# Patient Record
Sex: MALE | Race: WHITE | NOT HISPANIC OR LATINO | Employment: OTHER | URBAN - METROPOLITAN AREA
[De-identification: names, ages, dates, MRNs, and addresses within clinical notes are randomized per-mention and may not be internally consistent; named-entity substitution may affect disease eponyms.]

---

## 2019-08-19 ENCOUNTER — HOSPITAL ENCOUNTER (EMERGENCY)
Facility: HOSPITAL | Age: 59
Discharge: HOME | End: 2019-08-19
Attending: EMERGENCY MEDICINE
Payer: MEDICAID

## 2019-08-19 VITALS
BODY MASS INDEX: 25.87 KG/M2 | TEMPERATURE: 99 F | OXYGEN SATURATION: 98 % | WEIGHT: 191 LBS | RESPIRATION RATE: 18 BRPM | HEIGHT: 72 IN | SYSTOLIC BLOOD PRESSURE: 131 MMHG | DIASTOLIC BLOOD PRESSURE: 88 MMHG | HEART RATE: 56 BPM

## 2019-08-19 DIAGNOSIS — R19.7 DIARRHEA, UNSPECIFIED TYPE: Primary | ICD-10-CM

## 2019-08-19 LAB
ALBUMIN SERPL BCP-MCNC: 3.5 G/DL (ref 3.5–5.2)
ALBUMIN SERPL BCP-MCNC: 3.9 G/DL (ref 3.5–5.2)
ALP SERPL-CCNC: 52 U/L (ref 55–135)
ALP SERPL-CCNC: 58 U/L (ref 55–135)
ALT SERPL W/O P-5'-P-CCNC: 15 U/L (ref 10–44)
ALT SERPL W/O P-5'-P-CCNC: 16 U/L (ref 10–44)
ANION GAP SERPL CALC-SCNC: 5 MMOL/L (ref 8–16)
ANION GAP SERPL CALC-SCNC: 8 MMOL/L (ref 8–16)
AST SERPL-CCNC: 20 U/L (ref 10–40)
AST SERPL-CCNC: 22 U/L (ref 10–40)
BACTERIA #/AREA URNS HPF: NEGATIVE /HPF
BASOPHILS # BLD AUTO: 0.03 K/UL (ref 0–0.2)
BASOPHILS NFR BLD: 0.5 % (ref 0–1.9)
BILIRUB SERPL-MCNC: 1.2 MG/DL (ref 0.1–1)
BILIRUB SERPL-MCNC: 1.4 MG/DL (ref 0.1–1)
BILIRUB UR QL STRIP: NEGATIVE
BUN SERPL-MCNC: 11 MG/DL (ref 8–23)
BUN SERPL-MCNC: 11 MG/DL (ref 8–23)
CALCIUM SERPL-MCNC: 8.4 MG/DL (ref 8.7–10.5)
CALCIUM SERPL-MCNC: 9.1 MG/DL (ref 8.7–10.5)
CHLORIDE SERPL-SCNC: 103 MMOL/L (ref 95–110)
CHLORIDE SERPL-SCNC: 106 MMOL/L (ref 95–110)
CLARITY UR: CLEAR
CO2 SERPL-SCNC: 26 MMOL/L (ref 23–29)
CO2 SERPL-SCNC: 27 MMOL/L (ref 23–29)
COLOR UR: YELLOW
CREAT SERPL-MCNC: 1 MG/DL (ref 0.5–1.4)
CREAT SERPL-MCNC: 1 MG/DL (ref 0.5–1.4)
DIFFERENTIAL METHOD: ABNORMAL
EOSINOPHIL # BLD AUTO: 0.2 K/UL (ref 0–0.5)
EOSINOPHIL NFR BLD: 2.4 % (ref 0–8)
ERYTHROCYTE [DISTWIDTH] IN BLOOD BY AUTOMATED COUNT: 13.4 % (ref 11.5–14.5)
EST. GFR  (AFRICAN AMERICAN): >60 ML/MIN/1.73 M^2
EST. GFR  (AFRICAN AMERICAN): >60 ML/MIN/1.73 M^2
EST. GFR  (NON AFRICAN AMERICAN): >60 ML/MIN/1.73 M^2
EST. GFR  (NON AFRICAN AMERICAN): >60 ML/MIN/1.73 M^2
GLUCOSE SERPL-MCNC: 92 MG/DL (ref 70–110)
GLUCOSE SERPL-MCNC: 97 MG/DL (ref 70–110)
GLUCOSE UR QL STRIP: NEGATIVE
HCT VFR BLD AUTO: 46.3 % (ref 40–54)
HGB BLD-MCNC: 15.6 G/DL (ref 14–18)
HGB UR QL STRIP: ABNORMAL
HYALINE CASTS #/AREA URNS LPF: 4 /LPF
IMM GRANULOCYTES # BLD AUTO: 0.01 K/UL (ref 0–0.04)
IMM GRANULOCYTES NFR BLD AUTO: 0.2 % (ref 0–0.5)
KETONES UR QL STRIP: NEGATIVE
LEUKOCYTE ESTERASE UR QL STRIP: NEGATIVE
LIPASE SERPL-CCNC: 34 U/L (ref 4–60)
LYMPHOCYTES # BLD AUTO: 1.8 K/UL (ref 1–4.8)
LYMPHOCYTES NFR BLD: 28 % (ref 18–48)
MCH RBC QN AUTO: 31.8 PG (ref 27–31)
MCHC RBC AUTO-ENTMCNC: 33.7 G/DL (ref 32–36)
MCV RBC AUTO: 95 FL (ref 82–98)
MICROSCOPIC COMMENT: ABNORMAL
MONOCYTES # BLD AUTO: 0.8 K/UL (ref 0.3–1)
MONOCYTES NFR BLD: 12.5 % (ref 4–15)
NEUTROPHILS # BLD AUTO: 3.5 K/UL (ref 1.8–7.7)
NEUTROPHILS NFR BLD: 56.4 % (ref 38–73)
NITRITE UR QL STRIP: NEGATIVE
NRBC BLD-RTO: 0 /100 WBC
PH UR STRIP: 8 [PH] (ref 5–8)
PLATELET # BLD AUTO: 239 K/UL (ref 150–350)
PMV BLD AUTO: 9.3 FL (ref 9.2–12.9)
POTASSIUM SERPL-SCNC: 4.1 MMOL/L (ref 3.5–5.1)
POTASSIUM SERPL-SCNC: 4.2 MMOL/L (ref 3.5–5.1)
PROT SERPL-MCNC: 6.4 G/DL (ref 6–8.4)
PROT SERPL-MCNC: 7.3 G/DL (ref 6–8.4)
PROT UR QL STRIP: ABNORMAL
RBC # BLD AUTO: 4.9 M/UL (ref 4.6–6.2)
RBC #/AREA URNS HPF: 6 /HPF (ref 0–4)
SODIUM SERPL-SCNC: 137 MMOL/L (ref 136–145)
SODIUM SERPL-SCNC: 138 MMOL/L (ref 136–145)
SP GR UR STRIP: 1.02 (ref 1–1.03)
SQUAMOUS #/AREA URNS HPF: 1 /HPF
URN SPEC COLLECT METH UR: ABNORMAL
UROBILINOGEN UR STRIP-ACNC: NEGATIVE EU/DL
WBC # BLD AUTO: 6.25 K/UL (ref 3.9–12.7)
WBC #/AREA URNS HPF: 1 /HPF (ref 0–5)

## 2019-08-19 PROCEDURE — 85025 COMPLETE CBC W/AUTO DIFF WBC: CPT

## 2019-08-19 PROCEDURE — 80053 COMPREHEN METABOLIC PANEL: CPT

## 2019-08-19 PROCEDURE — 81001 URINALYSIS AUTO W/SCOPE: CPT

## 2019-08-19 PROCEDURE — 25000003 PHARM REV CODE 250: Performed by: EMERGENCY MEDICINE

## 2019-08-19 PROCEDURE — 36415 COLL VENOUS BLD VENIPUNCTURE: CPT

## 2019-08-19 PROCEDURE — 96374 THER/PROPH/DIAG INJ IV PUSH: CPT | Mod: 59

## 2019-08-19 PROCEDURE — 63600175 PHARM REV CODE 636 W HCPCS: Performed by: EMERGENCY MEDICINE

## 2019-08-19 PROCEDURE — 80053 COMPREHEN METABOLIC PANEL: CPT | Mod: 59

## 2019-08-19 PROCEDURE — 83690 ASSAY OF LIPASE: CPT

## 2019-08-19 PROCEDURE — 96361 HYDRATE IV INFUSION ADD-ON: CPT

## 2019-08-19 PROCEDURE — 99285 EMERGENCY DEPT VISIT HI MDM: CPT | Mod: 25

## 2019-08-19 PROCEDURE — 25500020 PHARM REV CODE 255: Performed by: EMERGENCY MEDICINE

## 2019-08-19 RX ORDER — LISINOPRIL AND HYDROCHLOROTHIAZIDE 12.5; 2 MG/1; MG/1
1 TABLET ORAL
Status: ON HOLD | COMMUNITY
End: 2022-01-04 | Stop reason: HOSPADM

## 2019-08-19 RX ORDER — ONDANSETRON 2 MG/ML
4 INJECTION INTRAMUSCULAR; INTRAVENOUS
Status: COMPLETED | OUTPATIENT
Start: 2019-08-19 | End: 2019-08-19

## 2019-08-19 RX ORDER — SIMVASTATIN 20 MG/1
20 TABLET, FILM COATED ORAL NIGHTLY
COMMUNITY
End: 2021-07-13

## 2019-08-19 RX ORDER — HYOSCYAMINE SULFATE 0.12 MG/1
0.12 TABLET SUBLINGUAL
Status: COMPLETED | OUTPATIENT
Start: 2019-08-19 | End: 2019-08-19

## 2019-08-19 RX ADMIN — IOHEXOL 100 ML: 350 INJECTION, SOLUTION INTRAVENOUS at 10:08

## 2019-08-19 RX ADMIN — HYOSCYAMINE SULFATE 0.12 MG: 0.12 TABLET ORAL at 09:08

## 2019-08-19 RX ADMIN — SODIUM CHLORIDE 1000 ML: 0.9 INJECTION, SOLUTION INTRAVENOUS at 09:08

## 2019-08-19 RX ADMIN — ONDANSETRON 4 MG: 2 INJECTION INTRAMUSCULAR; INTRAVENOUS at 09:08

## 2019-08-19 NOTE — DISCHARGE INSTRUCTIONS
Please follow-up with your primary care provider and your gastroenterologist for further evaluation and definitive care  Return if your condition becomes worse or for any concerns.

## 2019-08-19 NOTE — ED PROVIDER NOTES
Encounter Date: 8/19/2019       History 59-year-old male presents to the emergency department with complaint of lower abdominal pain and cramping associated with diarrhea patient states he had a as outpatient colonoscopy performed in December he states that his doctor told him he had something abnormal on his colonoscopy which was removed he states he has had intermittent loose stools since that time however reports for 1 week he has had persistent diarrhea patient denies associated fevers or weight loss.  Denies melanotic stools he states he did have some bright red blood in his stool last week for 1 day but attributed to possibly straining.     Chief Complaint   Patient presents with    Abdominal Pain     X 3 DAYS    Diarrhea     HPI  Review of patient's allergies indicates:  No Known Allergies  Past Medical History:   Diagnosis Date    Enlarged prostate     Hypertension     Kidney stone     Thyroid disease      Past Surgical History:   Procedure Laterality Date    THYROIDECTOMY, PARTIAL       No family history on file.  Social History     Tobacco Use    Smoking status: Not on file   Substance Use Topics    Alcohol use: Not on file    Drug use: Not on file     Review of Systems   Constitutional: Negative for fever and unexpected weight change.   HENT: Negative.    Respiratory: Negative.    Cardiovascular: Negative.    Gastrointestinal: Positive for abdominal pain, diarrhea and nausea.   Genitourinary: Negative.    Musculoskeletal: Negative.    Hematological: Negative.        Physical Exam     Initial Vitals [08/19/19 0851]   BP Pulse Resp Temp SpO2   (!) 142/97 61 16 97.9 °F (36.6 °C) 96 %      MAP       --         Physical Exam    Nursing note and vitals reviewed.  Constitutional: He appears well-developed and well-nourished.   HENT:   Head: Normocephalic.   Eyes: EOM are normal. Pupils are equal, round, and reactive to light.   Cardiovascular: Normal rate and regular rhythm.   Pulmonary/Chest: Effort  normal and breath sounds normal.   Abdominal: Soft. Normal appearance and bowel sounds are normal. He exhibits no distension, no ascites, no pulsatile midline mass and no mass. There is no hepatosplenomegaly. There is tenderness in the left lower quadrant. There is no rigidity, no rebound, no guarding and no tenderness at McBurney's point.   Neurological: He is alert and oriented to person, place, and time.   Skin: Skin is warm and dry.   Psychiatric: He has a normal mood and affect. His speech is normal and behavior is normal.         ED Course   Procedures  Labs Reviewed   CBC W/ AUTO DIFFERENTIAL   COMPREHENSIVE METABOLIC PANEL   LIPASE   URINALYSIS, REFLEX TO URINE CULTURE          Imaging Results    None          Medical Decision Making:   Initial Assessment:   Diarrhea  Differential Diagnosis:   Gastroenteritis, diverticulitis , appendicitis, colitis  Clinical Tests:   Lab Tests: Ordered and Reviewed  Radiological Study: Ordered and Reviewed  ED Management:  59-year-old male presents to the emergency room with a complaint of diarrhea that has been ongoing since December he states for the last few days he has experienced more loose stools than normal he states he has had 1 episode today.  Patient has had no associated vomiting or weight loss.  His labs are unremarkable CT imaging reveals no acute pathology including diverticulitis appendicitis patient does have incidental renal cyst noted. Patient was instructed to follow up with  who is his gastroenterologist for further evaluation and definitive care.  He was given detailed return precautions.  The patient has not had a bowel movement therefore no stool studies were able to be analyzed.                      Clinical Impression:       ICD-10-CM ICD-9-CM   1. Diarrhea, unspecified type R19.7 787.91                                Katy Rabago, [facility]  08/19/19 1208

## 2019-11-07 PROBLEM — N40.1 BPH WITH URINARY OBSTRUCTION: Status: ACTIVE | Noted: 2019-11-07

## 2019-11-07 PROBLEM — N13.8 BPH WITH URINARY OBSTRUCTION: Status: ACTIVE | Noted: 2019-11-07

## 2020-06-29 DIAGNOSIS — R26.81 UNSTEADY GAIT: ICD-10-CM

## 2020-06-29 DIAGNOSIS — G24.5 EYE TWITCH: ICD-10-CM

## 2020-06-29 DIAGNOSIS — R25.1 TREMOR OF RIGHT HAND: Primary | ICD-10-CM

## 2020-06-29 DIAGNOSIS — H02.402 PTOSIS OF LEFT EYELID: ICD-10-CM

## 2020-07-01 ENCOUNTER — HOSPITAL ENCOUNTER (OUTPATIENT)
Dept: RADIOLOGY | Facility: HOSPITAL | Age: 60
Discharge: HOME | End: 2020-07-01
Attending: CLINICAL NURSE SPECIALIST
Payer: MEDICAID

## 2020-07-01 DIAGNOSIS — R26.81 UNSTEADY GAIT: ICD-10-CM

## 2020-07-01 DIAGNOSIS — H02.402 PTOSIS OF LEFT EYELID: ICD-10-CM

## 2020-07-01 DIAGNOSIS — G24.5 EYE TWITCH: ICD-10-CM

## 2020-07-01 DIAGNOSIS — R25.1 TREMOR OF RIGHT HAND: ICD-10-CM

## 2020-07-01 PROCEDURE — 93880 EXTRACRANIAL BILAT STUDY: CPT | Mod: TC,PO

## 2020-07-01 PROCEDURE — 70551 MRI BRAIN STEM W/O DYE: CPT | Mod: TC,PO

## 2020-10-23 ENCOUNTER — OFFICE VISIT (OUTPATIENT)
Dept: URGENT CARE | Facility: CLINIC | Age: 60
End: 2020-10-23
Payer: MEDICAID

## 2020-10-23 VITALS
OXYGEN SATURATION: 97 % | WEIGHT: 192 LBS | BODY MASS INDEX: 25.33 KG/M2 | SYSTOLIC BLOOD PRESSURE: 147 MMHG | TEMPERATURE: 98 F | DIASTOLIC BLOOD PRESSURE: 97 MMHG | HEART RATE: 67 BPM | RESPIRATION RATE: 12 BRPM

## 2020-10-23 DIAGNOSIS — L08.9 INFECTED SEBACEOUS CYST: Primary | ICD-10-CM

## 2020-10-23 DIAGNOSIS — L72.3 INFECTED SEBACEOUS CYST: Primary | ICD-10-CM

## 2020-10-23 PROCEDURE — 99204 PR OFFICE/OUTPT VISIT, NEW, LEVL IV, 45-59 MIN: ICD-10-PCS | Mod: S$GLB,,, | Performed by: NURSE PRACTITIONER

## 2020-10-23 PROCEDURE — 99204 OFFICE O/P NEW MOD 45 MIN: CPT | Mod: S$GLB,,, | Performed by: NURSE PRACTITIONER

## 2020-10-23 RX ORDER — SULFAMETHOXAZOLE AND TRIMETHOPRIM 800; 160 MG/1; MG/1
1 TABLET ORAL 2 TIMES DAILY
Qty: 20 TABLET | Refills: 0 | Status: SHIPPED | OUTPATIENT
Start: 2020-10-23 | End: 2020-11-02

## 2020-10-23 NOTE — PATIENT INSTRUCTIONS
Epidermoid Cyst (Sebaceous Cyst), Infected (Antibiotic Treatment)  You have an epidermoid cyst. This is a small, painless lump under your skin. An epidermoid cyst (often called a sebaceous cyst, epidermal cyst, or epidermal inclusion cyst) is a term most often used for 2 similar types of cysts:  · Epidermoid cysts. These cysts form slowly under the skin. They can be found on most parts of the body. But they are most often found on areas with more hair such as the scalp, face, upper back, and genitals.  · Pilar cysts. These are similar to epidermoid cysts. But they start from a different part of the hair follicle. They are more likely to be on the scalp.  Here are some general facts about these cysts:  · A cyst is a sac filled with material that is often cheesy, fatty, oily, or stringy. The material inside can be thick. Or it can be a liquid.  · You can usually move the cyst slightly if you try.  · The cysts can be smaller than a pea or as large as a few inches.  · The cysts are usually not painful, unless they become inflamed or infected.  · The area around the cyst may smell bad. If the cyst breaks open, the material inside it often smells bad as well.  Your cyst became infected and your healthcare provider wanted to treat it with antibiotics. If the antibiotics dont clear up the infection, the cyst will need to be drained by making a small cut (incision). Local anesthesia will be used to numb the area.  Home care  · Resist the temptation to squeeze or pop the cyst, stick a needle in it, or cut it open. This often leads to a worsening infection and scarring.  · Take the antibiotic as directed until it is all used up.  · Soak the affected area in hot water or apply a hot pack (a thin, clean towel soaked in hot water) for 20 minutes at a time. Do this 3 to 4 times a day.  · Apply antibiotic cream or ointment 3 times a day.  · You may use over-the-counter pain medicine to control pain, unless another medicine was  given. If you have chronic liver or kidney disease or ever had a stomach ulcer or GI bleeding, talk with your healthcare provider before using these medicines.  Prevention  Once this infection has healed, reduce the risk of future infections by:  · Keeping the cyst area clean by bathing or showering daily  · Avoiding tight-fitting clothing in the cyst area  Follow-up care  Follow up with your healthcare provider, or as advised. If a gauze packing was put in your wound, it should be removed in a few days as advised by your healthcare provider. Check your wound every day for the signs listed below.  When to seek medical advice  Call your healthcare provider right away if any of these occur:  · Pus coming from the cyst  · Increasing redness around the wound  · Increasing local pain or swelling  · Fever of 100.4°F (38ºC) or higher, or as directed by your provider  Date Last Reviewed: 10/5/2016  © 9888-9363 [employer]. [address] [place] [address]. All rights reserved. This information is not intended as a substitute for professional medical care. Always follow your healthcare professional's instructions.        Epidermoid Cyst (Sebaceous Cyst), Infected (Antibiotic Treatment)  You have an epidermoid cyst. This is a small, painless lump under your skin. An epidermoid cyst (often called a sebaceous cyst, epidermal cyst, or epidermal inclusion cyst) is a term most often used for 2 similar types of cysts:  · Epidermoid cysts. These cysts form slowly under the skin. They can be found on most parts of the body. But they are most often found on areas with more hair such as the scalp, face, upper back, and genitals.  · Pilar cysts. These are similar to epidermoid cysts. But they start from a different part of the hair follicle. They are more likely to be on the scalp.  Here are some general facts about these cysts:  · A cyst is a sac filled with material that is often cheesy, fatty, oily, or stringy.  The material inside can be thick. Or it can be a liquid.  · You can usually move the cyst slightly if you try.  · The cysts can be smaller than a pea or as large as a few inches.  · The cysts are usually not painful, unless they become inflamed or infected.  · The area around the cyst may smell bad. If the cyst breaks open, the material inside it often smells bad as well.  Your cyst became infected and your healthcare provider wanted to treat it with antibiotics. If the antibiotics dont clear up the infection, the cyst will need to be drained by making a small cut (incision). Local anesthesia will be used to numb the area.  Home care  · Resist the temptation to squeeze or pop the cyst, stick a needle in it, or cut it open. This often leads to a worsening infection and scarring.  · Take the antibiotic as directed until it is all used up.  · Soak the affected area in hot water or apply a hot pack (a thin, clean towel soaked in hot water) for 20 minutes at a time. Do this 3 to 4 times a day.  · Apply antibiotic cream or ointment 3 times a day.  · You may use over-the-counter pain medicine to control pain, unless another medicine was given. If you have chronic liver or kidney disease or ever had a stomach ulcer or GI bleeding, talk with your healthcare provider before using these medicines.  Prevention  Once this infection has healed, reduce the risk of future infections by:  · Keeping the cyst area clean by bathing or showering daily  · Avoiding tight-fitting clothing in the cyst area  Follow-up care  Follow up with your healthcare provider, or as advised. If a gauze packing was put in your wound, it should be removed in a few days as advised by your healthcare provider. Check your wound every day for the signs listed below.  When to seek medical advice  Call your healthcare provider right away if any of these occur:  · Pus coming from the cyst  · Increasing redness around the wound  · Increasing local pain or  swelling  · Fever of 100.4°F (38ºC) or higher, or as directed by your provider  Date Last Reviewed: 10/5/2016  © 9015-5990 [employer]. [address] [place] [address], [place] [address]. All rights reserved. This information is not intended as a substitute for professional medical care. Always follow your healthcare professional's instructions.

## 2020-10-23 NOTE — PROGRESS NOTES
Subjective:       Patient ID: Helder Brand is a 60 y.o. male.    Vitals:  weight is 87.1 kg (192 lb). His temperature is 98.2 °F (36.8 °C). His blood pressure is 147/97 (abnormal) and his pulse is 67. His respiration is 12 and oxygen saturation is 97%.     Chief Complaint: Facial Swelling    CC:: swelling on the forehead and almost to the eye with pain and redness  Onset: 2 days   TX: none       Constitution: Negative for appetite change, chills and fever.   HENT: Negative for ear pain, congestion and sore throat.    Neck: Negative for painful lymph nodes.   Eyes: Negative for eye discharge and eye redness.   Respiratory: Negative for cough.    Gastrointestinal: Negative for vomiting and diarrhea.   Genitourinary: Negative for dysuria.   Musculoskeletal: Negative for muscle ache.   Skin: Positive for erythema and abscess. Negative for rash.   Neurological: Negative for headaches and seizures.   Hematologic/Lymphatic: Negative for swollen lymph nodes.       Objective:      Physical Exam   Constitutional: He is oriented to person, place, and time. He appears well-developed.   HENT:   Head: Normocephalic and atraumatic. Head is without abrasion, without contusion and without laceration.       Ears:   Right Ear: External ear normal.   Left Ear: External ear normal.   Nose: Nose normal.   Mouth/Throat: Oropharynx is clear and moist and mucous membranes are normal.   Eyes: Pupils are equal, round, and reactive to light. Conjunctivae, EOM and lids are normal.   Neck: Trachea normal, full passive range of motion without pain and phonation normal. Neck supple.   Cardiovascular: Normal rate, regular rhythm and normal heart sounds.   Pulmonary/Chest: Effort normal and breath sounds normal. No stridor. No respiratory distress.   Musculoskeletal: Normal range of motion.   Neurological: He is alert and oriented to person, place, and time.   Skin: Skin is warm, dry, intact and no rash. Capillary refill takes less than 2  seconds. Lesions:  erythemaabrasion, burn, bruising and ecchymosisPsychiatric: His speech is normal and behavior is normal. Judgment and thought content normal.   Nursing note and vitals reviewed.        Assessment:       1. Infected sebaceous cyst        Plan:         Infected sebaceous cyst    Other orders  -     sulfamethoxazole-trimethoprim 800-160mg (BACTRIM DS) 800-160 mg Tab; Take 1 tablet by mouth 2 (two) times daily. for 10 days  Dispense: 20 tablet; Refill: 0

## 2020-10-25 ENCOUNTER — HOSPITAL ENCOUNTER (EMERGENCY)
Facility: HOSPITAL | Age: 60
Discharge: HOME | End: 2020-10-25
Attending: EMERGENCY MEDICINE
Payer: MEDICAID

## 2020-10-25 VITALS
RESPIRATION RATE: 20 BRPM | HEIGHT: 72 IN | SYSTOLIC BLOOD PRESSURE: 120 MMHG | TEMPERATURE: 98 F | HEART RATE: 69 BPM | OXYGEN SATURATION: 95 % | BODY MASS INDEX: 26.01 KG/M2 | WEIGHT: 192 LBS | DIASTOLIC BLOOD PRESSURE: 87 MMHG

## 2020-10-25 DIAGNOSIS — L02.01 FACIAL ABSCESS: Primary | ICD-10-CM

## 2020-10-25 LAB
ALBUMIN SERPL BCP-MCNC: 4.2 G/DL (ref 3.5–5.2)
ALP SERPL-CCNC: 69 U/L (ref 55–135)
ALT SERPL W/O P-5'-P-CCNC: 17 U/L (ref 10–44)
ANION GAP SERPL CALC-SCNC: 6 MMOL/L (ref 8–16)
AST SERPL-CCNC: 21 U/L (ref 10–40)
BASOPHILS # BLD AUTO: 0.06 K/UL (ref 0–0.2)
BASOPHILS NFR BLD: 0.6 % (ref 0–1.9)
BILIRUB SERPL-MCNC: 1.2 MG/DL (ref 0.1–1)
BUN SERPL-MCNC: 14 MG/DL (ref 8–23)
CALCIUM SERPL-MCNC: 9 MG/DL (ref 8.7–10.5)
CHLORIDE SERPL-SCNC: 104 MMOL/L (ref 95–110)
CO2 SERPL-SCNC: 25 MMOL/L (ref 23–29)
CREAT SERPL-MCNC: 1.2 MG/DL (ref 0.5–1.4)
DIFFERENTIAL METHOD: ABNORMAL
EOSINOPHIL # BLD AUTO: 0.2 K/UL (ref 0–0.5)
EOSINOPHIL NFR BLD: 2.3 % (ref 0–8)
ERYTHROCYTE [DISTWIDTH] IN BLOOD BY AUTOMATED COUNT: 13.4 % (ref 11.5–14.5)
EST. GFR  (AFRICAN AMERICAN): >60 ML/MIN/1.73 M^2
EST. GFR  (NON AFRICAN AMERICAN): >60 ML/MIN/1.73 M^2
GLUCOSE SERPL-MCNC: 128 MG/DL (ref 70–110)
HCT VFR BLD AUTO: 50 % (ref 40–54)
HGB BLD-MCNC: 17.1 G/DL (ref 14–18)
IMM GRANULOCYTES # BLD AUTO: 0.02 K/UL (ref 0–0.04)
IMM GRANULOCYTES NFR BLD AUTO: 0.2 % (ref 0–0.5)
LYMPHOCYTES # BLD AUTO: 2.3 K/UL (ref 1–4.8)
LYMPHOCYTES NFR BLD: 24 % (ref 18–48)
MCH RBC QN AUTO: 31.5 PG (ref 27–31)
MCHC RBC AUTO-ENTMCNC: 34.2 G/DL (ref 32–36)
MCV RBC AUTO: 92 FL (ref 82–98)
MONOCYTES # BLD AUTO: 1.1 K/UL (ref 0.3–1)
MONOCYTES NFR BLD: 11.6 % (ref 4–15)
NEUTROPHILS # BLD AUTO: 5.8 K/UL (ref 1.8–7.7)
NEUTROPHILS NFR BLD: 61.3 % (ref 38–73)
NRBC BLD-RTO: 0 /100 WBC
PLATELET # BLD AUTO: 258 K/UL (ref 150–350)
PMV BLD AUTO: 9.5 FL (ref 9.2–12.9)
POTASSIUM SERPL-SCNC: 3.7 MMOL/L (ref 3.5–5.1)
PROT SERPL-MCNC: 8 G/DL (ref 6–8.4)
RBC # BLD AUTO: 5.43 M/UL (ref 4.6–6.2)
SODIUM SERPL-SCNC: 135 MMOL/L (ref 136–145)
WBC # BLD AUTO: 9.45 K/UL (ref 3.9–12.7)

## 2020-10-25 PROCEDURE — 10060 I&D ABSCESS SIMPLE/SINGLE: CPT

## 2020-10-25 PROCEDURE — 25000003 PHARM REV CODE 250: Performed by: EMERGENCY MEDICINE

## 2020-10-25 PROCEDURE — 96365 THER/PROPH/DIAG IV INF INIT: CPT

## 2020-10-25 PROCEDURE — 96366 THER/PROPH/DIAG IV INF ADDON: CPT | Mod: 59

## 2020-10-25 PROCEDURE — 87070 CULTURE OTHR SPECIMN AEROBIC: CPT

## 2020-10-25 PROCEDURE — 63600175 PHARM REV CODE 636 W HCPCS: Performed by: EMERGENCY MEDICINE

## 2020-10-25 PROCEDURE — 99284 EMERGENCY DEPT VISIT MOD MDM: CPT | Mod: 25

## 2020-10-25 PROCEDURE — 85025 COMPLETE CBC W/AUTO DIFF WBC: CPT

## 2020-10-25 PROCEDURE — 80053 COMPREHEN METABOLIC PANEL: CPT

## 2020-10-25 RX ORDER — HYDROCODONE BITARTRATE AND ACETAMINOPHEN 5; 325 MG/1; MG/1
1 TABLET ORAL EVERY 6 HOURS PRN
Qty: 12 TABLET | Refills: 0 | Status: SHIPPED | OUTPATIENT
Start: 2020-10-25 | End: 2021-07-13

## 2020-10-25 RX ORDER — MUPIROCIN 20 MG/G
OINTMENT TOPICAL 3 TIMES DAILY
Qty: 22 G | Refills: 0 | Status: SHIPPED | OUTPATIENT
Start: 2020-10-25 | End: 2021-07-13

## 2020-10-25 RX ORDER — LIDOCAINE HYDROCHLORIDE 10 MG/ML
10 INJECTION, SOLUTION EPIDURAL; INFILTRATION; INTRACAUDAL; PERINEURAL
Status: COMPLETED | OUTPATIENT
Start: 2020-10-25 | End: 2020-10-25

## 2020-10-25 RX ORDER — VANCOMYCIN HCL IN 5 % DEXTROSE 1G/250ML
1000 PLASTIC BAG, INJECTION (ML) INTRAVENOUS ONCE
Status: COMPLETED | OUTPATIENT
Start: 2020-10-25 | End: 2020-10-25

## 2020-10-25 RX ADMIN — VANCOMYCIN HYDROCHLORIDE 1000 MG: 1 INJECTION, POWDER, LYOPHILIZED, FOR SOLUTION INTRAVENOUS at 10:10

## 2020-10-25 RX ADMIN — VANCOMYCIN HYDROCHLORIDE 500 MG: 500 INJECTION, POWDER, LYOPHILIZED, FOR SOLUTION INTRAVENOUS at 08:10

## 2020-10-25 RX ADMIN — LIDOCAINE HYDROCHLORIDE 100 MG: 10 INJECTION, SOLUTION EPIDURAL; INFILTRATION; INTRACAUDAL; PERINEURAL at 08:10

## 2020-10-25 NOTE — DISCHARGE INSTRUCTIONS
Continue Bactrim.  Elevate head above heart.  Return immediately for any fever, worsening swelling, or any other problems.  Packing removal in 48 hours.

## 2020-10-25 NOTE — ED PROVIDER NOTES
Encounter Date: 10/25/2020       History     Chief Complaint   Patient presents with    Abscess     Seen by ricardo, on antibiotics; Not getting better and pain has gotten worse, Eyes started swelling 3 days ago     Patient reports approximately 45 day history of redness and swelling to left lower forehead.  Patient went to urgent care clinic.  He was paced on Bactrim.  Patient has been taking Bactrim for approximately 2 days with no improvement.  Symptoms seem to be worsening.  No fever or chills.  No shortness of breath.  No similar symptoms in the past.        Review of patient's allergies indicates:  No Known Allergies  Past Medical History:   Diagnosis Date    Enlarged prostate     Hypertension     Kidney stone     Thyroid disease     benign growth, partial thyroidectomy     Past Surgical History:   Procedure Laterality Date    PROSTATE SURGERY      THYROIDECTOMY, PARTIAL      urolift  2019    [facility]     Family History   Problem Relation Age of Onset    Hypertension Mother     COPD Mother     Aneurysm Father      Social History     Tobacco Use    Smoking status: Former Smoker     Quit date: 1980     Years since quittin.9    Smokeless tobacco: Never Used   Substance Use Topics    Alcohol use: Yes     Alcohol/week: 2.0 standard drinks     Types: 2 Cans of beer per week     Comment: weekend only    Drug use: Not on file     Review of Systems   Constitutional: Negative for chills and fever.   HENT: Negative for sore throat.         Approximately 2 x 2 cm area of erythema induration with central pustule just above left eyebrow.  No drainage.  There is some mild left periorbital edema with no erythema.  No painful ocular movements.   Eyes: Negative for photophobia and visual disturbance.   Respiratory: Negative for shortness of breath.    Cardiovascular: Negative for chest pain.   Gastrointestinal: Negative for abdominal pain and vomiting.   Genitourinary: Negative for dysuria.    Musculoskeletal: Negative for joint swelling.   Skin: Negative for rash.   Neurological: Negative for weakness and headaches.   Psychiatric/Behavioral: Negative for confusion.       Physical Exam     Initial Vitals [10/25/20 0748]   BP Pulse Resp Temp SpO2   (!) 140/72 89 14 98.4 °F (36.9 °C) 96 %      MAP       --         Physical Exam    Nursing note and vitals reviewed.  Constitutional: He is not diaphoretic. No distress.   HENT:   Head: Normocephalic and atraumatic.   Approximately 2 x 2 cm area of erythema induration with pustule just above medial left eyebrow.  Mild associated periorbital swelling.   Eyes: Conjunctivae are normal.   Neck: Normal range of motion.   Musculoskeletal: Normal range of motion.   Lymphadenopathy:     He has no cervical adenopathy.   Skin: No rash noted.   Psychiatric: He has a normal mood and affect.         ED Course   I & D - Incision and Drainage    Date/Time: 10/25/2020 9:32 AM  Location procedure was performed: [facility] EMERGENCY DEPARTMENT  Performed by: Nik Arevalo MD  Authorized by: Nik Arevalo MD   Type: abscess  Anesthesia: local infiltration    Anesthesia:  Local Anesthetic: lidocaine 1% without epinephrine  Scalpel size: 11  Incision type: single straight  Complexity: simple  Drainage: bloody and  purulent  Drainage amount: moderate  Wound treatment: incision  Packing material: 1/4 in iodoform gauze  Patient tolerance: Patient tolerated the procedure well with no immediate complications        Labs Reviewed   CBC W/ AUTO DIFFERENTIAL - Abnormal; Notable for the following components:       Result Value    Mean Corpuscular Hemoglobin 31.5 (*)     Mono # 1.1 (*)     All other components within normal limits   CULTURE, AEROBIC  (SPECIFY SOURCE)   COMPREHENSIVE METABOLIC PANEL          Imaging Results    None          Medical Decision Making:   History:   Old Medical Records: I decided to obtain old medical records.  Clinical Tests:   Lab Tests: Reviewed  ED  Management:  Patient presents with abscess/cellulitis of forehead.  Symptoms are worsening on Bactrim.  Discussed risk and benefits of incision and in drainage including scarring.  Patient states he knows it has to be open.  Status post I&D.  Will give 1 dose of parental antibiotics.  Continue Bactrim.                             Clinical Impression:     ICD-10-CM ICD-9-CM   1. Facial abscess  L02.01 682.0                                               Nik Arevalo MD  10/25/20 0935       Nik Arevalo MD  11/07/20 [id number]

## 2020-10-25 NOTE — ED TRIAGE NOTES
"Present to the ER with c/o " this cyst on my eyebrow is killing me" reports cyst to face x 4 days, reports going to urgent care 2 days ago and placed on bactrim, rates pain 6/10, denies fever/chills   "

## 2020-10-30 LAB — BACTERIA SPEC AEROBE CULT: NO GROWTH

## 2021-02-23 ENCOUNTER — LAB VISIT (OUTPATIENT)
Dept: URGENT CARE | Facility: CLINIC | Age: 61
End: 2021-02-23
Payer: MEDICAID

## 2021-02-23 DIAGNOSIS — Z20.822 ENCOUNTER FOR LABORATORY TESTING FOR COVID-19 VIRUS: ICD-10-CM

## 2021-02-23 PROCEDURE — U0003 INFECTIOUS AGENT DETECTION BY NUCLEIC ACID (DNA OR RNA); SEVERE ACUTE RESPIRATORY SYNDROME CORONAVIRUS 2 (SARS-COV-2) (CORONAVIRUS DISEASE [COVID-19]), AMPLIFIED PROBE TECHNIQUE, MAKING USE OF HIGH THROUGHPUT TECHNOLOGIES AS DESCRIBED BY CMS-2020-01-R: HCPCS

## 2021-02-23 PROCEDURE — U0005 INFEC AGEN DETEC AMPLI PROBE: HCPCS

## 2021-02-24 LAB — SARS-COV-2 RNA RESP QL NAA+PROBE: NOT DETECTED

## 2021-03-09 ENCOUNTER — HOSPITAL ENCOUNTER (EMERGENCY)
Facility: HOSPITAL | Age: 61
Discharge: HOME | End: 2021-03-09
Attending: EMERGENCY MEDICINE
Payer: MEDICAID

## 2021-03-09 VITALS
RESPIRATION RATE: 18 BRPM | BODY MASS INDEX: 26.72 KG/M2 | HEART RATE: 66 BPM | OXYGEN SATURATION: 95 % | TEMPERATURE: 98 F | SYSTOLIC BLOOD PRESSURE: 124 MMHG | WEIGHT: 197 LBS | DIASTOLIC BLOOD PRESSURE: 86 MMHG

## 2021-03-09 DIAGNOSIS — R94.31 EKG ABNORMALITIES: ICD-10-CM

## 2021-03-09 DIAGNOSIS — R05.9 COUGH: ICD-10-CM

## 2021-03-09 LAB
ALBUMIN SERPL BCP-MCNC: 3.9 G/DL (ref 3.5–5.2)
ALP SERPL-CCNC: 78 U/L (ref 55–135)
ALT SERPL W/O P-5'-P-CCNC: 22 U/L (ref 10–44)
ANION GAP SERPL CALC-SCNC: 8 MMOL/L (ref 8–16)
AST SERPL-CCNC: 22 U/L (ref 10–40)
BASOPHILS # BLD AUTO: 0.08 K/UL (ref 0–0.2)
BASOPHILS NFR BLD: 1.1 % (ref 0–1.9)
BILIRUB SERPL-MCNC: 0.6 MG/DL (ref 0.1–1)
BNP SERPL-MCNC: 38 PG/ML (ref 0–99)
BUN SERPL-MCNC: 12 MG/DL (ref 8–23)
CALCIUM SERPL-MCNC: 9.2 MG/DL (ref 8.7–10.5)
CHLORIDE SERPL-SCNC: 105 MMOL/L (ref 95–110)
CO2 SERPL-SCNC: 25 MMOL/L (ref 23–29)
CREAT SERPL-MCNC: 0.9 MG/DL (ref 0.5–1.4)
DIFFERENTIAL METHOD: ABNORMAL
EOSINOPHIL # BLD AUTO: 0.7 K/UL (ref 0–0.5)
EOSINOPHIL NFR BLD: 8.9 % (ref 0–8)
ERYTHROCYTE [DISTWIDTH] IN BLOOD BY AUTOMATED COUNT: 13 % (ref 11.5–14.5)
EST. GFR  (AFRICAN AMERICAN): >60 ML/MIN/1.73 M^2
EST. GFR  (NON AFRICAN AMERICAN): >60 ML/MIN/1.73 M^2
GLUCOSE SERPL-MCNC: 94 MG/DL (ref 70–110)
HCT VFR BLD AUTO: 47.8 % (ref 40–54)
HGB BLD-MCNC: 16.3 G/DL (ref 14–18)
IMM GRANULOCYTES # BLD AUTO: 0.01 K/UL (ref 0–0.04)
IMM GRANULOCYTES NFR BLD AUTO: 0.1 % (ref 0–0.5)
LYMPHOCYTES # BLD AUTO: 1.9 K/UL (ref 1–4.8)
LYMPHOCYTES NFR BLD: 25.7 % (ref 18–48)
MCH RBC QN AUTO: 31.8 PG (ref 27–31)
MCHC RBC AUTO-ENTMCNC: 34.1 G/DL (ref 32–36)
MCV RBC AUTO: 93 FL (ref 82–98)
MONOCYTES # BLD AUTO: 0.9 K/UL (ref 0.3–1)
MONOCYTES NFR BLD: 11.7 % (ref 4–15)
NEUTROPHILS # BLD AUTO: 3.9 K/UL (ref 1.8–7.7)
NEUTROPHILS NFR BLD: 52.5 % (ref 38–73)
NRBC BLD-RTO: 0 /100 WBC
PLATELET # BLD AUTO: 234 K/UL (ref 150–350)
PMV BLD AUTO: 9 FL (ref 9.2–12.9)
POTASSIUM SERPL-SCNC: 4.2 MMOL/L (ref 3.5–5.1)
PROT SERPL-MCNC: 7.4 G/DL (ref 6–8.4)
RBC # BLD AUTO: 5.12 M/UL (ref 4.6–6.2)
SARS-COV-2 RDRP RESP QL NAA+PROBE: NEGATIVE
SODIUM SERPL-SCNC: 138 MMOL/L (ref 136–145)
TROPONIN I SERPL DL<=0.01 NG/ML-MCNC: <0.006 NG/ML (ref 0–0.03)
WBC # BLD AUTO: 7.43 K/UL (ref 3.9–12.7)

## 2021-03-09 PROCEDURE — 83880 ASSAY OF NATRIURETIC PEPTIDE: CPT | Performed by: NURSE PRACTITIONER

## 2021-03-09 PROCEDURE — 99284 EMERGENCY DEPT VISIT MOD MDM: CPT | Mod: 25

## 2021-03-09 PROCEDURE — 93010 EKG 12-LEAD: ICD-10-PCS | Mod: ,,, | Performed by: INTERNAL MEDICINE

## 2021-03-09 PROCEDURE — 93010 ELECTROCARDIOGRAM REPORT: CPT | Mod: ,,, | Performed by: INTERNAL MEDICINE

## 2021-03-09 PROCEDURE — 85025 COMPLETE CBC W/AUTO DIFF WBC: CPT | Performed by: NURSE PRACTITIONER

## 2021-03-09 PROCEDURE — 93005 ELECTROCARDIOGRAM TRACING: CPT

## 2021-03-09 PROCEDURE — 36415 COLL VENOUS BLD VENIPUNCTURE: CPT | Performed by: NURSE PRACTITIONER

## 2021-03-09 PROCEDURE — 84484 ASSAY OF TROPONIN QUANT: CPT | Performed by: NURSE PRACTITIONER

## 2021-03-09 PROCEDURE — 80053 COMPREHEN METABOLIC PANEL: CPT | Performed by: NURSE PRACTITIONER

## 2021-03-09 PROCEDURE — U0002 COVID-19 LAB TEST NON-CDC: HCPCS | Performed by: NURSE PRACTITIONER

## 2021-03-15 ENCOUNTER — PATIENT OUTREACH (OUTPATIENT)
Dept: EMERGENCY MEDICINE | Facility: HOSPITAL | Age: 61
End: 2021-03-15

## 2021-04-29 ENCOUNTER — PATIENT MESSAGE (OUTPATIENT)
Dept: RESEARCH | Facility: HOSPITAL | Age: 61
End: 2021-04-29

## 2021-06-15 ENCOUNTER — TELEPHONE (OUTPATIENT)
Dept: PULMONOLOGY | Facility: CLINIC | Age: 61
End: 2021-06-15

## 2021-06-17 ENCOUNTER — OFFICE VISIT (OUTPATIENT)
Dept: PULMONOLOGY | Facility: CLINIC | Age: 61
End: 2021-06-17
Payer: MEDICAID

## 2021-06-17 VITALS
HEART RATE: 63 BPM | BODY MASS INDEX: 25.05 KG/M2 | DIASTOLIC BLOOD PRESSURE: 88 MMHG | OXYGEN SATURATION: 98 % | WEIGHT: 184.94 LBS | HEIGHT: 72 IN | SYSTOLIC BLOOD PRESSURE: 133 MMHG

## 2021-06-17 DIAGNOSIS — J44.9 CHRONIC OBSTRUCTIVE PULMONARY DISEASE, UNSPECIFIED COPD TYPE: Primary | ICD-10-CM

## 2021-06-17 DIAGNOSIS — R07.89 CHEST TIGHTNESS: ICD-10-CM

## 2021-06-17 DIAGNOSIS — R91.1 LUNG NODULE: ICD-10-CM

## 2021-06-17 PROCEDURE — 99204 PR OFFICE/OUTPT VISIT, NEW, LEVL IV, 45-59 MIN: ICD-10-PCS | Mod: S$PBB,,, | Performed by: INTERNAL MEDICINE

## 2021-06-17 PROCEDURE — 99999 PR PBB SHADOW E&M-EST. PATIENT-LVL III: ICD-10-PCS | Mod: PBBFAC,,, | Performed by: INTERNAL MEDICINE

## 2021-06-17 PROCEDURE — 99999 PR PBB SHADOW E&M-EST. PATIENT-LVL III: CPT | Mod: PBBFAC,,, | Performed by: INTERNAL MEDICINE

## 2021-06-17 PROCEDURE — 99213 OFFICE O/P EST LOW 20 MIN: CPT | Mod: PBBFAC,PO | Performed by: INTERNAL MEDICINE

## 2021-06-17 PROCEDURE — 99204 OFFICE O/P NEW MOD 45 MIN: CPT | Mod: S$PBB,,, | Performed by: INTERNAL MEDICINE

## 2021-06-17 RX ORDER — SIMVASTATIN 20 MG/1
TABLET, FILM COATED ORAL
COMMUNITY
End: 2021-07-13

## 2021-06-17 RX ORDER — LISINOPRIL 10 MG/1
TABLET ORAL
COMMUNITY
End: 2021-07-13

## 2021-06-25 ENCOUNTER — TELEPHONE (OUTPATIENT)
Dept: PULMONOLOGY | Facility: CLINIC | Age: 61
End: 2021-06-25

## 2021-06-25 ENCOUNTER — HOSPITAL ENCOUNTER (OUTPATIENT)
Dept: PULMONOLOGY | Facility: HOSPITAL | Age: 61
Discharge: HOME | End: 2021-06-25
Attending: INTERNAL MEDICINE
Payer: MEDICAID

## 2021-06-25 DIAGNOSIS — J44.9 CHRONIC OBSTRUCTIVE PULMONARY DISEASE, UNSPECIFIED COPD TYPE: ICD-10-CM

## 2021-06-25 LAB
BRPFT: NORMAL
DLCO ADJ PRE: 25.19 ML/(MIN*MMHG)
DLCO SINGLE BREATH LLN: 23.36
DLCO SINGLE BREATH PRE REF: 83.2 %
DLCO SINGLE BREATH REF: 30.29
DLCOC SBVA LLN: 2.91
DLCOC SBVA PRE REF: 74.6 %
DLCOC SBVA REF: 4.02
DLCOC SINGLE BREATH LLN: 23.36
DLCOC SINGLE BREATH PRE REF: 83.2 %
DLCOC SINGLE BREATH REF: 30.29
DLCOVA LLN: 2.91
DLCOVA PRE REF: 74.6 %
DLCOVA PRE: 3 ML/(MIN*MMHG*L)
DLCOVA REF: 4.02
DLVAADJ PRE: 3 ML/(MIN*MMHG*L)
ERVN2 LLN: -16448.79
ERVN2 PRE REF: 95.9 %
ERVN2 PRE: 1.16 L
ERVN2 REF: 1.21
FEF 25 75 CHG: -22.3 %
FEF 25 75 LLN: 1.39
FEF 25 75 POST REF: 49 %
FEF 25 75 PRE REF: 63.1 %
FEF 25 75 REF: 2.94
FET100 CHG: 4.4 %
FEV1 CHG: -4.7 %
FEV1 FVC CHG: -5.2 %
FEV1 FVC LLN: 64
FEV1 FVC POST REF: 73.8 %
FEV1 FVC PRE REF: 77.8 %
FEV1 FVC REF: 77
FEV1 LLN: 2.75
FEV1 POST REF: 92.8 %
FEV1 PRE REF: 97.4 %
FEV1 REF: 3.7
FRCN2 LLN: 2.77
FRCN2 PRE REF: 115.3 %
FRCN2 REF: 3.76
FVC CHG: 0.4 %
FVC LLN: 3.66
FVC POST REF: 125.2 %
FVC PRE REF: 124.7 %
FVC REF: 4.84
IVC PRE: 5.6 L
IVC SINGLE BREATH LLN: 3.66
IVC SINGLE BREATH PRE REF: 115.7 %
IVC SINGLE BREATH REF: 4.84
MVV LLN: 121
MVV PRE REF: 103.5 %
MVV REF: 142
PEF CHG: 3.1 %
PEF LLN: 7.01
PEF POST REF: 101 %
PEF PRE REF: 97.9 %
PEF REF: 9.46
POST FEF 25 75: 1.44 L/S
POST FET 100: 8.94 SEC
POST FEV1 FVC: 56.57 %
POST FEV1: 3.43 L
POST FVC: 6.06 L
POST PEF: 9.55 L/S
PRE DLCO: 25.19 ML/(MIN*MMHG)
PRE FEF 25 75: 1.86 L/S
PRE FET 100: 8.57 SEC
PRE FEV1 FVC: 59.66 %
PRE FEV1: 3.6 L
PRE FRC N2: 4.34 L
PRE FVC: 6.04 L
PRE MVV: 147 L/MIN
PRE PEF: 9.26 L/S
RVN2 LLN: 1.88
RVN2 PRE REF: 131.4 %
RVN2 PRE: 3.36 L
RVN2 REF: 2.55
RVN2TLCN2 LLN: 29.55
RVN2TLCN2 PRE REF: 91.3 %
RVN2TLCN2 PRE: 35.19 %
RVN2TLCN2 REF: 38.53
TLCN2 LLN: 6.39
TLCN2 PRE REF: 126.4 %
TLCN2 PRE: 9.54 L
TLCN2 REF: 7.54
VA PRE: 8.41 L
VA SINGLE BREATH LLN: 7.39
VA SINGLE BREATH PRE REF: 113.8 %
VA SINGLE BREATH REF: 7.39
VCMAXN2 LLN: 3.66
VCMAXN2 PRE REF: 127.6 %
VCMAXN2 PRE: 6.18 L
VCMAXN2 REF: 4.84

## 2021-06-25 PROCEDURE — 94727 GAS DIL/WSHOT DETER LNG VOL: CPT | Mod: 26,,, | Performed by: INTERNAL MEDICINE

## 2021-06-25 PROCEDURE — 94727 PR PULM FUNCTION TEST BY GAS: ICD-10-PCS | Mod: 26,,, | Performed by: INTERNAL MEDICINE

## 2021-06-25 PROCEDURE — 94727 GAS DIL/WSHOT DETER LNG VOL: CPT

## 2021-06-25 PROCEDURE — 94729 DIFFUSING CAPACITY: CPT | Mod: 26,,, | Performed by: INTERNAL MEDICINE

## 2021-06-25 PROCEDURE — 94060 PR EVAL OF BRONCHOSPASM: ICD-10-PCS | Mod: 26,,, | Performed by: INTERNAL MEDICINE

## 2021-06-25 PROCEDURE — 94060 EVALUATION OF WHEEZING: CPT | Mod: 26,,, | Performed by: INTERNAL MEDICINE

## 2021-06-25 PROCEDURE — 94060 EVALUATION OF WHEEZING: CPT

## 2021-06-25 PROCEDURE — 94729 PR C02/MEMBANE DIFFUSE CAPACITY: ICD-10-PCS | Mod: 26,,, | Performed by: INTERNAL MEDICINE

## 2021-06-25 PROCEDURE — 94729 DIFFUSING CAPACITY: CPT

## 2021-06-28 ENCOUNTER — TELEPHONE (OUTPATIENT)
Dept: PULMONOLOGY | Facility: CLINIC | Age: 61
End: 2021-06-28

## 2021-07-13 ENCOUNTER — HOSPITAL ENCOUNTER (INPATIENT)
Facility: HOSPITAL | Age: 61
LOS: 3 days | Discharge: HOME | DRG: 301 | End: 2021-07-16
Attending: EMERGENCY MEDICINE | Admitting: FAMILY MEDICINE
Payer: MEDICAID

## 2021-07-13 DIAGNOSIS — R07.9 CHEST PAIN: ICD-10-CM

## 2021-07-13 DIAGNOSIS — R20.0 NUMBNESS: ICD-10-CM

## 2021-07-13 DIAGNOSIS — I71.20 THORACIC AORTIC ANEURYSM WITHOUT RUPTURE: ICD-10-CM

## 2021-07-13 DIAGNOSIS — I71.21 THORACIC ASCENDING AORTIC ANEURYSM: ICD-10-CM

## 2021-07-13 DIAGNOSIS — I82.432 ACUTE EMBOLISM AND THROMBOSIS OF LEFT POPLITEAL VEIN: Primary | ICD-10-CM

## 2021-07-13 DIAGNOSIS — I25.10 CAD (CORONARY ARTERY DISEASE): ICD-10-CM

## 2021-07-13 DIAGNOSIS — I73.9 CLAUDICATION OF LEFT LOWER EXTREMITY: ICD-10-CM

## 2021-07-13 DIAGNOSIS — I72.4 POPLITEAL ARTERY ANEURYSM: ICD-10-CM

## 2021-07-13 PROBLEM — I71.9 DESCENDING AORTIC ANEURYSM: Status: ACTIVE | Noted: 2021-07-13

## 2021-07-13 LAB
ABO + RH BLD: NORMAL
ALBUMIN SERPL BCP-MCNC: 4.5 G/DL (ref 3.5–5.2)
ALP SERPL-CCNC: 74 U/L (ref 55–135)
ALT SERPL W/O P-5'-P-CCNC: 30 U/L (ref 10–44)
ANION GAP SERPL CALC-SCNC: 13 MMOL/L (ref 8–16)
APTT PPP: 30.9 SEC (ref 25.6–35.8)
AST SERPL-CCNC: 38 U/L (ref 10–40)
BASOPHILS # BLD AUTO: 0.04 K/UL (ref 0–0.2)
BASOPHILS NFR BLD: 0.4 % (ref 0–1.9)
BILIRUB SERPL-MCNC: 1.2 MG/DL (ref 0.1–1)
BLD GP AB SCN CELLS X3 SERPL QL: NORMAL
BNP SERPL-MCNC: 52 PG/ML (ref 0–99)
BUN SERPL-MCNC: 14 MG/DL (ref 8–23)
CALCIUM SERPL-MCNC: 9.6 MG/DL (ref 8.7–10.5)
CHLORIDE SERPL-SCNC: 99 MMOL/L (ref 95–110)
CO2 SERPL-SCNC: 26 MMOL/L (ref 23–29)
CREAT SERPL-MCNC: 1 MG/DL (ref 0.5–1.4)
CREAT SERPL-MCNC: 1.1 MG/DL (ref 0.5–1.4)
DIFFERENTIAL METHOD: ABNORMAL
EOSINOPHIL # BLD AUTO: 0.4 K/UL (ref 0–0.5)
EOSINOPHIL NFR BLD: 3.6 % (ref 0–8)
ERYTHROCYTE [DISTWIDTH] IN BLOOD BY AUTOMATED COUNT: 13.2 % (ref 11.5–14.5)
EST. GFR  (AFRICAN AMERICAN): >60 ML/MIN/1.73 M^2
EST. GFR  (NON AFRICAN AMERICAN): >60 ML/MIN/1.73 M^2
GLUCOSE SERPL-MCNC: 96 MG/DL (ref 70–110)
HCT VFR BLD AUTO: 50.5 % (ref 40–54)
HGB BLD-MCNC: 17.2 G/DL (ref 14–18)
IMM GRANULOCYTES # BLD AUTO: 0.04 K/UL (ref 0–0.04)
IMM GRANULOCYTES NFR BLD AUTO: 0.4 % (ref 0–0.5)
INR PPP: 1.1
LACTATE SERPL-SCNC: 1.5 MMOL/L (ref 0.5–1.9)
LYMPHOCYTES # BLD AUTO: 1.9 K/UL (ref 1–4.8)
LYMPHOCYTES NFR BLD: 19.2 % (ref 18–48)
MAGNESIUM SERPL-MCNC: 2.1 MG/DL (ref 1.6–2.6)
MCH RBC QN AUTO: 31.7 PG (ref 27–31)
MCHC RBC AUTO-ENTMCNC: 34.1 G/DL (ref 32–36)
MCV RBC AUTO: 93 FL (ref 82–98)
MONOCYTES # BLD AUTO: 1.1 K/UL (ref 0.3–1)
MONOCYTES NFR BLD: 10.9 % (ref 4–15)
NEUTROPHILS # BLD AUTO: 6.3 K/UL (ref 1.8–7.7)
NEUTROPHILS NFR BLD: 65.5 % (ref 38–73)
NRBC BLD-RTO: 0 /100 WBC
PLATELET # BLD AUTO: 248 K/UL (ref 150–450)
PMV BLD AUTO: 9.2 FL (ref 9.2–12.9)
POTASSIUM SERPL-SCNC: 3.5 MMOL/L (ref 3.5–5.1)
PROT SERPL-MCNC: 8.3 G/DL (ref 6–8.4)
PROTHROMBIN TIME: 13.5 SEC (ref 11.8–14.3)
RBC # BLD AUTO: 5.43 M/UL (ref 4.6–6.2)
SAMPLE: NORMAL
SARS-COV-2 RDRP RESP QL NAA+PROBE: NEGATIVE
SODIUM SERPL-SCNC: 138 MMOL/L (ref 136–145)
TROPONIN I SERPL DL<=0.01 NG/ML-MCNC: <0.03 NG/ML
WBC # BLD AUTO: 9.67 K/UL (ref 3.9–12.7)

## 2021-07-13 PROCEDURE — 96376 TX/PRO/DX INJ SAME DRUG ADON: CPT

## 2021-07-13 PROCEDURE — U0002 COVID-19 LAB TEST NON-CDC: HCPCS | Performed by: EMERGENCY MEDICINE

## 2021-07-13 PROCEDURE — 85730 THROMBOPLASTIN TIME PARTIAL: CPT | Performed by: EMERGENCY MEDICINE

## 2021-07-13 PROCEDURE — 84484 ASSAY OF TROPONIN QUANT: CPT | Performed by: EMERGENCY MEDICINE

## 2021-07-13 PROCEDURE — 85610 PROTHROMBIN TIME: CPT | Performed by: EMERGENCY MEDICINE

## 2021-07-13 PROCEDURE — 63600175 PHARM REV CODE 636 W HCPCS: Performed by: EMERGENCY MEDICINE

## 2021-07-13 PROCEDURE — 99291 CRITICAL CARE FIRST HOUR: CPT

## 2021-07-13 PROCEDURE — 63600175 PHARM REV CODE 636 W HCPCS: Performed by: FAMILY MEDICINE

## 2021-07-13 PROCEDURE — 83605 ASSAY OF LACTIC ACID: CPT | Performed by: EMERGENCY MEDICINE

## 2021-07-13 PROCEDURE — 25500020 PHARM REV CODE 255: Performed by: EMERGENCY MEDICINE

## 2021-07-13 PROCEDURE — 25000003 PHARM REV CODE 250: Performed by: FAMILY MEDICINE

## 2021-07-13 PROCEDURE — 80053 COMPREHEN METABOLIC PANEL: CPT | Performed by: EMERGENCY MEDICINE

## 2021-07-13 PROCEDURE — 25000003 PHARM REV CODE 250: Performed by: EMERGENCY MEDICINE

## 2021-07-13 PROCEDURE — 20000000 HC ICU ROOM

## 2021-07-13 PROCEDURE — 93005 ELECTROCARDIOGRAM TRACING: CPT | Performed by: INTERNAL MEDICINE

## 2021-07-13 PROCEDURE — 85025 COMPLETE CBC W/AUTO DIFF WBC: CPT | Performed by: EMERGENCY MEDICINE

## 2021-07-13 PROCEDURE — 93010 EKG 12-LEAD: ICD-10-PCS | Mod: ,,, | Performed by: INTERNAL MEDICINE

## 2021-07-13 PROCEDURE — 83735 ASSAY OF MAGNESIUM: CPT | Performed by: EMERGENCY MEDICINE

## 2021-07-13 PROCEDURE — 83880 ASSAY OF NATRIURETIC PEPTIDE: CPT | Performed by: EMERGENCY MEDICINE

## 2021-07-13 PROCEDURE — 96375 TX/PRO/DX INJ NEW DRUG ADDON: CPT

## 2021-07-13 PROCEDURE — 93010 ELECTROCARDIOGRAM REPORT: CPT | Mod: ,,, | Performed by: INTERNAL MEDICINE

## 2021-07-13 PROCEDURE — 86900 BLOOD TYPING SEROLOGIC ABO: CPT | Performed by: EMERGENCY MEDICINE

## 2021-07-13 PROCEDURE — 96374 THER/PROPH/DIAG INJ IV PUSH: CPT

## 2021-07-13 RX ORDER — TALC
6 POWDER (GRAM) TOPICAL NIGHTLY PRN
Status: DISCONTINUED | OUTPATIENT
Start: 2021-07-14 | End: 2021-07-16 | Stop reason: HOSPADM

## 2021-07-13 RX ORDER — IBUPROFEN 200 MG
16 TABLET ORAL
Status: DISCONTINUED | OUTPATIENT
Start: 2021-07-14 | End: 2021-07-16 | Stop reason: HOSPADM

## 2021-07-13 RX ORDER — FLUTICASONE PROPIONATE 50 MCG
2 SPRAY, SUSPENSION (ML) NASAL DAILY PRN
COMMUNITY
Start: 2021-04-02

## 2021-07-13 RX ORDER — LABETALOL 100 MG/1
100 TABLET, FILM COATED ORAL NIGHTLY
Status: ON HOLD | COMMUNITY
Start: 2021-06-17 | End: 2022-01-04 | Stop reason: HOSPADM

## 2021-07-13 RX ORDER — NALOXONE HCL 0.4 MG/ML
0.02 VIAL (ML) INJECTION
Status: DISCONTINUED | OUTPATIENT
Start: 2021-07-14 | End: 2021-07-16 | Stop reason: HOSPADM

## 2021-07-13 RX ORDER — PRIMIDONE 50 MG/1
TABLET ORAL
COMMUNITY
End: 2021-07-13

## 2021-07-13 RX ORDER — LABETALOL HYDROCHLORIDE 5 MG/ML
10 INJECTION, SOLUTION INTRAVENOUS
Status: COMPLETED | OUTPATIENT
Start: 2021-07-13 | End: 2021-07-13

## 2021-07-13 RX ORDER — TIOTROPIUM BROMIDE AND OLODATEROL 3.124; 2.736 UG/1; UG/1
2 SPRAY, METERED RESPIRATORY (INHALATION) DAILY PRN
COMMUNITY
Start: 2021-04-22 | End: 2022-03-09

## 2021-07-13 RX ORDER — ALPRAZOLAM 0.25 MG/1
0.25 TABLET ORAL 4 TIMES DAILY PRN
Status: DISCONTINUED | OUTPATIENT
Start: 2021-07-14 | End: 2021-07-16 | Stop reason: HOSPADM

## 2021-07-13 RX ORDER — HYDROMORPHONE HYDROCHLORIDE 1 MG/ML
1 INJECTION, SOLUTION INTRAMUSCULAR; INTRAVENOUS; SUBCUTANEOUS ONCE
Status: COMPLETED | OUTPATIENT
Start: 2021-07-14 | End: 2021-07-13

## 2021-07-13 RX ORDER — ALPRAZOLAM 0.25 MG/1
0.25 TABLET ORAL ONCE
Status: COMPLETED | OUTPATIENT
Start: 2021-07-13 | End: 2021-07-14

## 2021-07-13 RX ORDER — HYDROMORPHONE HYDROCHLORIDE 1 MG/ML
0.5 INJECTION, SOLUTION INTRAMUSCULAR; INTRAVENOUS; SUBCUTANEOUS
Status: COMPLETED | OUTPATIENT
Start: 2021-07-13 | End: 2021-07-13

## 2021-07-13 RX ORDER — HYDROMORPHONE HYDROCHLORIDE 1 MG/ML
1 INJECTION, SOLUTION INTRAMUSCULAR; INTRAVENOUS; SUBCUTANEOUS
Status: COMPLETED | OUTPATIENT
Start: 2021-07-13 | End: 2021-07-13

## 2021-07-13 RX ORDER — FLUTICASONE FUROATE AND VILANTEROL 200; 25 UG/1; UG/1
1 POWDER RESPIRATORY (INHALATION) DAILY
Status: DISCONTINUED | OUTPATIENT
Start: 2021-07-14 | End: 2021-07-16 | Stop reason: HOSPADM

## 2021-07-13 RX ORDER — PROCHLORPERAZINE EDISYLATE 5 MG/ML
5 INJECTION INTRAMUSCULAR; INTRAVENOUS EVERY 6 HOURS PRN
Status: DISCONTINUED | OUTPATIENT
Start: 2021-07-14 | End: 2021-07-16 | Stop reason: HOSPADM

## 2021-07-13 RX ORDER — ATORVASTATIN CALCIUM 40 MG/1
40 TABLET, FILM COATED ORAL DAILY
Status: DISCONTINUED | OUTPATIENT
Start: 2021-07-13 | End: 2021-07-16 | Stop reason: HOSPADM

## 2021-07-13 RX ORDER — ALBUTEROL SULFATE 90 UG/1
2 AEROSOL, METERED RESPIRATORY (INHALATION) EVERY 6 HOURS PRN
COMMUNITY
Start: 2021-02-23

## 2021-07-13 RX ORDER — ESMOLOL HYDROCHLORIDE 20 MG/ML
50 INJECTION, SOLUTION INTRAVENOUS CONTINUOUS
Status: DISCONTINUED | OUTPATIENT
Start: 2021-07-14 | End: 2021-07-14

## 2021-07-13 RX ORDER — ROSUVASTATIN CALCIUM 10 MG/1
10 TABLET, COATED ORAL NIGHTLY
COMMUNITY
Start: 2021-06-17 | End: 2022-07-22

## 2021-07-13 RX ORDER — LABETALOL HYDROCHLORIDE 5 MG/ML
10 INJECTION, SOLUTION INTRAVENOUS
Status: DISCONTINUED | OUTPATIENT
Start: 2021-07-13 | End: 2021-07-13

## 2021-07-13 RX ORDER — GLUCAGON 1 MG
1 KIT INJECTION
Status: DISCONTINUED | OUTPATIENT
Start: 2021-07-14 | End: 2021-07-16 | Stop reason: HOSPADM

## 2021-07-13 RX ORDER — HYDROMORPHONE HCL IN 0.9% NACL 6 MG/30 ML
PATIENT CONTROLLED ANALGESIA SYRINGE INTRAVENOUS CONTINUOUS
Status: DISCONTINUED | OUTPATIENT
Start: 2021-07-13 | End: 2021-07-16

## 2021-07-13 RX ORDER — IPRATROPIUM BROMIDE AND ALBUTEROL SULFATE 2.5; .5 MG/3ML; MG/3ML
3 SOLUTION RESPIRATORY (INHALATION) EVERY 4 HOURS PRN
Status: DISCONTINUED | OUTPATIENT
Start: 2021-07-14 | End: 2021-07-16 | Stop reason: HOSPADM

## 2021-07-13 RX ORDER — ONDANSETRON 2 MG/ML
4 INJECTION INTRAMUSCULAR; INTRAVENOUS EVERY 8 HOURS PRN
Status: DISCONTINUED | OUTPATIENT
Start: 2021-07-14 | End: 2021-07-16 | Stop reason: HOSPADM

## 2021-07-13 RX ORDER — IBUPROFEN 200 MG
24 TABLET ORAL
Status: DISCONTINUED | OUTPATIENT
Start: 2021-07-14 | End: 2021-07-16 | Stop reason: HOSPADM

## 2021-07-13 RX ORDER — ACETAMINOPHEN 325 MG/1
650 TABLET ORAL EVERY 8 HOURS PRN
Status: DISCONTINUED | OUTPATIENT
Start: 2021-07-14 | End: 2021-07-16 | Stop reason: HOSPADM

## 2021-07-13 RX ORDER — POLYETHYLENE GLYCOL 3350 17 G/17G
17 POWDER, FOR SOLUTION ORAL 2 TIMES DAILY PRN
Status: DISCONTINUED | OUTPATIENT
Start: 2021-07-14 | End: 2021-07-16 | Stop reason: HOSPADM

## 2021-07-13 RX ORDER — SODIUM CHLORIDE 0.9 % (FLUSH) 0.9 %
10 SYRINGE (ML) INJECTION
Status: DISCONTINUED | OUTPATIENT
Start: 2021-07-14 | End: 2021-07-16 | Stop reason: HOSPADM

## 2021-07-13 RX ORDER — HYDROMORPHONE HYDROCHLORIDE 1 MG/ML
1 INJECTION, SOLUTION INTRAMUSCULAR; INTRAVENOUS; SUBCUTANEOUS
Status: DISCONTINUED | OUTPATIENT
Start: 2021-07-13 | End: 2021-07-13

## 2021-07-13 RX ADMIN — HYDROMORPHONE HYDROCHLORIDE 1 MG: 1 INJECTION, SOLUTION INTRAMUSCULAR; INTRAVENOUS; SUBCUTANEOUS at 08:07

## 2021-07-13 RX ADMIN — HYDROMORPHONE HYDROCHLORIDE 1 MG: 1 INJECTION, SOLUTION INTRAMUSCULAR; INTRAVENOUS; SUBCUTANEOUS at 11:07

## 2021-07-13 RX ADMIN — LABETALOL HYDROCHLORIDE 10 MG: 5 INJECTION, SOLUTION INTRAVENOUS at 09:07

## 2021-07-13 RX ADMIN — LABETALOL HYDROCHLORIDE 10 MG: 5 INJECTION INTRAVENOUS at 10:07

## 2021-07-13 RX ADMIN — ESMOLOL HYDROCHLORIDE 50 MCG/KG/MIN: 20 INJECTION INTRAVENOUS at 11:07

## 2021-07-13 RX ADMIN — IOHEXOL 100 ML: 350 INJECTION, SOLUTION INTRAVENOUS at 08:07

## 2021-07-13 RX ADMIN — HYDROMORPHONE HYDROCHLORIDE 0.5 MG: 1 INJECTION, SOLUTION INTRAMUSCULAR; INTRAVENOUS; SUBCUTANEOUS at 09:07

## 2021-07-14 LAB
ANION GAP SERPL CALC-SCNC: 11 MMOL/L (ref 8–16)
BASOPHILS # BLD AUTO: 0.06 K/UL (ref 0–0.2)
BASOPHILS NFR BLD: 0.7 % (ref 0–1.9)
BUN SERPL-MCNC: 16 MG/DL (ref 8–23)
CALCIUM SERPL-MCNC: 8.8 MG/DL (ref 8.7–10.5)
CHLORIDE SERPL-SCNC: 103 MMOL/L (ref 95–110)
CO2 SERPL-SCNC: 26 MMOL/L (ref 23–29)
CREAT SERPL-MCNC: 0.9 MG/DL (ref 0.5–1.4)
DIFFERENTIAL METHOD: ABNORMAL
EOSINOPHIL # BLD AUTO: 0.3 K/UL (ref 0–0.5)
EOSINOPHIL NFR BLD: 3.6 % (ref 0–8)
ERYTHROCYTE [DISTWIDTH] IN BLOOD BY AUTOMATED COUNT: 13.2 % (ref 11.5–14.5)
EST. GFR  (AFRICAN AMERICAN): >60 ML/MIN/1.73 M^2
EST. GFR  (NON AFRICAN AMERICAN): >60 ML/MIN/1.73 M^2
ESTIMATED AVG GLUCOSE: 111 MG/DL (ref 68–131)
GLUCOSE SERPL-MCNC: 86 MG/DL (ref 70–110)
HBA1C MFR BLD: 5.5 % (ref 4.5–6.2)
HCT VFR BLD AUTO: 47.3 % (ref 40–54)
HGB BLD-MCNC: 16.2 G/DL (ref 14–18)
IMM GRANULOCYTES # BLD AUTO: 0.02 K/UL (ref 0–0.04)
IMM GRANULOCYTES NFR BLD AUTO: 0.2 % (ref 0–0.5)
LYMPHOCYTES # BLD AUTO: 2.1 K/UL (ref 1–4.8)
LYMPHOCYTES NFR BLD: 23.3 % (ref 18–48)
MAGNESIUM SERPL-MCNC: 2.1 MG/DL (ref 1.6–2.6)
MCH RBC QN AUTO: 32.1 PG (ref 27–31)
MCHC RBC AUTO-ENTMCNC: 34.2 G/DL (ref 32–36)
MCV RBC AUTO: 94 FL (ref 82–98)
MONOCYTES # BLD AUTO: 1.2 K/UL (ref 0.3–1)
MONOCYTES NFR BLD: 13.2 % (ref 4–15)
NEUTROPHILS # BLD AUTO: 5.4 K/UL (ref 1.8–7.7)
NEUTROPHILS NFR BLD: 59 % (ref 38–73)
NRBC BLD-RTO: 0 /100 WBC
PHOSPHATE SERPL-MCNC: 3.6 MG/DL (ref 2.7–4.5)
PLATELET # BLD AUTO: 208 K/UL (ref 150–450)
PMV BLD AUTO: 9.2 FL (ref 9.2–12.9)
POTASSIUM SERPL-SCNC: 3.4 MMOL/L (ref 3.5–5.1)
RBC # BLD AUTO: 5.04 M/UL (ref 4.6–6.2)
SODIUM SERPL-SCNC: 140 MMOL/L (ref 136–145)
WBC # BLD AUTO: 9.2 K/UL (ref 3.9–12.7)

## 2021-07-14 PROCEDURE — 85025 COMPLETE CBC W/AUTO DIFF WBC: CPT | Performed by: FAMILY MEDICINE

## 2021-07-14 PROCEDURE — 99900035 HC TECH TIME PER 15 MIN (STAT)

## 2021-07-14 PROCEDURE — 83735 ASSAY OF MAGNESIUM: CPT | Performed by: FAMILY MEDICINE

## 2021-07-14 PROCEDURE — 83036 HEMOGLOBIN GLYCOSYLATED A1C: CPT | Performed by: FAMILY MEDICINE

## 2021-07-14 PROCEDURE — 25000003 PHARM REV CODE 250: Performed by: FAMILY MEDICINE

## 2021-07-14 PROCEDURE — 36415 COLL VENOUS BLD VENIPUNCTURE: CPT | Performed by: FAMILY MEDICINE

## 2021-07-14 PROCEDURE — 21400001 HC TELEMETRY ROOM

## 2021-07-14 PROCEDURE — 80048 BASIC METABOLIC PNL TOTAL CA: CPT | Performed by: FAMILY MEDICINE

## 2021-07-14 PROCEDURE — 99900031 HC PATIENT EDUCATION (STAT)

## 2021-07-14 PROCEDURE — 94761 N-INVAS EAR/PLS OXIMETRY MLT: CPT

## 2021-07-14 PROCEDURE — 84100 ASSAY OF PHOSPHORUS: CPT | Performed by: FAMILY MEDICINE

## 2021-07-14 PROCEDURE — 63600175 PHARM REV CODE 636 W HCPCS: Performed by: FAMILY MEDICINE

## 2021-07-14 PROCEDURE — 27000221 HC OXYGEN, UP TO 24 HOURS

## 2021-07-14 PROCEDURE — 94640 AIRWAY INHALATION TREATMENT: CPT

## 2021-07-14 PROCEDURE — 25000242 PHARM REV CODE 250 ALT 637 W/ HCPCS: Performed by: FAMILY MEDICINE

## 2021-07-14 RX ORDER — POTASSIUM CHLORIDE 7.45 MG/ML
40 INJECTION INTRAVENOUS
Status: DISCONTINUED | OUTPATIENT
Start: 2021-07-14 | End: 2021-07-16 | Stop reason: HOSPADM

## 2021-07-14 RX ORDER — LABETALOL 100 MG/1
100 TABLET, FILM COATED ORAL 2 TIMES DAILY
Status: DISCONTINUED | OUTPATIENT
Start: 2021-07-14 | End: 2021-07-16 | Stop reason: HOSPADM

## 2021-07-14 RX ORDER — POTASSIUM CHLORIDE 20 MEQ/1
40 TABLET, EXTENDED RELEASE ORAL
Status: DISCONTINUED | OUTPATIENT
Start: 2021-07-14 | End: 2021-07-16 | Stop reason: HOSPADM

## 2021-07-14 RX ORDER — MAGNESIUM SULFATE HEPTAHYDRATE 40 MG/ML
2 INJECTION, SOLUTION INTRAVENOUS
Status: DISCONTINUED | OUTPATIENT
Start: 2021-07-14 | End: 2021-07-16 | Stop reason: HOSPADM

## 2021-07-14 RX ORDER — CHLORHEXIDINE GLUCONATE ORAL RINSE 1.2 MG/ML
15 SOLUTION DENTAL 2 TIMES DAILY
Status: DISCONTINUED | OUTPATIENT
Start: 2021-07-14 | End: 2021-07-16 | Stop reason: HOSPADM

## 2021-07-14 RX ORDER — POTASSIUM CHLORIDE 20 MEQ/1
20 TABLET, EXTENDED RELEASE ORAL
Status: DISCONTINUED | OUTPATIENT
Start: 2021-07-14 | End: 2021-07-16 | Stop reason: HOSPADM

## 2021-07-14 RX ORDER — LISINOPRIL 20 MG/1
20 TABLET ORAL DAILY
Status: DISCONTINUED | OUTPATIENT
Start: 2021-07-15 | End: 2021-07-16 | Stop reason: HOSPADM

## 2021-07-14 RX ORDER — POTASSIUM CHLORIDE 7.45 MG/ML
20 INJECTION INTRAVENOUS
Status: DISCONTINUED | OUTPATIENT
Start: 2021-07-14 | End: 2021-07-16 | Stop reason: HOSPADM

## 2021-07-14 RX ORDER — LANOLIN ALCOHOL/MO/W.PET/CERES
800 CREAM (GRAM) TOPICAL
Status: DISCONTINUED | OUTPATIENT
Start: 2021-07-14 | End: 2021-07-16 | Stop reason: HOSPADM

## 2021-07-14 RX ORDER — MAGNESIUM SULFATE HEPTAHYDRATE 40 MG/ML
4 INJECTION, SOLUTION INTRAVENOUS
Status: DISCONTINUED | OUTPATIENT
Start: 2021-07-14 | End: 2021-07-16 | Stop reason: HOSPADM

## 2021-07-14 RX ORDER — MAGNESIUM SULFATE 1 G/100ML
1 INJECTION INTRAVENOUS
Status: DISCONTINUED | OUTPATIENT
Start: 2021-07-14 | End: 2021-07-16 | Stop reason: HOSPADM

## 2021-07-14 RX ORDER — MUPIROCIN 20 MG/G
OINTMENT TOPICAL 2 TIMES DAILY
Status: DISCONTINUED | OUTPATIENT
Start: 2021-07-14 | End: 2021-07-16 | Stop reason: HOSPADM

## 2021-07-14 RX ADMIN — ESMOLOL HYDROCHLORIDE 140 MCG/KG/MIN: 20 INJECTION INTRAVENOUS at 04:07

## 2021-07-14 RX ADMIN — CHLORHEXIDINE GLUCONATE 15 ML: 1.2 RINSE ORAL at 09:07

## 2021-07-14 RX ADMIN — POTASSIUM CHLORIDE 40 MEQ: 20 TABLET, EXTENDED RELEASE ORAL at 09:07

## 2021-07-14 RX ADMIN — ESMOLOL HYDROCHLORIDE 130 MCG/KG/MIN: 20 INJECTION INTRAVENOUS at 02:07

## 2021-07-14 RX ADMIN — Medication: at 02:07

## 2021-07-14 RX ADMIN — POTASSIUM CHLORIDE 40 MEQ: 20 TABLET, EXTENDED RELEASE ORAL at 12:07

## 2021-07-14 RX ADMIN — MUPIROCIN: 20 OINTMENT TOPICAL at 09:07

## 2021-07-14 RX ADMIN — ESMOLOL HYDROCHLORIDE 50 MCG/KG/MIN: 20 INJECTION INTRAVENOUS at 09:07

## 2021-07-14 RX ADMIN — FLUTICASONE FUROATE AND VILANTEROL TRIFENATATE 1 PUFF: 200; 25 POWDER RESPIRATORY (INHALATION) at 07:07

## 2021-07-14 RX ADMIN — ATORVASTATIN CALCIUM 40 MG: 40 TABLET, FILM COATED ORAL at 12:07

## 2021-07-14 RX ADMIN — TIOTROPIUM BROMIDE INHALATION SPRAY 2 PUFF: 3.12 SPRAY, METERED RESPIRATORY (INHALATION) at 07:07

## 2021-07-14 RX ADMIN — ONDANSETRON 4 MG: 2 INJECTION INTRAMUSCULAR; INTRAVENOUS at 06:07

## 2021-07-14 RX ADMIN — ALPRAZOLAM 0.25 MG: 0.25 TABLET ORAL at 12:07

## 2021-07-15 LAB
ANION GAP SERPL CALC-SCNC: 8 MMOL/L (ref 8–16)
BASOPHILS # BLD AUTO: 0.04 K/UL (ref 0–0.2)
BASOPHILS NFR BLD: 0.4 % (ref 0–1.9)
BUN SERPL-MCNC: 12 MG/DL (ref 8–23)
CALCIUM SERPL-MCNC: 8.7 MG/DL (ref 8.7–10.5)
CHLORIDE SERPL-SCNC: 107 MMOL/L (ref 95–110)
CO2 SERPL-SCNC: 24 MMOL/L (ref 23–29)
CREAT SERPL-MCNC: 0.8 MG/DL (ref 0.5–1.4)
DIFFERENTIAL METHOD: ABNORMAL
EOSINOPHIL # BLD AUTO: 0.5 K/UL (ref 0–0.5)
EOSINOPHIL NFR BLD: 5.9 % (ref 0–8)
ERYTHROCYTE [DISTWIDTH] IN BLOOD BY AUTOMATED COUNT: 13.2 % (ref 11.5–14.5)
EST. GFR  (AFRICAN AMERICAN): >60 ML/MIN/1.73 M^2
EST. GFR  (NON AFRICAN AMERICAN): >60 ML/MIN/1.73 M^2
GLUCOSE SERPL-MCNC: 93 MG/DL (ref 70–110)
HCT VFR BLD AUTO: 44.6 % (ref 40–54)
HGB BLD-MCNC: 15.4 G/DL (ref 14–18)
IMM GRANULOCYTES # BLD AUTO: 0.02 K/UL (ref 0–0.04)
IMM GRANULOCYTES NFR BLD AUTO: 0.2 % (ref 0–0.5)
LYMPHOCYTES # BLD AUTO: 2.3 K/UL (ref 1–4.8)
LYMPHOCYTES NFR BLD: 25.9 % (ref 18–48)
MAGNESIUM SERPL-MCNC: 1.9 MG/DL (ref 1.6–2.6)
MCH RBC QN AUTO: 31.9 PG (ref 27–31)
MCHC RBC AUTO-ENTMCNC: 34.5 G/DL (ref 32–36)
MCV RBC AUTO: 92 FL (ref 82–98)
MONOCYTES # BLD AUTO: 1.1 K/UL (ref 0.3–1)
MONOCYTES NFR BLD: 11.8 % (ref 4–15)
NEUTROPHILS # BLD AUTO: 5 K/UL (ref 1.8–7.7)
NEUTROPHILS NFR BLD: 55.8 % (ref 38–73)
NRBC BLD-RTO: 0 /100 WBC
PHOSPHATE SERPL-MCNC: 3.2 MG/DL (ref 2.7–4.5)
PLATELET # BLD AUTO: 205 K/UL (ref 150–450)
PMV BLD AUTO: 9.6 FL (ref 9.2–12.9)
POTASSIUM SERPL-SCNC: 3.7 MMOL/L (ref 3.5–5.1)
RBC # BLD AUTO: 4.83 M/UL (ref 4.6–6.2)
SODIUM SERPL-SCNC: 139 MMOL/L (ref 136–145)
WBC # BLD AUTO: 9.03 K/UL (ref 3.9–12.7)

## 2021-07-15 PROCEDURE — 25000003 PHARM REV CODE 250: Performed by: FAMILY MEDICINE

## 2021-07-15 PROCEDURE — 94760 N-INVAS EAR/PLS OXIMETRY 1: CPT

## 2021-07-15 PROCEDURE — 85025 COMPLETE CBC W/AUTO DIFF WBC: CPT | Performed by: FAMILY MEDICINE

## 2021-07-15 PROCEDURE — 94640 AIRWAY INHALATION TREATMENT: CPT

## 2021-07-15 PROCEDURE — 83735 ASSAY OF MAGNESIUM: CPT | Performed by: FAMILY MEDICINE

## 2021-07-15 PROCEDURE — 80048 BASIC METABOLIC PNL TOTAL CA: CPT | Performed by: FAMILY MEDICINE

## 2021-07-15 PROCEDURE — 63600175 PHARM REV CODE 636 W HCPCS: Performed by: FAMILY MEDICINE

## 2021-07-15 PROCEDURE — 99900031 HC PATIENT EDUCATION (STAT)

## 2021-07-15 PROCEDURE — 84100 ASSAY OF PHOSPHORUS: CPT | Performed by: FAMILY MEDICINE

## 2021-07-15 PROCEDURE — 36415 COLL VENOUS BLD VENIPUNCTURE: CPT | Performed by: FAMILY MEDICINE

## 2021-07-15 PROCEDURE — 99900035 HC TECH TIME PER 15 MIN (STAT)

## 2021-07-15 PROCEDURE — 21400001 HC TELEMETRY ROOM

## 2021-07-15 RX ORDER — ENOXAPARIN SODIUM 100 MG/ML
1 INJECTION SUBCUTANEOUS
Status: DISCONTINUED | OUTPATIENT
Start: 2021-07-15 | End: 2021-07-16 | Stop reason: HOSPADM

## 2021-07-15 RX ADMIN — LABETALOL HCL 100 MG: 100 TABLET, FILM COATED ORAL at 09:07

## 2021-07-15 RX ADMIN — LISINOPRIL 20 MG: 20 TABLET ORAL at 09:07

## 2021-07-15 RX ADMIN — ENOXAPARIN SODIUM 90 MG: 100 INJECTION SUBCUTANEOUS at 05:07

## 2021-07-15 RX ADMIN — CHLORHEXIDINE GLUCONATE 15 ML: 1.2 RINSE ORAL at 09:07

## 2021-07-15 RX ADMIN — MUPIROCIN: 20 OINTMENT TOPICAL at 09:07

## 2021-07-15 RX ADMIN — LABETALOL HCL 100 MG: 100 TABLET, FILM COATED ORAL at 04:07

## 2021-07-15 RX ADMIN — TIOTROPIUM BROMIDE INHALATION SPRAY 2 PUFF: 3.12 SPRAY, METERED RESPIRATORY (INHALATION) at 07:07

## 2021-07-15 RX ADMIN — FLUTICASONE FUROATE AND VILANTEROL TRIFENATATE 1 PUFF: 200; 25 POWDER RESPIRATORY (INHALATION) at 07:07

## 2021-07-15 RX ADMIN — ATORVASTATIN CALCIUM 40 MG: 40 TABLET, FILM COATED ORAL at 09:07

## 2021-07-16 ENCOUNTER — CLINICAL SUPPORT (OUTPATIENT)
Dept: CARDIOLOGY | Facility: HOSPITAL | Age: 61
DRG: 301 | End: 2021-07-16
Attending: FAMILY MEDICINE
Payer: MEDICAID

## 2021-07-16 VITALS
TEMPERATURE: 98 F | HEART RATE: 61 BPM | SYSTOLIC BLOOD PRESSURE: 150 MMHG | DIASTOLIC BLOOD PRESSURE: 97 MMHG | RESPIRATION RATE: 18 BRPM | HEIGHT: 72 IN | BODY MASS INDEX: 25.24 KG/M2 | WEIGHT: 186.31 LBS | OXYGEN SATURATION: 96 %

## 2021-07-16 LAB
ANION GAP SERPL CALC-SCNC: 11 MMOL/L (ref 8–16)
BASOPHILS # BLD AUTO: 0.06 K/UL (ref 0–0.2)
BASOPHILS NFR BLD: 0.7 % (ref 0–1.9)
BUN SERPL-MCNC: 14 MG/DL (ref 8–23)
CALCIUM SERPL-MCNC: 8.7 MG/DL (ref 8.7–10.5)
CHLORIDE SERPL-SCNC: 102 MMOL/L (ref 95–110)
CO2 SERPL-SCNC: 26 MMOL/L (ref 23–29)
CREAT SERPL-MCNC: 0.9 MG/DL (ref 0.5–1.4)
DIFFERENTIAL METHOD: ABNORMAL
EOSINOPHIL # BLD AUTO: 0.5 K/UL (ref 0–0.5)
EOSINOPHIL NFR BLD: 5.8 % (ref 0–8)
ERYTHROCYTE [DISTWIDTH] IN BLOOD BY AUTOMATED COUNT: 13.2 % (ref 11.5–14.5)
EST. GFR  (AFRICAN AMERICAN): >60 ML/MIN/1.73 M^2
EST. GFR  (NON AFRICAN AMERICAN): >60 ML/MIN/1.73 M^2
GLUCOSE SERPL-MCNC: 90 MG/DL (ref 70–110)
HCT VFR BLD AUTO: 44.8 % (ref 40–54)
HGB BLD-MCNC: 15.4 G/DL (ref 14–18)
IMM GRANULOCYTES # BLD AUTO: 0.02 K/UL (ref 0–0.04)
IMM GRANULOCYTES NFR BLD AUTO: 0.2 % (ref 0–0.5)
LYMPHOCYTES # BLD AUTO: 2.4 K/UL (ref 1–4.8)
LYMPHOCYTES NFR BLD: 28.4 % (ref 18–48)
MAGNESIUM SERPL-MCNC: 2 MG/DL (ref 1.6–2.6)
MCH RBC QN AUTO: 31.8 PG (ref 27–31)
MCHC RBC AUTO-ENTMCNC: 34.4 G/DL (ref 32–36)
MCV RBC AUTO: 93 FL (ref 82–98)
MONOCYTES # BLD AUTO: 1.1 K/UL (ref 0.3–1)
MONOCYTES NFR BLD: 12.9 % (ref 4–15)
NEUTROPHILS # BLD AUTO: 4.4 K/UL (ref 1.8–7.7)
NEUTROPHILS NFR BLD: 52 % (ref 38–73)
NRBC BLD-RTO: 0 /100 WBC
PHOSPHATE SERPL-MCNC: 3.6 MG/DL (ref 2.7–4.5)
PLATELET # BLD AUTO: 203 K/UL (ref 150–450)
PMV BLD AUTO: 9.4 FL (ref 9.2–12.9)
POTASSIUM SERPL-SCNC: 3.7 MMOL/L (ref 3.5–5.1)
RBC # BLD AUTO: 4.84 M/UL (ref 4.6–6.2)
SODIUM SERPL-SCNC: 139 MMOL/L (ref 136–145)
WBC # BLD AUTO: 8.44 K/UL (ref 3.9–12.7)

## 2021-07-16 PROCEDURE — 25000003 PHARM REV CODE 250: Performed by: FAMILY MEDICINE

## 2021-07-16 PROCEDURE — 99900035 HC TECH TIME PER 15 MIN (STAT)

## 2021-07-16 PROCEDURE — 63600175 PHARM REV CODE 636 W HCPCS: Performed by: FAMILY MEDICINE

## 2021-07-16 PROCEDURE — 94640 AIRWAY INHALATION TREATMENT: CPT

## 2021-07-16 PROCEDURE — 99900031 HC PATIENT EDUCATION (STAT)

## 2021-07-16 PROCEDURE — 36415 COLL VENOUS BLD VENIPUNCTURE: CPT | Performed by: FAMILY MEDICINE

## 2021-07-16 PROCEDURE — 84100 ASSAY OF PHOSPHORUS: CPT | Performed by: FAMILY MEDICINE

## 2021-07-16 PROCEDURE — 93017 CV STRESS TEST TRACING ONLY: CPT

## 2021-07-16 PROCEDURE — 80048 BASIC METABOLIC PNL TOTAL CA: CPT | Performed by: FAMILY MEDICINE

## 2021-07-16 PROCEDURE — 85025 COMPLETE CBC W/AUTO DIFF WBC: CPT | Performed by: FAMILY MEDICINE

## 2021-07-16 PROCEDURE — 83735 ASSAY OF MAGNESIUM: CPT | Performed by: FAMILY MEDICINE

## 2021-07-16 RX ORDER — REGADENOSON 0.08 MG/ML
0.4 INJECTION, SOLUTION INTRAVENOUS ONCE
Status: COMPLETED | OUTPATIENT
Start: 2021-07-16 | End: 2021-07-16

## 2021-07-16 RX ORDER — NAPROXEN SODIUM 220 MG/1
81 TABLET, FILM COATED ORAL DAILY
Qty: 36 TABLET | Refills: 0 | Status: SHIPPED | OUTPATIENT
Start: 2021-07-16 | End: 2022-07-22

## 2021-07-16 RX ADMIN — MUPIROCIN: 20 OINTMENT TOPICAL at 10:07

## 2021-07-16 RX ADMIN — POTASSIUM CHLORIDE 20 MEQ: 20 TABLET, EXTENDED RELEASE ORAL at 06:07

## 2021-07-16 RX ADMIN — REGADENOSON 0.4 MG: 0.08 INJECTION, SOLUTION INTRAVENOUS at 08:07

## 2021-07-16 RX ADMIN — LABETALOL HCL 100 MG: 100 TABLET, FILM COATED ORAL at 10:07

## 2021-07-16 RX ADMIN — ENOXAPARIN SODIUM 90 MG: 100 INJECTION SUBCUTANEOUS at 05:07

## 2021-07-16 RX ADMIN — LISINOPRIL 20 MG: 20 TABLET ORAL at 10:07

## 2021-07-16 RX ADMIN — FLUTICASONE FUROATE AND VILANTEROL TRIFENATATE 1 PUFF: 200; 25 POWDER RESPIRATORY (INHALATION) at 08:07

## 2021-07-16 RX ADMIN — TIOTROPIUM BROMIDE INHALATION SPRAY 2 PUFF: 3.12 SPRAY, METERED RESPIRATORY (INHALATION) at 08:07

## 2021-07-16 RX ADMIN — CHLORHEXIDINE GLUCONATE 15 ML: 1.2 RINSE ORAL at 10:07

## 2021-07-16 RX ADMIN — ATORVASTATIN CALCIUM 40 MG: 40 TABLET, FILM COATED ORAL at 10:07

## 2021-07-18 LAB
CV PHARM DOSE: 0.4 MG
CV STRESS BASE HR: 57 BPM
DIASTOLIC BLOOD PRESSURE: 94 MMHG
OHS CV CPX 85 PERCENT MAX PREDICTED HEART RATE MALE: 133
OHS CV CPX MAX PREDICTED HEART RATE: 157
OHS CV CPX PATIENT IS FEMALE: 0
OHS CV CPX PATIENT IS MALE: 1
OHS CV CPX PEAK DIASTOLIC BLOOD PRESSURE: 99 MMHG
OHS CV CPX PEAK HEAR RATE: 99 BPM
OHS CV CPX PEAK RATE PRESSURE PRODUCT: NORMAL
OHS CV CPX PEAK SYSTOLIC BLOOD PRESSURE: 155 MMHG
OHS CV CPX PERCENT MAX PREDICTED HEART RATE ACHIEVED: 63
OHS CV CPX RATE PRESSURE PRODUCT PRESENTING: 7125
SYSTOLIC BLOOD PRESSURE: 125 MMHG

## 2021-07-20 ENCOUNTER — HOSPITAL ENCOUNTER (OUTPATIENT)
Dept: RADIOLOGY | Facility: HOSPITAL | Age: 61
Discharge: HOME | DRG: 254 | End: 2021-07-20
Attending: SURGERY
Payer: MEDICAID

## 2021-07-20 ENCOUNTER — HOSPITAL ENCOUNTER (OUTPATIENT)
Dept: PREADMISSION TESTING | Facility: HOSPITAL | Age: 61
Discharge: HOME | DRG: 254 | End: 2021-07-20
Attending: SURGERY
Payer: MEDICAID

## 2021-07-20 VITALS
SYSTOLIC BLOOD PRESSURE: 123 MMHG | RESPIRATION RATE: 18 BRPM | OXYGEN SATURATION: 95 % | DIASTOLIC BLOOD PRESSURE: 78 MMHG | WEIGHT: 185.19 LBS | HEART RATE: 62 BPM | BODY MASS INDEX: 25.08 KG/M2 | HEIGHT: 72 IN | TEMPERATURE: 99 F

## 2021-07-20 DIAGNOSIS — I72.4 ANEURYSM OF ARTERY OF LOWER EXTREMITY: ICD-10-CM

## 2021-07-20 DIAGNOSIS — Z01.818 PRE-OP TESTING: Primary | ICD-10-CM

## 2021-07-20 DIAGNOSIS — I72.4 ANEURYSM OF ARTERY OF LOWER EXTREMITY: Primary | ICD-10-CM

## 2021-07-20 RX ORDER — CEFAZOLIN SODIUM 2 G/50ML
2 SOLUTION INTRAVENOUS ONCE
Status: CANCELLED | OUTPATIENT
Start: 2021-07-21

## 2021-07-21 ENCOUNTER — ANESTHESIA (OUTPATIENT)
Dept: SURGERY | Facility: HOSPITAL | Age: 61
DRG: 254 | End: 2021-07-21
Payer: MEDICAID

## 2021-07-21 ENCOUNTER — ANESTHESIA EVENT (OUTPATIENT)
Dept: SURGERY | Facility: HOSPITAL | Age: 61
DRG: 254 | End: 2021-07-21
Payer: MEDICAID

## 2021-07-21 ENCOUNTER — HOSPITAL ENCOUNTER (INPATIENT)
Facility: HOSPITAL | Age: 61
LOS: 2 days | Discharge: HOME | DRG: 254 | End: 2021-07-23
Attending: SURGERY | Admitting: SURGERY
Payer: MEDICAID

## 2021-07-21 DIAGNOSIS — I72.4 POPLITEAL ANEURYSM: Primary | ICD-10-CM

## 2021-07-21 DIAGNOSIS — Z01.818 PRE-OP TESTING: ICD-10-CM

## 2021-07-21 LAB
ANION GAP SERPL CALC-SCNC: 9 MMOL/L (ref 8–16)
BASOPHILS # BLD AUTO: 0.04 K/UL (ref 0–0.2)
BASOPHILS NFR BLD: 0.4 % (ref 0–1.9)
BUN SERPL-MCNC: 14 MG/DL (ref 8–23)
CALCIUM SERPL-MCNC: 8.1 MG/DL (ref 8.7–10.5)
CHLORIDE SERPL-SCNC: 105 MMOL/L (ref 95–110)
CO2 SERPL-SCNC: 22 MMOL/L (ref 23–29)
CREAT SERPL-MCNC: 0.8 MG/DL (ref 0.5–1.4)
DIFFERENTIAL METHOD: ABNORMAL
EOSINOPHIL # BLD AUTO: 0.1 K/UL (ref 0–0.5)
EOSINOPHIL NFR BLD: 0.7 % (ref 0–8)
ERYTHROCYTE [DISTWIDTH] IN BLOOD BY AUTOMATED COUNT: 13.2 % (ref 11.5–14.5)
EST. GFR  (AFRICAN AMERICAN): >60 ML/MIN/1.73 M^2
EST. GFR  (NON AFRICAN AMERICAN): >60 ML/MIN/1.73 M^2
GLUCOSE SERPL-MCNC: 138 MG/DL (ref 70–110)
HCT VFR BLD AUTO: 41.1 % (ref 40–54)
HCT VFR BLD AUTO: 41.1 % (ref 40–54)
HGB BLD-MCNC: 14.3 G/DL (ref 14–18)
IMM GRANULOCYTES # BLD AUTO: 0.04 K/UL (ref 0–0.04)
IMM GRANULOCYTES NFR BLD AUTO: 0.4 % (ref 0–0.5)
LYMPHOCYTES # BLD AUTO: 1.2 K/UL (ref 1–4.8)
LYMPHOCYTES NFR BLD: 10.3 % (ref 18–48)
MCH RBC QN AUTO: 32.4 PG (ref 27–31)
MCHC RBC AUTO-ENTMCNC: 34.8 G/DL (ref 32–36)
MCV RBC AUTO: 93 FL (ref 82–98)
MONOCYTES # BLD AUTO: 0.1 K/UL (ref 0.3–1)
MONOCYTES NFR BLD: 1.2 % (ref 4–15)
NEUTROPHILS # BLD AUTO: 9.8 K/UL (ref 1.8–7.7)
NEUTROPHILS NFR BLD: 87 % (ref 38–73)
NRBC BLD-RTO: 0 /100 WBC
PLATELET # BLD AUTO: 194 K/UL (ref 150–450)
PMV BLD AUTO: 9.7 FL (ref 9.2–12.9)
POC ACTIVATED CLOTTING TIME K: 131 SEC (ref 74–137)
POTASSIUM SERPL-SCNC: 3.9 MMOL/L (ref 3.5–5.1)
RBC # BLD AUTO: 4.42 M/UL (ref 4.6–6.2)
SAMPLE: NORMAL
SODIUM SERPL-SCNC: 136 MMOL/L (ref 136–145)
WBC # BLD AUTO: 11.23 K/UL (ref 3.9–12.7)

## 2021-07-21 PROCEDURE — 37000008 HC ANESTHESIA 1ST 15 MINUTES: Performed by: SURGERY

## 2021-07-21 PROCEDURE — 63600175 PHARM REV CODE 636 W HCPCS: Performed by: SURGERY

## 2021-07-21 PROCEDURE — 85025 COMPLETE CBC W/AUTO DIFF WBC: CPT | Performed by: SURGERY

## 2021-07-21 PROCEDURE — 25000003 PHARM REV CODE 250: Performed by: ANESTHESIOLOGY

## 2021-07-21 PROCEDURE — 71000039 HC RECOVERY, EACH ADD'L HOUR: Performed by: SURGERY

## 2021-07-21 PROCEDURE — 27800505 HC CATH, RADIAL ARTERY KIT: Performed by: ANESTHESIOLOGY

## 2021-07-21 PROCEDURE — 37000009 HC ANESTHESIA EA ADD 15 MINS: Performed by: SURGERY

## 2021-07-21 PROCEDURE — 20000000 HC ICU ROOM

## 2021-07-21 PROCEDURE — 27201423 OPTIME MED/SURG SUP & DEVICES STERILE SUPPLY: Performed by: SURGERY

## 2021-07-21 PROCEDURE — 80048 BASIC METABOLIC PNL TOTAL CA: CPT | Performed by: SURGERY

## 2021-07-21 PROCEDURE — 25000003 PHARM REV CODE 250: Performed by: NURSE ANESTHETIST, CERTIFIED REGISTERED

## 2021-07-21 PROCEDURE — 63600175 PHARM REV CODE 636 W HCPCS: Performed by: ANESTHESIOLOGY

## 2021-07-21 PROCEDURE — 71000033 HC RECOVERY, INTIAL HOUR: Performed by: SURGERY

## 2021-07-21 PROCEDURE — 36000706: Performed by: SURGERY

## 2021-07-21 PROCEDURE — 99900035 HC TECH TIME PER 15 MIN (STAT)

## 2021-07-21 PROCEDURE — 27202107 HC XP QUATRO SENSOR: Performed by: ANESTHESIOLOGY

## 2021-07-21 PROCEDURE — 63600175 PHARM REV CODE 636 W HCPCS: Performed by: NURSE ANESTHETIST, CERTIFIED REGISTERED

## 2021-07-21 PROCEDURE — 27000284 HC CANNULA NASAL: Performed by: ANESTHESIOLOGY

## 2021-07-21 PROCEDURE — 25000003 PHARM REV CODE 250: Performed by: SURGERY

## 2021-07-21 PROCEDURE — C1729 CATH, DRAINAGE: HCPCS | Performed by: SURGERY

## 2021-07-21 PROCEDURE — 94761 N-INVAS EAR/PLS OXIMETRY MLT: CPT

## 2021-07-21 PROCEDURE — 27000673 HC TUBING BLOOD Y: Performed by: ANESTHESIOLOGY

## 2021-07-21 PROCEDURE — 36000707: Performed by: SURGERY

## 2021-07-21 PROCEDURE — 27000654 HC CATH IV JELCO: Performed by: ANESTHESIOLOGY

## 2021-07-21 PROCEDURE — 27201107 HC STYLET, STANDARD: Performed by: ANESTHESIOLOGY

## 2021-07-21 RX ORDER — FENTANYL CITRATE 50 UG/ML
INJECTION, SOLUTION INTRAMUSCULAR; INTRAVENOUS
Status: DISCONTINUED | OUTPATIENT
Start: 2021-07-21 | End: 2021-07-21

## 2021-07-21 RX ORDER — PHENYLEPHRINE HYDROCHLORIDE 10 MG/ML
INJECTION INTRAVENOUS
Status: DISCONTINUED | OUTPATIENT
Start: 2021-07-21 | End: 2021-07-21

## 2021-07-21 RX ORDER — HEPARIN SODIUM 10000 [USP'U]/ML
INJECTION, SOLUTION INTRAVENOUS; SUBCUTANEOUS
Status: DISCONTINUED | OUTPATIENT
Start: 2021-07-21 | End: 2021-07-21 | Stop reason: HOSPADM

## 2021-07-21 RX ORDER — SODIUM CHLORIDE 9 MG/ML
INJECTION, SOLUTION INTRAVENOUS CONTINUOUS
Status: DISCONTINUED | OUTPATIENT
Start: 2021-07-21 | End: 2021-07-23 | Stop reason: HOSPADM

## 2021-07-21 RX ORDER — CELECOXIB 100 MG/1
200 CAPSULE ORAL ONCE
Status: COMPLETED | OUTPATIENT
Start: 2021-07-21 | End: 2021-07-21

## 2021-07-21 RX ORDER — DIPHENHYDRAMINE HYDROCHLORIDE 50 MG/ML
12.5 INJECTION INTRAMUSCULAR; INTRAVENOUS
Status: DISCONTINUED | OUTPATIENT
Start: 2021-07-21 | End: 2021-07-21 | Stop reason: HOSPADM

## 2021-07-21 RX ORDER — NAPROXEN SODIUM 220 MG/1
81 TABLET, FILM COATED ORAL DAILY
Status: DISCONTINUED | OUTPATIENT
Start: 2021-07-22 | End: 2021-07-23 | Stop reason: HOSPADM

## 2021-07-21 RX ORDER — PAPAVERINE HYDROCHLORIDE 30 MG/ML
60 INJECTION INTRAMUSCULAR; INTRAVENOUS ONCE
Status: DISCONTINUED | OUTPATIENT
Start: 2021-07-21 | End: 2021-07-21 | Stop reason: HOSPADM

## 2021-07-21 RX ORDER — HYDROMORPHONE HYDROCHLORIDE 1 MG/ML
0.2 INJECTION, SOLUTION INTRAMUSCULAR; INTRAVENOUS; SUBCUTANEOUS
Status: DISCONTINUED | OUTPATIENT
Start: 2021-07-21 | End: 2021-07-21 | Stop reason: HOSPADM

## 2021-07-21 RX ORDER — FLUTICASONE PROPIONATE 50 MCG
2 SPRAY, SUSPENSION (ML) NASAL DAILY PRN
Status: DISCONTINUED | OUTPATIENT
Start: 2021-07-21 | End: 2021-07-23 | Stop reason: HOSPADM

## 2021-07-21 RX ORDER — ALBUTEROL SULFATE 90 UG/1
2 AEROSOL, METERED RESPIRATORY (INHALATION) EVERY 6 HOURS PRN
Status: DISCONTINUED | OUTPATIENT
Start: 2021-07-21 | End: 2021-07-22

## 2021-07-21 RX ORDER — LABETALOL 100 MG/1
100 TABLET, FILM COATED ORAL 2 TIMES DAILY
Status: DISCONTINUED | OUTPATIENT
Start: 2021-07-21 | End: 2021-07-23 | Stop reason: HOSPADM

## 2021-07-21 RX ORDER — ROCURONIUM BROMIDE 10 MG/ML
INJECTION, SOLUTION INTRAVENOUS
Status: DISCONTINUED | OUTPATIENT
Start: 2021-07-21 | End: 2021-07-21

## 2021-07-21 RX ORDER — CEFAZOLIN SODIUM 2 G/50ML
2 SOLUTION INTRAVENOUS ONCE
Status: COMPLETED | OUTPATIENT
Start: 2021-07-21 | End: 2021-07-21

## 2021-07-21 RX ORDER — LIDOCAINE HYDROCHLORIDE 20 MG/ML
INJECTION, SOLUTION EPIDURAL; INFILTRATION; INTRACAUDAL; PERINEURAL
Status: DISCONTINUED | OUTPATIENT
Start: 2021-07-21 | End: 2021-07-21

## 2021-07-21 RX ORDER — ATORVASTATIN CALCIUM 20 MG/1
20 TABLET, FILM COATED ORAL NIGHTLY
Status: DISCONTINUED | OUTPATIENT
Start: 2021-07-22 | End: 2021-07-23 | Stop reason: HOSPADM

## 2021-07-21 RX ORDER — FAMOTIDINE 10 MG/ML
INJECTION INTRAVENOUS
Status: DISCONTINUED | OUTPATIENT
Start: 2021-07-21 | End: 2021-07-21

## 2021-07-21 RX ORDER — GABAPENTIN 300 MG/1
CAPSULE ORAL
Status: DISPENSED
Start: 2021-07-21 | End: 2021-07-22

## 2021-07-21 RX ORDER — ONDANSETRON 2 MG/ML
INJECTION INTRAMUSCULAR; INTRAVENOUS
Status: DISCONTINUED | OUTPATIENT
Start: 2021-07-21 | End: 2021-07-21

## 2021-07-21 RX ORDER — EPHEDRINE SULFATE 50 MG/ML
INJECTION, SOLUTION INTRAVENOUS
Status: DISCONTINUED | OUTPATIENT
Start: 2021-07-21 | End: 2021-07-21

## 2021-07-21 RX ORDER — HYDROMORPHONE HYDROCHLORIDE 1 MG/ML
1 INJECTION, SOLUTION INTRAMUSCULAR; INTRAVENOUS; SUBCUTANEOUS EVERY 4 HOURS PRN
Status: DISCONTINUED | OUTPATIENT
Start: 2021-07-21 | End: 2021-07-23 | Stop reason: HOSPADM

## 2021-07-21 RX ORDER — GABAPENTIN 300 MG/1
300 CAPSULE ORAL ONCE
Status: COMPLETED | OUTPATIENT
Start: 2021-07-21 | End: 2021-07-21

## 2021-07-21 RX ORDER — PROPOFOL 10 MG/ML
VIAL (ML) INTRAVENOUS
Status: DISCONTINUED | OUTPATIENT
Start: 2021-07-21 | End: 2021-07-21

## 2021-07-21 RX ORDER — SODIUM CHLORIDE, SODIUM LACTATE, POTASSIUM CHLORIDE, CALCIUM CHLORIDE 600; 310; 30; 20 MG/100ML; MG/100ML; MG/100ML; MG/100ML
INJECTION, SOLUTION INTRAVENOUS CONTINUOUS PRN
Status: DISCONTINUED | OUTPATIENT
Start: 2021-07-21 | End: 2021-07-21

## 2021-07-21 RX ORDER — OXYCODONE HYDROCHLORIDE 5 MG/1
5 TABLET ORAL
Status: DISCONTINUED | OUTPATIENT
Start: 2021-07-21 | End: 2021-07-21 | Stop reason: HOSPADM

## 2021-07-21 RX ORDER — SUCCINYLCHOLINE CHLORIDE 20 MG/ML
INJECTION INTRAMUSCULAR; INTRAVENOUS
Status: DISCONTINUED | OUTPATIENT
Start: 2021-07-21 | End: 2021-07-21

## 2021-07-21 RX ORDER — SODIUM CHLORIDE 0.9 % (FLUSH) 0.9 %
10 SYRINGE (ML) INJECTION
Status: DISCONTINUED | OUTPATIENT
Start: 2021-07-21 | End: 2021-07-23 | Stop reason: HOSPADM

## 2021-07-21 RX ORDER — ONDANSETRON 2 MG/ML
4 INJECTION INTRAMUSCULAR; INTRAVENOUS DAILY PRN
Status: DISCONTINUED | OUTPATIENT
Start: 2021-07-21 | End: 2021-07-21 | Stop reason: HOSPADM

## 2021-07-21 RX ORDER — HEPARIN SODIUM 10000 [USP'U]/ML
INJECTION, SOLUTION INTRAVENOUS; SUBCUTANEOUS
Status: DISCONTINUED | OUTPATIENT
Start: 2021-07-21 | End: 2021-07-21

## 2021-07-21 RX ORDER — HYDROCODONE BITARTRATE AND ACETAMINOPHEN 5; 325 MG/1; MG/1
1 TABLET ORAL EVERY 4 HOURS PRN
Status: DISCONTINUED | OUTPATIENT
Start: 2021-07-21 | End: 2021-07-23 | Stop reason: HOSPADM

## 2021-07-21 RX ORDER — MEPERIDINE HYDROCHLORIDE 50 MG/ML
12.5 INJECTION INTRAMUSCULAR; INTRAVENOUS; SUBCUTANEOUS EVERY 10 MIN PRN
Status: DISCONTINUED | OUTPATIENT
Start: 2021-07-21 | End: 2021-07-21 | Stop reason: HOSPADM

## 2021-07-21 RX ORDER — HEPARIN SODIUM 5000 [USP'U]/ML
INJECTION, SOLUTION INTRAVENOUS; SUBCUTANEOUS
Status: DISCONTINUED | OUTPATIENT
Start: 2021-07-21 | End: 2021-07-21 | Stop reason: HOSPADM

## 2021-07-21 RX ORDER — MIDAZOLAM HYDROCHLORIDE 1 MG/ML
INJECTION INTRAMUSCULAR; INTRAVENOUS
Status: DISCONTINUED | OUTPATIENT
Start: 2021-07-21 | End: 2021-07-21

## 2021-07-21 RX ORDER — ACETAMINOPHEN 10 MG/ML
INJECTION, SOLUTION INTRAVENOUS
Status: DISCONTINUED | OUTPATIENT
Start: 2021-07-21 | End: 2021-07-21

## 2021-07-21 RX ORDER — DEXAMETHASONE SODIUM PHOSPHATE 4 MG/ML
INJECTION, SOLUTION INTRA-ARTICULAR; INTRALESIONAL; INTRAMUSCULAR; INTRAVENOUS; SOFT TISSUE
Status: DISCONTINUED | OUTPATIENT
Start: 2021-07-21 | End: 2021-07-21

## 2021-07-21 RX ORDER — HYDROCHLOROTHIAZIDE 12.5 MG/1
12.5 TABLET ORAL DAILY
Status: DISCONTINUED | OUTPATIENT
Start: 2021-07-22 | End: 2021-07-23 | Stop reason: HOSPADM

## 2021-07-21 RX ORDER — LISINOPRIL 20 MG/1
20 TABLET ORAL DAILY
Status: DISCONTINUED | OUTPATIENT
Start: 2021-07-22 | End: 2021-07-23 | Stop reason: HOSPADM

## 2021-07-21 RX ADMIN — PHENYLEPHRINE HYDROCHLORIDE 200 MCG: 10 INJECTION INTRAVENOUS at 06:07

## 2021-07-21 RX ADMIN — HYDROMORPHONE HYDROCHLORIDE 0.2 MG: 1 INJECTION, SOLUTION INTRAMUSCULAR; INTRAVENOUS; SUBCUTANEOUS at 07:07

## 2021-07-21 RX ADMIN — EPHEDRINE SULFATE 10 MG: 50 INJECTION, SOLUTION INTRAVENOUS at 04:07

## 2021-07-21 RX ADMIN — SODIUM CHLORIDE, SODIUM LACTATE, POTASSIUM CHLORIDE, AND CALCIUM CHLORIDE: .6; .31; .03; .02 INJECTION, SOLUTION INTRAVENOUS at 04:07

## 2021-07-21 RX ADMIN — FAMOTIDINE 20 MG: 10 INJECTION, SOLUTION INTRAVENOUS at 02:07

## 2021-07-21 RX ADMIN — GABAPENTIN 300 MG: 300 CAPSULE ORAL at 01:07

## 2021-07-21 RX ADMIN — SODIUM CHLORIDE, SODIUM LACTATE, POTASSIUM CHLORIDE, AND CALCIUM CHLORIDE: .6; .31; .03; .02 INJECTION, SOLUTION INTRAVENOUS at 03:07

## 2021-07-21 RX ADMIN — CELECOXIB 200 MG: 100 CAPSULE ORAL at 01:07

## 2021-07-21 RX ADMIN — ROCURONIUM BROMIDE 50 MG: 10 INJECTION, SOLUTION INTRAVENOUS at 04:07

## 2021-07-21 RX ADMIN — HYDROMORPHONE HYDROCHLORIDE 0.2 MG: 1 INJECTION, SOLUTION INTRAMUSCULAR; INTRAVENOUS; SUBCUTANEOUS at 06:07

## 2021-07-21 RX ADMIN — FENTANYL CITRATE 50 MCG: 50 INJECTION INTRAMUSCULAR; INTRAVENOUS at 01:07

## 2021-07-21 RX ADMIN — ACETAMINOPHEN 1000 MG: 10 INJECTION, SOLUTION INTRAVENOUS at 02:07

## 2021-07-21 RX ADMIN — ROCURONIUM BROMIDE 5 MG: 10 INJECTION, SOLUTION INTRAVENOUS at 01:07

## 2021-07-21 RX ADMIN — PROPOFOL 120 MG: 10 INJECTION, EMULSION INTRAVENOUS at 01:07

## 2021-07-21 RX ADMIN — LABETALOL HCL 100 MG: 100 TABLET, FILM COATED ORAL at 09:07

## 2021-07-21 RX ADMIN — SUCCINYLCHOLINE CHLORIDE 120 MG: 20 INJECTION, SOLUTION INTRAMUSCULAR; INTRAVENOUS at 01:07

## 2021-07-21 RX ADMIN — PHENYLEPHRINE HYDROCHLORIDE 200 MCG: 10 INJECTION INTRAVENOUS at 03:07

## 2021-07-21 RX ADMIN — SODIUM CHLORIDE, SODIUM LACTATE, POTASSIUM CHLORIDE, AND CALCIUM CHLORIDE: .6; .31; .03; .02 INJECTION, SOLUTION INTRAVENOUS at 01:07

## 2021-07-21 RX ADMIN — ROCURONIUM BROMIDE 25 MG: 10 INJECTION, SOLUTION INTRAVENOUS at 02:07

## 2021-07-21 RX ADMIN — LIDOCAINE HYDROCHLORIDE 50 MG: 20 INJECTION, SOLUTION EPIDURAL; INFILTRATION; INTRACAUDAL; PERINEURAL at 01:07

## 2021-07-21 RX ADMIN — CEFAZOLIN SODIUM 2 G: 2 SOLUTION INTRAVENOUS at 02:07

## 2021-07-21 RX ADMIN — HYDROMORPHONE HYDROCHLORIDE 1 MG: 1 INJECTION, SOLUTION INTRAMUSCULAR; INTRAVENOUS; SUBCUTANEOUS at 08:07

## 2021-07-21 RX ADMIN — ROCURONIUM BROMIDE 20 MG: 10 INJECTION, SOLUTION INTRAVENOUS at 02:07

## 2021-07-21 RX ADMIN — SODIUM CHLORIDE: 0.9 INJECTION, SOLUTION INTRAVENOUS at 08:07

## 2021-07-21 RX ADMIN — MIDAZOLAM HYDROCHLORIDE 2 MG: 1 INJECTION, SOLUTION INTRAMUSCULAR; INTRAVENOUS at 01:07

## 2021-07-21 RX ADMIN — DEXAMETHASONE SODIUM PHOSPHATE 8 MG: 4 INJECTION, SOLUTION INTRAMUSCULAR; INTRAVENOUS at 02:07

## 2021-07-21 RX ADMIN — ONDANSETRON 4 MG: 2 INJECTION INTRAMUSCULAR; INTRAVENOUS at 02:07

## 2021-07-21 RX ADMIN — HEPARIN SODIUM 10000 UNITS: 10000 INJECTION, SOLUTION INTRAVENOUS; SUBCUTANEOUS at 04:07

## 2021-07-21 RX ADMIN — FENTANYL CITRATE 50 MCG: 50 INJECTION INTRAMUSCULAR; INTRAVENOUS at 02:07

## 2021-07-21 RX ADMIN — HYDROCODONE BITARTRATE AND ACETAMINOPHEN 1 TABLET: 5; 325 TABLET ORAL at 11:07

## 2021-07-22 LAB
HGB BLD-MCNC: 11.4 G/DL (ref 14–18)
POC ACTIVATED CLOTTING TIME K: 263 SEC (ref 74–137)
SAMPLE: ABNORMAL

## 2021-07-22 PROCEDURE — 99900035 HC TECH TIME PER 15 MIN (STAT)

## 2021-07-22 PROCEDURE — 20000000 HC ICU ROOM

## 2021-07-22 PROCEDURE — 85018 HEMOGLOBIN: CPT | Performed by: SURGERY

## 2021-07-22 PROCEDURE — 27000221 HC OXYGEN, UP TO 24 HOURS

## 2021-07-22 PROCEDURE — 25000003 PHARM REV CODE 250

## 2021-07-22 PROCEDURE — 25000003 PHARM REV CODE 250: Performed by: SURGERY

## 2021-07-22 PROCEDURE — 94640 AIRWAY INHALATION TREATMENT: CPT

## 2021-07-22 PROCEDURE — 99900031 HC PATIENT EDUCATION (STAT)

## 2021-07-22 PROCEDURE — 25000242 PHARM REV CODE 250 ALT 637 W/ HCPCS: Performed by: SURGERY

## 2021-07-22 PROCEDURE — 94761 N-INVAS EAR/PLS OXIMETRY MLT: CPT

## 2021-07-22 PROCEDURE — 94760 N-INVAS EAR/PLS OXIMETRY 1: CPT

## 2021-07-22 RX ORDER — MUPIROCIN 20 MG/G
OINTMENT TOPICAL 2 TIMES DAILY
Status: DISCONTINUED | OUTPATIENT
Start: 2021-07-22 | End: 2021-07-23 | Stop reason: HOSPADM

## 2021-07-22 RX ORDER — CHLORHEXIDINE GLUCONATE ORAL RINSE 1.2 MG/ML
15 SOLUTION DENTAL 2 TIMES DAILY
Status: DISCONTINUED | OUTPATIENT
Start: 2021-07-22 | End: 2021-07-23 | Stop reason: HOSPADM

## 2021-07-22 RX ORDER — ALBUTEROL SULFATE 0.83 MG/ML
2.5 SOLUTION RESPIRATORY (INHALATION) EVERY 4 HOURS PRN
Status: DISCONTINUED | OUTPATIENT
Start: 2021-07-22 | End: 2021-07-23 | Stop reason: HOSPADM

## 2021-07-22 RX ADMIN — CHLORHEXIDINE GLUCONATE 15 ML: 1.2 RINSE ORAL at 08:07

## 2021-07-22 RX ADMIN — SODIUM CHLORIDE: 0.9 INJECTION, SOLUTION INTRAVENOUS at 08:07

## 2021-07-22 RX ADMIN — SODIUM CHLORIDE 500 ML: 0.9 INJECTION, SOLUTION INTRAVENOUS at 08:07

## 2021-07-22 RX ADMIN — ATORVASTATIN CALCIUM 20 MG: 20 TABLET, FILM COATED ORAL at 08:07

## 2021-07-22 RX ADMIN — SODIUM CHLORIDE: 0.9 INJECTION, SOLUTION INTRAVENOUS at 05:07

## 2021-07-22 RX ADMIN — CHLORHEXIDINE GLUCONATE 15 ML: 1.2 RINSE ORAL at 09:07

## 2021-07-22 RX ADMIN — ALBUTEROL SULFATE 2.5 MG: 2.5 SOLUTION RESPIRATORY (INHALATION) at 08:07

## 2021-07-22 RX ADMIN — MUPIROCIN: 20 OINTMENT TOPICAL at 09:07

## 2021-07-22 RX ADMIN — ALBUTEROL SULFATE 2.5 MG: 2.5 SOLUTION RESPIRATORY (INHALATION) at 04:07

## 2021-07-22 RX ADMIN — SODIUM CHLORIDE 500 ML: 0.9 INJECTION, SOLUTION INTRAVENOUS at 03:07

## 2021-07-22 RX ADMIN — MUPIROCIN: 20 OINTMENT TOPICAL at 08:07

## 2021-07-22 RX ADMIN — HYDROCODONE BITARTRATE AND ACETAMINOPHEN 1 TABLET: 5; 325 TABLET ORAL at 04:07

## 2021-07-22 RX ADMIN — ASPIRIN 81 MG: 81 TABLET, CHEWABLE ORAL at 08:07

## 2021-07-22 RX ADMIN — HYDROCODONE BITARTRATE AND ACETAMINOPHEN 1 TABLET: 5; 325 TABLET ORAL at 09:07

## 2021-07-23 VITALS
DIASTOLIC BLOOD PRESSURE: 75 MMHG | OXYGEN SATURATION: 99 % | BODY MASS INDEX: 26.37 KG/M2 | WEIGHT: 194.69 LBS | HEART RATE: 90 BPM | HEIGHT: 72 IN | RESPIRATION RATE: 17 BRPM | SYSTOLIC BLOOD PRESSURE: 132 MMHG | TEMPERATURE: 99 F

## 2021-07-23 LAB
ANION GAP SERPL CALC-SCNC: 7 MMOL/L (ref 8–16)
BASOPHILS # BLD AUTO: 0.03 K/UL (ref 0–0.2)
BASOPHILS NFR BLD: 0.3 % (ref 0–1.9)
BUN SERPL-MCNC: 12 MG/DL (ref 8–23)
CALCIUM SERPL-MCNC: 7.9 MG/DL (ref 8.7–10.5)
CHLORIDE SERPL-SCNC: 108 MMOL/L (ref 95–110)
CO2 SERPL-SCNC: 22 MMOL/L (ref 23–29)
CREAT SERPL-MCNC: 0.7 MG/DL (ref 0.5–1.4)
DIFFERENTIAL METHOD: ABNORMAL
EOSINOPHIL # BLD AUTO: 0.4 K/UL (ref 0–0.5)
EOSINOPHIL NFR BLD: 3.3 % (ref 0–8)
ERYTHROCYTE [DISTWIDTH] IN BLOOD BY AUTOMATED COUNT: 13.8 % (ref 11.5–14.5)
EST. GFR  (AFRICAN AMERICAN): >60 ML/MIN/1.73 M^2
EST. GFR  (NON AFRICAN AMERICAN): >60 ML/MIN/1.73 M^2
GLUCOSE SERPL-MCNC: 104 MG/DL (ref 70–110)
HCT VFR BLD AUTO: 29 % (ref 40–54)
HGB BLD-MCNC: 10 G/DL (ref 14–18)
IMM GRANULOCYTES # BLD AUTO: 0.04 K/UL (ref 0–0.04)
IMM GRANULOCYTES NFR BLD AUTO: 0.4 % (ref 0–0.5)
LYMPHOCYTES # BLD AUTO: 1.4 K/UL (ref 1–4.8)
LYMPHOCYTES NFR BLD: 13 % (ref 18–48)
MCH RBC QN AUTO: 32.5 PG (ref 27–31)
MCHC RBC AUTO-ENTMCNC: 34.5 G/DL (ref 32–36)
MCV RBC AUTO: 94 FL (ref 82–98)
MONOCYTES # BLD AUTO: 1.5 K/UL (ref 0.3–1)
MONOCYTES NFR BLD: 13.7 % (ref 4–15)
NEUTROPHILS # BLD AUTO: 7.6 K/UL (ref 1.8–7.7)
NEUTROPHILS NFR BLD: 69.3 % (ref 38–73)
NRBC BLD-RTO: 0 /100 WBC
PLATELET # BLD AUTO: 169 K/UL (ref 150–450)
PMV BLD AUTO: 9.8 FL (ref 9.2–12.9)
POTASSIUM SERPL-SCNC: 3.7 MMOL/L (ref 3.5–5.1)
RBC # BLD AUTO: 3.08 M/UL (ref 4.6–6.2)
SODIUM SERPL-SCNC: 137 MMOL/L (ref 136–145)
WBC # BLD AUTO: 10.93 K/UL (ref 3.9–12.7)

## 2021-07-23 PROCEDURE — 85025 COMPLETE CBC W/AUTO DIFF WBC: CPT | Performed by: SURGERY

## 2021-07-23 PROCEDURE — 25000003 PHARM REV CODE 250

## 2021-07-23 PROCEDURE — 94640 AIRWAY INHALATION TREATMENT: CPT

## 2021-07-23 PROCEDURE — 80048 BASIC METABOLIC PNL TOTAL CA: CPT | Performed by: SURGERY

## 2021-07-23 PROCEDURE — 94761 N-INVAS EAR/PLS OXIMETRY MLT: CPT

## 2021-07-23 PROCEDURE — 25000242 PHARM REV CODE 250 ALT 637 W/ HCPCS: Performed by: SURGERY

## 2021-07-23 PROCEDURE — 25000003 PHARM REV CODE 250: Performed by: SURGERY

## 2021-07-23 RX ORDER — HYDROCODONE BITARTRATE AND ACETAMINOPHEN 5; 325 MG/1; MG/1
1 TABLET ORAL EVERY 4 HOURS PRN
Qty: 30 TABLET | Refills: 0 | Status: SHIPPED | OUTPATIENT
Start: 2021-07-23 | End: 2022-07-22

## 2021-07-23 RX ADMIN — ASPIRIN 81 MG: 81 TABLET, CHEWABLE ORAL at 09:07

## 2021-07-23 RX ADMIN — CHLORHEXIDINE GLUCONATE 15 ML: 1.2 RINSE ORAL at 09:07

## 2021-07-23 RX ADMIN — MUPIROCIN: 20 OINTMENT TOPICAL at 09:07

## 2021-07-23 RX ADMIN — SODIUM CHLORIDE: 0.9 INJECTION, SOLUTION INTRAVENOUS at 01:07

## 2021-07-23 RX ADMIN — HYDROCODONE BITARTRATE AND ACETAMINOPHEN 1 TABLET: 5; 325 TABLET ORAL at 04:07

## 2021-07-23 RX ADMIN — ALBUTEROL SULFATE 2.5 MG: 2.5 SOLUTION RESPIRATORY (INHALATION) at 08:07

## 2021-07-23 RX ADMIN — HYDROCODONE BITARTRATE AND ACETAMINOPHEN 1 TABLET: 5; 325 TABLET ORAL at 05:07

## 2021-07-23 RX ADMIN — HYDROCODONE BITARTRATE AND ACETAMINOPHEN 1 TABLET: 5; 325 TABLET ORAL at 11:07

## 2021-08-31 ENCOUNTER — HOSPITAL ENCOUNTER (EMERGENCY)
Facility: HOSPITAL | Age: 61
Discharge: HOME | End: 2021-08-31
Attending: EMERGENCY MEDICINE
Payer: MEDICAID

## 2021-08-31 VITALS
WEIGHT: 195 LBS | HEIGHT: 72 IN | HEART RATE: 85 BPM | OXYGEN SATURATION: 97 % | SYSTOLIC BLOOD PRESSURE: 123 MMHG | RESPIRATION RATE: 18 BRPM | DIASTOLIC BLOOD PRESSURE: 82 MMHG | BODY MASS INDEX: 26.41 KG/M2 | TEMPERATURE: 98 F

## 2021-08-31 DIAGNOSIS — M79.673 FOOT PAIN: ICD-10-CM

## 2021-08-31 DIAGNOSIS — L03.119 CELLULITIS OF FOOT: Primary | ICD-10-CM

## 2021-08-31 PROCEDURE — 99284 EMERGENCY DEPT VISIT MOD MDM: CPT

## 2021-08-31 PROCEDURE — 90715 TDAP VACCINE 7 YRS/> IM: CPT | Performed by: EMERGENCY MEDICINE

## 2021-08-31 PROCEDURE — 90471 IMMUNIZATION ADMIN: CPT | Performed by: EMERGENCY MEDICINE

## 2021-08-31 PROCEDURE — 63600175 PHARM REV CODE 636 W HCPCS: Performed by: EMERGENCY MEDICINE

## 2021-08-31 RX ORDER — DOXYCYCLINE 100 MG/1
100 CAPSULE ORAL 2 TIMES DAILY
Qty: 20 CAPSULE | Refills: 0 | Status: SHIPPED | OUTPATIENT
Start: 2021-08-31 | End: 2021-09-10

## 2021-08-31 RX ORDER — HYDROCODONE BITARTRATE AND ACETAMINOPHEN 7.5; 325 MG/1; MG/1
1 TABLET ORAL EVERY 6 HOURS PRN
COMMUNITY
End: 2022-07-22

## 2021-08-31 RX ORDER — DOXYCYCLINE 100 MG/1
100 CAPSULE ORAL 2 TIMES DAILY
Qty: 4 CAPSULE | Refills: 0 | Status: SHIPPED | OUTPATIENT
Start: 2021-08-31 | End: 2021-09-02

## 2021-08-31 RX ADMIN — CLOSTRIDIUM TETANI TOXOID ANTIGEN (FORMALDEHYDE INACTIVATED), CORYNEBACTERIUM DIPHTHERIAE TOXOID ANTIGEN (FORMALDEHYDE INACTIVATED), BORDETELLA PERTUSSIS TOXOID ANTIGEN (GLUTARALDEHYDE INACTIVATED), BORDETELLA PERTUSSIS FILAMENTOUS HEMAGGLUTININ ANTIGEN (FORMALDEHYDE INACTIVATED), BORDETELLA PERTUSSIS PERTACTIN ANTIGEN, AND BORDETELLA PERTUSSIS FIMBRIAE 2/3 ANTIGEN 0.5 ML: 5; 2; 2.5; 5; 3; 5 INJECTION, SUSPENSION INTRAMUSCULAR at 02:08

## 2021-09-02 ENCOUNTER — PATIENT MESSAGE (OUTPATIENT)
Dept: PULMONOLOGY | Facility: CLINIC | Age: 61
End: 2021-09-02

## 2021-09-29 ENCOUNTER — TELEPHONE (OUTPATIENT)
Dept: PULMONOLOGY | Facility: CLINIC | Age: 61
End: 2021-09-29

## 2021-10-06 ENCOUNTER — OFFICE VISIT (OUTPATIENT)
Dept: PULMONOLOGY | Facility: CLINIC | Age: 61
End: 2021-10-06
Payer: MEDICAID

## 2021-10-06 VITALS
SYSTOLIC BLOOD PRESSURE: 140 MMHG | DIASTOLIC BLOOD PRESSURE: 96 MMHG | HEIGHT: 72 IN | OXYGEN SATURATION: 95 % | WEIGHT: 189.25 LBS | HEART RATE: 70 BPM | BODY MASS INDEX: 25.63 KG/M2

## 2021-10-06 DIAGNOSIS — J44.9 CHRONIC OBSTRUCTIVE PULMONARY DISEASE, UNSPECIFIED COPD TYPE: Primary | ICD-10-CM

## 2021-10-06 DIAGNOSIS — R91.1 LUNG NODULE: ICD-10-CM

## 2021-10-06 PROCEDURE — 99214 OFFICE O/P EST MOD 30 MIN: CPT | Mod: S$PBB,,, | Performed by: INTERNAL MEDICINE

## 2021-10-06 PROCEDURE — 99214 OFFICE O/P EST MOD 30 MIN: CPT | Mod: PBBFAC,PO | Performed by: INTERNAL MEDICINE

## 2021-10-06 PROCEDURE — 99999 PR PBB SHADOW E&M-EST. PATIENT-LVL IV: ICD-10-PCS | Mod: PBBFAC,,, | Performed by: INTERNAL MEDICINE

## 2021-10-06 PROCEDURE — 99214 PR OFFICE/OUTPT VISIT, EST, LEVL IV, 30-39 MIN: ICD-10-PCS | Mod: S$PBB,,, | Performed by: INTERNAL MEDICINE

## 2021-10-06 PROCEDURE — 99999 PR PBB SHADOW E&M-EST. PATIENT-LVL IV: CPT | Mod: PBBFAC,,, | Performed by: INTERNAL MEDICINE

## 2021-11-17 ENCOUNTER — TELEPHONE (OUTPATIENT)
Dept: PULMONOLOGY | Facility: CLINIC | Age: 61
End: 2021-11-17
Payer: MEDICAID

## 2021-11-19 ENCOUNTER — OFFICE VISIT (OUTPATIENT)
Dept: PULMONOLOGY | Facility: CLINIC | Age: 61
End: 2021-11-19
Payer: MEDICAID

## 2021-11-19 VITALS
HEART RATE: 80 BPM | HEIGHT: 72 IN | DIASTOLIC BLOOD PRESSURE: 85 MMHG | RESPIRATION RATE: 18 BRPM | SYSTOLIC BLOOD PRESSURE: 122 MMHG | OXYGEN SATURATION: 94 % | TEMPERATURE: 98 F | WEIGHT: 183.31 LBS | BODY MASS INDEX: 24.83 KG/M2

## 2021-11-19 DIAGNOSIS — J44.9 CHRONIC OBSTRUCTIVE PULMONARY DISEASE, UNSPECIFIED COPD TYPE: ICD-10-CM

## 2021-11-19 DIAGNOSIS — J44.1 COPD WITH ACUTE EXACERBATION: Primary | ICD-10-CM

## 2021-11-19 PROCEDURE — 99999 PR PBB SHADOW E&M-EST. PATIENT-LVL III: CPT | Mod: PBBFAC,,, | Performed by: NURSE PRACTITIONER

## 2021-11-19 PROCEDURE — 99999 PR PBB SHADOW E&M-EST. PATIENT-LVL III: ICD-10-PCS | Mod: PBBFAC,,, | Performed by: NURSE PRACTITIONER

## 2021-11-19 PROCEDURE — 99213 PR OFFICE/OUTPT VISIT, EST, LEVL III, 20-29 MIN: ICD-10-PCS | Mod: S$PBB,,, | Performed by: NURSE PRACTITIONER

## 2021-11-19 PROCEDURE — 96372 THER/PROPH/DIAG INJ SC/IM: CPT | Mod: PBBFAC,PO

## 2021-11-19 PROCEDURE — 99213 OFFICE O/P EST LOW 20 MIN: CPT | Mod: PBBFAC,PO | Performed by: NURSE PRACTITIONER

## 2021-11-19 PROCEDURE — 99213 OFFICE O/P EST LOW 20 MIN: CPT | Mod: S$PBB,,, | Performed by: NURSE PRACTITIONER

## 2021-11-19 RX ORDER — GUAIFENESIN 1200 MG/1
1200 TABLET, EXTENDED RELEASE ORAL 2 TIMES DAILY
Qty: 60 TABLET | Refills: 1 | Status: SHIPPED | OUTPATIENT
Start: 2021-11-19 | End: 2022-07-22

## 2021-11-19 RX ORDER — PREDNISONE 20 MG/1
TABLET ORAL
Qty: 12 TABLET | Refills: 0 | Status: SHIPPED | OUTPATIENT
Start: 2021-11-19 | End: 2021-12-22 | Stop reason: SDUPTHER

## 2021-11-19 RX ORDER — IPRATROPIUM BROMIDE AND ALBUTEROL SULFATE 2.5; .5 MG/3ML; MG/3ML
3 SOLUTION RESPIRATORY (INHALATION) EVERY 6 HOURS PRN
Qty: 240 ML | Refills: 1 | Status: SHIPPED | OUTPATIENT
Start: 2021-11-19 | End: 2022-03-09 | Stop reason: SDUPTHER

## 2021-11-19 RX ORDER — BETAMETHASONE SODIUM PHOSPHATE AND BETAMETHASONE ACETATE 3; 3 MG/ML; MG/ML
6 INJECTION, SUSPENSION INTRA-ARTICULAR; INTRALESIONAL; INTRAMUSCULAR; SOFT TISSUE
Status: COMPLETED | OUTPATIENT
Start: 2021-11-19 | End: 2021-11-19

## 2021-11-19 RX ADMIN — BETAMETHASONE SODIUM PHOSPHATE AND BETAMETHASONE ACETATE 6 MG: 3; 3 INJECTION, SUSPENSION INTRA-ARTICULAR; INTRALESIONAL; INTRAMUSCULAR; SOFT TISSUE at 04:11

## 2021-12-22 ENCOUNTER — PATIENT MESSAGE (OUTPATIENT)
Dept: PULMONOLOGY | Facility: CLINIC | Age: 61
End: 2021-12-22
Payer: MEDICAID

## 2022-01-02 ENCOUNTER — HOSPITAL ENCOUNTER (OUTPATIENT)
Facility: HOSPITAL | Age: 62
Discharge: HOME | End: 2022-01-04
Attending: EMERGENCY MEDICINE | Admitting: INTERNAL MEDICINE
Payer: MEDICAID

## 2022-01-02 DIAGNOSIS — R07.9 CHEST PAIN: ICD-10-CM

## 2022-01-02 DIAGNOSIS — I10 ACCELERATED HYPERTENSION: Primary | ICD-10-CM

## 2022-01-02 LAB
ALBUMIN SERPL BCP-MCNC: 4.1 G/DL (ref 3.5–5.2)
ALP SERPL-CCNC: 56 U/L (ref 55–135)
ALT SERPL W/O P-5'-P-CCNC: 20 U/L (ref 10–44)
ANION GAP SERPL CALC-SCNC: 10 MMOL/L (ref 8–16)
AST SERPL-CCNC: 22 U/L (ref 10–40)
BASOPHILS # BLD AUTO: 0.07 K/UL (ref 0–0.2)
BASOPHILS NFR BLD: 0.7 % (ref 0–1.9)
BILIRUB SERPL-MCNC: 0.9 MG/DL (ref 0.1–1)
BNP SERPL-MCNC: 48 PG/ML (ref 0–99)
BUN SERPL-MCNC: 18 MG/DL (ref 8–23)
CALCIUM SERPL-MCNC: 9.1 MG/DL (ref 8.7–10.5)
CHLORIDE SERPL-SCNC: 103 MMOL/L (ref 95–110)
CO2 SERPL-SCNC: 25 MMOL/L (ref 23–29)
CREAT SERPL-MCNC: 0.9 MG/DL (ref 0.5–1.4)
DIFFERENTIAL METHOD: ABNORMAL
EOSINOPHIL # BLD AUTO: 0.6 K/UL (ref 0–0.5)
EOSINOPHIL NFR BLD: 5.7 % (ref 0–8)
ERYTHROCYTE [DISTWIDTH] IN BLOOD BY AUTOMATED COUNT: 17.2 % (ref 11.5–14.5)
EST. GFR  (AFRICAN AMERICAN): >60 ML/MIN/1.73 M^2
EST. GFR  (NON AFRICAN AMERICAN): >60 ML/MIN/1.73 M^2
GLUCOSE SERPL-MCNC: 90 MG/DL (ref 70–110)
HCT VFR BLD AUTO: 44.8 % (ref 40–54)
HGB BLD-MCNC: 15 G/DL (ref 14–18)
IMM GRANULOCYTES # BLD AUTO: 0.02 K/UL (ref 0–0.04)
IMM GRANULOCYTES NFR BLD AUTO: 0.2 % (ref 0–0.5)
INR PPP: 1.1
LYMPHOCYTES # BLD AUTO: 2.6 K/UL (ref 1–4.8)
LYMPHOCYTES NFR BLD: 25.4 % (ref 18–48)
MCH RBC QN AUTO: 29.7 PG (ref 27–31)
MCHC RBC AUTO-ENTMCNC: 33.5 G/DL (ref 32–36)
MCV RBC AUTO: 89 FL (ref 82–98)
MONOCYTES # BLD AUTO: 1.4 K/UL (ref 0.3–1)
MONOCYTES NFR BLD: 13.5 % (ref 4–15)
NEUTROPHILS # BLD AUTO: 5.7 K/UL (ref 1.8–7.7)
NEUTROPHILS NFR BLD: 54.5 % (ref 38–73)
NRBC BLD-RTO: 0 /100 WBC
PLATELET # BLD AUTO: 282 K/UL (ref 150–450)
PMV BLD AUTO: 9.5 FL (ref 9.2–12.9)
POTASSIUM SERPL-SCNC: 3.3 MMOL/L (ref 3.5–5.1)
PROT SERPL-MCNC: 7 G/DL (ref 6–8.4)
PROTHROMBIN TIME: 13.7 SEC (ref 11.4–13.7)
RBC # BLD AUTO: 5.05 M/UL (ref 4.6–6.2)
SARS-COV-2 RDRP RESP QL NAA+PROBE: NEGATIVE
SODIUM SERPL-SCNC: 138 MMOL/L (ref 136–145)
TROPONIN I SERPL DL<=0.01 NG/ML-MCNC: <0.03 NG/ML
WBC # BLD AUTO: 10.37 K/UL (ref 3.9–12.7)

## 2022-01-02 PROCEDURE — 93010 EKG 12-LEAD: ICD-10-PCS | Mod: ,,, | Performed by: INTERNAL MEDICINE

## 2022-01-02 PROCEDURE — 93005 ELECTROCARDIOGRAM TRACING: CPT | Performed by: INTERNAL MEDICINE

## 2022-01-02 PROCEDURE — 63600175 PHARM REV CODE 636 W HCPCS: Performed by: EMERGENCY MEDICINE

## 2022-01-02 PROCEDURE — 93010 ELECTROCARDIOGRAM REPORT: CPT | Mod: ,,, | Performed by: INTERNAL MEDICINE

## 2022-01-02 PROCEDURE — 96374 THER/PROPH/DIAG INJ IV PUSH: CPT

## 2022-01-02 PROCEDURE — 85610 PROTHROMBIN TIME: CPT | Performed by: EMERGENCY MEDICINE

## 2022-01-02 PROCEDURE — 83880 ASSAY OF NATRIURETIC PEPTIDE: CPT | Performed by: EMERGENCY MEDICINE

## 2022-01-02 PROCEDURE — U0002 COVID-19 LAB TEST NON-CDC: HCPCS | Performed by: EMERGENCY MEDICINE

## 2022-01-02 PROCEDURE — 84484 ASSAY OF TROPONIN QUANT: CPT | Performed by: EMERGENCY MEDICINE

## 2022-01-02 PROCEDURE — 80053 COMPREHEN METABOLIC PANEL: CPT | Performed by: EMERGENCY MEDICINE

## 2022-01-02 PROCEDURE — 99285 EMERGENCY DEPT VISIT HI MDM: CPT | Mod: 25

## 2022-01-02 PROCEDURE — 85025 COMPLETE CBC W/AUTO DIFF WBC: CPT | Performed by: EMERGENCY MEDICINE

## 2022-01-02 RX ORDER — HYDRALAZINE HYDROCHLORIDE 20 MG/ML
10 INJECTION INTRAMUSCULAR; INTRAVENOUS
Status: COMPLETED | OUTPATIENT
Start: 2022-01-02 | End: 2022-01-02

## 2022-01-02 RX ADMIN — HYDRALAZINE HYDROCHLORIDE 10 MG: 20 INJECTION, SOLUTION INTRAMUSCULAR; INTRAVENOUS at 11:01

## 2022-01-02 NOTE — Clinical Note
Is this patient a high probability for COVID-19?: No   Diagnosis: Chest pain [[id number]]   Future Attending Provider: BIANCA BAR [[id number]]   Admitting Provider:: BIANCA BAR [[id number]]

## 2022-01-03 LAB
PROCALCITONIN SERPL IA-MCNC: <0.05 NG/ML (ref 0–0.5)
TROPONIN I SERPL DL<=0.01 NG/ML-MCNC: <0.03 NG/ML

## 2022-01-03 PROCEDURE — G0378 HOSPITAL OBSERVATION PER HR: HCPCS

## 2022-01-03 PROCEDURE — 99900035 HC TECH TIME PER 15 MIN (STAT)

## 2022-01-03 PROCEDURE — 94640 AIRWAY INHALATION TREATMENT: CPT | Mod: 76

## 2022-01-03 PROCEDURE — 25000242 PHARM REV CODE 250 ALT 637 W/ HCPCS: Performed by: INTERNAL MEDICINE

## 2022-01-03 PROCEDURE — 25000003 PHARM REV CODE 250: Performed by: INTERNAL MEDICINE

## 2022-01-03 PROCEDURE — 84145 PROCALCITONIN (PCT): CPT | Performed by: EMERGENCY MEDICINE

## 2022-01-03 PROCEDURE — 96376 TX/PRO/DX INJ SAME DRUG ADON: CPT

## 2022-01-03 PROCEDURE — 25000003 PHARM REV CODE 250: Performed by: NURSE PRACTITIONER

## 2022-01-03 PROCEDURE — 63600175 PHARM REV CODE 636 W HCPCS: Performed by: HOSPITALIST

## 2022-01-03 PROCEDURE — 36415 COLL VENOUS BLD VENIPUNCTURE: CPT | Performed by: EMERGENCY MEDICINE

## 2022-01-03 PROCEDURE — 25000242 PHARM REV CODE 250 ALT 637 W/ HCPCS: Performed by: NURSE PRACTITIONER

## 2022-01-03 PROCEDURE — 25000003 PHARM REV CODE 250: Performed by: HOSPITALIST

## 2022-01-03 PROCEDURE — 25500020 PHARM REV CODE 255: Performed by: EMERGENCY MEDICINE

## 2022-01-03 PROCEDURE — 84484 ASSAY OF TROPONIN QUANT: CPT | Performed by: NURSE PRACTITIONER

## 2022-01-03 PROCEDURE — 99900031 HC PATIENT EDUCATION (STAT)

## 2022-01-03 PROCEDURE — 94799 UNLISTED PULMONARY SVC/PX: CPT | Mod: 76

## 2022-01-03 PROCEDURE — 94799 UNLISTED PULMONARY SVC/PX: CPT

## 2022-01-03 PROCEDURE — 94640 AIRWAY INHALATION TREATMENT: CPT

## 2022-01-03 PROCEDURE — 25000003 PHARM REV CODE 250: Performed by: PHYSICIAN ASSISTANT

## 2022-01-03 PROCEDURE — 94761 N-INVAS EAR/PLS OXIMETRY MLT: CPT

## 2022-01-03 PROCEDURE — 36415 COLL VENOUS BLD VENIPUNCTURE: CPT | Performed by: NURSE PRACTITIONER

## 2022-01-03 RX ORDER — POTASSIUM CHLORIDE 20 MEQ/1
40 TABLET, EXTENDED RELEASE ORAL
Status: DISCONTINUED | OUTPATIENT
Start: 2022-01-03 | End: 2022-01-04 | Stop reason: HOSPADM

## 2022-01-03 RX ORDER — IPRATROPIUM BROMIDE AND ALBUTEROL SULFATE 2.5; .5 MG/3ML; MG/3ML
3 SOLUTION RESPIRATORY (INHALATION)
Status: DISCONTINUED | OUTPATIENT
Start: 2022-01-03 | End: 2022-01-04 | Stop reason: HOSPADM

## 2022-01-03 RX ORDER — POTASSIUM CHLORIDE 20 MEQ/1
40 TABLET, EXTENDED RELEASE ORAL ONCE
Status: COMPLETED | OUTPATIENT
Start: 2022-01-03 | End: 2022-01-03

## 2022-01-03 RX ORDER — PREDNISONE 20 MG/1
20 TABLET ORAL DAILY
Status: DISCONTINUED | OUTPATIENT
Start: 2022-01-03 | End: 2022-01-03

## 2022-01-03 RX ORDER — ALBUTEROL SULFATE 90 UG/1
2 AEROSOL, METERED RESPIRATORY (INHALATION) EVERY 6 HOURS PRN
Status: DISCONTINUED | OUTPATIENT
Start: 2022-01-03 | End: 2022-01-04 | Stop reason: HOSPADM

## 2022-01-03 RX ORDER — HYDROCHLOROTHIAZIDE 25 MG/1
25 TABLET ORAL
Status: DISCONTINUED | OUTPATIENT
Start: 2022-01-03 | End: 2022-01-04 | Stop reason: HOSPADM

## 2022-01-03 RX ORDER — MAGNESIUM SULFATE 1 G/100ML
1 INJECTION INTRAVENOUS
Status: DISCONTINUED | OUTPATIENT
Start: 2022-01-03 | End: 2022-01-04 | Stop reason: HOSPADM

## 2022-01-03 RX ORDER — MAGNESIUM SULFATE HEPTAHYDRATE 40 MG/ML
2 INJECTION, SOLUTION INTRAVENOUS
Status: DISCONTINUED | OUTPATIENT
Start: 2022-01-03 | End: 2022-01-04 | Stop reason: HOSPADM

## 2022-01-03 RX ORDER — POTASSIUM CHLORIDE 750 MG/1
50 CAPSULE, EXTENDED RELEASE ORAL ONCE
Status: DISCONTINUED | OUTPATIENT
Start: 2022-01-03 | End: 2022-01-03

## 2022-01-03 RX ORDER — BISACODYL 10 MG
10 SUPPOSITORY, RECTAL RECTAL DAILY PRN
Status: DISCONTINUED | OUTPATIENT
Start: 2022-01-03 | End: 2022-01-04 | Stop reason: HOSPADM

## 2022-01-03 RX ORDER — NITROGLYCERIN 0.4 MG/1
0.4 TABLET SUBLINGUAL EVERY 5 MIN PRN
Status: DISCONTINUED | OUTPATIENT
Start: 2022-01-03 | End: 2022-01-04 | Stop reason: HOSPADM

## 2022-01-03 RX ORDER — LOSARTAN POTASSIUM 50 MG/1
50 TABLET ORAL NIGHTLY
Status: DISCONTINUED | OUTPATIENT
Start: 2022-01-03 | End: 2022-01-04 | Stop reason: HOSPADM

## 2022-01-03 RX ORDER — HYDROCHLOROTHIAZIDE 12.5 MG/1
12.5 TABLET ORAL
Status: DISCONTINUED | OUTPATIENT
Start: 2022-01-03 | End: 2022-01-03

## 2022-01-03 RX ORDER — LABETALOL 100 MG/1
100 TABLET, FILM COATED ORAL NIGHTLY
Status: DISCONTINUED | OUTPATIENT
Start: 2022-01-03 | End: 2022-01-03

## 2022-01-03 RX ORDER — POTASSIUM CHLORIDE 7.45 MG/ML
20 INJECTION INTRAVENOUS
Status: DISCONTINUED | OUTPATIENT
Start: 2022-01-03 | End: 2022-01-04 | Stop reason: HOSPADM

## 2022-01-03 RX ORDER — LISINOPRIL 20 MG/1
20 TABLET ORAL
Status: DISCONTINUED | OUTPATIENT
Start: 2022-01-03 | End: 2022-01-03

## 2022-01-03 RX ORDER — POTASSIUM CHLORIDE 7.45 MG/ML
40 INJECTION INTRAVENOUS
Status: DISCONTINUED | OUTPATIENT
Start: 2022-01-03 | End: 2022-01-04 | Stop reason: HOSPADM

## 2022-01-03 RX ORDER — MAGNESIUM SULFATE HEPTAHYDRATE 40 MG/ML
4 INJECTION, SOLUTION INTRAVENOUS
Status: DISCONTINUED | OUTPATIENT
Start: 2022-01-03 | End: 2022-01-04 | Stop reason: HOSPADM

## 2022-01-03 RX ORDER — GUAIFENESIN 600 MG/1
1200 TABLET, EXTENDED RELEASE ORAL 2 TIMES DAILY
Status: DISCONTINUED | OUTPATIENT
Start: 2022-01-03 | End: 2022-01-04 | Stop reason: HOSPADM

## 2022-01-03 RX ORDER — AMLODIPINE BESYLATE 5 MG/1
5 TABLET ORAL DAILY
Status: DISCONTINUED | OUTPATIENT
Start: 2022-01-03 | End: 2022-01-03

## 2022-01-03 RX ORDER — IPRATROPIUM BROMIDE AND ALBUTEROL SULFATE 2.5; .5 MG/3ML; MG/3ML
3 SOLUTION RESPIRATORY (INHALATION) EVERY 6 HOURS PRN
Status: DISCONTINUED | OUTPATIENT
Start: 2022-01-03 | End: 2022-01-03

## 2022-01-03 RX ORDER — ALPRAZOLAM 0.5 MG/1
0.5 TABLET ORAL NIGHTLY PRN
Status: DISCONTINUED | OUTPATIENT
Start: 2022-01-03 | End: 2022-01-04 | Stop reason: HOSPADM

## 2022-01-03 RX ORDER — POTASSIUM CHLORIDE 20 MEQ/1
20 TABLET, EXTENDED RELEASE ORAL
Status: DISCONTINUED | OUTPATIENT
Start: 2022-01-03 | End: 2022-01-04 | Stop reason: HOSPADM

## 2022-01-03 RX ORDER — NAPROXEN SODIUM 220 MG/1
81 TABLET, FILM COATED ORAL DAILY
Status: DISCONTINUED | OUTPATIENT
Start: 2022-01-03 | End: 2022-01-04 | Stop reason: HOSPADM

## 2022-01-03 RX ORDER — LISINOPRIL AND HYDROCHLOROTHIAZIDE 12.5; 2 MG/1; MG/1
1 TABLET ORAL
Status: DISCONTINUED | OUTPATIENT
Start: 2022-01-03 | End: 2022-01-03

## 2022-01-03 RX ORDER — CARVEDILOL 12.5 MG/1
12.5 TABLET ORAL 2 TIMES DAILY
Status: DISCONTINUED | OUTPATIENT
Start: 2022-01-03 | End: 2022-01-03

## 2022-01-03 RX ORDER — ACETAMINOPHEN 325 MG/1
650 TABLET ORAL EVERY 4 HOURS PRN
Status: DISCONTINUED | OUTPATIENT
Start: 2022-01-03 | End: 2022-01-04 | Stop reason: HOSPADM

## 2022-01-03 RX ORDER — TALC
6 POWDER (GRAM) TOPICAL NIGHTLY PRN
Status: DISCONTINUED | OUTPATIENT
Start: 2022-01-03 | End: 2022-01-04 | Stop reason: HOSPADM

## 2022-01-03 RX ORDER — FLUTICASONE PROPIONATE 50 MCG
2 SPRAY, SUSPENSION (ML) NASAL DAILY PRN
Status: DISCONTINUED | OUTPATIENT
Start: 2022-01-03 | End: 2022-01-04 | Stop reason: HOSPADM

## 2022-01-03 RX ORDER — MORPHINE SULFATE 4 MG/ML
4 INJECTION, SOLUTION INTRAMUSCULAR; INTRAVENOUS EVERY 4 HOURS PRN
Status: DISCONTINUED | OUTPATIENT
Start: 2022-01-03 | End: 2022-01-04 | Stop reason: HOSPADM

## 2022-01-03 RX ORDER — CARVEDILOL 6.25 MG/1
6.25 TABLET ORAL 2 TIMES DAILY
Status: DISCONTINUED | OUTPATIENT
Start: 2022-01-03 | End: 2022-01-04 | Stop reason: HOSPADM

## 2022-01-03 RX ORDER — LANOLIN ALCOHOL/MO/W.PET/CERES
800 CREAM (GRAM) TOPICAL
Status: DISCONTINUED | OUTPATIENT
Start: 2022-01-03 | End: 2022-01-04 | Stop reason: HOSPADM

## 2022-01-03 RX ORDER — SODIUM CHLORIDE 0.9 % (FLUSH) 0.9 %
10 SYRINGE (ML) INJECTION
Status: DISCONTINUED | OUTPATIENT
Start: 2022-01-03 | End: 2022-01-04 | Stop reason: HOSPADM

## 2022-01-03 RX ORDER — LISINOPRIL 20 MG/1
40 TABLET ORAL
Status: DISCONTINUED | OUTPATIENT
Start: 2022-01-03 | End: 2022-01-03

## 2022-01-03 RX ORDER — ATORVASTATIN CALCIUM 20 MG/1
20 TABLET, FILM COATED ORAL NIGHTLY
Status: DISCONTINUED | OUTPATIENT
Start: 2022-01-03 | End: 2022-01-04 | Stop reason: HOSPADM

## 2022-01-03 RX ORDER — HYDROCODONE BITARTRATE AND ACETAMINOPHEN 5; 325 MG/1; MG/1
1 TABLET ORAL EVERY 6 HOURS PRN
Status: DISCONTINUED | OUTPATIENT
Start: 2022-01-03 | End: 2022-01-04 | Stop reason: HOSPADM

## 2022-01-03 RX ORDER — NAPROXEN SODIUM 220 MG/1
81 TABLET, FILM COATED ORAL DAILY
Status: DISCONTINUED | OUTPATIENT
Start: 2022-01-03 | End: 2022-01-03 | Stop reason: SDUPTHER

## 2022-01-03 RX ORDER — HYDRALAZINE HYDROCHLORIDE 20 MG/ML
10 INJECTION INTRAMUSCULAR; INTRAVENOUS EVERY 6 HOURS PRN
Status: DISCONTINUED | OUTPATIENT
Start: 2022-01-03 | End: 2022-01-04 | Stop reason: HOSPADM

## 2022-01-03 RX ORDER — HYDRALAZINE HYDROCHLORIDE 20 MG/ML
10 INJECTION INTRAMUSCULAR; INTRAVENOUS EVERY 6 HOURS PRN
Status: DISCONTINUED | OUTPATIENT
Start: 2022-01-03 | End: 2022-01-03

## 2022-01-03 RX ORDER — POTASSIUM CHLORIDE 750 MG/1
10 CAPSULE, EXTENDED RELEASE ORAL ONCE
Status: COMPLETED | OUTPATIENT
Start: 2022-01-03 | End: 2022-01-03

## 2022-01-03 RX ORDER — ONDANSETRON 2 MG/ML
4 INJECTION INTRAMUSCULAR; INTRAVENOUS EVERY 8 HOURS PRN
Status: DISCONTINUED | OUTPATIENT
Start: 2022-01-03 | End: 2022-01-04 | Stop reason: HOSPADM

## 2022-01-03 RX ADMIN — LOSARTAN POTASSIUM 50 MG: 50 TABLET, FILM COATED ORAL at 09:01

## 2022-01-03 RX ADMIN — ASPIRIN 81 MG 81 MG: 81 TABLET ORAL at 02:01

## 2022-01-03 RX ADMIN — POTASSIUM CHLORIDE 10 MEQ: 750 CAPSULE, EXTENDED RELEASE ORAL at 02:01

## 2022-01-03 RX ADMIN — IPRATROPIUM BROMIDE AND ALBUTEROL SULFATE 3 ML: .5; 3 SOLUTION RESPIRATORY (INHALATION) at 08:01

## 2022-01-03 RX ADMIN — IPRATROPIUM BROMIDE AND ALBUTEROL SULFATE 3 ML: .5; 3 SOLUTION RESPIRATORY (INHALATION) at 09:01

## 2022-01-03 RX ADMIN — GUAIFENESIN 1200 MG: 600 TABLET, EXTENDED RELEASE ORAL at 09:01

## 2022-01-03 RX ADMIN — POTASSIUM CHLORIDE 40 MEQ: 20 TABLET, EXTENDED RELEASE ORAL at 02:01

## 2022-01-03 RX ADMIN — HYDRALAZINE HYDROCHLORIDE 10 MG: 20 INJECTION, SOLUTION INTRAMUSCULAR; INTRAVENOUS at 08:01

## 2022-01-03 RX ADMIN — POTASSIUM CHLORIDE 40 MEQ: 20 TABLET, EXTENDED RELEASE ORAL at 10:01

## 2022-01-03 RX ADMIN — ACETAMINOPHEN 650 MG: 325 TABLET, FILM COATED ORAL at 11:01

## 2022-01-03 RX ADMIN — CARVEDILOL 6.25 MG: 6.25 TABLET, FILM COATED ORAL at 09:01

## 2022-01-03 RX ADMIN — ATORVASTATIN CALCIUM 20 MG: 20 TABLET, FILM COATED ORAL at 09:01

## 2022-01-03 RX ADMIN — HYDROCHLOROTHIAZIDE 25 MG: 25 TABLET ORAL at 11:01

## 2022-01-03 RX ADMIN — IOHEXOL 100 ML: 350 INJECTION, SOLUTION INTRAVENOUS at 12:01

## 2022-01-03 RX ADMIN — ACETAMINOPHEN 650 MG: 325 TABLET, FILM COATED ORAL at 01:01

## 2022-01-03 RX ADMIN — CARVEDILOL 12.5 MG: 12.5 TABLET, FILM COATED ORAL at 09:01

## 2022-01-03 RX ADMIN — ALPRAZOLAM 0.5 MG: 0.5 TABLET ORAL at 10:01

## 2022-01-03 RX ADMIN — IPRATROPIUM BROMIDE AND ALBUTEROL SULFATE 3 ML: .5; 3 SOLUTION RESPIRATORY (INHALATION) at 02:01

## 2022-01-03 RX ADMIN — GUAIFENESIN 1200 MG: 600 TABLET, EXTENDED RELEASE ORAL at 08:01

## 2022-01-03 RX ADMIN — ALPRAZOLAM 0.5 MG: 0.5 TABLET ORAL at 02:01

## 2022-01-03 NOTE — ED PROVIDER NOTES
Encounter Date: 2022       History     Chief Complaint   Patient presents with    Chest Pain     Patient presents complaining of chest discomfort and pain.  Patient started with pain this morning lasted about 1 hour.  He describes substernal pain radiating to the left shoulder.  Patient has a history of thoracic aorta aneurysm.  Patient notices blood pressure to be elevated which was unusual for him today.  He denies any numbness tingling or weakness to the arms or legs.        Review of patient's allergies indicates:  No Known Allergies  Past Medical History:   Diagnosis Date    Emphysema lung     Enlarged prostate     Hyperlipidemia     Hypertension     Kidney stone 2001    x3    Thyroid disease     benign growth, partial thyroidectomy     Past Surgical History:   Procedure Laterality Date    PROSTATE SURGERY  2018    REPAIR OF ANEURYSM Left 2021    Procedure: REPAIR POPLITEAL ARTERY ANEURYSM;  Surgeon: Ismael Esquivel MD;  [facility];  Service: Cardiovascular;  Laterality: Left;    THYROIDECTOMY, PARTIAL  2011    TONSILLECTOMY      as a child    [facility]ift  2019    [facility]     Family History   Problem Relation Age of Onset    Hypertension Mother     COPD Mother     Pulmonary embolism Father      Social History     Tobacco Use    Smoking status: Former Smoker     Packs/day: 0.25     Years: 5.00     Pack years: 1.25     Quit date: 1980     Years since quittin.1    Smokeless tobacco: Never Used   Substance Use Topics    Alcohol use: Not Currently     Alcohol/week: 2.0 standard drinks     Types: 2 Cans of beer per week     Review of Systems   All other systems reviewed and are negative.      Physical Exam     Initial Vitals [22 2241]   BP Pulse Resp Temp SpO2   (!) 193/126 70 14 98 °F (36.7 °C) 98 %      MAP       --         Physical Exam    Nursing note and vitals reviewed.  Constitutional: He appears well-developed and well-nourished. He is not  diaphoretic. No distress.   Pleasant, polite.   HENT:   Head: Normocephalic and atraumatic.   Mouth/Throat: Oropharynx is clear and moist.   Eyes: EOM are normal.   Neck: Neck supple.   Normal range of motion.  Cardiovascular: Normal rate, regular rhythm, normal heart sounds and intact distal pulses.   Pulmonary/Chest: Breath sounds normal. No respiratory distress.   Abdominal: Abdomen is soft.   Musculoskeletal:         General: Normal range of motion.      Cervical back: Normal range of motion and neck supple.     Neurological: He is alert and oriented to person, place, and time. He has normal strength.   Skin: Skin is warm and dry.   Psychiatric: He has a normal mood and affect. His behavior is normal. Judgment and thought content normal.         ED Course   Procedures  Labs Reviewed   CBC W/ AUTO DIFFERENTIAL - Abnormal; Notable for the following components:       Result Value    RDW 17.2 (*)     Mono # 1.4 (*)     Eos # 0.6 (*)     All other components within normal limits   COMPREHENSIVE METABOLIC PANEL - Abnormal; Notable for the following components:    Potassium 3.3 (*)     All other components within normal limits   B-TYPE NATRIURETIC PEPTIDE   TROPONIN I   PROTIME-INR   SARS-COV-2 RNA AMPLIFICATION, QUAL          Imaging Results          CTA Chest Non-Coronary (PE Study) (In process)                X-Ray Chest AP Portable (In process)                  Medications   iohexoL (OMNIPAQUE 350) injection 100 mL (has no administration in time range)   hydrALAZINE injection 10 mg (10 mg Intravenous Given 1/2/22 2425)     Medical Decision Making:   Initial Assessment:   No apparent distress  Differential Diagnosis:   Considerations include acute coronary syndrome, less likely ruptured aortic aneurysm, aortic dissection, pulmonary embolism  Independently Interpreted Test(s):   I have ordered and independently interpreted EKG Reading(s) - see summary below       <> Summary of EKG Reading(s): EKG is regular rate  rhythm without ST elevation  Clinical Tests:   Lab Tests: Reviewed and Ordered  Radiological Study: Ordered and Reviewed  Medical Tests: Reviewed and Ordered  ED Management:  In the emergency department patient found to have negative troponin EKG without ST changes.  CT of the chest performed and is pending.  Patient received hydralazine with good reduction of blood pressure to 121/84.  Patient be consulted to Hospital Medicine for further cardiac evaluation and treatment.  He remains clinically stable.                      Clinical Impression:   Final diagnoses:  [R07.9] Chest pain                 Isaak Blum MD  01/03/22 0007

## 2022-01-03 NOTE — PLAN OF CARE
01/03/22 0915   Patient Assessment/Suction   Level of Consciousness (AVPU) alert   Respiratory Effort Unlabored;Normal   Expansion/Accessory Muscles/Retractions no use of accessory muscles   All Lung Fields Breath Sounds equal bilaterally;coarse   Rhythm/Pattern, Respiratory pattern regular;depth regular;unlabored   Cough Frequency no cough   Cough Type nonproductive   PRE-TX-O2   O2 Device (Oxygen Therapy) room air   SpO2 95 %   Pulse Oximetry Type Intermittent   $ Pulse Oximetry - Multiple Charge Pulse Oximetry - Multiple   Pulse 84   Resp 20   Aerosol Therapy   $ Aerosol Therapy Charges Aerosol Treatment   Daily Review of Necessity (SVN) completed   Respiratory Treatment Status (SVN) given   Treatment Route (SVN) mouthpiece   Patient Position (SVN) HOB elevated   Post Treatment Assessment (SVN) breath sounds improved   Signs of Intolerance (SVN) none   Breath Sounds Post-Respiratory Treatment   Throughout All Fields Post-Treatment All Fields   Throughout All Fields Post-Treatment aeration increased   Post-treatment Heart Rate (beats/min) 78   Post-treatment Resp Rate (breaths/min) 20   Education   $ Education 15 min   Respiratory Evaluation   $ Care Plan Tech Time 15 min   $ Eval/Re-eval Charges Evaluation   Evaluation For New Orders

## 2022-01-03 NOTE — PLAN OF CARE
[facility]  Initial Discharge Assessment       Primary Care Provider: Karlos Woodruff MD    Admission Diagnosis: Chest pain [R07.9]    Admission Date: 1/2/2022  Expected Discharge Date: 1/4/2022    Discharge Barriers Identified: None    Payor: MEDICAID / Plan: AETNA [facility] / Product Type: Managed Medicaid /     Extended Emergency Contact Information  Primary Emergency Contact: woocelso velasquez  Mobile Phone: [phone]  Relation: Son  Preferred language: English   needed? No    Discharge Plan A: Home  Discharge Plan B: Home      [id number] - [place], [place] - [address]  [address]  [facility] [address]  Phone: [phone] Fax: [phone]    [facility]  [address]  [facility] [address]  Phone: [phone] Fax: [phone]      Initial Assessment (most recent)     Adult Discharge Assessment - 01/03/22 1311        Discharge Assessment    Assessment Type Discharge Planning Assessment     Confirmed/corrected address, phone number and insurance Yes     Confirmed Demographics Correct on Facesheet     Source of Information patient     When was your last doctors appointment? 01/21/22     Reason For Admission Chest pain     Lives With child(yojana), adult     Facility Arrived From: home     Do you expect to return to your current living situation? Yes     Do you have help at home or someone to help you manage your care at home? Yes     Who are your caregiver(s) and their phone number(s)? Celso Woo [phone]     Prior to hospitilization cognitive status: Alert/Oriented     Current cognitive status: Alert/Oriented     Walking or Climbing Stairs Difficulty none     Dressing/Bathing Difficulty none     Equipment Currently Used at Home none     Readmission within 30 days? No     Patient currently being followed by outpatient case management? No     Do you currently have service(s) that help you manage your care at home?  No     Do you take prescription medications? Yes     Do you have prescription coverage? Yes     Coverage Medicaid     Do you have any problems affording any of your prescribed medications? No     Is the patient taking medications as prescribed? yes     Who is going to help you get home at discharge? Celso Lewisblanc [phone]     How do you get to doctors appointments? car, drives self     Are you on dialysis? No     Do you take coumadin? No     Discharge Plan A Home     Discharge Plan B Home     DME Needed Upon Discharge  none     Discharge Plan discussed with: Patient     Discharge Barriers Identified None        Relationship/Environment    Name(s) of Who Lives With Patient Celso Brand [phone]

## 2022-01-03 NOTE — H&P
"[facility] Medicine History & Physical Examination   Patient Name: Helder Brand  MRN: [id number]  Patient Class: OP- Observation   Admission Date: 1/2/2022 10:37 PM  Length of Stay: 0  Attending Physician:   Primary Care Provider: Karlos Woodruff MD  Face-to-Face encounter date: 01/03/2022  Code Status: Full code    Chief Complaint: Chest Pain        HISTORY OF PRESENT ILLNESS:   Helder Brand is a 61 y.o. former smoker White male with known history of hypertension , hyperlipidemia, BPH, COPD, aortic aneurysm who presented with a primary complaint of Chest Pain  History was obtained from the patient and collateral obtained from the family present at the bedside and ER physician Sign-out.     Reports acute onset of moderate to severe left upper chest pain, "dull/pressure", with no radiation, no aggravating/alleviating factors since 10:30 p.m. last night.  States that he took his blood pressure at home and noted to be high, 150/109 and he got very concern.  Denies cough/more shortness of breath than usual.  States that he has been having upper respiratory congestion for the past 4 months, had work up and being treated for COPD and COPD exacerbation by pulmonology.  Denies abdominal pain, nausea, vomiting, diarrhea, urinary symptoms.    In the ED:  Afebrile, hypertensive - 193/126, received hydralazine 10 mg IV x1 with subsequent improved readings    CBC - WBC 10.37, hemoglobin 15, hematocrit 44.8, platelet 282  CMP - unremarkable except potassium 3.3  COVID-19 negative  BNP -48  Troponin - less than 0.0360  EKG - sinus rhythm with no ischemic changes  CXR -no obvious infiltrates or overt heart failure  CTA chest - no PE, bibasilar atelectasis/infiltrate    Per my chart review:  Nuclear stress test 7/21:  No reversible ischemia, ejection fraction 68%    REVIEW OF SYSTEMS:   10 Point Review of System was performed and was found to be negative except for that mentioned already in the HPI " above.     PAST MEDICAL HISTORY:     Past Medical History:   Diagnosis Date    Emphysema lung     Enlarged prostate     Hyperlipidemia     Hypertension     Kidney stone 2001    x3    Thyroid disease     benign growth, partial thyroidectomy       PAST SURGICAL HISTORY:     Past Surgical History:   Procedure Laterality Date    PROSTATE SURGERY  2018    REPAIR OF ANEURYSM Left 2021    Procedure: REPAIR POPLITEAL ARTERY ANEURYSM;  Surgeon: Ismael Esquivel MD;  Location: [facility];  Service: Cardiovascular;  Laterality: Left;    THYROIDECTOMY, PARTIAL  2011    TONSILLECTOMY      as a child    urolift  2019    [facility]       ALLERGIES:   Patient has no allergy information on record.    FAMILY HISTORY:     Family History   Problem Relation Age of Onset    Hypertension Mother     COPD Mother     Pulmonary embolism Father        SOCIAL HISTORY:     Social History     Tobacco Use    Smoking status: Former Smoker     Packs/day: 0.25     Years: 5.00     Pack years: 1.25     Quit date: 1980     Years since quittin.1    Smokeless tobacco: Never Used   Substance Use Topics    Alcohol use: Not Currently     Alcohol/week: 2.0 standard drinks     Types: 2 Cans of beer per week        Social History     Substance and Sexual Activity   Sexual Activity Not on file        HOME MEDICATIONS:     Prior to Admission medications    Medication Sig Start Date End Date Taking? Authorizing Provider   albuterol (PROVENTIL/VENTOLIN HFA) 90 mcg/actuation inhaler Inhale 2 puffs into the lungs every 6 (six) hours as needed.  21   Historical Provider   albuterol-ipratropium (DUO-NEB) 2.5 mg-0.5 mg/3 mL nebulizer solution Take 3 mLs by nebulization every 6 (six) hours as needed for Wheezing or Shortness of Breath. Rescue 21  Kayley Jones, NP   apixaban (ELIQUIS) 5 mg Tab Take 1 tablet (5 mg total) by mouth 2 (two) times daily. 21  Adam Trinh MD   aspirin 81 MG  Chew Take 1 tablet (81 mg total) by mouth once daily. 7/16/21 7/16/22  Adam Trinh MD   fluticasone propionate (FLONASE) 50 mcg/actuation nasal spray 2 sprays by Each Nostril route daily as needed.  4/2/21   Historical Provider   guaiFENesin (MUCINEX) 1,200 mg Ta12 Take 1,200 mg by mouth 2 (two) times a day. 11/19/21   Kayley Jones NP   HYDROcodone-acetaminophen (NORCO) 5-325 mg per tablet Take 1 tablet by mouth every 4 (four) hours as needed for Pain. 7/23/21   Ismael Esquivel MD   HYDROcodone-acetaminophen (NORCO) 7.5-325 mg per tablet Take 1 tablet by mouth every 6 (six) hours as needed for Pain.    Historical Provider   labetaloL (NORMODYNE) 100 MG tablet Take 100 mg by mouth 2 (two) times daily. 6/17/21   Historical Provider   lisinopril-hydrochlorothiazide (PRINZIDE,ZESTORETIC) 20-12.5 mg per tablet Take 1 tablet by mouth with lunch. At 1200 noon    Historical Provider   predniSONE (DELTASONE) 20 MG tablet Take one pill a day for three days, repeat for shortness of breath 12/22/21   Kayley Jones NP   rosuvastatin (CRESTOR) 10 MG tablet Take 10 mg by mouth every evening.  6/17/21   Historical Provider   STIOLTO RESPIMAT 2.5-2.5 mcg/actuation Mist Inhale 2 puffs into the lungs daily as needed.  4/22/21   Historical Provider         PHYSICAL EXAM:   BP (!) 168/89   Pulse 74   Temp 98 °F (36.7 °C) (Oral)   Resp 20   Wt 83.5 kg (184 lb)   SpO2 95%   BMI 24.95 kg/m²   Vitals Reviewed  General appearance: Well-developed, well-nourished, disheveled, diaphoretic, sick/toxic appearing White male in no apparent distress.  Skin: No Rash.   Neuro: Motor and sensory exams grossly intact. Good tone. Power in all 4 extremities 5/5.   HENT: Atraumatic head. Moist mucous membranes of oral cavity.  Eyes: Normal extraocular movements.   Neck: Supple. No evidence of lymphadenopathy. No thyroidomegaly.  Lungs: Few fine crackles at bases.. No wheezing present.   Heart: Regular rate and rhythm. S1 and S2  present with no murmurs/gallop/rub. No pedal edema. No JVD present.   Abdomen: Soft, non-distended, non-tender. No rebound tenderness/guarding. No masses or organomegaly. Bowel sounds are normal. Bladder is not palpable.   Extremities: No cyanosis, clubbing.  Psych/mental status: Alert and oriented. Cooperative. Responds appropriately to questions.   EMERGENCY DEPARTMENT LABS AND IMAGING:     Labs Reviewed   CBC W/ AUTO DIFFERENTIAL - Abnormal; Notable for the following components:       Result Value    RDW 17.2 (*)     Mono # 1.4 (*)     Eos # 0.6 (*)     All other components within normal limits   COMPREHENSIVE METABOLIC PANEL - Abnormal; Notable for the following components:    Potassium 3.3 (*)     All other components within normal limits   B-TYPE NATRIURETIC PEPTIDE   TROPONIN I   PROTIME-INR   SARS-COV-2 RNA AMPLIFICATION, QUAL       CTA Chest Non-Coronary (PE Study)   Final Result      X-Ray Chest AP Portable    (Results Pending)       ASSESSMENT & PLAN:   Helder Brand is a 61 y.o. male admitted for    Active problems:  Chest pain, negative first troponin, negative nuclear stress test 7/2021  Hypokalemia  Uncontrolled hypertension    Chronic problems:  Hypertension  COPD, followed by Dr. Servin and her NP  Ascending aortic aneurysm, evaluated by CT surgery - Dr. Raza 7/2021 - no intervention indicated til it reaches 5 cm in diameter.      Plan  Admit, observation, medical-surgical unit with remote telemetry (telemetry unit at full capacity).  Continue cardiac monitor  Trend troponin  Consult Cardiology  Continue home medication   Hydralazine IV p.r.n. for systolic blood pressure above 170  Potassium supplement given in the ED  Replete electrolyte per protocol    Diet: Cardiac    DVT Prophylaxis: SCD for DVT prophylaxis. Encourage ambulation and OOB as tolerated.     Discharge Planning and Disposition:  Patient will be discharged in 1-2  days  ________________________________________________________________________________      Face-to-Face encounter date: 01/03/2022  Encounter included review of the medical records, interviewing and examining the patient face-to-face, discussion with family and other health care providers including emergency medicine physician, admission orders, interpreting lab/test results and formulating a plan of care.   Medical Decision Making during this encounter was  [_] Low Complexity  [_] Moderate Complexity  [x] High Complexity  _________________________________________________________________________________    INPATIENT LIST OF MEDICATIONS     Current Facility-Administered Medications:     acetaminophen tablet 650 mg, 650 mg, Oral, Q4H PRN, [place] P. Bo, APRN    aspirin chewable tablet 81 mg, 81 mg, Oral, Daily, [place] P. Bo, APRN    bisacodyL suppository 10 mg, 10 mg, Rectal, Daily PRN, [place] P. Bo, APRN    melatonin tablet 6 mg, 6 mg, Oral, Nightly PRN, [place] P. Bo, APRN    morphine injection 4 mg, 4 mg, Intravenous, Q4H PRN, [place] P. Bo, APRN    nitroGLYCERIN SL tablet 0.4 mg, 0.4 mg, Sublingual, Q5 Min PRN, [place] P. Bo, APRN    ondansetron injection 4 mg, 4 mg, Intravenous, Q8H PRN, [place] P. Bo, APRN    sodium chloride 0.9% flush 10 mL, 10 mL, Intravenous, PRN, [place] P. Bo, APRN    Current Outpatient Medications:     albuterol (PROVENTIL/VENTOLIN HFA) 90 mcg/actuation inhaler, Inhale 2 puffs into the lungs every 6 (six) hours as needed. , Disp: , Rfl:     albuterol-ipratropium (DUO-NEB) 2.5 mg-0.5 mg/3 mL nebulizer solution, Take 3 mLs by nebulization every 6 (six) hours as needed for Wheezing or Shortness of Breath. Rescue, Disp: 240 mL, Rfl: 1    apixaban (ELIQUIS) 5 mg Tab, Take 1 tablet (5 mg total) by mouth 2 (two) times daily., Disp: 60 tablet, Rfl: 0    aspirin 81 MG Chew, Take 1 tablet (81 mg total) by mouth once daily., Disp: 36 tablet, Rfl: 0     fluticasone propionate (FLONASE) 50 mcg/actuation nasal spray, 2 sprays by Each Nostril route daily as needed. , Disp: , Rfl:     guaiFENesin (MUCINEX) 1,200 mg Ta12, Take 1,200 mg by mouth 2 (two) times a day., Disp: 60 tablet, Rfl: 1    HYDROcodone-acetaminophen (NORCO) 5-325 mg per tablet, Take 1 tablet by mouth every 4 (four) hours as needed for Pain., Disp: 30 tablet, Rfl: 0    HYDROcodone-acetaminophen (NORCO) 7.5-325 mg per tablet, Take 1 tablet by mouth every 6 (six) hours as needed for Pain., Disp: , Rfl:     labetaloL (NORMODYNE) 100 MG tablet, Take 100 mg by mouth 2 (two) times daily., Disp: , Rfl:     lisinopril-hydrochlorothiazide (PRINZIDE,ZESTORETIC) 20-12.5 mg per tablet, Take 1 tablet by mouth with lunch. At 1200 noon, Disp: , Rfl:     predniSONE (DELTASONE) 20 MG tablet, Take one pill a day for three days, repeat for shortness of breath, Disp: 12 tablet, Rfl: 0    rosuvastatin (CRESTOR) 10 MG tablet, Take 10 mg by mouth every evening. , Disp: , Rfl:     STIOLTO RESPIMAT 2.5-2.5 mcg/actuation Mist, Inhale 2 puffs into the lungs daily as needed. , Disp: , Rfl:     Facility-Administered Medications Ordered in Other Encounters:     lactated ringers infusion, , Intravenous, Continuous, Jasbir Aragon MD, Last Rate: 75 mL/hr at 11/07/19 1333, 1,000 mL at 11/07/19 1333      Scheduled Meds:   aspirin  81 mg Oral Daily     Continuous Infusions:  PRN Meds:.acetaminophen, bisacodyL, melatonin, morphine, nitroGLYCERIN, ondansetron, sodium chloride 0.9%      Shirleytawadee AVNI Soriano  Acute Care Nurse Practitioner  Hospitalist Service  01/03/2022

## 2022-01-03 NOTE — PLAN OF CARE
01/03/22 1437   Patient Assessment/Suction   Level of Consciousness (AVPU) alert   Respiratory Effort Normal;Unlabored   Expansion/Accessory Muscles/Retractions no use of accessory muscles   Rhythm/Pattern, Respiratory unlabored;pattern regular   Cough Frequency no cough   Cough Type nonproductive   PRE-TX-O2   O2 Device (Oxygen Therapy) room air   SpO2 96 %   Pulse 70   Resp (!) 22   Aerosol Therapy   $ Aerosol Therapy Charges Aerosol Treatment   Daily Review of Necessity (SVN) completed   Respiratory Treatment Status (SVN) given   Treatment Route (SVN) mouthpiece   Patient Position (SVN) HOB elevated   Post Treatment Assessment (SVN) breath sounds improved   Signs of Intolerance (SVN) none   Breath Sounds Post-Respiratory Treatment   Throughout All Fields Post-Treatment All Fields   Throughout All Fields Post-Treatment aeration increased   Post-treatment Heart Rate (beats/min) 72   Post-treatment Resp Rate (breaths/min) 20   Education   $ Education 15 min   Respiratory Evaluation   $ Care Plan Tech Time 15 min   $ Eval/Re-eval Charges Re-evaluation   Evaluation For New Orders

## 2022-01-03 NOTE — CONSULTS
[facility]   Cardiology Note    Consult Requested By: Primary  Reason for Consult: Chest pain    SUBJECTIVE:     History of Present Illness:   PMHx:  Peripheral bypass Dr. Esquivel 2021  HTN  HLP  COPD    Pt states he was in his usual state of health until Sunday when he checked his BP and noticed it was high. He rechecked it a couple of times, and it was rising. He then developed left shoulder/neck pain and decided to proceed to the ED. He states his shoulder and neck pain subsided once he arrived to the ED and was able to sleep. He denies any aggravating or alleviating factors. Denies CP. He does admit to shortness of breath w/ wheezing and occasional productive cough for a few days. No fevers. He states he has been using his nebulizer treatment several times throughout the day to control his symptoms.  Of note he has not seen Dr. Hardy, his primary cardiologist since April at the time of his peripheral bypass w/ dr. Esquivel. He states prior to this he had a coronary angiogram which was per pt normal. He had a negative stress test here in July.   Troponins are negative  EKG sinus rhythm w/ no acute ischemic changes  K was slightly low    Review of patient's allergies indicates:  No Known Allergies    Past Medical History:   Diagnosis Date    Emphysema lung 2021    Enlarged prostate     Hyperlipidemia 2021    Hypertension 2001    Kidney stone 2001    x3    Thyroid disease     benign growth, partial thyroidectomy     Past Surgical History:   Procedure Laterality Date    PROSTATE SURGERY  2018    REPAIR OF ANEURYSM Left 7/21/2021    Procedure: REPAIR POPLITEAL ARTERY ANEURYSM;  Surgeon: Ismael Esquivel MD;  Location: [facility] OR;  Service: Cardiovascular;  Laterality: Left;    THYROIDECTOMY, PARTIAL  2011    TONSILLECTOMY      as a child    urolift  04/2019    [facility]     Family History   Problem Relation Age of Onset    Hypertension Mother     COPD Mother     Pulmonary embolism Father       Social History     Tobacco Use    Smoking status: Former Smoker     Packs/day: 0.25     Years: 5.00     Pack years: 1.25     Quit date: 1980     Years since quittin.1    Smokeless tobacco: Never Used   Substance Use Topics    Alcohol use: Not Currently     Alcohol/week: 2.0 standard drinks     Types: 2 Cans of beer per week       Review of Systems:  Review of Systems   Respiratory: Positive for cough, sputum production, shortness of breath and wheezing.    Musculoskeletal: Positive for joint pain and neck pain.   All other systems reviewed and are negative.      OBJECTIVE:     Vital Signs (Most Recent)  Temp: 98.1 °F (36.7 °C) (22)  Pulse: 84 (22)  Resp: 20 (22)  BP: (!) 128/106 (nurse notified) (22)  SpO2: 95 % (22)    Vital Signs Range (Last 24H):  Temp:  [97.4 °F (36.3 °C)-98.1 °F (36.7 °C)]   Pulse:  [61-84]   Resp:  [14-20]   BP: (128-193)/()   SpO2:  [94 %-98 %]     I & O (Last 24H):  No intake or output data in the 24 hours ending 22    Current Diet:     Current Diet Order   Procedures    Diet Cardiac        Allergies:  Review of patient's allergies indicates:  No Known Allergies    Meds:  Scheduled Meds:   aspirin  81 mg Oral Daily    atorvastatin  20 mg Oral QHS    guaiFENesin  1,200 mg Oral BID    hydroCHLOROthiazide  12.5 mg Oral Daily with lunch    labetaloL  100 mg Oral QHS    lisinopriL  20 mg Oral Daily with lunch     Continuous Infusions:  PRN Meds:acetaminophen, albuterol, albuterol-ipratropium, ALPRAZolam, bisacodyL, calcium chloride IVPB, calcium chloride IVPB, calcium chloride IVPB, fluticasone propionate, hydrALAZINE, HYDROcodone-acetaminophen, influenza, magnesium oxide, magnesium sulfate IVPB, magnesium sulfate IVPB, magnesium sulfate IVPB, magnesium sulfate IVPB, melatonin, morphine, nitroGLYCERIN, ondansetron, potassium chloride in water, potassium chloride in water, potassium chloride in water,  potassium chloride in water, potassium chloride, potassium chloride, potassium chloride, potassium chloride, sodium chloride 0.9%    Oxygen/Ventilator Data (Last 24H):  (if applicable)        Hemodynamic Parameters (Last 24H):   (if applicable)        Laboratory and Radiology Data:  Recent Results (from the past 24 hour(s))   CBC auto differential    Collection Time: 01/02/22 10:47 PM   Result Value Ref Range    WBC 10.37 3.90 - 12.70 K/uL    RBC 5.05 4.60 - 6.20 M/uL    Hemoglobin 15.0 14.0 - 18.0 g/dL    Hematocrit 44.8 40.0 - 54.0 %    MCV 89 82 - 98 fL    MCH 29.7 27.0 - 31.0 pg    MCHC 33.5 32.0 - 36.0 g/dL    RDW 17.2 (H) 11.5 - 14.5 %    Platelets 282 150 - 450 K/uL    MPV 9.5 9.2 - 12.9 fL    Immature Granulocytes 0.2 0.0 - 0.5 %    Gran # (ANC) 5.7 1.8 - 7.7 K/uL    Immature Grans (Abs) 0.02 0.00 - 0.04 K/uL    Lymph # 2.6 1.0 - 4.8 K/uL    Mono # 1.4 (H) 0.3 - 1.0 K/uL    Eos # 0.6 (H) 0.0 - 0.5 K/uL    Baso # 0.07 0.00 - 0.20 K/uL    nRBC 0 0 /100 WBC    Gran % 54.5 38.0 - 73.0 %    Lymph % 25.4 18.0 - 48.0 %    Mono % 13.5 4.0 - 15.0 %    Eosinophil % 5.7 0.0 - 8.0 %    Basophil % 0.7 0.0 - 1.9 %    Differential Method Automated    Comprehensive metabolic panel    Collection Time: 01/02/22 10:47 PM   Result Value Ref Range    Sodium 138 136 - 145 mmol/L    Potassium 3.3 (L) 3.5 - 5.1 mmol/L    Chloride 103 95 - 110 mmol/L    CO2 25 23 - 29 mmol/L    Glucose 90 70 - 110 mg/dL    BUN 18 8 - 23 mg/dL    Creatinine 0.9 0.5 - 1.4 mg/dL    Calcium 9.1 8.7 - 10.5 mg/dL    Total Protein 7.0 6.0 - 8.4 g/dL    Albumin 4.1 3.5 - 5.2 g/dL    Total Bilirubin 0.9 0.1 - 1.0 mg/dL    Alkaline Phosphatase 56 55 - 135 U/L    AST 22 10 - 40 U/L    ALT 20 10 - 44 U/L    Anion Gap 10 8 - 16 mmol/L    eGFR if African American >60.0 >60 mL/min/1.73 m^2    eGFR if non African American >60.0 >60 mL/min/1.73 m^2   Brain natriuretic peptide    Collection Time: 01/02/22 10:47 PM   Result Value Ref Range    BNP 48 0 - 99 pg/mL    Troponin I    Collection Time: 01/02/22 10:47 PM   Result Value Ref Range    Troponin I <0.030 <=0.040 ng/mL   Protime-INR    Collection Time: 01/02/22 10:47 PM   Result Value Ref Range    PT 13.7 11.4 - 13.7 sec    INR 1.1    COVID-19 Rapid Screening    Collection Time: 01/02/22 10:56 PM   Result Value Ref Range    SARS-CoV-2 RNA, Amplification, Qual Negative Negative   Troponin I    Collection Time: 01/03/22  6:30 AM   Result Value Ref Range    Troponin I <0.030 <=0.040 ng/mL     Imaging Results          CTA Chest Non-Coronary (PE Study) (Final result)  Result time 01/03/22 00:34:05    Final result by Willie Knowles MD (01/03/22 00:34:05)                 Narrative:    EXAM DESCRIPTION:  CTA CHEST NON CORONARY    CLINICAL HISTORY:  61 years Male, Pulmonary embolism suspected    COMPARISON:  None.    TECHNIQUE:  Axial images of the chest were performed utilizing intravenous contrast, with sagittal and coronal reformatted images.    This exam was performed according to our departmental dose-optimization program which includes use of Automated Exposure Control, adjustment of the mA and/or kV according to patient size and/or use of iterative reconstruction technique.    FINDINGS:  There is bibasilar atelectasis/infiltrate.    No evidence of pulmonary embolus.    No evidence of aortic dissection. There is mild ectasia of the thoracic aorta.    No evidence of mediastinal or hilar adenopathy.    No evidence of pleural effusion or pneumothorax.    There is a small probable cyst in the left lobe of liver.    IMPRESSION:  Bibasilar atelectasis/infiltrate. Pneumonia must be considered.    Other findings as described.    Electronically signed by:  Willie Knowles MD  1/3/2022 12:34 AM CST Workstation: [id number]                             X-Ray Chest AP Portable (Final result)  Result time 01/03/22 06:23:40    Final result by Keon Tran IV, MD (01/03/22 06:23:40)                 Narrative:    Chest, single view    HISTORY:  Upper chest pain.    Comparison 9/12/2018.    The heart size is within normal limits. There is no evidence of acute pulmonary vascular engorgement.    There are minor opacities in the lung bases, potentially a reflection of mild volume loss. There is a small nodular opacity in the right lung base laterally. Subsequently, the patient underwent CT of the chest. There is no effusion.    IMPRESSION:    Minimal basilar parenchymal opacities or atelectasis.    Small nodular opacity observed in the right lung base laterally.    Electronically signed by:  Keon Tran MD  1/3/2022 6:23 AM CST Workstation: [id number]                              12-lead EKG interpretation:  (if applicable)      Current Cardiac Rhythm:   (if applicable)    Physical Exam:   Physical Exam  Vitals and nursing note reviewed.   Constitutional:       Appearance: Normal appearance.   HENT:      Head: Normocephalic and atraumatic.   Cardiovascular:      Rate and Rhythm: Normal rate and regular rhythm.      Heart sounds: No murmur heard.      Pulmonary:      Effort: Pulmonary effort is normal.      Breath sounds: Wheezing present.   Musculoskeletal:      Right lower leg: No edema.      Left lower leg: No edema.   Skin:     General: Skin is warm and dry.      Findings: No rash.   Neurological:      General: No focal deficit present.      Mental Status: He is alert and oriented to person, place, and time.         ASSESSMENT/PLAN:   Assessment:   Elevated blood pressure  Chest pain  - negative Lili and EKG unremarkable  COPD exacerbation  H/o peripheral bypass 04/2021- Dr. Esquivel  Per pt normal coronary angiogram prior to bypass-- will need obtain these records to confirm  Normal ST 07/2021    Plan:     Change labetalol to coreg 12.5 mg BID  Add norvasc 5 mg   BP control  COPD exacerbation/pneumonia tx per primary team  No further cardiac w/u while inpt

## 2022-01-03 NOTE — PROGRESS NOTES
Patient was seen and examined bedside.  Reviewed admitting provider's note and plan.  Blood pressure is acceptable.  Of note patient is on lisinopril and suffering from chronic cough for last 3-4 months.  His diastolic blood pressure remains elevated    Plan:  Check procalcitonin  DuoNebs, incentive spirometer  At this point I would discontinue lisinopril considering his prolonged history of cough requiring multiple pharmacological interventions.  Will switch to losartan  Increased HCTZ  Noted cardiology changed beta-blocker to Coreg.  Reduced dose to 6.25 mg b.i.d.  Further management as per clinical course

## 2022-01-04 VITALS
HEART RATE: 76 BPM | SYSTOLIC BLOOD PRESSURE: 120 MMHG | HEIGHT: 72 IN | OXYGEN SATURATION: 94 % | BODY MASS INDEX: 25.98 KG/M2 | RESPIRATION RATE: 18 BRPM | WEIGHT: 191.81 LBS | DIASTOLIC BLOOD PRESSURE: 82 MMHG | TEMPERATURE: 98 F

## 2022-01-04 PROBLEM — I10 ACCELERATED HYPERTENSION: Status: ACTIVE | Noted: 2022-01-04

## 2022-01-04 LAB
ALBUMIN SERPL BCP-MCNC: 3.5 G/DL (ref 3.5–5.2)
ANION GAP SERPL CALC-SCNC: 9 MMOL/L (ref 8–16)
BASOPHILS # BLD AUTO: 0.07 K/UL (ref 0–0.2)
BASOPHILS NFR BLD: 0.7 % (ref 0–1.9)
BUN SERPL-MCNC: 14 MG/DL (ref 8–23)
CALCIUM SERPL-MCNC: 8.9 MG/DL (ref 8.7–10.5)
CHLORIDE SERPL-SCNC: 104 MMOL/L (ref 95–110)
CO2 SERPL-SCNC: 24 MMOL/L (ref 23–29)
CREAT SERPL-MCNC: 0.9 MG/DL (ref 0.5–1.4)
DIFFERENTIAL METHOD: ABNORMAL
EOSINOPHIL # BLD AUTO: 1.2 K/UL (ref 0–0.5)
EOSINOPHIL NFR BLD: 12.6 % (ref 0–8)
ERYTHROCYTE [DISTWIDTH] IN BLOOD BY AUTOMATED COUNT: 17.4 % (ref 11.5–14.5)
EST. GFR  (AFRICAN AMERICAN): >60 ML/MIN/1.73 M^2
EST. GFR  (NON AFRICAN AMERICAN): >60 ML/MIN/1.73 M^2
GLUCOSE SERPL-MCNC: 82 MG/DL (ref 70–110)
HCT VFR BLD AUTO: 42.7 % (ref 40–54)
HGB BLD-MCNC: 14.7 G/DL (ref 14–18)
IMM GRANULOCYTES # BLD AUTO: 0.03 K/UL (ref 0–0.04)
IMM GRANULOCYTES NFR BLD AUTO: 0.3 % (ref 0–0.5)
LYMPHOCYTES # BLD AUTO: 2.5 K/UL (ref 1–4.8)
LYMPHOCYTES NFR BLD: 26.3 % (ref 18–48)
MAGNESIUM SERPL-MCNC: 2 MG/DL (ref 1.6–2.6)
MCH RBC QN AUTO: 30.6 PG (ref 27–31)
MCHC RBC AUTO-ENTMCNC: 34.4 G/DL (ref 32–36)
MCV RBC AUTO: 89 FL (ref 82–98)
MONOCYTES # BLD AUTO: 1.3 K/UL (ref 0.3–1)
MONOCYTES NFR BLD: 13.8 % (ref 4–15)
NEUTROPHILS # BLD AUTO: 4.5 K/UL (ref 1.8–7.7)
NEUTROPHILS NFR BLD: 46.3 % (ref 38–73)
NRBC BLD-RTO: 0 /100 WBC
PHOSPHATE SERPL-MCNC: 3.1 MG/DL (ref 2.7–4.5)
PLATELET # BLD AUTO: 258 K/UL (ref 150–450)
PMV BLD AUTO: 9.3 FL (ref 9.2–12.9)
POTASSIUM SERPL-SCNC: 3.5 MMOL/L (ref 3.5–5.1)
RBC # BLD AUTO: 4.8 M/UL (ref 4.6–6.2)
SODIUM SERPL-SCNC: 137 MMOL/L (ref 136–145)
WBC # BLD AUTO: 9.6 K/UL (ref 3.9–12.7)

## 2022-01-04 PROCEDURE — 25000003 PHARM REV CODE 250: Performed by: NURSE PRACTITIONER

## 2022-01-04 PROCEDURE — G0378 HOSPITAL OBSERVATION PER HR: HCPCS

## 2022-01-04 PROCEDURE — 94799 UNLISTED PULMONARY SVC/PX: CPT | Mod: 76

## 2022-01-04 PROCEDURE — 94761 N-INVAS EAR/PLS OXIMETRY MLT: CPT

## 2022-01-04 PROCEDURE — 99900031 HC PATIENT EDUCATION (STAT)

## 2022-01-04 PROCEDURE — 80069 RENAL FUNCTION PANEL: CPT | Performed by: HOSPITALIST

## 2022-01-04 PROCEDURE — 94799 UNLISTED PULMONARY SVC/PX: CPT

## 2022-01-04 PROCEDURE — 99900035 HC TECH TIME PER 15 MIN (STAT)

## 2022-01-04 PROCEDURE — 25000242 PHARM REV CODE 250 ALT 637 W/ HCPCS: Performed by: INTERNAL MEDICINE

## 2022-01-04 PROCEDURE — 25000003 PHARM REV CODE 250: Performed by: HOSPITALIST

## 2022-01-04 PROCEDURE — 85025 COMPLETE CBC W/AUTO DIFF WBC: CPT | Performed by: NURSE PRACTITIONER

## 2022-01-04 PROCEDURE — 36415 COLL VENOUS BLD VENIPUNCTURE: CPT | Performed by: HOSPITALIST

## 2022-01-04 PROCEDURE — 83735 ASSAY OF MAGNESIUM: CPT | Performed by: HOSPITALIST

## 2022-01-04 PROCEDURE — 94640 AIRWAY INHALATION TREATMENT: CPT

## 2022-01-04 RX ORDER — LOSARTAN POTASSIUM 25 MG/1
25 TABLET ORAL NIGHTLY
Qty: 30 TABLET | Refills: 0 | Status: SHIPPED | OUTPATIENT
Start: 2022-01-04 | End: 2022-01-04

## 2022-01-04 RX ORDER — HYDROCHLOROTHIAZIDE 25 MG/1
25 TABLET ORAL DAILY
Qty: 30 TABLET | Refills: 0 | Status: SHIPPED | OUTPATIENT
Start: 2022-01-04 | End: 2022-07-22

## 2022-01-04 RX ORDER — CARVEDILOL 6.25 MG/1
6.25 TABLET ORAL 2 TIMES DAILY
Qty: 60 TABLET | Refills: 0 | Status: SHIPPED | OUTPATIENT
Start: 2022-01-04 | End: 2022-02-03

## 2022-01-04 RX ADMIN — ASPIRIN 81 MG 81 MG: 81 TABLET ORAL at 08:01

## 2022-01-04 RX ADMIN — HYDROCHLOROTHIAZIDE 25 MG: 25 TABLET ORAL at 09:01

## 2022-01-04 RX ADMIN — IPRATROPIUM BROMIDE AND ALBUTEROL SULFATE 3 ML: .5; 3 SOLUTION RESPIRATORY (INHALATION) at 08:01

## 2022-01-04 RX ADMIN — GUAIFENESIN 1200 MG: 600 TABLET, EXTENDED RELEASE ORAL at 08:01

## 2022-01-04 NOTE — PLAN OF CARE
Problem: Adult Inpatient Plan of Care  Goal: Plan of Care Review  Outcome: Ongoing, Progressing  Goal: Patient-Specific Goal (Individualized)  Outcome: Ongoing, Progressing  Goal: Absence of Hospital-Acquired Illness or Injury  Outcome: Ongoing, Progressing  Goal: Optimal Comfort and Wellbeing  Outcome: Ongoing, Progressing  Goal: Readiness for Transition of Care  Outcome: Ongoing, Progressing     Problem: Pain Acute  Goal: Acceptable Pain Control and Functional Ability  Outcome: Ongoing, Progressing

## 2022-01-04 NOTE — NURSING
Pt given discharge instructions and instructions reviewed. Pt stated understanding. IV removed without issues. Tele monitor removed and returned to telemetry. Pt discharged per drs orders and  taken to [place] where he dc with his son. No distress noted. All belongings with pt on dc.

## 2022-01-04 NOTE — RESPIRATORY THERAPY
01/03/22 2018   Patient Assessment/Suction   Level of Consciousness (AVPU) alert   Respiratory Effort Normal;Unlabored   Expansion/Accessory Muscles/Retractions no use of accessory muscles   All Lung Fields Breath Sounds equal bilaterally;diminished   Rhythm/Pattern, Respiratory unlabored   Cough Frequency no cough   PRE-TX-O2   O2 Device (Oxygen Therapy) room air   SpO2 97 %   Pulse Oximetry Type Intermittent   $ Pulse Oximetry - Multiple Charge Pulse Oximetry - Multiple   Pulse 64   Resp 18   Aerosol Therapy   $ Aerosol Therapy Charges Aerosol Treatment   Daily Review of Necessity (SVN) completed   Respiratory Treatment Status (SVN) given   Treatment Route (SVN) mask   Patient Position (SVN) HOB elevated   Post Treatment Assessment (SVN) breath sounds unchanged   Signs of Intolerance (SVN) none   Breath Sounds Post-Respiratory Treatment   Post-treatment Heart Rate (beats/min) 68   Post-treatment Resp Rate (breaths/min) 18   Education   $ Education Bronchodilator;15 min   Respiratory Evaluation   $ Care Plan Tech Time 15 min   $ Eval/Re-eval Charges Re-evaluation      Post-Care Instructions: I reviewed with the patient in detail post-care instructions. Patient should avoid sun exposure and wear sun protection. Detail Level: Zone Post Peel Care: Sun protection and post-care instructions were reviewed with the patient. Treatment Number: 0 Chemical Peel: TCA 10% Consent: Prior to the procedure, written consent was obtained and risks were reviewed, including but not limited to: redness, peeling, blistering, pigmentary change, scarring, infection, and pain. Number Of Layers: 2 Prep: The treated area was cleansed with gentle cleanser, and ask was applied for 5 minutes then removed with warm water.  Extractions of deep open pores and milia were done with a sterile 23g hypodermic needle and physical extraction.  1 layer of acid was applied over entire face and neck for minutes, then neutralized with 10% sodium bicarbonate solution and cool water.    Elta MD SPF 46 was applied post-tx. Price (Use Numbers Only, No Special Characters Or $): 155.00

## 2022-01-04 NOTE — PLAN OF CARE
01/04/22 0826   Patient Assessment/Suction   Level of Consciousness (AVPU) alert   Respiratory Effort Unlabored;Normal   Expansion/Accessory Muscles/Retractions no use of accessory muscles;no retractions;expansion symmetric   All Lung Fields Breath Sounds equal bilaterally;clear   Rhythm/Pattern, Respiratory unlabored;pattern regular;depth regular   Cough Frequency infrequent   Cough Type dry;nonproductive   PRE-TX-O2   O2 Device (Oxygen Therapy) room air   SpO2 98 %   Pulse Oximetry Type Intermittent   $ Pulse Oximetry - Multiple Charge Pulse Oximetry - Multiple   Pulse 75   Resp 20   Aerosol Therapy   $ Aerosol Therapy Charges Aerosol Treatment   Daily Review of Necessity (SVN) completed   Respiratory Treatment Status (SVN) given   Treatment Route (SVN) mask   Patient Position (SVN) Coy's   Post Treatment Assessment (SVN) breath sounds improved   Signs of Intolerance (SVN) none   Breath Sounds Post-Respiratory Treatment   Throughout All Fields Post-Treatment All Fields   Throughout All Fields Post-Treatment aeration increased   Post-treatment Heart Rate (beats/min) 77   Post-treatment Resp Rate (breaths/min) 14   Education   $ Education Bronchodilator;15 min   Respiratory Evaluation   $ Care Plan Tech Time 15 min   $ Eval/Re-eval Charges Re-evaluation

## 2022-01-04 NOTE — PLAN OF CARE
Chart and discharge orders reviewed.  Patient discharged home with no further case management needs         01/04/22 0926   Final Note   Assessment Type Final Discharge Note   Anticipated Discharge Disposition Home   What phone number can be called within the next 1-3 days to see how you are doing after discharge? [phone]   Post-Acute Status   Discharge Delays None known at this time

## 2022-01-04 NOTE — PROGRESS NOTES
[facility]  Cardiology  Progress Note    Patient Name: Helder Brand  MRN: [id number]  Admission Date: 1/2/2022  Hospital Length of Stay: 0 days  Code Status: Full Code   Attending Physician: Mely Kendrick MD   Primary Care Physician: Karlos Woodruff MD  Expected Discharge Date: 1/4/2022  Principal Problem:<principal problem not specified>    Subjective:     Hospital Course: bp back to baseline. Ready for d/c. Lisinopril d/c'd. On losartan/hctz    Interval History: tx of copd    ROS  Objective:     Vital Signs (Most Recent):  Temp: 97.9 °F (36.6 °C) (01/04/22 0415)  Pulse: 75 (01/04/22 0826)  Resp: 20 (01/04/22 0826)  BP: 121/85 (01/04/22 0415)  SpO2: 98 % (01/04/22 0826) Vital Signs (24h Range):  Temp:  [97.6 °F (36.4 °C)-98.3 °F (36.8 °C)] 97.9 °F (36.6 °C)  Pulse:  [60-84] 75  Resp:  [17-22] 20  SpO2:  [95 %-98 %] 98 %  BP: (103-121)/(81-93) 121/85     Weight: 87 kg (191 lb 12.8 oz)  Body mass index is 26.01 kg/m².    SpO2: 98 %  O2 Device (Oxygen Therapy): room air    No intake or output data in the 24 hours ending 01/04/22 0835    Lines/Drains/Airways       Peripheral Intravenous Line                   Peripheral IV - Single Lumen 01/02/22 1130 18 G Right Antecubital 1 day                    Physical Exam   rrr  Course BS  S NT + BS    Significant Labs:   Recent Lab Results         01/04/22  0650   01/03/22  1032        Procalcitonin   <0.05  Comment: Procalcitonin Result Interpretation:    <=0.50 ng/mL  Systemic infection (sepsis) is not likely. Local bacterial infection   is   possible.    >0.50 and <= 2.00 ng/mL  Systemic infection (sepsis) is possible, but other conditions are   also known   to elevate procalcitonin.     >2.00 ng/mL  Systemic infection (sepsis) is likely unless other causes are known.    >=10.00 ng/mL  Important systemic inflammatory response, almost exclusively due to   severe   bacterial sepsis or septic shock.         Albumin 3.5         Anion Gap 9         Baso #  0.07         Basophil % 0.7         BUN 14         Calcium 8.9         Chloride 104         CO2 24         Creatinine 0.9         Differential Method Automated         eGFR if  >60.0         eGFR if non  >60.0  Comment: Calculation used to obtain the estimated glomerular filtration  rate (eGFR) is the CKD-EPI equation.            Eos # 1.2         Eosinophil % 12.6         Glucose 82         Gran # (ANC) 4.5         Gran % 46.3         Hematocrit 42.7         Hemoglobin 14.7         Immature Grans (Abs) 0.03  Comment: Mild elevation in immature granulocytes is non specific and   can be seen in a variety of conditions including stress response,   acute inflammation, trauma and pregnancy. Correlation with other   laboratory and clinical findings is essential.           Immature Granulocytes 0.3         Lymph # 2.5         Lymph % 26.3         Magnesium 2.0         MCH 30.6         MCHC 34.4         MCV 89         Mono # 1.3         Mono % 13.8         MPV 9.3         nRBC 0         Phosphorus 3.1         Platelets 258         Potassium 3.5         RBC 4.80         RDW 17.4         Sodium 137         WBC 9.60                 Significant Imaging: see cxr  Assessment and Plan:     Brief HPI: see consult    There are no hospital problems to display for this patient.      VTE Risk Mitigation (From admission, onward)           Ordered     IP VTE LOW RISK PATIENT  Once         01/03/22 0050     Place sequential compression device  Until discontinued         01/03/22 0050                Assessment:   Elevated blood pressure=resolved  Chest pain-non cardiac  - negative Lili and EKG unremarkable  COPD exacerbation  H/o peripheral bypass 04/2021- Dr. Esquivel  Per pt normal coronary angiogram prior to bypass-- will need obtain these records to confirm  Normal ST 07/2021  ACEI cough-now on losartan     Plan:      BP controlled  Ok for d/c to home  F/u 1-2 weeks  Carlos Hardy MD  Cardiology  [place]  [facility]

## 2022-01-05 NOTE — DISCHARGE SUMMARY
Formerly [facility], [facility] Medicine  Discharge Summary      Patient Name: Helder Brand  MRN: [id number]  Patient Class: OP- Observation  Admission Date: 1/2/2022  Hospital Length of Stay: 0 days  Discharge Date and Time:  01/04/2022 6:13 PM  Attending Physician: No att. providers found   Discharging Provider: Mely Kendrick MD  Primary Care Provider: Karlos Woodruff MD      HPI:   No notes on file    * No surgery found *      Hospital Course:   61-year-old gentleman was treated for accelerated hypertension, cough.  Overall it appears that patient is suffering from cough for last 3-4 months requiring multiple pharmacological interventions.  At this point I decided to discontinue lisinopril.  He was started on Coreg 6.25 mg b.i.d., up titrated HCTZ, prescribed losartan.  Blood pressure remained very controlled on current regimen.  He was advised to follow-up with PCP and cardiologist to further optimize his outpatient regimen.  If he continues to suffer coffee might need ENT evaluation.     Instructions provided to follow up with primary care physician as outpatient. Patient verbalized understanding and is aware to contact primary care physician or return to ED if new or worsening symptoms.    Physical exam on the day of discharge:  General: Patient resting comfortably in no acute distress.  Lungs: CTA. Good air entry.  Cor: Regular rate and rhythm. No murmurs. No pedal edema.  Abd: Soft. Nontender. Non-distended.  Neuro: A&O x3. Moving all 4 extremities equally  Ext: No clubbing. No cyanosis.      Vital signs reviewed.  Nursing notes reviewed       Goals of Care Treatment Preferences:  Code Status: Full Code      Consults:   Consults (From admission, onward)        Status Ordering Provider     Inpatient consult to Cardiology  Once        Provider:  Carlos Hardy MD    Completed MICHAEL ALBA new Assessment & Plan notes have been filed under this hospital service since the last note  was generated.  Service: Hospital Medicine    Final Active Diagnoses:    Diagnosis Date Noted POA    PRINCIPAL PROBLEM:  Accelerated hypertension [I10] 01/04/2022 Yes      Problems Resolved During this Admission:       Discharged Condition: good    Disposition: Home or Self Care    Follow Up:   Follow-up Information     Karlos Woodruff MD In 1 week.    Specialty: Internal Medicine  Contact information:  [address] [place] [address] [address]  [place] [address]  [phone]             Carlos Hardy MD In 2 weeks.    Specialties: Cardiology, INTERVENTIONAL CARDIOLOGY  Contact information:  [address]  [address]  [facility]  [place] [address]  [phone]                       Patient Instructions:      Diet Cardiac     Notify your health care provider if you experience any of the following:  temperature >100.4     Activity as tolerated       Significant Diagnostic Studies: Labs: All labs within the past 24 hours have been reviewed    Pending Diagnostic Studies:     None         Medications:  Reconciled Home Medications:      Medication List      START taking these medications    carvediloL 6.25 MG tablet  Commonly known as: COREG  Take 1 tablet (6.25 mg total) by mouth 2 (two) times daily.     hydroCHLOROthiazide 25 MG tablet  Commonly known as: HYDRODIURIL  Take 1 tablet (25 mg total) by mouth once daily.        CHANGE how you take these medications    predniSONE 20 MG tablet  Commonly known as: DELTASONE  Take one pill a day for three days, repeat for shortness of breath  What changed:   · how much to take  · when to take this  · reasons to take this        CONTINUE taking these medications    albuterol 90 mcg/actuation inhaler  Commonly known as: PROVENTIL/VENTOLIN HFA  Inhale 2 puffs into the lungs every 6 (six) hours as needed.     albuterol-ipratropium 2.5 mg-0.5 mg/3 mL nebulizer solution  Commonly known as: DUO-NEB  Take 3 mLs by nebulization every 6 (six) hours as needed for Wheezing or  Shortness of Breath. Rescue     aspirin 81 MG Chew  Take 1 tablet (81 mg total) by mouth once daily.     ELIQUIS 5 mg Tab  Generic drug: apixaban  Take 1 tablet (5 mg total) by mouth 2 (two) times daily.     fluticasone propionate 50 mcg/actuation nasal spray  Commonly known as: FLONASE  2 sprays by Each Nostril route daily as needed.     * HYDROcodone-acetaminophen 5-325 mg per tablet  Commonly known as: NORCO  Take 1 tablet by mouth every 4 (four) hours as needed for Pain.     * HYDROcodone-acetaminophen 7.5-325 mg per tablet  Commonly known as: NORCO  Take 1 tablet by mouth every 6 (six) hours as needed for Pain.     MUCINEX 1,200 mg Ta12  Generic drug: guaiFENesin  Take 1,200 mg by mouth 2 (two) times a day.     rosuvastatin 10 MG tablet  Commonly known as: CRESTOR  Take 10 mg by mouth every evening.     STIOLTO RESPIMAT 2.5-2.5 mcg/actuation Mist  Generic drug: tiotropium-olodateroL  Inhale 2 puffs into the lungs daily as needed.         * This list has 2 medication(s) that are the same as other medications prescribed for you. Read the directions carefully, and ask your doctor or other care provider to review them with you.            STOP taking these medications    labetaloL 100 MG tablet  Commonly known as: NORMODYNE     lisinopriL-hydrochlorothiazide 20-12.5 mg per tablet  Commonly known as: PRINZIDE,ZESTORETIC        ASK your doctor about these medications    losartan 25 MG tablet  Commonly known as: COZAAR  TAKE 1 TABLET BY MOUTH IN THE EVENING            Indwelling Lines/Drains at time of discharge:   Lines/Drains/Airways     None                 Time spent on the discharge of patient: 34 minutes         Mely Kendrick MD  Department of Hospital Medicine  [facility]

## 2022-01-05 NOTE — HOSPITAL COURSE
61-year-old gentleman was treated for accelerated hypertension, cough.  Overall it appears that patient is suffering from cough for last 3-4 months requiring multiple pharmacological interventions.  At this point I decided to discontinue lisinopril.  He was started on Coreg 6.25 mg b.i.d., up titrated HCTZ, prescribed losartan.  Blood pressure remained very controlled on current regimen.  He was advised to follow-up with PCP and cardiologist to further optimize his outpatient regimen.  If he continues to suffer coffee might need ENT evaluation.     Instructions provided to follow up with primary care physician as outpatient. Patient verbalized understanding and is aware to contact primary care physician or return to ED if new or worsening symptoms.    Physical exam on the day of discharge:  General: Patient resting comfortably in no acute distress.  Lungs: CTA. Good air entry.  Cor: Regular rate and rhythm. No murmurs. No pedal edema.  Abd: Soft. Nontender. Non-distended.  Neuro: A&O x3. Moving all 4 extremities equally  Ext: No clubbing. No cyanosis.      Vital signs reviewed.  Nursing notes reviewed

## 2022-03-09 ENCOUNTER — OFFICE VISIT (OUTPATIENT)
Dept: PULMONOLOGY | Facility: CLINIC | Age: 62
End: 2022-03-09
Payer: MEDICAID

## 2022-03-09 ENCOUNTER — LAB VISIT (OUTPATIENT)
Dept: LAB | Facility: HOSPITAL | Age: 62
End: 2022-03-09
Attending: INTERNAL MEDICINE
Payer: MEDICAID

## 2022-03-09 VITALS
BODY MASS INDEX: 26.31 KG/M2 | DIASTOLIC BLOOD PRESSURE: 94 MMHG | OXYGEN SATURATION: 94 % | SYSTOLIC BLOOD PRESSURE: 145 MMHG | HEIGHT: 72 IN | HEART RATE: 88 BPM | WEIGHT: 194.25 LBS

## 2022-03-09 DIAGNOSIS — E87.8 DILATED CARDIOMYOPATHY SECONDARY TO ELECTROLYTE DEFICIENCY: Primary | ICD-10-CM

## 2022-03-09 DIAGNOSIS — J44.1 CHRONIC OBSTRUCTIVE PULMONARY DISEASE WITH ACUTE EXACERBATION: Primary | ICD-10-CM

## 2022-03-09 DIAGNOSIS — I43 DILATED CARDIOMYOPATHY SECONDARY TO ELECTROLYTE DEFICIENCY: Primary | ICD-10-CM

## 2022-03-09 DIAGNOSIS — D64.9 ANEMIA, UNSPECIFIED: ICD-10-CM

## 2022-03-09 LAB
ALBUMIN SERPL BCP-MCNC: 3.9 G/DL (ref 3.5–5.2)
ALP SERPL-CCNC: 74 U/L (ref 55–135)
ALT SERPL W/O P-5'-P-CCNC: 13 U/L (ref 10–44)
ANION GAP SERPL CALC-SCNC: 10 MMOL/L (ref 8–16)
AST SERPL-CCNC: 19 U/L (ref 10–40)
BASOPHILS # BLD AUTO: 0.09 K/UL (ref 0–0.2)
BASOPHILS NFR BLD: 1 % (ref 0–1.9)
BILIRUB SERPL-MCNC: 0.9 MG/DL (ref 0.1–1)
BUN SERPL-MCNC: 10 MG/DL (ref 8–23)
CALCIUM SERPL-MCNC: 9.6 MG/DL (ref 8.7–10.5)
CHLORIDE SERPL-SCNC: 105 MMOL/L (ref 95–110)
CHOLEST SERPL-MCNC: 197 MG/DL (ref 120–199)
CHOLEST/HDLC SERPL: 3.5 {RATIO} (ref 2–5)
CO2 SERPL-SCNC: 26 MMOL/L (ref 23–29)
CREAT SERPL-MCNC: 1 MG/DL (ref 0.5–1.4)
DIFFERENTIAL METHOD: ABNORMAL
EOSINOPHIL # BLD AUTO: 1.1 K/UL (ref 0–0.5)
EOSINOPHIL NFR BLD: 12.6 % (ref 0–8)
ERYTHROCYTE [DISTWIDTH] IN BLOOD BY AUTOMATED COUNT: 13.2 % (ref 11.5–14.5)
EST. GFR  (AFRICAN AMERICAN): >60 ML/MIN/1.73 M^2
EST. GFR  (NON AFRICAN AMERICAN): >60 ML/MIN/1.73 M^2
ESTIMATED AVG GLUCOSE: 103 MG/DL (ref 68–131)
GLUCOSE SERPL-MCNC: 93 MG/DL (ref 70–110)
HBA1C MFR BLD: 5.2 % (ref 4–5.6)
HCT VFR BLD AUTO: 47.3 % (ref 40–54)
HDLC SERPL-MCNC: 57 MG/DL (ref 40–75)
HDLC SERPL: 28.9 % (ref 20–50)
HGB BLD-MCNC: 16 G/DL (ref 14–18)
IMM GRANULOCYTES # BLD AUTO: 0.02 K/UL (ref 0–0.04)
IMM GRANULOCYTES NFR BLD AUTO: 0.2 % (ref 0–0.5)
LDLC SERPL CALC-MCNC: 130.2 MG/DL (ref 63–159)
LYMPHOCYTES # BLD AUTO: 1.5 K/UL (ref 1–4.8)
LYMPHOCYTES NFR BLD: 17.2 % (ref 18–48)
MCH RBC QN AUTO: 31.9 PG (ref 27–31)
MCHC RBC AUTO-ENTMCNC: 33.8 G/DL (ref 32–36)
MCV RBC AUTO: 94 FL (ref 82–98)
MONOCYTES # BLD AUTO: 0.9 K/UL (ref 0.3–1)
MONOCYTES NFR BLD: 10.3 % (ref 4–15)
NEUTROPHILS # BLD AUTO: 5.2 K/UL (ref 1.8–7.7)
NEUTROPHILS NFR BLD: 58.7 % (ref 38–73)
NONHDLC SERPL-MCNC: 140 MG/DL
NRBC BLD-RTO: 0 /100 WBC
PLATELET # BLD AUTO: 252 K/UL (ref 150–450)
PMV BLD AUTO: 9.1 FL (ref 9.2–12.9)
POTASSIUM SERPL-SCNC: 4.6 MMOL/L (ref 3.5–5.1)
PROT SERPL-MCNC: 7.6 G/DL (ref 6–8.4)
RBC # BLD AUTO: 5.02 M/UL (ref 4.6–6.2)
SODIUM SERPL-SCNC: 141 MMOL/L (ref 136–145)
TRIGL SERPL-MCNC: 49 MG/DL (ref 30–150)
WBC # BLD AUTO: 8.9 K/UL (ref 3.9–12.7)

## 2022-03-09 PROCEDURE — 80061 LIPID PANEL: CPT | Performed by: INTERNAL MEDICINE

## 2022-03-09 PROCEDURE — 85025 COMPLETE CBC W/AUTO DIFF WBC: CPT | Performed by: INTERNAL MEDICINE

## 2022-03-09 PROCEDURE — 3077F PR MOST RECENT SYSTOLIC BLOOD PRESSURE >= 140 MM HG: ICD-10-PCS | Mod: CPTII,,, | Performed by: NURSE PRACTITIONER

## 2022-03-09 PROCEDURE — 3008F PR BODY MASS INDEX (BMI) DOCUMENTED: ICD-10-PCS | Mod: CPTII,,, | Performed by: NURSE PRACTITIONER

## 2022-03-09 PROCEDURE — 83036 HEMOGLOBIN GLYCOSYLATED A1C: CPT | Performed by: INTERNAL MEDICINE

## 2022-03-09 PROCEDURE — 1159F PR MEDICATION LIST DOCUMENTED IN MEDICAL RECORD: ICD-10-PCS | Mod: CPTII,,, | Performed by: NURSE PRACTITIONER

## 2022-03-09 PROCEDURE — 80053 COMPREHEN METABOLIC PANEL: CPT | Performed by: INTERNAL MEDICINE

## 2022-03-09 PROCEDURE — 36415 COLL VENOUS BLD VENIPUNCTURE: CPT | Performed by: INTERNAL MEDICINE

## 2022-03-09 PROCEDURE — 4010F ACE/ARB THERAPY RXD/TAKEN: CPT | Mod: CPTII,,, | Performed by: NURSE PRACTITIONER

## 2022-03-09 PROCEDURE — 99214 PR OFFICE/OUTPT VISIT, EST, LEVL IV, 30-39 MIN: ICD-10-PCS | Mod: S$PBB,,, | Performed by: NURSE PRACTITIONER

## 2022-03-09 PROCEDURE — 3008F BODY MASS INDEX DOCD: CPT | Mod: CPTII,,, | Performed by: NURSE PRACTITIONER

## 2022-03-09 PROCEDURE — 3077F SYST BP >= 140 MM HG: CPT | Mod: CPTII,,, | Performed by: NURSE PRACTITIONER

## 2022-03-09 PROCEDURE — 99213 OFFICE O/P EST LOW 20 MIN: CPT | Mod: PBBFAC,PO | Performed by: NURSE PRACTITIONER

## 2022-03-09 PROCEDURE — 4010F PR ACE/ARB THEARPY RXD/TAKEN: ICD-10-PCS | Mod: CPTII,,, | Performed by: NURSE PRACTITIONER

## 2022-03-09 PROCEDURE — 3080F PR MOST RECENT DIASTOLIC BLOOD PRESSURE >= 90 MM HG: ICD-10-PCS | Mod: CPTII,,, | Performed by: NURSE PRACTITIONER

## 2022-03-09 PROCEDURE — 3080F DIAST BP >= 90 MM HG: CPT | Mod: CPTII,,, | Performed by: NURSE PRACTITIONER

## 2022-03-09 PROCEDURE — 99214 OFFICE O/P EST MOD 30 MIN: CPT | Mod: S$PBB,,, | Performed by: NURSE PRACTITIONER

## 2022-03-09 PROCEDURE — 1159F MED LIST DOCD IN RCRD: CPT | Mod: CPTII,,, | Performed by: NURSE PRACTITIONER

## 2022-03-09 PROCEDURE — 99999 PR PBB SHADOW E&M-EST. PATIENT-LVL III: ICD-10-PCS | Mod: PBBFAC,,, | Performed by: NURSE PRACTITIONER

## 2022-03-09 PROCEDURE — 99999 PR PBB SHADOW E&M-EST. PATIENT-LVL III: CPT | Mod: PBBFAC,,, | Performed by: NURSE PRACTITIONER

## 2022-03-09 RX ORDER — FLUTICASONE PROPIONATE AND SALMETEROL XINAFOATE 230; 21 UG/1; UG/1
2 AEROSOL, METERED RESPIRATORY (INHALATION) 2 TIMES DAILY
Qty: 12 G | Refills: 11 | Status: SHIPPED | OUTPATIENT
Start: 2022-03-09 | End: 2023-06-22

## 2022-03-09 RX ORDER — PREDNISONE 20 MG/1
TABLET ORAL
Qty: 18 TABLET | Refills: 0 | Status: SHIPPED | OUTPATIENT
Start: 2022-03-09 | End: 2022-07-22

## 2022-03-09 RX ORDER — CARVEDILOL 12.5 MG/1
12.5 TABLET ORAL 2 TIMES DAILY
COMMUNITY
Start: 2022-01-12 | End: 2023-05-30

## 2022-03-09 RX ORDER — AMOXICILLIN AND CLAVULANATE POTASSIUM 875; 125 MG/1; MG/1
1 TABLET, FILM COATED ORAL 2 TIMES DAILY
Qty: 20 TABLET | Refills: 1 | Status: SHIPPED | OUTPATIENT
Start: 2022-03-09 | End: 2022-03-19

## 2022-03-09 RX ORDER — IPRATROPIUM BROMIDE AND ALBUTEROL SULFATE 2.5; .5 MG/3ML; MG/3ML
3 SOLUTION RESPIRATORY (INHALATION) EVERY 6 HOURS PRN
Qty: 240 ML | Refills: 5 | Status: SHIPPED | OUTPATIENT
Start: 2022-03-09 | End: 2023-07-05 | Stop reason: SDUPTHER

## 2022-03-09 NOTE — PATIENT INSTRUCTIONS
Stop Stiolto and start Advair 1 puff twice daily    Use Aerokika device attached to nebulizer    Prednisone taper     Antibiotic after you bring in sputum sample  Bring sputum sample to any [facility] lab with in 4 hours of collecting      Have chest x-ray if no improvement with antibiotic

## 2022-03-09 NOTE — PROGRESS NOTES
3/9/2022    Helder Brand  Follow up        Chief Complaint   Patient presents with    Follow-up    Shortness of Breath     Trouble breathing     Wheezing    Cough     Greenish color        HPI:   3/9/2022-Cough- worsened in past 2 weeks, has to sleep in recliner, coughing fits at night. Productive thick mucous green in color. Using nebulizer 3x daily with benefit for few hours.   SOB- severe, worse with exertion, associated with wheeze and chest tightness.     States was previously doing well, able to do gardening for 10 minutes then having to rest. Not using stiolto due to difficultly with device.       11/19/2021- Complaint of worsening cough- onset 2 weeks, tx by PCP with azithromycin and Levaquin with minimal benefit. Coughing through out night. Productive thick green mucous that has turned yellow in color.   Associated with chest tightness and wheeze.        10/06/2021- since last visit pt had blood clot to left popliteal artery and required vascular surgery. He has a L leg wound vacc and getting wound care now.   He hasn't smoked for 40 yrs. Feels some mild throat clearing w/ cough.   Used to swim a lot when young. Worked [employer] 8-10 yrs and got lots of exercise.  Mother smoked a little and got bad copd.    6/17/21-  Need disc of images from [facility]- please bring disc from home and drop off to our office. Once I review the images I can give more advice- possible biopsy?  Get pulmonary function tests  Follow up with cardiologist  Pt is a 62 yo male with HTN, thyroid disease presenting for new evaluation.  Pt reports he had abnormal chest x-ray which was found after he had episode of chest tightness.  Has been getting these episodes of pressure like anterior chest discomfort, off and on for several months, usually while at rest and lasts few minutes. He rides bike 3-6 miles a day and denies LAW or chest tightness w/ exertion. Sometimes has slight wheeze when laying down at night but no cough/mucous. No inhaler  use. No childhood asthma or breathing trouble. Denies frequent bronchitis.  Secondhand smoke from grandparents and parents- teenage cigarette smoke but quit at age 17. Mother smoked and now on hospice with COPD.  Pt had CT chest after abnormality seen on CXR 3/2021 with RLL nodule- forgot disc at home. (done at [facility])  He is going to have an echo at 3pm today.  In past had growth on thyroid- benign, had partial thyroidectomy.  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 Denies asbestos or other exposures    The chief complaint problem varies with instablilty at time      PFSH:  Past Medical History:   Diagnosis Date    Emphysema lung     Enlarged prostate     Hyperlipidemia     Hypertension 2001    Kidney stone 2001    x3    Thyroid disease     benign growth, partial thyroidectomy         Past Surgical History:   Procedure Laterality Date    PROSTATE SURGERY  2018    REPAIR OF ANEURYSM Left 2021    Procedure: REPAIR POPLITEAL ARTERY ANEURYSM;  Surgeon: Ismael Esquivel MD;  Location: [facility] OR;  Service: Cardiovascular;  Laterality: Left;    THYROIDECTOMY, PARTIAL      TONSILLECTOMY      as a child    [facility]rolift  [facility]     Social History     Tobacco Use    Smoking status: Former Smoker     Packs/day: 0.25     Years: 5.00     Pack years: 1.25     Quit date: 1980     Years since quittin.3    Smokeless tobacco: Never Used   Substance Use Topics    Alcohol use: Not Currently     Alcohol/week: 2.0 standard drinks     Types: 2 Cans of beer per week     Family History   Problem Relation Age of Onset    Hypertension Mother     COPD Mother     Pulmonary embolism Father      Review of patient's allergies indicates:  No Known Allergies    Performance Status:The patient's activity level is regular exercise.      Review of Systems:  a review of eleven systems covering constitutional, Eye, HEENT, Psych, Respiratory, Cardiac, GI, , Musculoskeletal, Endocrine, Dermatologic was negative  except for pertinent findings as listed ABOVE and below:  All negative with pertinent positives as above   Cough, chest tightness, wheeze, shortness of breath.     Exam:Comprehensive exam done. BP (!) 145/94 (BP Location: Right arm, Patient Position: Sitting, BP Method: Medium (Automatic))   Pulse 88   Ht 6' (1.829 m)   Wt 88.1 kg (194 lb 3.6 oz)   SpO2 (!) 94% Comment: on room air at rest  BMI 26.34 kg/m²   Exam included Vitals as listed, and patient's appearance and affect and alertness and mood, oral exam for yeast and hygiene and pharynx lesions and Mallapatti (M) score, neck with inspection for jvd and masses and thyroid abnormalities and lymph nodes (supraclavicular and infraclavicular nodes and axillary also examined and noted if abn), chest exam included symmetry and effort and fremitus and percussion and auscultation, cardiac exam included rhythm and gallops and murmur and rubs and jvd and edema, abdominal exam for mass and hepatosplenomegaly and tenderness and hernias and bowel sounds, Musculoskeletal exam with muscle tone and posture and mobility/gait and  strength, and skin for rashes and cyanosis and pallor and turgor, extremity for clubbing.  Findings were normal except for pertinent findings listed below:  Thin, athletic build  Lungs clear  L medial knee wound w/ vacc draining serous      Radiographs (ct chest and cxr) reviewed: view by direct vision   CTA Chest Non-Coronary (PE Study) 01/03/22 Bibasilar atelectasis/infiltrate. Pneumonia must be considered     Outside CT (uploaded) chest 6/9/21- mild linear atelectasis bilat lower lobes, mild pleural based nodules which look like rounded atelectasis.  CXR 3/9/21- RLL nodule  CT abd/p 8/2019- rounded atelectasis bibasilar present at that time    Labs reviewed    3/2021- wnl    PFT reviewed- mild obstruction, increased lung vol. DLCO normal          Plan:  Clinical impression is apparently straight forward and impression with management as  below.    Helder was seen today for follow-up, shortness of breath, wheezing and cough.    Diagnoses and all orders for this visit:    Chronic obstructive pulmonary disease with acute exacerbation  -     mucus clearing device (ACAPELLA, FLUTTER); 1 Device by Misc.(Non-Drug; Combo Route) route 2 (two) times daily.  -     fluticasone-salmeterol 230-21 mcg/dose (ADVAIR HFA) 230-21 mcg/actuation HFAA inhaler; Inhale 2 puffs into the lungs 2 (two) times daily. Controller  -     Culture, Respiratory with Gram Stain; Future  -     amoxicillin-clavulanate 875-125mg (AUGMENTIN) 875-125 mg per tablet; Take 1 tablet by mouth 2 (two) times daily. for 10 days  -     predniSONE (DELTASONE) 20 MG tablet; 2 pills for 3 days, 1 pill for 3 days; repeat for cough  -     albuterol-ipratropium (DUO-NEB) 2.5 mg-0.5 mg/3 mL nebulizer solution; Take 3 mLs by nebulization every 6 (six) hours as needed for Wheezing or Shortness of Breath. Rescue  -     X-Ray Chest PA And Lateral; Future        Follow up in about 3 months (around 6/9/2022), or if symptoms worsen or fail to improve.    Discussed with patient above for education the following:      Patient Instructions   Stop Stiolto and start Advair 1 puff twice daily    Use Aerokika device attached to nebulizer    Prednisone taper     Antibiotic after you bring in sputum sample  Bring sputum sample to any [facility] lab with in 4 hours of collecting      Have chest x-ray if no improvement with antibiotic

## 2022-03-10 ENCOUNTER — LAB VISIT (OUTPATIENT)
Dept: LAB | Facility: HOSPITAL | Age: 62
End: 2022-03-10
Attending: NURSE PRACTITIONER
Payer: MEDICAID

## 2022-03-10 DIAGNOSIS — J44.1 CHRONIC OBSTRUCTIVE PULMONARY DISEASE WITH ACUTE EXACERBATION: ICD-10-CM

## 2022-03-10 PROCEDURE — 87070 CULTURE OTHR SPECIMN AEROBIC: CPT | Performed by: NURSE PRACTITIONER

## 2022-03-10 PROCEDURE — 87205 SMEAR GRAM STAIN: CPT | Performed by: NURSE PRACTITIONER

## 2022-03-14 ENCOUNTER — PATIENT MESSAGE (OUTPATIENT)
Dept: PULMONOLOGY | Facility: CLINIC | Age: 62
End: 2022-03-14
Payer: MEDICAID

## 2022-03-14 LAB
BACTERIA SPEC AEROBE CULT: NORMAL
BACTERIA SPEC AEROBE CULT: NORMAL
GRAM STN SPEC: NORMAL

## 2022-06-01 ENCOUNTER — OFFICE VISIT (OUTPATIENT)
Dept: PULMONOLOGY | Facility: CLINIC | Age: 62
End: 2022-06-01
Payer: MEDICAID

## 2022-06-01 ENCOUNTER — TELEPHONE (OUTPATIENT)
Dept: PULMONOLOGY | Facility: CLINIC | Age: 62
End: 2022-06-01

## 2022-06-01 VITALS
OXYGEN SATURATION: 94 % | WEIGHT: 187.75 LBS | SYSTOLIC BLOOD PRESSURE: 129 MMHG | HEIGHT: 72 IN | HEART RATE: 61 BPM | DIASTOLIC BLOOD PRESSURE: 86 MMHG | BODY MASS INDEX: 25.43 KG/M2

## 2022-06-01 DIAGNOSIS — J44.9 CHRONIC OBSTRUCTIVE PULMONARY DISEASE, UNSPECIFIED COPD TYPE: ICD-10-CM

## 2022-06-01 DIAGNOSIS — J47.9 BRONCHIECTASIS WITHOUT COMPLICATION: ICD-10-CM

## 2022-06-01 DIAGNOSIS — R93.89 ABNORMAL CHEST CT: Primary | ICD-10-CM

## 2022-06-01 PROCEDURE — 3074F SYST BP LT 130 MM HG: CPT | Mod: CPTII,,, | Performed by: NURSE PRACTITIONER

## 2022-06-01 PROCEDURE — 3044F PR MOST RECENT HEMOGLOBIN A1C LEVEL <7.0%: ICD-10-PCS | Mod: CPTII,,, | Performed by: NURSE PRACTITIONER

## 2022-06-01 PROCEDURE — 3008F BODY MASS INDEX DOCD: CPT | Mod: CPTII,,, | Performed by: NURSE PRACTITIONER

## 2022-06-01 PROCEDURE — 99213 OFFICE O/P EST LOW 20 MIN: CPT | Mod: PBBFAC,PO | Performed by: NURSE PRACTITIONER

## 2022-06-01 PROCEDURE — 3044F HG A1C LEVEL LT 7.0%: CPT | Mod: CPTII,,, | Performed by: NURSE PRACTITIONER

## 2022-06-01 PROCEDURE — 1159F PR MEDICATION LIST DOCUMENTED IN MEDICAL RECORD: ICD-10-PCS | Mod: CPTII,,, | Performed by: NURSE PRACTITIONER

## 2022-06-01 PROCEDURE — 99999 PR PBB SHADOW E&M-EST. PATIENT-LVL III: CPT | Mod: PBBFAC,,, | Performed by: NURSE PRACTITIONER

## 2022-06-01 PROCEDURE — 3074F PR MOST RECENT SYSTOLIC BLOOD PRESSURE < 130 MM HG: ICD-10-PCS | Mod: CPTII,,, | Performed by: NURSE PRACTITIONER

## 2022-06-01 PROCEDURE — 99213 PR OFFICE/OUTPT VISIT, EST, LEVL III, 20-29 MIN: ICD-10-PCS | Mod: S$PBB,,, | Performed by: NURSE PRACTITIONER

## 2022-06-01 PROCEDURE — 3079F PR MOST RECENT DIASTOLIC BLOOD PRESSURE 80-89 MM HG: ICD-10-PCS | Mod: CPTII,,, | Performed by: NURSE PRACTITIONER

## 2022-06-01 PROCEDURE — 3079F DIAST BP 80-89 MM HG: CPT | Mod: CPTII,,, | Performed by: NURSE PRACTITIONER

## 2022-06-01 PROCEDURE — 3008F PR BODY MASS INDEX (BMI) DOCUMENTED: ICD-10-PCS | Mod: CPTII,,, | Performed by: NURSE PRACTITIONER

## 2022-06-01 PROCEDURE — 99213 OFFICE O/P EST LOW 20 MIN: CPT | Mod: S$PBB,,, | Performed by: NURSE PRACTITIONER

## 2022-06-01 PROCEDURE — 99999 PR PBB SHADOW E&M-EST. PATIENT-LVL III: ICD-10-PCS | Mod: PBBFAC,,, | Performed by: NURSE PRACTITIONER

## 2022-06-01 PROCEDURE — 4010F PR ACE/ARB THEARPY RXD/TAKEN: ICD-10-PCS | Mod: CPTII,,, | Performed by: NURSE PRACTITIONER

## 2022-06-01 PROCEDURE — 1159F MED LIST DOCD IN RCRD: CPT | Mod: CPTII,,, | Performed by: NURSE PRACTITIONER

## 2022-06-01 PROCEDURE — 4010F ACE/ARB THERAPY RXD/TAKEN: CPT | Mod: CPTII,,, | Performed by: NURSE PRACTITIONER

## 2022-06-01 NOTE — PATIENT INSTRUCTIONS
Continue current medication regiment    Percussion therapy instruction  Do breathing treatments 2 x a week  with Aerobika,       Repeat CT chest to evaluated changes seen on previous CT chest

## 2022-06-01 NOTE — PROGRESS NOTES
6/1/2022    Helder Brand  Follow up    Chief Complaint   Patient presents with    3m f/u    COPD    Bronchiectasis       HPI:   6/1/2022- states breathing is improved after started nebulizer as needed. PCP treated with antibiotics for productive green mucous in April, resolved with therapy.   SOB- recurrent complaint, had trouble breathing while laying down improved with albuterol inhaler. Associated with chest tightness and wheeze in late evenings, most nights. Nocturnal arousals 1x weekly. States doing better while on antibiotics.   On advair most day, sometimes forgets. Started on Trelegy but insurance coverage is not affordable.   Able to work in garden but still having to take breaks. Worse in high heat.   Declined sleep apnea therapy.     3/9/2022-Cough- worsened in past 2 weeks, has to sleep in recliner, coughing fits at night. Productive thick mucous green in color. Using nebulizer 3x daily with benefit for few hours.   SOB- severe, worse with exertion, associated with wheeze and chest tightness.     States was previously doing well, able to do gardening for 10 minutes then having to rest. Not using stiolto due to difficultly with device.       11/19/2021- Complaint of worsening cough- onset 2 weeks, tx by PCP with azithromycin and Levaquin with minimal benefit. Coughing through out night. Productive thick green mucous that has turned yellow in color.   Associated with chest tightness and wheeze.        10/06/2021- since last visit pt had blood clot to left popliteal artery and required vascular surgery. He has a L leg wound vacc and getting wound care now.   He hasn't smoked for 40 yrs. Feels some mild throat clearing w/ cough.   Used to swim a lot when young. Worked [employer] 8-10 yrs and got lots of exercise.  Mother smoked a little and got bad copd.    6/17/21-  Need disc of images from [facility]- please bring disc from home and drop off to our office. Once I review the images I can give more advice-  possible biopsy?  Get pulmonary function tests  Follow up with cardiologist  Pt is a 60 yo male with HTN, thyroid disease presenting for new evaluation.  Pt reports he had abnormal chest x-ray which was found after he had episode of chest tightness.  Has been getting these episodes of pressure like anterior chest discomfort, off and on for several months, usually while at rest and lasts few minutes. He rides bike 3-6 miles a day and denies LAW or chest tightness w/ exertion. Sometimes has slight wheeze when laying down at night but no cough/mucous. No inhaler use. No childhood asthma or breathing trouble. Denies frequent bronchitis.  Secondhand smoke from grandparents and parents- teenage cigarette smoke but quit at age 17. Mother smoked and now on hospice with COPD.  Pt had CT chest after abnormality seen on CXR 3/2021 with RLL nodule- forgot disc at home. (done at [facility])  He is going to have an echo at 3pm today.  In past had growth on thyroid- benign, had partial thyroidectomy.  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 Denies asbestos or other exposures    The chief complaint problem varies with instablilty at time      PFSH:  Past Medical History:   Diagnosis Date    Emphysema lung     Enlarged prostate     Hyperlipidemia     Hypertension     Kidney stone 2001    x3    Thyroid disease     benign growth, partial thyroidectomy         Past Surgical History:   Procedure Laterality Date    PROSTATE SURGERY  2018    REPAIR OF ANEURYSM Left 2021    Procedure: REPAIR POPLITEAL ARTERY ANEURYSM;  Surgeon: Ismael Esquivel MD;  [facility] OR;  Service: Cardiovascular;  Laterality: Left;    THYROIDECTOMY, PARTIAL      TONSILLECTOMY      as a child    urolift  2019    [facility]     Social History     Tobacco Use    Smoking status: Former Smoker     Packs/day: 0.25     Years: 5.00     Pack years: 1.25     Quit date: 1980     Years since quittin.5    Smokeless tobacco: Never Used    Substance Use Topics    Alcohol use: Not Currently     Alcohol/week: 2.0 standard drinks     Types: 2 Cans of beer per week     Family History   Problem Relation Age of Onset    Hypertension Mother     COPD Mother     Pulmonary embolism Father      Review of patient's allergies indicates:  No Known Allergies    Performance Status:The patient's activity level is regular exercise.      Review of Systems:  a review of eleven systems covering constitutional, Eye, HEENT, Psych, Respiratory, Cardiac, GI, , Musculoskeletal, Endocrine, Dermatologic was negative except for pertinent findings as listed ABOVE and below:  All negative with pertinent positives as above   Cough, chest tightness, wheeze, shortness of breath.     Exam:Comprehensive exam done. /86 (BP Location: Right arm, Patient Position: Sitting, BP Method: Medium (Automatic))   Pulse 61   Ht 6' (1.829 m)   Wt 85.2 kg (187 lb 11.6 oz)   SpO2 (!) 94% Comment: on room air at rest  BMI 25.46 kg/m²   Exam included Vitals as listed, and patient's appearance and affect and alertness and mood, oral exam for yeast and hygiene and pharynx lesions and Mallapatti (M) score, neck with inspection for jvd and masses and thyroid abnormalities and lymph nodes (supraclavicular and infraclavicular nodes and axillary also examined and noted if abn), chest exam included symmetry and effort and fremitus and percussion and auscultation, cardiac exam included rhythm and gallops and murmur and rubs and jvd and edema, abdominal exam for mass and hepatosplenomegaly and tenderness and hernias and bowel sounds, Musculoskeletal exam with muscle tone and posture and mobility/gait and  strength, and skin for rashes and cyanosis and pallor and turgor, extremity for clubbing.  Findings were normal except for pertinent findings listed below:  Thin, athletic build  Lungs clear  L medial knee wound w/ vacc draining serous      Radiographs (ct chest and cxr) reviewed: view by  direct vision   [facility] Chest x-ray: 4/21/22 findings appear consitent wtih chronic small airwasy disease. no consolidation or other adute process in evident, no significnat or detrimental interval changes in comparison to CXR 11/15/2021      X-Ray Chest AP Portable 01/02/22    Minimal basilar parenchymal opacities or atelectasis.  Small nodular opacity observed in the right lung base laterally.      CTA Chest Non-Coronary (PE Study) 01/03/22 Bibasilar atelectasis/infiltrate. Pneumonia must be considered     Outside CT (uploaded) chest 6/9/21- mild linear atelectasis bilat lower lobes, mild pleural based nodules which look like rounded atelectasis.  CXR 3/9/21- RLL nodule  CT abd/p 8/2019- rounded atelectasis bibasilar present at that time    Labs reviewed    3/2021- wnl    Culture, Respiratory with Gram Stain 03/10/22   Normal respiratory whitney       PFT reviewed- mild obstruction, increased lung vol. DLCO normal          Plan:  Clinical impression is apparently straight forward and impression with management as below.    Helder was seen today for 3m f/u, copd and bronchiectasis.    Diagnoses and all orders for this visit:    Abnormal chest CT  -     CT Chest Without Contrast; Future    Chronic obstructive pulmonary disease, unspecified COPD type  -     PULSE OXIMETRY OVERNIGHT; Future    Bronchiectasis without complication  -     Culture, Respiratory with Gram Stain; Future     - continue current medication regiment Nebulizer with Aerobika device 2-4 times weekly on a schedule    Follow up in about 3 months (around 9/1/2022), or if symptoms worsen or fail to improve.    Discussed with patient above for education the following:      Patient Instructions   Continue current medication regiment    Percussion therapy instruction  Do breathing treatments 2 x a week  with Aerobika,       Repeat CT chest to evaluated changes seen on previous CT chest

## 2022-06-30 ENCOUNTER — LAB VISIT (OUTPATIENT)
Dept: LAB | Facility: HOSPITAL | Age: 62
End: 2022-06-30
Attending: NURSE PRACTITIONER
Payer: MEDICAID

## 2022-06-30 DIAGNOSIS — J47.9 BRONCHIECTASIS WITHOUT COMPLICATION: ICD-10-CM

## 2022-06-30 PROCEDURE — 87070 CULTURE OTHR SPECIMN AEROBIC: CPT | Performed by: NURSE PRACTITIONER

## 2022-06-30 PROCEDURE — 87205 SMEAR GRAM STAIN: CPT | Performed by: NURSE PRACTITIONER

## 2022-07-02 LAB
BACTERIA SPEC AEROBE CULT: NORMAL
BACTERIA SPEC AEROBE CULT: NORMAL
GRAM STN SPEC: NORMAL

## 2022-07-05 ENCOUNTER — PATIENT MESSAGE (OUTPATIENT)
Dept: PULMONOLOGY | Facility: CLINIC | Age: 62
End: 2022-07-05
Payer: MEDICAID

## 2022-07-22 ENCOUNTER — OFFICE VISIT (OUTPATIENT)
Dept: URGENT CARE | Facility: CLINIC | Age: 62
End: 2022-07-22
Payer: MEDICAID

## 2022-07-22 VITALS
DIASTOLIC BLOOD PRESSURE: 86 MMHG | TEMPERATURE: 100 F | RESPIRATION RATE: 20 BRPM | BODY MASS INDEX: 25.06 KG/M2 | SYSTOLIC BLOOD PRESSURE: 132 MMHG | HEIGHT: 72 IN | WEIGHT: 185 LBS | OXYGEN SATURATION: 96 % | HEART RATE: 69 BPM

## 2022-07-22 DIAGNOSIS — R05.9 COUGH: Primary | ICD-10-CM

## 2022-07-22 DIAGNOSIS — U07.1 COVID-19: ICD-10-CM

## 2022-07-22 LAB
CTP QC/QA: YES
SARS-COV-2 AG RESP QL IA.RAPID: POSITIVE

## 2022-07-22 PROCEDURE — 1159F MED LIST DOCD IN RCRD: CPT | Mod: CPTII,S$GLB,, | Performed by: NURSE PRACTITIONER

## 2022-07-22 PROCEDURE — 3008F PR BODY MASS INDEX (BMI) DOCUMENTED: ICD-10-PCS | Mod: CPTII,S$GLB,, | Performed by: NURSE PRACTITIONER

## 2022-07-22 PROCEDURE — 99214 PR OFFICE/OUTPT VISIT, EST, LEVL IV, 30-39 MIN: ICD-10-PCS | Mod: S$GLB,,, | Performed by: NURSE PRACTITIONER

## 2022-07-22 PROCEDURE — 3044F PR MOST RECENT HEMOGLOBIN A1C LEVEL <7.0%: ICD-10-PCS | Mod: CPTII,S$GLB,, | Performed by: NURSE PRACTITIONER

## 2022-07-22 PROCEDURE — 3008F BODY MASS INDEX DOCD: CPT | Mod: CPTII,S$GLB,, | Performed by: NURSE PRACTITIONER

## 2022-07-22 PROCEDURE — 1159F PR MEDICATION LIST DOCUMENTED IN MEDICAL RECORD: ICD-10-PCS | Mod: CPTII,S$GLB,, | Performed by: NURSE PRACTITIONER

## 2022-07-22 PROCEDURE — 4010F ACE/ARB THERAPY RXD/TAKEN: CPT | Mod: CPTII,S$GLB,, | Performed by: NURSE PRACTITIONER

## 2022-07-22 PROCEDURE — 3044F HG A1C LEVEL LT 7.0%: CPT | Mod: CPTII,S$GLB,, | Performed by: NURSE PRACTITIONER

## 2022-07-22 PROCEDURE — 4010F PR ACE/ARB THEARPY RXD/TAKEN: ICD-10-PCS | Mod: CPTII,S$GLB,, | Performed by: NURSE PRACTITIONER

## 2022-07-22 PROCEDURE — 3075F PR MOST RECENT SYSTOLIC BLOOD PRESS GE 130-139MM HG: ICD-10-PCS | Mod: CPTII,S$GLB,, | Performed by: NURSE PRACTITIONER

## 2022-07-22 PROCEDURE — 99214 OFFICE O/P EST MOD 30 MIN: CPT | Mod: S$GLB,,, | Performed by: NURSE PRACTITIONER

## 2022-07-22 PROCEDURE — 87811 SARS-COV-2 COVID19 W/OPTIC: CPT | Mod: QW,S$GLB,, | Performed by: NURSE PRACTITIONER

## 2022-07-22 PROCEDURE — 1160F RVW MEDS BY RX/DR IN RCRD: CPT | Mod: CPTII,S$GLB,, | Performed by: NURSE PRACTITIONER

## 2022-07-22 PROCEDURE — 87811 SARS CORONAVIRUS 2 ANTIGEN POCT, MANUAL READ: ICD-10-PCS | Mod: QW,S$GLB,, | Performed by: NURSE PRACTITIONER

## 2022-07-22 PROCEDURE — 3079F PR MOST RECENT DIASTOLIC BLOOD PRESSURE 80-89 MM HG: ICD-10-PCS | Mod: CPTII,S$GLB,, | Performed by: NURSE PRACTITIONER

## 2022-07-22 PROCEDURE — 3079F DIAST BP 80-89 MM HG: CPT | Mod: CPTII,S$GLB,, | Performed by: NURSE PRACTITIONER

## 2022-07-22 PROCEDURE — 1160F PR REVIEW ALL MEDS BY PRESCRIBER/CLIN PHARMACIST DOCUMENTED: ICD-10-PCS | Mod: CPTII,S$GLB,, | Performed by: NURSE PRACTITIONER

## 2022-07-22 PROCEDURE — 3075F SYST BP GE 130 - 139MM HG: CPT | Mod: CPTII,S$GLB,, | Performed by: NURSE PRACTITIONER

## 2022-07-22 RX ORDER — AZELASTINE 1 MG/ML
1 SPRAY, METERED NASAL 2 TIMES DAILY
Qty: 30 ML | Refills: 0 | Status: SHIPPED | OUTPATIENT
Start: 2022-07-22 | End: 2023-06-22

## 2022-07-22 RX ORDER — PROMETHAZINE HYDROCHLORIDE AND DEXTROMETHORPHAN HYDROBROMIDE 6.25; 15 MG/5ML; MG/5ML
5 SYRUP ORAL 3 TIMES DAILY PRN
Qty: 240 ML | Refills: 0 | Status: SHIPPED | OUTPATIENT
Start: 2022-07-22 | End: 2022-08-01

## 2022-07-22 RX ORDER — GUAIFENESIN 1200 MG/1
1200 TABLET, EXTENDED RELEASE ORAL 2 TIMES DAILY
Qty: 20 TABLET | Refills: 0 | Status: SHIPPED | OUTPATIENT
Start: 2022-07-22 | End: 2022-08-01

## 2022-07-22 NOTE — PROGRESS NOTES
Subjective:       Patient ID: Helder Brand is a 62 y.o. male.    Vitals:  height is 6' (1.829 m) and weight is 83.9 kg (185 lb). His temperature is 100.3 °F (37.9 °C). His blood pressure is 132/86 and his pulse is 69. His respiration is 20 and oxygen saturation is 96%.     Chief Complaint: COVID-19 Concerns    At home test positive for covid an hour ago.   Slight cough, elevated bp, temp of 99.2, overall not feeling well X yesterday. No treatment.  4 COVID 19 risk score.     Past Medical History:  : Emphysema lung  No date: Enlarged prostate  : Hyperlipidemia  : Hypertension  : Kidney stone      Comment:  x3  No date: Thyroid disease      Comment:  benign growth, partial thyroidectomy    Past Surgical History:  2018: PROSTATE SURGERY  2021: REPAIR OF ANEURYSM; Left      Comment:  Procedure: REPAIR POPLITEAL ARTERY ANEURYSM;  Surgeon:                Ismael Esquivel MD;  Location: [facility];  Service:                Cardiovascular;  Laterality: Left;  : THYROIDECTOMY, PARTIAL  No date: TONSILLECTOMY      Comment:  as a child  2019: urolift      Comment:  [facility]    Review of patient's family history indicates:  Problem: Hypertension      Relation: Mother          Age of Onset: (Not Specified)  Problem: COPD      Relation: Mother          Age of Onset: (Not Specified)  Problem: Pulmonary embolism      Relation: Father          Age of Onset: (Not Specified)      Social History    Socioeconomic History      Marital status:     Tobacco Use      Smoking status: Former Smoker        Packs/day: 0.25        Years: 5.00        Pack years: 1.25        Quit date: 1980        Years since quittin.7      Smokeless tobacco: Never Used    Substance and Sexual Activity      Alcohol use: Not Currently        Alcohol/week: 2.0 standard drinks        Types: 2 Cans of beer per week      Current Outpatient Medications:    albuterol-ipratropium (DUO-NEB) 2.5 mg-0.5 mg/3 mL nebulizer  solution, Take 3 mLs by nebulization every 6 (six) hours as needed for Wheezing or Shortness of Breath. Rescue, Disp: 240 mL, Rfl: 5  carvediloL (COREG) 12.5 MG tablet, Take 12.5 mg by mouth 2 (two) times daily., Disp: , Rfl:   fluticasone propionate (FLONASE) 50 mcg/actuation nasal spray, 2 sprays by Each Nostril route daily as needed. , Disp: , Rfl:   fluticasone-salmeterol 230-21 mcg/dose (ADVAIR HFA) 230-21 mcg/actuation HFAA inhaler, Inhale 2 puffs into the lungs 2 (two) times daily. Controller, Disp: 12 g, Rfl: 11  losartan (COZAAR) 25 MG tablet, TAKE 1 TABLET BY MOUTH IN THE EVENING, Disp: 30 tablet, Rfl: 0  mucus clearing device (ACAPELLA, FLUTTER), 1 Device by Misc.(Non-Drug; Combo Route) route 2 (two) times daily., Disp: 1 each, Rfl: 0      No current facility-administered medications for this visit.  Facility-Administered Medications Ordered in Other Visits:  lactated ringers infusion, , Intravenous, Continuous, aJsbir Aragon MD, Last Rate: 75 mL/hr at 11/07/19 1333, 1,000 mL at 11/07/19 1333        Review of patient's allergies indicates:  No Known Allergies      Constitution: Positive for fatigue and fever.   HENT: Positive for congestion, postnasal drip and sore throat.    Neck: neck negative.   Cardiovascular: Negative.    Respiratory: Positive for cough. Negative for wheezing.    Gastrointestinal: Negative.    Neurological: Positive for headaches.       Objective:      Physical Exam   Constitutional: He is oriented to person, place, and time.  Non-toxic appearance. He appears ill. No distress.   HENT:   Head: Normocephalic and atraumatic.   Cardiovascular: Normal rate.   Pulmonary/Chest: Effort normal. No respiratory distress.   Abdominal: Normal appearance.   Neurological: He is alert and oriented to person, place, and time.   Psychiatric: His behavior is normal. Mood normal.         Assessment:       1. Cough    2. COVID-19          Plan:       Rapid COVID 19 test positive, results dicussed  with patient, questions answered. Symptomatic treatment as discussed, Self Quarantine guidelines as discussed, and ED precuaitons reviewed. Patient states understanding. COVID 19 patient education handout given.Paxlovid given after discussion with patient regarding risk factors, medication EUA status, possible rebound and medication side effects, and medication interactions. Patient states understanding.  Cough  -     SARS Coronavirus 2 Antigen, POCT Manual Read  -     promethazine-dextromethorphan (PROMETHAZINE-DM) 6.25-15 mg/5 mL Syrp; Take 5 mLs by mouth 3 (three) times daily as needed.  Dispense: 240 mL; Refill: 0  -     azelastine (ASTELIN) 137 mcg (0.1 %) nasal spray; 1 spray (137 mcg total) by Nasal route 2 (two) times daily.  Dispense: 30 mL; Refill: 0  -     guaiFENesin (MUCINEX) 1,200 mg Ta12; Take 1,200 mg by mouth 2 (two) times a day. for 10 days  Dispense: 20 tablet; Refill: 0  -     nirmatrelvir-ritonavir 300 mg (150 mg x 2)-100 mg copackaged tablets (EUA); Take 3 tablets by mouth 2 (two) times daily for 5 days. Each dose contains 2 nirmatrelvir (pink tablets) and 1 ritonavir (white tablet). Take all 3 tablets together  Dispense: 30 tablet; Refill: 0    COVID-19  -     promethazine-dextromethorphan (PROMETHAZINE-DM) 6.25-15 mg/5 mL Syrp; Take 5 mLs by mouth 3 (three) times daily as needed.  Dispense: 240 mL; Refill: 0  -     azelastine (ASTELIN) 137 mcg (0.1 %) nasal spray; 1 spray (137 mcg total) by Nasal route 2 (two) times daily.  Dispense: 30 mL; Refill: 0  -     guaiFENesin (MUCINEX) 1,200 mg Ta12; Take 1,200 mg by mouth 2 (two) times a day. for 10 days  Dispense: 20 tablet; Refill: 0  -     nirmatrelvir-ritonavir 300 mg (150 mg x 2)-100 mg copackaged tablets (EUA); Take 3 tablets by mouth 2 (two) times daily for 5 days. Each dose contains 2 nirmatrelvir (pink tablets) and 1 ritonavir (white tablet). Take all 3 tablets together  Dispense: 30 tablet; Refill: 0

## 2022-07-22 NOTE — PATIENT INSTRUCTIONS
Vit C 2000 mg daily, Vit D 1000 iu daily, Zinc 50 mg daily  ED for Fever > 102 not resolving with medication, significant shortness of breath or chest pain, Oxygen level less than 93%, or other worsening symptoms.    Instructions for Patients with Confirmed or Suspected COVID-19    If you are awaiting your test result, you will either be called or it will be released to the patient portal.  If you have any questions about your test, please visit [email or web address] or call our COVID-19 information line at [phone].      Please isolate yourself at home.  You may leave home and/or return to work once the following conditions are met:    If you have symptoms and tested positive:  More than 5 days since symptoms first appeared AND  More than 24 hours fever free without medications AND       symptoms have improved   For five days after ending isolation, masks are required.    If you had no symptoms but tested positive:  More than 5 days since the date of the first positive test. If you develop symptoms, then use the guidelines above  For five days after ending isolation, masks are required.      Testing is not recommended if you are symptom free after completing isolation.

## 2022-08-04 ENCOUNTER — HOSPITAL ENCOUNTER (INPATIENT)
Facility: HOSPITAL | Age: 62
LOS: 4 days | Discharge: HOME | DRG: 271 | End: 2022-08-08
Attending: EMERGENCY MEDICINE | Admitting: INTERNAL MEDICINE
Payer: MEDICAID

## 2022-08-04 DIAGNOSIS — M79.606 LOWER EXTREMITY PAIN: ICD-10-CM

## 2022-08-04 DIAGNOSIS — I74.3 FEMORAL POPLITEAL ARTERY THROMBUS: ICD-10-CM

## 2022-08-04 DIAGNOSIS — I74.3 FEMORAL ARTERY THROMBOSIS: ICD-10-CM

## 2022-08-04 DIAGNOSIS — I73.9 PAD (PERIPHERAL ARTERY DISEASE): Primary | ICD-10-CM

## 2022-08-04 DIAGNOSIS — R52 PAIN: ICD-10-CM

## 2022-08-04 DIAGNOSIS — I72.4 POPLITEAL ANEURYSM: ICD-10-CM

## 2022-08-04 PROCEDURE — 12000002 HC ACUTE/MED SURGE SEMI-PRIVATE ROOM

## 2022-08-04 NOTE — Clinical Note
An angiography was performed of the ostial, proximal, mid and distal left popliteal artery, infrapopliteal artery, anterior tibial artery, tibio-peroneal trunk, peroneal artery and posterior tibial artery via hand injection with 12 mL of contrast

## 2022-08-04 NOTE — Clinical Note
An angiography was performed of the mid left posterior tibial artery via hand injection with 5 mL of contrast POST PRONTO; VIA DESTINATION SHEATH

## 2022-08-04 NOTE — Clinical Note
The site was marked. The right groin was prepped. The site was prepped with ChloraPrep. The site was clipped. The patient was draped. The patient was positioned supine. The patient was secured using safety straps.

## 2022-08-04 NOTE — Clinical Note
An angiography was performed of the ostial and proximal left posterior tibial arterypost intervention  via hand injection with 5 mL of contrast VIA DESTINATION SHEATH

## 2022-08-04 NOTE — Clinical Note
An angiography was performed of the ostial, proximal and mid left superficial femoral artery via hand injection with 10 mL of contrast

## 2022-08-04 NOTE — Clinical Note
The left DP pulse was 1+. The right DP pulse was 2+.  The left PT pulse was 1+. The right PT pulse was 2+.

## 2022-08-04 NOTE — Clinical Note
An angiography was performed of the mid left popliteal artery via hand injection with 5 mL of contrast

## 2022-08-04 NOTE — Clinical Note
An angiography was performed of the proximal left superficial femoral artery via hand injection with 10 mL of contrast

## 2022-08-04 NOTE — Clinical Note
30 ml of contrast were injected throughout the case. 120 mL of contrast was the total wasted during the case. 150 mL was the total amount used during the case.

## 2022-08-04 NOTE — Clinical Note
An angiography was performed of the mid and distal left anterior tibial artery via hand injection with 5 mL of contrast

## 2022-08-04 NOTE — Clinical Note
40 ml of contrast were injected throughout the case. 30 mL of contrast was the total wasted during the case. 70 mL was the total amount used during the case.

## 2022-08-04 NOTE — Clinical Note
An angiography was performed of the mid and distal left peroneal artery via hand injection with 10 mL of contrast

## 2022-08-04 NOTE — Clinical Note
20 ml of contrast were injected throughout the case. 80 mL of contrast was the total wasted during the case. 100 mL was the total amount used during the case.

## 2022-08-04 NOTE — Clinical Note
Manual pressure was applied to the right femoral artery sheath insertion site. Using celox patch per RT Gianluca

## 2022-08-04 NOTE — Clinical Note
An angiography was performed of the ostial, proximal, mid and distal left popliteal artery, infrapopliteal artery, anterior tibial artery, tibio-peroneal trunk, peroneal artery and posterior tibial artery

## 2022-08-05 PROBLEM — I74.3 FEMORAL POPLITEAL ARTERY THROMBUS: Status: ACTIVE | Noted: 2022-08-05

## 2022-08-05 LAB
ALBUMIN SERPL BCP-MCNC: 3.9 G/DL (ref 3.5–5.2)
ALP SERPL-CCNC: 70 U/L (ref 55–135)
ALT SERPL W/O P-5'-P-CCNC: 12 U/L (ref 10–44)
ANION GAP SERPL CALC-SCNC: 8 MMOL/L (ref 8–16)
APTT PPP: 146.3 SEC (ref 23.3–35.1)
AST SERPL-CCNC: 20 U/L (ref 10–40)
BASOPHILS # BLD AUTO: 0.04 K/UL (ref 0–0.2)
BASOPHILS # BLD AUTO: 0.04 K/UL (ref 0–0.2)
BASOPHILS # BLD AUTO: 0.05 K/UL (ref 0–0.2)
BASOPHILS # BLD AUTO: 0.05 K/UL (ref 0–0.2)
BASOPHILS NFR BLD: 0.5 % (ref 0–1.9)
BASOPHILS NFR BLD: 0.6 % (ref 0–1.9)
BILIRUB SERPL-MCNC: 0.8 MG/DL (ref 0.1–1)
BUN SERPL-MCNC: 18 MG/DL (ref 8–23)
CALCIUM SERPL-MCNC: 8.7 MG/DL (ref 8.7–10.5)
CHLORIDE SERPL-SCNC: 102 MMOL/L (ref 95–110)
CK SERPL-CCNC: 147 U/L (ref 20–200)
CO2 SERPL-SCNC: 25 MMOL/L (ref 23–29)
CREAT SERPL-MCNC: 0.9 MG/DL (ref 0.5–1.4)
DIFFERENTIAL METHOD: ABNORMAL
EOSINOPHIL # BLD AUTO: 0.2 K/UL (ref 0–0.5)
EOSINOPHIL # BLD AUTO: 0.3 K/UL (ref 0–0.5)
EOSINOPHIL NFR BLD: 2 % (ref 0–8)
EOSINOPHIL NFR BLD: 2.1 % (ref 0–8)
EOSINOPHIL NFR BLD: 2.3 % (ref 0–8)
EOSINOPHIL NFR BLD: 2.5 % (ref 0–8)
ERYTHROCYTE [DISTWIDTH] IN BLOOD BY AUTOMATED COUNT: 13.5 % (ref 11.5–14.5)
ERYTHROCYTE [DISTWIDTH] IN BLOOD BY AUTOMATED COUNT: 13.6 % (ref 11.5–14.5)
EST. GFR  (NO RACE VARIABLE): >60 ML/MIN/1.73 M^2
FIBRINOGEN PPP-MCNC: 190 MG/DL (ref 194–545)
FIBRINOGEN PPP-MCNC: 190 MG/DL (ref 194–545)
FIBRINOGEN PPP-MCNC: 396 MG/DL (ref 194–545)
GLUCOSE SERPL-MCNC: 82 MG/DL (ref 70–110)
HCT VFR BLD AUTO: 43.3 % (ref 40–54)
HCT VFR BLD AUTO: 44.9 % (ref 40–54)
HCT VFR BLD AUTO: 47.1 % (ref 40–54)
HCT VFR BLD AUTO: 47.1 % (ref 40–54)
HGB BLD-MCNC: 15.3 G/DL (ref 14–18)
HGB BLD-MCNC: 15.6 G/DL (ref 14–18)
HGB BLD-MCNC: 16.2 G/DL (ref 14–18)
HGB BLD-MCNC: 16.3 G/DL (ref 14–18)
IMM GRANULOCYTES # BLD AUTO: 0.01 K/UL (ref 0–0.04)
IMM GRANULOCYTES # BLD AUTO: 0.02 K/UL (ref 0–0.04)
IMM GRANULOCYTES NFR BLD AUTO: 0.1 % (ref 0–0.5)
IMM GRANULOCYTES NFR BLD AUTO: 0.2 % (ref 0–0.5)
INR PPP: 1.2
INR PPP: 1.3
LYMPHOCYTES # BLD AUTO: 2.1 K/UL (ref 1–4.8)
LYMPHOCYTES # BLD AUTO: 2.2 K/UL (ref 1–4.8)
LYMPHOCYTES # BLD AUTO: 2.4 K/UL (ref 1–4.8)
LYMPHOCYTES # BLD AUTO: 2.4 K/UL (ref 1–4.8)
LYMPHOCYTES NFR BLD: 22.6 % (ref 18–48)
LYMPHOCYTES NFR BLD: 25.8 % (ref 18–48)
LYMPHOCYTES NFR BLD: 26.3 % (ref 18–48)
LYMPHOCYTES NFR BLD: 28.2 % (ref 18–48)
MCH RBC QN AUTO: 31.3 PG (ref 27–31)
MCH RBC QN AUTO: 31.6 PG (ref 27–31)
MCH RBC QN AUTO: 31.8 PG (ref 27–31)
MCH RBC QN AUTO: 31.9 PG (ref 27–31)
MCHC RBC AUTO-ENTMCNC: 34.4 G/DL (ref 32–36)
MCHC RBC AUTO-ENTMCNC: 34.6 G/DL (ref 32–36)
MCHC RBC AUTO-ENTMCNC: 34.7 G/DL (ref 32–36)
MCHC RBC AUTO-ENTMCNC: 35.3 G/DL (ref 32–36)
MCV RBC AUTO: 90 FL (ref 82–98)
MCV RBC AUTO: 91 FL (ref 82–98)
MONOCYTES # BLD AUTO: 1.1 K/UL (ref 0.3–1)
MONOCYTES # BLD AUTO: 1.1 K/UL (ref 0.3–1)
MONOCYTES # BLD AUTO: 1.2 K/UL (ref 0.3–1)
MONOCYTES # BLD AUTO: 1.5 K/UL (ref 0.3–1)
MONOCYTES NFR BLD: 12.9 % (ref 4–15)
MONOCYTES NFR BLD: 13.3 % (ref 4–15)
MONOCYTES NFR BLD: 14.1 % (ref 4–15)
MONOCYTES NFR BLD: 14.1 % (ref 4–15)
NEUTROPHILS # BLD AUTO: 4.6 K/UL (ref 1.8–7.7)
NEUTROPHILS # BLD AUTO: 4.7 K/UL (ref 1.8–7.7)
NEUTROPHILS # BLD AUTO: 5.1 K/UL (ref 1.8–7.7)
NEUTROPHILS # BLD AUTO: 6.4 K/UL (ref 1.8–7.7)
NEUTROPHILS NFR BLD: 54.7 % (ref 38–73)
NEUTROPHILS NFR BLD: 57.6 % (ref 38–73)
NEUTROPHILS NFR BLD: 58.6 % (ref 38–73)
NEUTROPHILS NFR BLD: 60.1 % (ref 38–73)
NRBC BLD-RTO: 0 /100 WBC
PLATELET # BLD AUTO: 220 K/UL (ref 150–450)
PLATELET # BLD AUTO: 224 K/UL (ref 150–450)
PLATELET # BLD AUTO: 226 K/UL (ref 150–450)
PLATELET # BLD AUTO: 231 K/UL (ref 150–450)
PMV BLD AUTO: 10 FL (ref 9.2–12.9)
PMV BLD AUTO: 9.1 FL (ref 9.2–12.9)
PMV BLD AUTO: 9.4 FL (ref 9.2–12.9)
PMV BLD AUTO: 9.5 FL (ref 9.2–12.9)
POTASSIUM SERPL-SCNC: 3.8 MMOL/L (ref 3.5–5.1)
PROT SERPL-MCNC: 7.2 G/DL (ref 6–8.4)
PROTHROMBIN TIME: 14.4 SEC (ref 11.4–13.7)
PROTHROMBIN TIME: 15.1 SEC (ref 11.4–13.7)
RBC # BLD AUTO: 4.8 M/UL (ref 4.6–6.2)
RBC # BLD AUTO: 4.91 M/UL (ref 4.6–6.2)
RBC # BLD AUTO: 5.16 M/UL (ref 4.6–6.2)
RBC # BLD AUTO: 5.18 M/UL (ref 4.6–6.2)
SODIUM SERPL-SCNC: 135 MMOL/L (ref 136–145)
TSH SERPL DL<=0.005 MIU/L-ACNC: 1.84 UIU/ML (ref 0.34–5.6)
WBC # BLD AUTO: 10.59 K/UL (ref 3.9–12.7)
WBC # BLD AUTO: 8.15 K/UL (ref 3.9–12.7)
WBC # BLD AUTO: 8.37 K/UL (ref 3.9–12.7)
WBC # BLD AUTO: 8.68 K/UL (ref 3.9–12.7)

## 2022-08-05 PROCEDURE — C1887 CATHETER, GUIDING: HCPCS | Performed by: SURGERY

## 2022-08-05 PROCEDURE — 25500020 PHARM REV CODE 255: Performed by: INTERNAL MEDICINE

## 2022-08-05 PROCEDURE — 27201423 OPTIME MED/SURG SUP & DEVICES STERILE SUPPLY: Performed by: SURGERY

## 2022-08-05 PROCEDURE — 63600175 PHARM REV CODE 636 W HCPCS: Performed by: STUDENT IN AN ORGANIZED HEALTH CARE EDUCATION/TRAINING PROGRAM

## 2022-08-05 PROCEDURE — 63600175 PHARM REV CODE 636 W HCPCS: Performed by: SURGERY

## 2022-08-05 PROCEDURE — 85025 COMPLETE CBC W/AUTO DIFF WBC: CPT | Performed by: EMERGENCY MEDICINE

## 2022-08-05 PROCEDURE — C1751 CATH, INF, PER/CENT/MIDLINE: HCPCS | Performed by: SURGERY

## 2022-08-05 PROCEDURE — C1894 INTRO/SHEATH, NON-LASER: HCPCS | Performed by: SURGERY

## 2022-08-05 PROCEDURE — 99900031 HC PATIENT EDUCATION (STAT)

## 2022-08-05 PROCEDURE — 63600175 PHARM REV CODE 636 W HCPCS: Performed by: INTERNAL MEDICINE

## 2022-08-05 PROCEDURE — 36415 COLL VENOUS BLD VENIPUNCTURE: CPT | Performed by: INTERNAL MEDICINE

## 2022-08-05 PROCEDURE — 80053 COMPREHEN METABOLIC PANEL: CPT | Performed by: EMERGENCY MEDICINE

## 2022-08-05 PROCEDURE — 82550 ASSAY OF CK (CPK): CPT | Performed by: EMERGENCY MEDICINE

## 2022-08-05 PROCEDURE — 25000003 PHARM REV CODE 250: Performed by: INTERNAL MEDICINE

## 2022-08-05 PROCEDURE — 85610 PROTHROMBIN TIME: CPT | Performed by: EMERGENCY MEDICINE

## 2022-08-05 PROCEDURE — 36246 INS CATH ABD/L-EXT ART 2ND: CPT | Mod: LT | Performed by: SURGERY

## 2022-08-05 PROCEDURE — 20000000 HC ICU ROOM

## 2022-08-05 PROCEDURE — 25000003 PHARM REV CODE 250: Performed by: EMERGENCY MEDICINE

## 2022-08-05 PROCEDURE — C1769 GUIDE WIRE: HCPCS | Performed by: SURGERY

## 2022-08-05 PROCEDURE — 99285 EMERGENCY DEPT VISIT HI MDM: CPT | Mod: 25

## 2022-08-05 PROCEDURE — 96374 THER/PROPH/DIAG INJ IV PUSH: CPT

## 2022-08-05 PROCEDURE — 85384 FIBRINOGEN ACTIVITY: CPT | Mod: 91 | Performed by: SURGERY

## 2022-08-05 PROCEDURE — 85610 PROTHROMBIN TIME: CPT | Mod: 91 | Performed by: EMERGENCY MEDICINE

## 2022-08-05 PROCEDURE — 99152 MOD SED SAME PHYS/QHP 5/>YRS: CPT | Performed by: SURGERY

## 2022-08-05 PROCEDURE — 94761 N-INVAS EAR/PLS OXIMETRY MLT: CPT

## 2022-08-05 PROCEDURE — 84443 ASSAY THYROID STIM HORMONE: CPT | Performed by: INTERNAL MEDICINE

## 2022-08-05 PROCEDURE — 37211 THROMBOLYTIC ART THERAPY: CPT | Performed by: SURGERY

## 2022-08-05 PROCEDURE — 99153 MOD SED SAME PHYS/QHP EA: CPT | Performed by: SURGERY

## 2022-08-05 PROCEDURE — 85025 COMPLETE CBC W/AUTO DIFF WBC: CPT | Mod: 91 | Performed by: STUDENT IN AN ORGANIZED HEALTH CARE EDUCATION/TRAINING PROGRAM

## 2022-08-05 PROCEDURE — 63600175 PHARM REV CODE 636 W HCPCS: Performed by: EMERGENCY MEDICINE

## 2022-08-05 PROCEDURE — 63600175 PHARM REV CODE 636 W HCPCS: Mod: JG | Performed by: SURGERY

## 2022-08-05 PROCEDURE — 99900035 HC TECH TIME PER 15 MIN (STAT)

## 2022-08-05 PROCEDURE — 25500020 PHARM REV CODE 255: Performed by: SURGERY

## 2022-08-05 PROCEDURE — 75710 ARTERY X-RAYS ARM/LEG: CPT | Mod: XU,LT | Performed by: SURGERY

## 2022-08-05 PROCEDURE — 25000003 PHARM REV CODE 250: Performed by: SURGERY

## 2022-08-05 PROCEDURE — 85730 THROMBOPLASTIN TIME PARTIAL: CPT | Performed by: EMERGENCY MEDICINE

## 2022-08-05 RX ORDER — IPRATROPIUM BROMIDE AND ALBUTEROL SULFATE 2.5; .5 MG/3ML; MG/3ML
3 SOLUTION RESPIRATORY (INHALATION) EVERY 6 HOURS PRN
Status: DISCONTINUED | OUTPATIENT
Start: 2022-08-05 | End: 2022-08-08 | Stop reason: HOSPADM

## 2022-08-05 RX ORDER — FLUTICASONE FUROATE AND VILANTEROL 200; 25 UG/1; UG/1
1 POWDER RESPIRATORY (INHALATION) DAILY
Status: DISCONTINUED | OUTPATIENT
Start: 2022-08-05 | End: 2022-08-08 | Stop reason: HOSPADM

## 2022-08-05 RX ORDER — AZELASTINE 1 MG/ML
1 SPRAY, METERED NASAL 2 TIMES DAILY
Status: DISCONTINUED | OUTPATIENT
Start: 2022-08-05 | End: 2022-08-08 | Stop reason: HOSPADM

## 2022-08-05 RX ORDER — MORPHINE SULFATE 2 MG/ML
2 INJECTION, SOLUTION INTRAMUSCULAR; INTRAVENOUS EVERY 4 HOURS PRN
Status: DISCONTINUED | OUTPATIENT
Start: 2022-08-05 | End: 2022-08-08 | Stop reason: HOSPADM

## 2022-08-05 RX ORDER — LORAZEPAM 2 MG/ML
2 INJECTION INTRAMUSCULAR 4 TIMES DAILY PRN
Status: DISCONTINUED | OUTPATIENT
Start: 2022-08-05 | End: 2022-08-08 | Stop reason: HOSPADM

## 2022-08-05 RX ORDER — FLUTICASONE PROPIONATE 50 MCG
2 SPRAY, SUSPENSION (ML) NASAL DAILY PRN
Status: DISCONTINUED | OUTPATIENT
Start: 2022-08-05 | End: 2022-08-08 | Stop reason: HOSPADM

## 2022-08-05 RX ORDER — CARVEDILOL 12.5 MG/1
12.5 TABLET ORAL 2 TIMES DAILY
Status: DISCONTINUED | OUTPATIENT
Start: 2022-08-05 | End: 2022-08-08 | Stop reason: HOSPADM

## 2022-08-05 RX ORDER — HYDRALAZINE HYDROCHLORIDE 20 MG/ML
10 INJECTION INTRAMUSCULAR; INTRAVENOUS EVERY 6 HOURS PRN
Status: DISCONTINUED | OUTPATIENT
Start: 2022-08-05 | End: 2022-08-08 | Stop reason: HOSPADM

## 2022-08-05 RX ORDER — ONDANSETRON 2 MG/ML
4 INJECTION INTRAMUSCULAR; INTRAVENOUS EVERY 8 HOURS PRN
Status: DISCONTINUED | OUTPATIENT
Start: 2022-08-05 | End: 2022-08-07

## 2022-08-05 RX ORDER — HEPARIN SODIUM,PORCINE/D5W 25000/250
0-40 INTRAVENOUS SOLUTION INTRAVENOUS CONTINUOUS
Status: DISCONTINUED | OUTPATIENT
Start: 2022-08-05 | End: 2022-08-07

## 2022-08-05 RX ORDER — TALC
6 POWDER (GRAM) TOPICAL NIGHTLY PRN
Status: DISCONTINUED | OUTPATIENT
Start: 2022-08-05 | End: 2022-08-08 | Stop reason: HOSPADM

## 2022-08-05 RX ORDER — FENTANYL CITRATE 50 UG/ML
INJECTION, SOLUTION INTRAMUSCULAR; INTRAVENOUS
Status: DISCONTINUED | OUTPATIENT
Start: 2022-08-05 | End: 2022-08-05 | Stop reason: HOSPADM

## 2022-08-05 RX ORDER — LIDOCAINE HYDROCHLORIDE 10 MG/ML
INJECTION, SOLUTION EPIDURAL; INFILTRATION; INTRACAUDAL; PERINEURAL
Status: DISCONTINUED | OUTPATIENT
Start: 2022-08-05 | End: 2022-08-05 | Stop reason: HOSPADM

## 2022-08-05 RX ORDER — ACETAMINOPHEN 325 MG/1
650 TABLET ORAL EVERY 8 HOURS PRN
Status: DISCONTINUED | OUTPATIENT
Start: 2022-08-05 | End: 2022-08-07

## 2022-08-05 RX ORDER — HEPARIN SODIUM 10000 [USP'U]/ML
INJECTION, SOLUTION INTRAVENOUS; SUBCUTANEOUS
Status: DISCONTINUED | OUTPATIENT
Start: 2022-08-05 | End: 2022-08-05 | Stop reason: HOSPADM

## 2022-08-05 RX ORDER — LOSARTAN POTASSIUM 25 MG/1
25 TABLET ORAL DAILY
Status: DISCONTINUED | OUTPATIENT
Start: 2022-08-05 | End: 2022-08-08 | Stop reason: HOSPADM

## 2022-08-05 RX ORDER — MORPHINE SULFATE 4 MG/ML
4 INJECTION, SOLUTION INTRAMUSCULAR; INTRAVENOUS EVERY 4 HOURS PRN
Status: DISCONTINUED | OUTPATIENT
Start: 2022-08-05 | End: 2022-08-06

## 2022-08-05 RX ORDER — OXYCODONE AND ACETAMINOPHEN 5; 325 MG/1; MG/1
1 TABLET ORAL
Status: COMPLETED | OUTPATIENT
Start: 2022-08-05 | End: 2022-08-05

## 2022-08-05 RX ORDER — MIDAZOLAM HYDROCHLORIDE 1 MG/ML
INJECTION INTRAMUSCULAR; INTRAVENOUS
Status: DISCONTINUED | OUTPATIENT
Start: 2022-08-05 | End: 2022-08-05 | Stop reason: HOSPADM

## 2022-08-05 RX ORDER — HEPARIN SODIUM 10000 [USP'U]/100ML
INJECTION, SOLUTION INTRAVENOUS
Status: DISCONTINUED | OUTPATIENT
Start: 2022-08-05 | End: 2022-08-07

## 2022-08-05 RX ADMIN — AZELASTINE HYDROCHLORIDE 137 MCG: 137 SPRAY, METERED NASAL at 09:08

## 2022-08-05 RX ADMIN — Medication 6 MG: at 08:08

## 2022-08-05 RX ADMIN — IOHEXOL 100 ML: 350 INJECTION, SOLUTION INTRAVENOUS at 04:08

## 2022-08-05 RX ADMIN — LOSARTAN POTASSIUM 25 MG: 25 TABLET, FILM COATED ORAL at 09:08

## 2022-08-05 RX ADMIN — MORPHINE SULFATE 4 MG: 4 INJECTION, SOLUTION INTRAMUSCULAR; INTRAVENOUS at 10:08

## 2022-08-05 RX ADMIN — HEPARIN SODIUM AND DEXTROSE 18 UNITS/KG/HR: 10000; 5 INJECTION INTRAVENOUS at 03:08

## 2022-08-05 RX ADMIN — ACETAMINOPHEN 650 MG: 325 TABLET ORAL at 08:08

## 2022-08-05 RX ADMIN — LORAZEPAM 2 MG: 2 INJECTION, SOLUTION INTRAMUSCULAR; INTRAVENOUS at 06:08

## 2022-08-05 RX ADMIN — CARVEDILOL 12.5 MG: 12.5 TABLET, FILM COATED ORAL at 09:08

## 2022-08-05 RX ADMIN — OXYCODONE AND ACETAMINOPHEN 1 TABLET: 325; 5 TABLET ORAL at 01:08

## 2022-08-05 RX ADMIN — MORPHINE SULFATE 4 MG: 4 INJECTION, SOLUTION INTRAMUSCULAR; INTRAVENOUS at 03:08

## 2022-08-05 RX ADMIN — MORPHINE SULFATE 4 MG: 4 INJECTION, SOLUTION INTRAMUSCULAR; INTRAVENOUS at 05:08

## 2022-08-05 RX ADMIN — CARVEDILOL 12.5 MG: 12.5 TABLET, FILM COATED ORAL at 08:08

## 2022-08-05 NOTE — CONSULTS
[facility]  Vascular Surgery  Consult Note    Inpatient consult to Vascular Surgery  Consult performed by: Ali Khoobehi, MD  Consult ordered by: Josias Linton MD        Subjective:     Chief Complaint/Reason for Admission: left leg ischemia    History of Present Illness: Pt with hx of left pop aneurysm repair using right SFV by Dr Rubin 1 year ago, admitted c/o pain in the left leg x 2 days. Pt's pop interposition bypass is occluded on CTA. Pt has no other complaints. He does not have pain at rest but the leg begins to hurt with any movement.    Medications Prior to Admission   Medication Sig Dispense Refill Last Dose    albuterol (PROVENTIL/VENTOLIN HFA) 90 mcg/actuation inhaler Inhale 2 puffs into the lungs every 6 (six) hours as needed.        albuterol-ipratropium (DUO-NEB) 2.5 mg-0.5 mg/3 mL nebulizer solution Take 3 mLs by nebulization every 6 (six) hours as needed for Wheezing or Shortness of Breath. Rescue 240 mL 5     azelastine (ASTELIN) 137 mcg (0.1 %) nasal spray 1 spray (137 mcg total) by Nasal route 2 (two) times daily. 30 mL 0     carvediloL (COREG) 12.5 MG tablet Take 12.5 mg by mouth 2 (two) times daily.       fluticasone propionate (FLONASE) 50 mcg/actuation nasal spray 2 sprays by Each Nostril route daily as needed.        fluticasone-salmeterol 230-21 mcg/dose (ADVAIR HFA) 230-21 mcg/actuation HFAA inhaler Inhale 2 puffs into the lungs 2 (two) times daily. Controller 12 g 11     losartan (COZAAR) 25 MG tablet TAKE 1 TABLET BY MOUTH IN THE EVENING 30 tablet 0     mucus clearing device (ACAPELLA, FLUTTER) 1 Device by Misc.(Non-Drug; Combo Route) route 2 (two) times daily. 1 each 0        Review of patient's allergies indicates:  No Known Allergies    Past Medical History:   Diagnosis Date    Emphysema lung 2021    Enlarged prostate     Hyperlipidemia 2021    Hypertension 2001    Kidney stone 2001    x3    Thyroid disease     benign growth, partial thyroidectomy      Past Surgical History:   Procedure Laterality Date    PROSTATE SURGERY  2018    REPAIR OF ANEURYSM Left 2021    Procedure: REPAIR POPLITEAL ARTERY ANEURYSM;  Surgeon: Ismael Esquivel MD;  Location: [facility];  Service: Cardiovascular;  Laterality: Left;    THYROIDECTOMY, PARTIAL  2011    TONSILLECTOMY      as a child    [facility]rolift  2019    [facility]     Family History     Problem Relation (Age of Onset)    COPD Mother    Hypertension Mother    Pulmonary embolism Father        Tobacco Use    Smoking status: Former Smoker     Packs/day: 0.25     Years: 5.00     Pack years: 1.25     Quit date: 1980     Years since quittin.7    Smokeless tobacco: Never Used   Substance and Sexual Activity    Alcohol use: Not Currently     Alcohol/week: 2.0 standard drinks     Types: 2 Cans of beer per week    Drug use: Not on file    Sexual activity: Not on file     Review of Systems   All other systems reviewed and are negative.    Objective:     Vital Signs (Most Recent):  Temp: 98 °F (36.7 °C) (22 1215)  Pulse: 62 (22 1215)  Resp: 16 (22 1215)  BP: 129/85 (22 1215)  SpO2: 96 % (22 1215) Vital Signs (24h Range):  Temp:  [97.9 °F (36.6 °C)-98.3 °F (36.8 °C)] 98 °F (36.7 °C)  Pulse:  [58-68] 62  Resp:  [16-20] 16  SpO2:  [95 %-96 %] 96 %  BP: (129-162)/() 129/85     Weight: 85.7 kg (189 lb)  Body mass index is 24.94 kg/m².        Physical Exam  Vitals reviewed.   Constitutional:       Appearance: Normal appearance.   Cardiovascular:      Rate and Rhythm: Normal rate and regular rhythm.      Pulses:           Femoral pulses are 2+ on the right side and 2+ on the left side.       Dorsalis pedis pulses are detected w/ Doppler on the right side and 0 on the left side.        Posterior tibial pulses are detected w/ Doppler on the right side and 0 on the left side.      Heart sounds: Normal heart sounds.      Comments: Left foot cool, no motor or sensory deficits  Abdominal:       Palpations: Abdomen is soft.      Tenderness: There is no abdominal tenderness.   Neurological:      General: No focal deficit present.      Mental Status: He is alert and oriented to person, place, and time.         Significant Labs:  CBC:   Recent Labs   Lab 08/05/22  0958   WBC 8.37   RBC 4.91   HGB 15.6   HCT 44.9      MCV 91   MCH 31.8*   MCHC 34.7     CMP:   Recent Labs   Lab 08/05/22  0053   GLU 82   CALCIUM 8.7   ALBUMIN 3.9   PROT 7.2   *   K 3.8   CO2 25      BUN 18   CREATININE 0.9   ALKPHOS 70   ALT 12   AST 20   BILITOT 0.8     Coagulation:   Recent Labs   Lab 08/05/22  0958   INR 1.3   APTT 146.3*       Significant Diagnostics:  I have reviewed all pertinent imaging results/findings within the past 24 hours.    Assessment/Plan:   Pt with occlusion of the left pop interposition graft. His leg is ischemic though viable, without motor deficits. He will need urgent angiography and lysis. I discussed the case with Dr Esquivel.      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 I will follow-up with patient. Please contact us if you have any additional questions.    Ali Khoobehi, MD  Vascular Surgery  [facility]

## 2022-08-05 NOTE — H&P
[facility] - Emergency Dept  Hospital Medicine  History & Physical    Patient Name: Helder Brand  MRN: [id number]  Patient Class: IP- Inpatient  Admission Date: 8/4/2022  Attending Physician: Josias Linton MD  Primary Care Provider: Karlos Woodruff MD         Patient information was obtained from patient, past medical records, ER records and ED MD.     Subjective:     Principal Problem:Femoral popliteal artery thrombus    Chief Complaint:   Chief Complaint   Patient presents with    Leg Pain     Left leg pain in calf and inner thigh x 2 days but has worsened since 2300 tonight.        HPI: 62 year old male with history of COPD, Popliteal Aneurysm/Repair (left), Hypothyroidism, HTN and has had 2/3 COVID vaccinations presented to ED complaining of 2 day history of worsening left lower extremity pain with cold foot. No LOP. Had left popliteal aneurysm repair July 2021. No CP/SOB.     In ED: US Artery revealed thrombosis mid superficial femoral artery downward. Labs reviewed and noted below: normal CBC, trivial hyponatremia otherwise normal CMP. CTA lower extremity ordered and pending. Discussed with ED MD: Vascular is aware and will see patient later today. Full dose heparinization started. NPO      Past Medical History:   Diagnosis Date    Emphysema lung 2021    Enlarged prostate     Hyperlipidemia 2021    Hypertension 2001    Kidney stone 2001    x3    Thyroid disease     benign growth, partial thyroidectomy       Past Surgical History:   Procedure Laterality Date    PROSTATE SURGERY  2018    REPAIR OF ANEURYSM Left 7/21/2021    Procedure: REPAIR POPLITEAL ARTERY ANEURYSM;  Surgeon: Ismael Esquivel MD;  Location: [facility] OR;  Service: Cardiovascular;  Laterality: Left;    THYROIDECTOMY, PARTIAL  2011    TONSILLECTOMY      as a child    [facility]  04/[facility]       Review of patient's allergies indicates:  No Known Allergies    Current Facility-Administered Medications on File  Prior to Encounter   Medication    lactated ringers infusion     Current Outpatient Medications on File Prior to Encounter   Medication Sig    albuterol (PROVENTIL/VENTOLIN HFA) 90 mcg/actuation inhaler Inhale 2 puffs into the lungs every 6 (six) hours as needed.     albuterol-ipratropium (DUO-NEB) 2.5 mg-0.5 mg/3 mL nebulizer solution Take 3 mLs by nebulization every 6 (six) hours as needed for Wheezing or Shortness of Breath. Rescue    azelastine (ASTELIN) 137 mcg (0.1 %) nasal spray 1 spray (137 mcg total) by Nasal route 2 (two) times daily.    carvediloL (COREG) 12.5 MG tablet Take 12.5 mg by mouth 2 (two) times daily.    fluticasone propionate (FLONASE) 50 mcg/actuation nasal spray 2 sprays by Each Nostril route daily as needed.     fluticasone-salmeterol 230-21 mcg/dose (ADVAIR HFA) 230-21 mcg/actuation HFAA inhaler Inhale 2 puffs into the lungs 2 (two) times daily. Controller    losartan (COZAAR) 25 MG tablet TAKE 1 TABLET BY MOUTH IN THE EVENING    mucus clearing device (ACAPELLA, FLUTTER) 1 Device by Misc.(Non-Drug; Combo Route) route 2 (two) times daily.     Family History       Problem Relation (Age of Onset)    COPD Mother    Hypertension Mother    Pulmonary embolism Father          Tobacco Use    Smoking status: Former Smoker     Packs/day: 0.25     Years: 5.00     Pack years: 1.25     Quit date: 1980     Years since quittin.7    Smokeless tobacco: Never Used   Substance and Sexual Activity    Alcohol use: Not Currently     Alcohol/week: 2.0 standard drinks     Types: 2 Cans of beer per week    Drug use: Not on file    Sexual activity: Not on file     Review of Systems   Constitutional: Negative.    HENT: Negative.     Eyes: Negative.    Respiratory: Negative.     Cardiovascular: Negative.         Left leg pain   Gastrointestinal: Negative.    Endocrine: Negative.    Genitourinary: Negative.    Musculoskeletal: Negative.    Skin: Negative.    Allergic/Immunologic: Negative.     Neurological: Negative.    Hematological: Negative.    All other systems reviewed and are negative.  Objective:     Vital Signs (Most Recent):  Temp: 98.3 °F (36.8 °C) (08/04/22 2358)  Pulse: 68 (08/04/22 2358)  Resp: 18 (08/05/22 0100)  BP: (!) 162/108 (08/04/22 2358)  SpO2: 96 % (08/04/22 2358)   Vital Signs (24h Range):  Temp:  [98.3 °F (36.8 °C)] 98.3 °F (36.8 °C)  Pulse:  [68] 68  Resp:  [18-20] 18  SpO2:  [96 %] 96 %  BP: (162)/(108) 162/108     Weight: 85.7 kg (189 lb)  Body mass index is 24.94 kg/m².    Physical Exam  Vitals and nursing note reviewed.   Constitutional:       Appearance: He is well-developed.   HENT:      Head: Normocephalic and atraumatic.      Right Ear: External ear normal.      Left Ear: External ear normal.      Nose: Nose normal.   Eyes:      Conjunctiva/sclera: Conjunctivae normal.      Pupils: Pupils are equal, round, and reactive to light.   Cardiovascular:      Rate and Rhythm: Normal rate and regular rhythm.      Heart sounds: Normal heart sounds.      Comments: Good bounding left femoral pulse. Leg cold to touch below knee; can move toes  Pulmonary:      Effort: Pulmonary effort is normal.      Breath sounds: Normal breath sounds.   Abdominal:      General: Bowel sounds are normal.      Palpations: Abdomen is soft.   Musculoskeletal:         General: Normal range of motion.      Cervical back: Normal range of motion and neck supple.   Skin:     General: Skin is warm and dry.      Capillary Refill: Capillary refill takes less than 2 seconds.   Neurological:      Mental Status: He is alert and oriented to person, place, and time.   Psychiatric:         Behavior: Behavior normal.         Thought Content: Thought content normal.         Judgment: Judgment normal.         CRANIAL NERVES     CN III, IV, VI   Pupils are equal, round, and reactive to light.     Significant Labs: All pertinent labs within the past 24 hours have been reviewed.  CBC:   Recent Labs   Lab 08/05/22  0053    WBC 10.59   HGB 16.3   HCT 47.1        CMP:   Recent Labs   Lab 08/05/22  0053   *   K 3.8      CO2 25   GLU 82   BUN 18   CREATININE 0.9   CALCIUM 8.7   PROT 7.2   ALBUMIN 3.9   BILITOT 0.8   ALKPHOS 70   AST 20   ALT 12   ANIONGAP 8     Cardiac Markers: No results for input(s): CKMB, MYOGLOBIN, BNP, TROPISTAT in the last 48 hours.    Significant Imaging: I have reviewed all pertinent imaging results/findings within the past 24 hours.    Assessment/Plan:     * Femoral popliteal artery thrombus  Vascular is aware and will see patient later today. Full dose heparinization started. NPO      VTE Risk Mitigation (From admission, onward)         Ordered     heparin 25,000 units in dextrose 5% (100 units/ml) IV bolus from bag - ADDITIONAL PRN BOLUS - 60 units/kg  As needed (PRN)        Question:  Heparin Infusion Adjustment (DO NOT MODIFY ANSWER)  Answer:  \\[email or web address]\Images\Pharmacy\HeparinInfusions\heparin HIGH INTENSITY nomogram for [facility] [id number]    08/05/22 0316     heparin 25,000 units in dextrose 5% (100 units/ml) IV bolus from bag - ADDITIONAL PRN BOLUS - 30 units/kg  As needed (PRN)        Question:  Heparin Infusion Adjustment (DO NOT MODIFY ANSWER)  Answer:  \\[email or web address]\Images\Pharmacy\HeparinInfusions\heparin HIGH INTENSITY nomogram for [facility] [id number]    08/05/22 0316     heparin 25,000 units in dextrose 5% 250 mL (100 units/mL) infusion HIGH INTENSITY nomogram - [facility]  Continuous        Question Answer Comment   Heparin Infusion Adjustment (DO NOT MODIFY ANSWER) [email or web address]\Images\Pharmacy\HeparinInfusions\heparin HIGH INTENSITY nomogram for [facility] [id number].pdf    Begin at (in units/kg/hr) 18        08/05/22 0316                   Josias Linton MD  Department of Hospital Medicine   [facility] - Emergency Dept

## 2022-08-05 NOTE — CARE UPDATE
Patient seen, currently on Heparin drip.  Awaiting vascular evaluation.  H&P per previous physician, labs and images reviewed.  Continue current care management.

## 2022-08-05 NOTE — SUBJECTIVE & OBJECTIVE
Past Medical History:   Diagnosis Date    Emphysema lung 2021    Enlarged prostate     Hyperlipidemia 2021    Hypertension 2001    Kidney stone 2001    x3    Thyroid disease     benign growth, partial thyroidectomy       Past Surgical History:   Procedure Laterality Date    PROSTATE SURGERY  2018    REPAIR OF ANEURYSM Left 7/21/2021    Procedure: REPAIR POPLITEAL ARTERY ANEURYSM;  Surgeon: Ismael Esquivel MD;  Location: [facility];  Service: Cardiovascular;  Laterality: Left;    THYROIDECTOMY, PARTIAL  2011    TONSILLECTOMY      as a child    urolift  04/2019    [facility]       Review of patient's allergies indicates:  No Known Allergies    Current Facility-Administered Medications on File Prior to Encounter   Medication    lactated ringers infusion     Current Outpatient Medications on File Prior to Encounter   Medication Sig    albuterol (PROVENTIL/VENTOLIN HFA) 90 mcg/actuation inhaler Inhale 2 puffs into the lungs every 6 (six) hours as needed.     albuterol-ipratropium (DUO-NEB) 2.5 mg-0.5 mg/3 mL nebulizer solution Take 3 mLs by nebulization every 6 (six) hours as needed for Wheezing or Shortness of Breath. Rescue    azelastine (ASTELIN) 137 mcg (0.1 %) nasal spray 1 spray (137 mcg total) by Nasal route 2 (two) times daily.    carvediloL (COREG) 12.5 MG tablet Take 12.5 mg by mouth 2 (two) times daily.    fluticasone propionate (FLONASE) 50 mcg/actuation nasal spray 2 sprays by Each Nostril route daily as needed.     fluticasone-salmeterol 230-21 mcg/dose (ADVAIR HFA) 230-21 mcg/actuation HFAA inhaler Inhale 2 puffs into the lungs 2 (two) times daily. Controller    losartan (COZAAR) 25 MG tablet TAKE 1 TABLET BY MOUTH IN THE EVENING    mucus clearing device (ACAPELLA, FLUTTER) 1 Device by Misc.(Non-Drug; Combo Route) route 2 (two) times daily.     Family History       Problem Relation (Age of Onset)    COPD Mother    Hypertension Mother    Pulmonary embolism Father          Tobacco Use    Smoking status:  Former Smoker     Packs/day: 0.25     Years: 5.00     Pack years: 1.25     Quit date: 1980     Years since quittin.7    Smokeless tobacco: Never Used   Substance and Sexual Activity    Alcohol use: Not Currently     Alcohol/week: 2.0 standard drinks     Types: 2 Cans of beer per week    Drug use: Not on file    Sexual activity: Not on file     Review of Systems   Constitutional: Negative.    HENT: Negative.     Eyes: Negative.    Respiratory: Negative.     Cardiovascular: Negative.         Left leg pain   Gastrointestinal: Negative.    Endocrine: Negative.    Genitourinary: Negative.    Musculoskeletal: Negative.    Skin: Negative.    Allergic/Immunologic: Negative.    Neurological: Negative.    Hematological: Negative.    All other systems reviewed and are negative.  Objective:     Vital Signs (Most Recent):  Temp: 98.3 °F (36.8 °C) (22 2358)  Pulse: 68 (22 235)  Resp: 18 (22 0100)  BP: (!) 162/108 (22)  SpO2: 96 % (22)   Vital Signs (24h Range):  Temp:  [98.3 °F (36.8 °C)] 98.3 °F (36.8 °C)  Pulse:  [68] 68  Resp:  [18-20] 18  SpO2:  [96 %] 96 %  BP: (162)/(108) 162/108     Weight: 85.7 kg (189 lb)  Body mass index is 24.94 kg/m².    Physical Exam  Vitals and nursing note reviewed.   Constitutional:       Appearance: He is well-developed.   HENT:      Head: Normocephalic and atraumatic.      Right Ear: External ear normal.      Left Ear: External ear normal.      Nose: Nose normal.   Eyes:      Conjunctiva/sclera: Conjunctivae normal.      Pupils: Pupils are equal, round, and reactive to light.   Cardiovascular:      Rate and Rhythm: Normal rate and regular rhythm.      Heart sounds: Normal heart sounds.      Comments: Good bounding left femoral pulse. Leg cold to touch below knee; can move toes  Pulmonary:      Effort: Pulmonary effort is normal.      Breath sounds: Normal breath sounds.   Abdominal:      General: Bowel sounds are normal.      Palpations: Abdomen  is soft.   Musculoskeletal:         General: Normal range of motion.      Cervical back: Normal range of motion and neck supple.   Skin:     General: Skin is warm and dry.      Capillary Refill: Capillary refill takes less than 2 seconds.   Neurological:      Mental Status: He is alert and oriented to person, place, and time.   Psychiatric:         Behavior: Behavior normal.         Thought Content: Thought content normal.         Judgment: Judgment normal.         CRANIAL NERVES     CN III, IV, VI   Pupils are equal, round, and reactive to light.     Significant Labs: All pertinent labs within the past 24 hours have been reviewed.  CBC:   Recent Labs   Lab 08/05/22  0053   WBC 10.59   HGB 16.3   HCT 47.1        CMP:   Recent Labs   Lab 08/05/22 0053   *   K 3.8      CO2 25   GLU 82   BUN 18   CREATININE 0.9   CALCIUM 8.7   PROT 7.2   ALBUMIN 3.9   BILITOT 0.8   ALKPHOS 70   AST 20   ALT 12   ANIONGAP 8     Cardiac Markers: No results for input(s): CKMB, MYOGLOBIN, BNP, TROPISTAT in the last 48 hours.    Significant Imaging: I have reviewed all pertinent imaging results/findings within the past 24 hours.

## 2022-08-05 NOTE — H&P (VIEW-ONLY)
[facility]  Consult Note    Inpatient consult to Vascular Surgery  Consult performed by: Ali Khoobehi, MD  Consult ordered by: Josias Linton MD        Subjective:     Chief Complaint/Reason for Admission: left leg ischemia    History of Present Illness: Pt with hx of left pop aneurysm repair using right SFV by Dr Rubin 1 year ago, admitted c/o pain in the left leg x 2 days. Pt's pop interposition bypass is occluded on CTA. Pt has no other complaints. He does not have pain at rest but the leg begins to hurt with any movement.    Medications Prior to Admission   Medication Sig Dispense Refill Last Dose    albuterol (PROVENTIL/VENTOLIN HFA) 90 mcg/actuation inhaler Inhale 2 puffs into the lungs every 6 (six) hours as needed.        albuterol-ipratropium (DUO-NEB) 2.5 mg-0.5 mg/3 mL nebulizer solution Take 3 mLs by nebulization every 6 (six) hours as needed for Wheezing or Shortness of Breath. Rescue 240 mL 5     azelastine (ASTELIN) 137 mcg (0.1 %) nasal spray 1 spray (137 mcg total) by Nasal route 2 (two) times daily. 30 mL 0     carvediloL (COREG) 12.5 MG tablet Take 12.5 mg by mouth 2 (two) times daily.       fluticasone propionate (FLONASE) 50 mcg/actuation nasal spray 2 sprays by Each Nostril route daily as needed.        fluticasone-salmeterol 230-21 mcg/dose (ADVAIR HFA) 230-21 mcg/actuation HFAA inhaler Inhale 2 puffs into the lungs 2 (two) times daily. Controller 12 g 11     losartan (COZAAR) 25 MG tablet TAKE 1 TABLET BY MOUTH IN THE EVENING 30 tablet 0     mucus clearing device (ACAPELLA, FLUTTER) 1 Device by Misc.(Non-Drug; Combo Route) route 2 (two) times daily. 1 each 0        Review of patient's allergies indicates:  No Known Allergies    Past Medical History:   Diagnosis Date    Emphysema lung 2021    Enlarged prostate     Hyperlipidemia 2021    Hypertension 2001    Kidney stone 2001    x3    Thyroid disease     benign growth, partial thyroidectomy      Past Surgical History:   Procedure Laterality Date    PROSTATE SURGERY  2018    REPAIR OF ANEURYSM Left 2021    Procedure: REPAIR POPLITEAL ARTERY ANEURYSM;  Surgeon: Ismael Esquivel MD;  Location: [facility];  Service: Cardiovascular;  Laterality: Left;    THYROIDECTOMY, PARTIAL  2011    TONSILLECTOMY      as a child    urolift  2019    [facility]     Family History     Problem Relation (Age of Onset)    COPD Mother    Hypertension Mother    Pulmonary embolism Father        Tobacco Use    Smoking status: Former Smoker     Packs/day: 0.25     Years: 5.00     Pack years: 1.25     Quit date: 1980     Years since quittin.7    Smokeless tobacco: Never Used   Substance and Sexual Activity    Alcohol use: Not Currently     Alcohol/week: 2.0 standard drinks     Types: 2 Cans of beer per week    Drug use: Not on file    Sexual activity: Not on file     Review of Systems   All other systems reviewed and are negative.    Objective:     Vital Signs (Most Recent):  Temp: 98 °F (36.7 °C) (22 1215)  Pulse: 62 (22 1215)  Resp: 16 (22 1215)  BP: 129/85 (22 1215)  SpO2: 96 % (22 1215) Vital Signs (24h Range):  Temp:  [97.9 °F (36.6 °C)-98.3 °F (36.8 °C)] 98 °F (36.7 °C)  Pulse:  [58-68] 62  Resp:  [16-20] 16  SpO2:  [95 %-96 %] 96 %  BP: (129-162)/() 129/85     Weight: 85.7 kg (189 lb)  Body mass index is 24.94 kg/m².        Physical Exam  Vitals reviewed.   Constitutional:       Appearance: Normal appearance.   Cardiovascular:      Rate and Rhythm: Normal rate and regular rhythm.      Pulses:           Femoral pulses are 2+ on the right side and 2+ on the left side.       Dorsalis pedis pulses are detected w/ Doppler on the right side and 0 on the left side.        Posterior tibial pulses are detected w/ Doppler on the right side and 0 on the left side.      Heart sounds: Normal heart sounds.      Comments: Left foot cool, no motor or sensory deficits  Abdominal:       Palpations: Abdomen is soft.      Tenderness: There is no abdominal tenderness.   Neurological:      General: No focal deficit present.      Mental Status: He is alert and oriented to person, place, and time.         Significant Labs:  CBC:   Recent Labs   Lab 08/05/22  0958   WBC 8.37   RBC 4.91   HGB 15.6   HCT 44.9      MCV 91   MCH 31.8*   MCHC 34.7     CMP:   Recent Labs   Lab 08/05/22  0053   GLU 82   CALCIUM 8.7   ALBUMIN 3.9   PROT 7.2   *   K 3.8   CO2 25      BUN 18   CREATININE 0.9   ALKPHOS 70   ALT 12   AST 20   BILITOT 0.8     Coagulation:   Recent Labs   Lab 08/05/22  0958   INR 1.3   APTT 146.3*       Significant Diagnostics:  I have reviewed all pertinent imaging results/findings within the past 24 hours.    Assessment/Plan:   Pt with occlusion of the left pop interposition graft. His leg is ischemic though viable, without motor deficits. He will need urgent angiography and lysis. I discussed the case with Dr Esquivel.      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 I will follow-up with patient. Please contact us if you have any additional questions.    Ali Khoobehi, MD  Vascular Surgery  [facility]

## 2022-08-05 NOTE — PLAN OF CARE
Plan of care reviewed with patient including increased risk of bleeding, pain management, and lab monitoring.  Questions answered.  Patient verbalizes understanding of plan of care.

## 2022-08-05 NOTE — OP NOTE
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 Risks discussed included possible bleeding, infection, cardiac arrest, limb loss, renal failure, nerve injury, stroke, and death, among other risks. Patient was agreeable for the procedure and signed consent.      Patient was brought to the procedure room and placed under sedation. Under my direct supervision, moderate sedation was administered with an initial dose of Versed (2 mg) and Fentanyl (150 mcgs). These were readministered during the course of the case. An independent observer was present throughout the procedure, there was continuous monitoring of cardiac telemetry, hemodynamics and pulse oximetry. I was personally present and spent 30 minutes face to face time during sedation administration.      Using ultrasound guidance access was obtained at the right femoral artery with an introducer needle. 5F sheath was placed.      Rim catheter was used to traverse the left iliac system. Angiogram of the left leg was performed which shows an occlusion at the distal SFA and popliteal artery. Tibial vessels do not appreciably reconstitute. I was able to pass a catheter through the occlusion easily and into the peroneal artery, which is patent but thrombosed proximally.    50 cm lysis catheter was left in place from SFA to the proximal peroneal artery, and TPA was  initiated.     Pt was brought to the ICU in stable condition.    Significant Surgical Tasks Conducted by the Assistant(s), if Applicable: n/a    Estimated Blood Loss (EBL): * No values recorded between 8/5/2022 10:49 AM and 8/5/2022 11:40 AM *           Implants: * No implants in log *    Specimens:   Specimen (24h ago, onward)            None                  Condition: Good    Disposition: PACU - hemodynamically stable.    Attestation: I performed the procedure.

## 2022-08-05 NOTE — HPI
62 year old male with history of COPD, Popliteal Aneurysm/Repair (left), Hypothyroidism, HTN and has had 2/3 COVID vaccinations presented to ED complaining of 2 day history of worsening left lower extremity pain with cold foot. No LOP. Had left popliteal aneurysm repair July 2021. No CP/SOB.     In ED: US Artery revealed thrombosis mid superficial femoral artery downward. Labs reviewed and noted below: normal CBC, trivial hyponatremia otherwise normal CMP. CTA lower extremity ordered and pending. Discussed with ED MD: Vascular is aware and will see patient later today. Full dose heparinization started. NPO

## 2022-08-05 NOTE — ED PROVIDER NOTES
Encounter Date: 2022       History     Chief Complaint   Patient presents with    Leg Pain     Left leg pain in calf and inner thigh x 2 days but has worsened since 2300 tonight.     HPI     Seen and evaluated presented with chief complaint of left lower extremity pain.  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  This is an acute ongoing pain it has been present for several days this time.  He reports about 3 days of pain that is worsening.  It intermittently gets better with pain control, and then recurs.  He denies any alleviating factors.  He does note there has been some degree of numbness and paresthesias since previous surgery.  This is an acute episodic process that is moderate to severe persistent ongoing with no clear alleviating factors noted.    Review of patient's allergies indicates:  No Known Allergies  Past Medical History:   Diagnosis Date    Emphysema lung     Enlarged prostate     Hyperlipidemia     Hypertension     Kidney stone 2001    x3    Thyroid disease     benign growth, partial thyroidectomy     Past Surgical History:   Procedure Laterality Date    PROSTATE SURGERY  2018    REPAIR OF ANEURYSM Left 2021    Procedure: REPAIR POPLITEAL ARTERY ANEURYSM;  Surgeon: Ismael Esquivel MD;  Location: [facility];  Service: Cardiovascular;  Laterality: Left;    THYROIDECTOMY, PARTIAL  2011    TONSILLECTOMY      as a child    urolift  2019    [facility]     Family History   Problem Relation Age of Onset    Hypertension Mother     COPD Mother     Pulmonary embolism Father      Social History     Tobacco Use    Smoking status: Former Smoker     Packs/day: 0.25     Years: 5.00     Pack years: 1.25     Quit date: 1980     Years since quittin.7    Smokeless tobacco: Never Used   Substance Use Topics    Alcohol use: Not Currently     Alcohol/week: 2.0 standard drinks     Types: 2 Cans of beer per week     Review  of Systems   Constitutional: Negative for fever.   HENT: Negative for sore throat.    Respiratory: Negative for shortness of breath.    Cardiovascular: Negative for chest pain.   Gastrointestinal: Negative for nausea.   Genitourinary: Negative for dysuria.   Musculoskeletal: Negative for back pain.   Skin:        Left lower extremity is cool, but patient is able to wiggle toes and move foot without significant rest pain.   Neurological: Negative for weakness.   Psychiatric/Behavioral: Negative for confusion.   All other systems reviewed and are negative.      Physical Exam     Initial Vitals [08/04/22 2358]   BP Pulse Resp Temp SpO2   (!) 162/108 68 20 98.3 °F (36.8 °C) 96 %      MAP       --         Physical Exam    Nursing note and vitals reviewed.  Constitutional: He appears well-developed and well-nourished.   HENT:   Head: Normocephalic and atraumatic.   Eyes: Conjunctivae are normal.   Cardiovascular: Normal rate and regular rhythm.   Decreased ability to palpate pulses in left lower extremity   Pulmonary/Chest: No respiratory distress.   Abdominal: Abdomen is soft.   Musculoskeletal:         General: Normal range of motion.     Neurological: He is alert and oriented to person, place, and time.   Skin: Skin is warm and dry.   Psychiatric: He has a normal mood and affect. His speech is normal.         ED Course   Procedures  Labs Reviewed   CBC W/ AUTO DIFFERENTIAL - Abnormal; Notable for the following components:       Result Value    MCH 31.6 (*)     MPV 9.1 (*)     Mono # 1.5 (*)     All other components within normal limits   COMPREHENSIVE METABOLIC PANEL - Abnormal; Notable for the following components:    Sodium 135 (*)     All other components within normal limits   PROTIME-INR - Abnormal; Notable for the following components:    PT 14.4 (*)     All other components within normal limits   CK   APTT   APTT   PROTIME-INR   CBC W/ AUTO DIFFERENTIAL   CBC W/ AUTO DIFFERENTIAL   TSH          Imaging Results           US Lower Extremity Veins Left (Edited Result - FINAL)  Result time 08/05/22 03:45:13    Edited Result - FINAL by Jordyn Shah MD (08/05/22 03:45:13)                 Narrative:         ADDENDUM #1       Findings were discussed with Dr. Alfa Keene on 8/5/2022 at 3:21 AM Central standard time via telephone conference. 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 Further evaluation with CTA of the left lower extremity will be obtained.    Electronically signed by:  Jordyn Shah MD  8/5/2022 3:45 AM Opposing ViewsT Workstation: [id number]     ORIGINAL REPORT       EXAM:  US Duplex Left Lower Extremity Veins    CLINICAL HISTORY:  The patient is 62 years old and is Male;    TECHNIQUE:  Real-time duplex ultrasound scan of the left lower extremity veins integrating B-mode two-dimensional vascular structure, Doppler spectral analysis, color flow Doppler imaging and compression.    COMPARISON:  No relevant prior studies available.    FINDINGS:  DEEP VEINS: No DVT in the visualized common femoral, femoral, proximal deep femoral or popliteal veins.  The veins demonstrate normal color flow, are normally compressible, with normal phasic flow and/or augmentation response.  SUPERFICIAL VEINS:  Unremarkable.  No thrombus in the visualized great saphenous vein.  SOFT TISSUES:  Large heterogeneous lesion in the left popliteal fossa measuring 7.1 x 3.3 x 3.9 cm of indeterminate etiology.    IMPRESSION:  1.  No evidence of deep venous thrombosis.  2.  Large heterogeneous lesion in the left popliteal fossa measuring 7.1 x 3.3 x 3.9 cm of indeterminate etiology.  Recommend further evaluation with cross-sectional imaging.    Electronically signed by:  Jordyn Shah MD  8/5/2022 3:18 AM Opposing ViewsT Workstation: [id number]                  Final result by Jordyn Shah MD (08/05/22 03:18:05)                 Narrative:    EXAM:  US Duplex Left Lower  Extremity Veins    CLINICAL HISTORY:  The patient is 62 years old and is Male;    TECHNIQUE:  Real-time duplex ultrasound scan of the left lower extremity veins integrating B-mode two-dimensional vascular structure, Doppler spectral analysis, color flow Doppler imaging and compression.    COMPARISON:  No relevant prior studies available.    FINDINGS:  DEEP VEINS: No DVT in the visualized common femoral, femoral, proximal deep femoral or popliteal veins.  The veins demonstrate normal color flow, are normally compressible, with normal phasic flow and/or augmentation response.  SUPERFICIAL VEINS:  Unremarkable.  No thrombus in the visualized great saphenous vein.  SOFT TISSUES:  Large heterogeneous lesion in the left popliteal fossa measuring 7.1 x 3.3 x 3.9 cm of indeterminate etiology.    IMPRESSION:  1.  No evidence of deep venous thrombosis.  2.  Large heterogeneous lesion in the left popliteal fossa measuring 7.1 x 3.3 x 3.9 cm of indeterminate etiology.  Recommend further evaluation with cross-sectional imaging.    Electronically signed by:  Jordyn Shah MD  8/5/2022 3:18 AM CDT Workstation: [id number]                             US Lower Extremity Arteries Left (Edited Result - FINAL)  Result time 08/05/22 04:43:00    Edited Result - FINAL by Piotr Cole MD (08/05/22 04:43:00)                 Narrative:         ADDENDUM #1       THIS REPORT CONTAINS FINDINGS THAT MAY BE CRITICAL TO PATIENT CARE:  Called, telephoned, verbal report was given oral to DR. VELMA AVALOS at 2:57 AM CDT on 8/5/2022.    Electronically signed by:  Piotr Cole MD  8/5/2022 4:43 AM CDT Workstation: [id number]     ORIGINAL REPORT       EXAM DESCRIPTION: US LOWER EXTREMITY ARTERIES LEFT 8/5/2022 2:49 AM CDT    CLINICAL HISTORY: 62 years, Male,    COMPARISON: None    FINDINGS:    Multiple grayscale images and duplex Doppler ultrasound of the left arterial system was performed with color flow, spectral waveform and peak systolic  velocities.    Left common femoral artery peak systolic velocity of 29 cm/sec. triphasic waveform  Left profunda peak systolic velocity of 38 cm/sec. triphasic waveform  Left superficial femoral artery proximal peak systolic velocity 19 cm/sec. triphasic waveform.  Left superficial femoral artery mid aspect demonstrate a filling defect with abnormal waveform and peak systolic velocity of 19 cm/s.  Left superficial femoral artery distally demonstrate filling defect with a markedly decreased velocity of 12 cm/s.  Left popliteal artery demonstrate filling defect with abnormal decreased velocity of 19 cm/s  Left posterior tibial artery demonstrated filling defect with lack of flow.  Left anterior tibial artery demonstrate filling defect within the lack of flow  Left peroneal artery demonstrate filling defect with lack of flow  Left dorsalis pedis artery demonstrate filling defect with lack of flow.    IMPRESSION:  Extensive left lower extremity arterial thrombus from mid superficial femoral artery downward.    Electronically signed by:  Piotr Cole MD  8/5/2022 2:54 AM CDT Workstation: [id number]                  Final result by Piotr Cole MD (08/05/22 02:54:09)                 Narrative:    EXAM DESCRIPTION: US LOWER EXTREMITY ARTERIES LEFT 8/5/2022 2:49 AM CDT    CLINICAL HISTORY: 62 years, Male,    COMPARISON: None    FINDINGS:    Multiple grayscale images and duplex Doppler ultrasound of the left arterial system was performed with color flow, spectral waveform and peak systolic velocities.    Left common femoral artery peak systolic velocity of 29 cm/sec. triphasic waveform  Left profunda peak systolic velocity of 38 cm/sec. triphasic waveform  Left superficial femoral artery proximal peak systolic velocity 19 cm/sec. triphasic waveform.  Left superficial femoral artery mid aspect demonstrate a filling defect with abnormal waveform and peak systolic velocity of 19 cm/s.  Left superficial femoral artery  distally demonstrate filling defect with a markedly decreased velocity of 12 cm/s.  Left popliteal artery demonstrate filling defect with abnormal decreased velocity of 19 cm/s  Left posterior tibial artery demonstrated filling defect with lack of flow.  Left anterior tibial artery demonstrate filling defect within the lack of flow  Left peroneal artery demonstrate filling defect with lack of flow  Left dorsalis pedis artery demonstrate filling defect with lack of flow.    IMPRESSION:  Extensive left lower extremity arterial thrombus from mid superficial femoral artery downward.    Electronically signed by:  Piotr Cole MD  8/5/2022 2:54 AM CDT Workstation: [id number]                               Medications   heparin 25,000 units in dextrose 5% 250 mL (100 units/mL) infusion HIGH INTENSITY nomogram - [facility] (18 Units/kg/hr × 82.2 kg (Adjusted) Intravenous New Bag 8/5/22 0345)   heparin 25,000 units in dextrose 5% (100 units/ml) IV bolus from bag - ADDITIONAL PRN BOLUS - 60 units/kg (has no administration in time range)   heparin 25,000 units in dextrose 5% (100 units/ml) IV bolus from bag - ADDITIONAL PRN BOLUS - 30 units/kg (has no administration in time range)   oxyCODONE-acetaminophen 5-325 mg per tablet 1 tablet (1 tablet Oral Given 8/5/22 0100)   heparin 25,000 units in dextrose 5% (100 units/ml) IV bolus from bag INITIAL BOLUS (6,576 Units Intravenous Bolus from Bag 8/5/22 0347)   iohexoL (OMNIPAQUE 350) injection 100 mL (100 mLs Intravenous Given 8/5/22 0431)     Medical Decision Making:   Initial Assessment:   Seen and evaluated.  Presented with a chief complaint of lower extremity/leg pain.  Previous history of popliteal artery aneurysm repair with subsequent surgical scars to his lower extremity.  Here we did not has significantly strong palpable pulses, and his foot was cool, and an arterial ultrasound was ordered.  This showed likely arterial thrombus from his previous graft side all the way through his  lower leg.  Discussed his care with Dr. Esquivel, who will see him in the morning.  He is currently hemodynamically stable, has been treated with pain control, and is not in extremis.                      Clinical Impression:   Final diagnoses:  [M79.606] Lower extremity pain  [R52] Pain  [I74.3] Femoral artery thrombosis          ED Disposition Condition    Admit               Alfa Dumont Jr., MD  08/05/22 0449

## 2022-08-06 LAB
ANION GAP SERPL CALC-SCNC: 10 MMOL/L (ref 8–16)
BASOPHILS # BLD AUTO: 0.03 K/UL (ref 0–0.2)
BASOPHILS # BLD AUTO: 0.03 K/UL (ref 0–0.2)
BASOPHILS # BLD AUTO: 0.04 K/UL (ref 0–0.2)
BASOPHILS # BLD AUTO: 0.05 K/UL (ref 0–0.2)
BASOPHILS NFR BLD: 0.3 % (ref 0–1.9)
BASOPHILS NFR BLD: 0.3 % (ref 0–1.9)
BASOPHILS NFR BLD: 0.4 % (ref 0–1.9)
BASOPHILS NFR BLD: 0.4 % (ref 0–1.9)
BASOPHILS NFR BLD: 0.5 % (ref 0–1.9)
BASOPHILS NFR BLD: 0.6 % (ref 0–1.9)
BLD PROD TYP BPU: NORMAL
BLOOD UNIT EXPIRATION DATE: NORMAL
BLOOD UNIT TYPE CODE: 5100
BLOOD UNIT TYPE CODE: 6200
BLOOD UNIT TYPE CODE: 9500
BLOOD UNIT TYPE: NORMAL
BUN SERPL-MCNC: 16 MG/DL (ref 8–23)
CALCIUM SERPL-MCNC: 8.8 MG/DL (ref 8.7–10.5)
CHLORIDE SERPL-SCNC: 104 MMOL/L (ref 95–110)
CO2 SERPL-SCNC: 24 MMOL/L (ref 23–29)
CODING SYSTEM: NORMAL
CREAT SERPL-MCNC: 0.9 MG/DL (ref 0.5–1.4)
CROSSMATCH INTERPRETATION: NORMAL
CRYOPRECIPITATE UNITS GIVEN [#]: NORMAL
DIFFERENTIAL METHOD: ABNORMAL
DISPENSE STATUS: NORMAL
EOSINOPHIL # BLD AUTO: 0.2 K/UL (ref 0–0.5)
EOSINOPHIL NFR BLD: 1.6 % (ref 0–8)
EOSINOPHIL NFR BLD: 2 % (ref 0–8)
EOSINOPHIL NFR BLD: 2 % (ref 0–8)
EOSINOPHIL NFR BLD: 2.1 % (ref 0–8)
ERYTHROCYTE [DISTWIDTH] IN BLOOD BY AUTOMATED COUNT: 13.4 % (ref 11.5–14.5)
ERYTHROCYTE [DISTWIDTH] IN BLOOD BY AUTOMATED COUNT: 13.5 % (ref 11.5–14.5)
ERYTHROCYTE [DISTWIDTH] IN BLOOD BY AUTOMATED COUNT: 13.6 % (ref 11.5–14.5)
ERYTHROCYTE [DISTWIDTH] IN BLOOD BY AUTOMATED COUNT: 13.6 % (ref 11.5–14.5)
ERYTHROCYTE [DISTWIDTH] IN BLOOD BY AUTOMATED COUNT: 13.8 % (ref 11.5–14.5)
ERYTHROCYTE [DISTWIDTH] IN BLOOD BY AUTOMATED COUNT: 13.8 % (ref 11.5–14.5)
EST. GFR  (NO RACE VARIABLE): >60 ML/MIN/1.73 M^2
FIBRINOGEN PPP-MCNC: 130 MG/DL (ref 194–545)
FIBRINOGEN PPP-MCNC: 153 MG/DL (ref 194–545)
FIBRINOGEN PPP-MCNC: 172 MG/DL (ref 194–545)
FIBRINOGEN PPP-MCNC: 229 MG/DL (ref 194–545)
FIBRINOGEN PPP-MCNC: 283 MG/DL (ref 194–545)
GLUCOSE SERPL-MCNC: 89 MG/DL (ref 70–110)
HCT VFR BLD AUTO: 38.7 % (ref 40–54)
HCT VFR BLD AUTO: 39.9 % (ref 40–54)
HCT VFR BLD AUTO: 41.7 % (ref 40–54)
HCT VFR BLD AUTO: 43.1 % (ref 40–54)
HCT VFR BLD AUTO: 44.1 % (ref 40–54)
HCT VFR BLD AUTO: 46 % (ref 40–54)
HGB BLD-MCNC: 13.4 G/DL (ref 14–18)
HGB BLD-MCNC: 14 G/DL (ref 14–18)
HGB BLD-MCNC: 14.6 G/DL (ref 14–18)
HGB BLD-MCNC: 14.9 G/DL (ref 14–18)
HGB BLD-MCNC: 15.2 G/DL (ref 14–18)
HGB BLD-MCNC: 15.6 G/DL (ref 14–18)
IMM GRANULOCYTES # BLD AUTO: 0.02 K/UL (ref 0–0.04)
IMM GRANULOCYTES # BLD AUTO: 0.02 K/UL (ref 0–0.04)
IMM GRANULOCYTES # BLD AUTO: 0.03 K/UL (ref 0–0.04)
IMM GRANULOCYTES # BLD AUTO: 0.03 K/UL (ref 0–0.04)
IMM GRANULOCYTES # BLD AUTO: 0.04 K/UL (ref 0–0.04)
IMM GRANULOCYTES # BLD AUTO: 0.05 K/UL (ref 0–0.04)
IMM GRANULOCYTES NFR BLD AUTO: 0.2 % (ref 0–0.5)
IMM GRANULOCYTES NFR BLD AUTO: 0.2 % (ref 0–0.5)
IMM GRANULOCYTES NFR BLD AUTO: 0.3 % (ref 0–0.5)
IMM GRANULOCYTES NFR BLD AUTO: 0.4 % (ref 0–0.5)
IMM GRANULOCYTES NFR BLD AUTO: 0.4 % (ref 0–0.5)
IMM GRANULOCYTES NFR BLD AUTO: 0.5 % (ref 0–0.5)
LYMPHOCYTES # BLD AUTO: 1.5 K/UL (ref 1–4.8)
LYMPHOCYTES # BLD AUTO: 1.7 K/UL (ref 1–4.8)
LYMPHOCYTES # BLD AUTO: 1.7 K/UL (ref 1–4.8)
LYMPHOCYTES # BLD AUTO: 1.8 K/UL (ref 1–4.8)
LYMPHOCYTES # BLD AUTO: 2.3 K/UL (ref 1–4.8)
LYMPHOCYTES # BLD AUTO: 2.4 K/UL (ref 1–4.8)
LYMPHOCYTES NFR BLD: 15.1 % (ref 18–48)
LYMPHOCYTES NFR BLD: 16.8 % (ref 18–48)
LYMPHOCYTES NFR BLD: 19.8 % (ref 18–48)
LYMPHOCYTES NFR BLD: 20.1 % (ref 18–48)
LYMPHOCYTES NFR BLD: 26.1 % (ref 18–48)
LYMPHOCYTES NFR BLD: 26.8 % (ref 18–48)
MAGNESIUM SERPL-MCNC: 2 MG/DL (ref 1.6–2.6)
MCH RBC QN AUTO: 30.9 PG (ref 27–31)
MCH RBC QN AUTO: 31.2 PG (ref 27–31)
MCH RBC QN AUTO: 31.4 PG (ref 27–31)
MCH RBC QN AUTO: 31.4 PG (ref 27–31)
MCH RBC QN AUTO: 31.6 PG (ref 27–31)
MCH RBC QN AUTO: 31.7 PG (ref 27–31)
MCHC RBC AUTO-ENTMCNC: 33.9 G/DL (ref 32–36)
MCHC RBC AUTO-ENTMCNC: 34.5 G/DL (ref 32–36)
MCHC RBC AUTO-ENTMCNC: 34.6 G/DL (ref 32–36)
MCHC RBC AUTO-ENTMCNC: 34.6 G/DL (ref 32–36)
MCHC RBC AUTO-ENTMCNC: 35 G/DL (ref 32–36)
MCHC RBC AUTO-ENTMCNC: 35.1 G/DL (ref 32–36)
MCV RBC AUTO: 90 FL (ref 82–98)
MCV RBC AUTO: 91 FL (ref 82–98)
MCV RBC AUTO: 92 FL (ref 82–98)
MONOCYTES # BLD AUTO: 1.3 K/UL (ref 0.3–1)
MONOCYTES # BLD AUTO: 1.4 K/UL (ref 0.3–1)
MONOCYTES # BLD AUTO: 1.4 K/UL (ref 0.3–1)
MONOCYTES NFR BLD: 12.3 % (ref 4–15)
MONOCYTES NFR BLD: 14.3 % (ref 4–15)
MONOCYTES NFR BLD: 14.5 % (ref 4–15)
MONOCYTES NFR BLD: 15.5 % (ref 4–15)
MONOCYTES NFR BLD: 15.5 % (ref 4–15)
MONOCYTES NFR BLD: 15.6 % (ref 4–15)
NEUTROPHILS # BLD AUTO: 4.8 K/UL (ref 1.8–7.7)
NEUTROPHILS # BLD AUTO: 4.9 K/UL (ref 1.8–7.7)
NEUTROPHILS # BLD AUTO: 5 K/UL (ref 1.8–7.7)
NEUTROPHILS # BLD AUTO: 5.6 K/UL (ref 1.8–7.7)
NEUTROPHILS # BLD AUTO: 6.9 K/UL (ref 1.8–7.7)
NEUTROPHILS # BLD AUTO: 6.9 K/UL (ref 1.8–7.7)
NEUTROPHILS NFR BLD: 55 % (ref 38–73)
NEUTROPHILS NFR BLD: 55.5 % (ref 38–73)
NEUTROPHILS NFR BLD: 61.3 % (ref 38–73)
NEUTROPHILS NFR BLD: 63.1 % (ref 38–73)
NEUTROPHILS NFR BLD: 68.1 % (ref 38–73)
NEUTROPHILS NFR BLD: 68.2 % (ref 38–73)
NRBC BLD-RTO: 0 /100 WBC
PLATELET # BLD AUTO: 145 K/UL (ref 150–450)
PLATELET # BLD AUTO: 151 K/UL (ref 150–450)
PLATELET # BLD AUTO: 158 K/UL (ref 150–450)
PLATELET # BLD AUTO: 163 K/UL (ref 150–450)
PLATELET # BLD AUTO: 179 K/UL (ref 150–450)
PLATELET # BLD AUTO: 184 K/UL (ref 150–450)
PMV BLD AUTO: 10.1 FL (ref 9.2–12.9)
PMV BLD AUTO: 9.3 FL (ref 9.2–12.9)
PMV BLD AUTO: 9.3 FL (ref 9.2–12.9)
PMV BLD AUTO: 9.4 FL (ref 9.2–12.9)
PMV BLD AUTO: 9.5 FL (ref 9.2–12.9)
PMV BLD AUTO: 9.8 FL (ref 9.2–12.9)
POOLED CRYOPPT GVN BPU: NORMAL
POTASSIUM SERPL-SCNC: 4.2 MMOL/L (ref 3.5–5.1)
RBC # BLD AUTO: 4.27 M/UL (ref 4.6–6.2)
RBC # BLD AUTO: 4.41 M/UL (ref 4.6–6.2)
RBC # BLD AUTO: 4.65 M/UL (ref 4.6–6.2)
RBC # BLD AUTO: 4.71 M/UL (ref 4.6–6.2)
RBC # BLD AUTO: 4.87 M/UL (ref 4.6–6.2)
RBC # BLD AUTO: 5.05 M/UL (ref 4.6–6.2)
SODIUM SERPL-SCNC: 138 MMOL/L (ref 136–145)
UNIT NUMBER: NORMAL
WBC # BLD AUTO: 10.07 K/UL (ref 3.9–12.7)
WBC # BLD AUTO: 10.18 K/UL (ref 3.9–12.7)
WBC # BLD AUTO: 8.21 K/UL (ref 3.9–12.7)
WBC # BLD AUTO: 8.63 K/UL (ref 3.9–12.7)
WBC # BLD AUTO: 8.85 K/UL (ref 3.9–12.7)
WBC # BLD AUTO: 8.94 K/UL (ref 3.9–12.7)

## 2022-08-06 PROCEDURE — 99900035 HC TECH TIME PER 15 MIN (STAT)

## 2022-08-06 PROCEDURE — 85384 FIBRINOGEN ACTIVITY: CPT | Mod: 91 | Performed by: SURGERY

## 2022-08-06 PROCEDURE — C1887 CATHETER, GUIDING: HCPCS | Performed by: SURGERY

## 2022-08-06 PROCEDURE — 63600175 PHARM REV CODE 636 W HCPCS: Performed by: INTERNAL MEDICINE

## 2022-08-06 PROCEDURE — 63600175 PHARM REV CODE 636 W HCPCS: Performed by: STUDENT IN AN ORGANIZED HEALTH CARE EDUCATION/TRAINING PROGRAM

## 2022-08-06 PROCEDURE — 99900031 HC PATIENT EDUCATION (STAT)

## 2022-08-06 PROCEDURE — 94799 UNLISTED PULMONARY SVC/PX: CPT

## 2022-08-06 PROCEDURE — 83735 ASSAY OF MAGNESIUM: CPT | Performed by: STUDENT IN AN ORGANIZED HEALTH CARE EDUCATION/TRAINING PROGRAM

## 2022-08-06 PROCEDURE — 85025 COMPLETE CBC W/AUTO DIFF WBC: CPT | Performed by: INTERNAL MEDICINE

## 2022-08-06 PROCEDURE — 80048 BASIC METABOLIC PNL TOTAL CA: CPT | Performed by: INTERNAL MEDICINE

## 2022-08-06 PROCEDURE — 36430 TRANSFUSION BLD/BLD COMPNT: CPT

## 2022-08-06 PROCEDURE — 63600175 PHARM REV CODE 636 W HCPCS: Performed by: SURGERY

## 2022-08-06 PROCEDURE — 37184 PRIM ART M-THRMBC 1ST VSL: CPT | Performed by: SURGERY

## 2022-08-06 PROCEDURE — 20000000 HC ICU ROOM

## 2022-08-06 PROCEDURE — 25000242 PHARM REV CODE 250 ALT 637 W/ HCPCS: Performed by: INTERNAL MEDICINE

## 2022-08-06 PROCEDURE — 25000003 PHARM REV CODE 250: Performed by: SURGERY

## 2022-08-06 PROCEDURE — C1894 INTRO/SHEATH, NON-LASER: HCPCS | Performed by: SURGERY

## 2022-08-06 PROCEDURE — 86965 POOLING BLOOD PLATELETS: CPT | Performed by: SURGERY

## 2022-08-06 PROCEDURE — 25000003 PHARM REV CODE 250: Performed by: INTERNAL MEDICINE

## 2022-08-06 PROCEDURE — P9012 CRYOPRECIPITATE EACH UNIT: HCPCS | Performed by: SURGERY

## 2022-08-06 PROCEDURE — 36247 INS CATH ABD/L-EXT ART 3RD: CPT | Mod: LT | Performed by: SURGERY

## 2022-08-06 PROCEDURE — 94640 AIRWAY INHALATION TREATMENT: CPT

## 2022-08-06 PROCEDURE — C1769 GUIDE WIRE: HCPCS | Performed by: SURGERY

## 2022-08-06 PROCEDURE — 99152 MOD SED SAME PHYS/QHP 5/>YRS: CPT | Performed by: SURGERY

## 2022-08-06 PROCEDURE — 85384 FIBRINOGEN ACTIVITY: CPT | Mod: 91 | Performed by: STUDENT IN AN ORGANIZED HEALTH CARE EDUCATION/TRAINING PROGRAM

## 2022-08-06 PROCEDURE — 63600175 PHARM REV CODE 636 W HCPCS: Mod: JG | Performed by: SURGERY

## 2022-08-06 PROCEDURE — 37213 THROMBLYTIC ART/VEN THERAPY: CPT | Performed by: SURGERY

## 2022-08-06 PROCEDURE — P9012 CRYOPRECIPITATE EACH UNIT: HCPCS | Performed by: STUDENT IN AN ORGANIZED HEALTH CARE EDUCATION/TRAINING PROGRAM

## 2022-08-06 PROCEDURE — C1757 CATH, THROMBECTOMY/EMBOLECT: HCPCS | Performed by: SURGERY

## 2022-08-06 PROCEDURE — 94761 N-INVAS EAR/PLS OXIMETRY MLT: CPT

## 2022-08-06 PROCEDURE — 99153 MOD SED SAME PHYS/QHP EA: CPT | Performed by: SURGERY

## 2022-08-06 PROCEDURE — 85384 FIBRINOGEN ACTIVITY: CPT | Performed by: SURGERY

## 2022-08-06 PROCEDURE — 85025 COMPLETE CBC W/AUTO DIFF WBC: CPT | Mod: 91 | Performed by: STUDENT IN AN ORGANIZED HEALTH CARE EDUCATION/TRAINING PROGRAM

## 2022-08-06 PROCEDURE — 27000221 HC OXYGEN, UP TO 24 HOURS

## 2022-08-06 RX ORDER — NITROGLYCERIN 5 MG/ML
INJECTION, SOLUTION INTRAVENOUS
Status: DISCONTINUED | OUTPATIENT
Start: 2022-08-06 | End: 2022-08-06 | Stop reason: HOSPADM

## 2022-08-06 RX ORDER — FENTANYL CITRATE 50 UG/ML
INJECTION, SOLUTION INTRAMUSCULAR; INTRAVENOUS
Status: DISCONTINUED | OUTPATIENT
Start: 2022-08-06 | End: 2022-08-06 | Stop reason: HOSPADM

## 2022-08-06 RX ORDER — HYDROCODONE BITARTRATE AND ACETAMINOPHEN 500; 5 MG/1; MG/1
TABLET ORAL
Status: DISCONTINUED | OUTPATIENT
Start: 2022-08-06 | End: 2022-08-08 | Stop reason: HOSPADM

## 2022-08-06 RX ORDER — SODIUM CHLORIDE 9 MG/ML
INJECTION, SOLUTION INTRAVENOUS CONTINUOUS
Status: CANCELLED | OUTPATIENT
Start: 2022-08-06

## 2022-08-06 RX ORDER — HEPARIN SODIUM 10000 [USP'U]/100ML
500 INJECTION, SOLUTION INTRAVENOUS CONTINUOUS
Status: CANCELLED | OUTPATIENT
Start: 2022-08-06

## 2022-08-06 RX ORDER — HYDROCODONE BITARTRATE AND ACETAMINOPHEN 500; 5 MG/1; MG/1
TABLET ORAL
Status: DISCONTINUED | OUTPATIENT
Start: 2022-08-06 | End: 2022-08-08 | Stop reason: SDUPTHER

## 2022-08-06 RX ORDER — MORPHINE SULFATE 2 MG/ML
INJECTION, SOLUTION INTRAMUSCULAR; INTRAVENOUS
Status: DISPENSED
Start: 2022-08-06 | End: 2022-08-06

## 2022-08-06 RX ORDER — MORPHINE SULFATE 4 MG/ML
4 INJECTION, SOLUTION INTRAMUSCULAR; INTRAVENOUS
Status: DISCONTINUED | OUTPATIENT
Start: 2022-08-06 | End: 2022-08-08 | Stop reason: HOSPADM

## 2022-08-06 RX ORDER — LIDOCAINE HYDROCHLORIDE 10 MG/ML
INJECTION, SOLUTION EPIDURAL; INFILTRATION; INTRACAUDAL; PERINEURAL
Status: DISCONTINUED | OUTPATIENT
Start: 2022-08-06 | End: 2022-08-06 | Stop reason: HOSPADM

## 2022-08-06 RX ORDER — MORPHINE SULFATE 4 MG/ML
4 INJECTION, SOLUTION INTRAMUSCULAR; INTRAVENOUS ONCE
Status: COMPLETED | OUTPATIENT
Start: 2022-08-06 | End: 2022-08-06

## 2022-08-06 RX ORDER — MIDAZOLAM HYDROCHLORIDE 1 MG/ML
INJECTION, SOLUTION INTRAMUSCULAR; INTRAVENOUS
Status: DISCONTINUED | OUTPATIENT
Start: 2022-08-06 | End: 2022-08-06 | Stop reason: HOSPADM

## 2022-08-06 RX ORDER — MIDAZOLAM HYDROCHLORIDE 1 MG/ML
INJECTION INTRAMUSCULAR; INTRAVENOUS
Status: DISCONTINUED | OUTPATIENT
Start: 2022-08-06 | End: 2022-08-06 | Stop reason: HOSPADM

## 2022-08-06 RX ADMIN — AZELASTINE HYDROCHLORIDE 137 MCG: 137 SPRAY, METERED NASAL at 09:08

## 2022-08-06 RX ADMIN — CARVEDILOL 12.5 MG: 12.5 TABLET, FILM COATED ORAL at 09:08

## 2022-08-06 RX ADMIN — MORPHINE SULFATE 4 MG: 4 INJECTION, SOLUTION INTRAMUSCULAR; INTRAVENOUS at 09:08

## 2022-08-06 RX ADMIN — LORAZEPAM 2 MG: 2 INJECTION, SOLUTION INTRAMUSCULAR; INTRAVENOUS at 06:08

## 2022-08-06 RX ADMIN — LORAZEPAM 2 MG: 2 INJECTION, SOLUTION INTRAMUSCULAR; INTRAVENOUS at 09:08

## 2022-08-06 RX ADMIN — HEPARIN SODIUM 500 UNITS/HR: 10000 INJECTION, SOLUTION INTRAVENOUS at 02:08

## 2022-08-06 RX ADMIN — FLUTICASONE FUROATE AND VILANTEROL TRIFENATATE 1 PUFF: 200; 25 POWDER RESPIRATORY (INHALATION) at 10:08

## 2022-08-06 RX ADMIN — MORPHINE SULFATE 4 MG: 4 INJECTION, SOLUTION INTRAMUSCULAR; INTRAVENOUS at 10:08

## 2022-08-06 RX ADMIN — MORPHINE SULFATE 4 MG: 4 INJECTION, SOLUTION INTRAMUSCULAR; INTRAVENOUS at 03:08

## 2022-08-06 RX ADMIN — LOSARTAN POTASSIUM 25 MG: 25 TABLET, FILM COATED ORAL at 09:08

## 2022-08-06 NOTE — PT/OT/SLP PROGRESS
Occupational Therapy      Patient Name:  Helder Brand   MRN:  [id number]    Patient not seen today secondary to Other (Comment) (Procedure). Will follow-up next service date.    8/6/2022

## 2022-08-06 NOTE — PT/OT/SLP PROGRESS
Physical Therapy      Patient Name:  Helder Brand   MRN:  [id number]    Patient not seen today secondary to Bedrest. Just had surgery today. Will await for post Op order to resume PT.

## 2022-08-06 NOTE — OP NOTE
[facility]  Surgery Department  Operative Note    SUMMARY     Date of Procedure: 8/6/2022     Procedure: Procedure(s) (LRB):  Angiogram Extremity Unilateral (Left)  THROMBECTOMY (Left)  INJECTION, TISSUE PLASMINOGEN ACTIVATOR (Left)     Surgeon(s) and Role:     * Ismael Esquivel MD - Primary    Assisting Surgeon: None    Pre-Operative Diagnosis: Femoral artery thrombosis [I74.3]    Post-Operative Diagnosis: Post-Op Diagnosis Codes:     * Femoral artery thrombosis [I74.3]    Anesthesia: RN IV Sedation    Operative Findings (including complications, if any):  Resolution of popliteal graft thrombosis with persistent anterior tibial posterior tibial and peroneal thrombus    Description of Technical Procedures:  Left lower extremity angiogram via existing catheter.  Angiojet thrombectomy of anterior tib and posterior tibial arteries with pulse spray tPA.  Placement of infusion catheter into the anterior tibial artery.    Angiogram through the left femoral sheath demonstrated resolution of the popliteal vein graft thrombus the distal popliteal is patent the trifurcation vessels are patent at their origin with some collateralization but distally there occluded we were able to pass a wire to the posterior tib and anterior tibial arteries all the way down to the foot and identify reconstitution distally we then used a 4 mm Angiojet catheter to pulse spray about a mg and a half into the posterior tib and anterior tibial arteries and then Angiojet thrombectomy was performed both vessels with some residual thrombus remaining this point a chose the anterior tibial artery advanced a 30 cm infusion length catheter into the vessel, the vessels are diffusely enlarged and appear to be able to tolerate a infusion catheter this diameter.  We then resumed half a mg an hour of tPA.    Significant Surgical Tasks Conducted by the Assistant(s), if Applicable:     Estimated Blood Loss (EBL): * No values recorded between  8/6/2022 12:00 AM and 8/6/2022  5:52 PM *           Implants: * No implants in log *    Specimens:   Specimen (24h ago, onward)            None                  Condition: Good    Disposition: ICU - extubated and stable.    Attestation: I was present and scrubbed for the entire procedure.

## 2022-08-06 NOTE — PROGRESS NOTES
"[facility]  Adult Nutrition   Progress Note (Initial Assessment)     SUMMARY     Recommendations  1.) Continue cardiac diet restrictions.   2.) If PO intakes <50% of meals, add Ensure Enlive BID.   3.) Monitor weights.    Goals: 1.) pt to consume/tolerate >75% of meals within 4 days.  Nutrition Goal Status: new    Dietitian Rounds Brief  Pt presents to [facility] with worsening left lower extremity pain with cold foot. S/p thrombectomy this morning. Diet advanced, however pt still groggy from sedation. PTA, good PO intakes. No GI distress. LBM . Skin: surgical incision. Wt hx per chart review; UBW 88.1kg (3/2022); admit wt 85.7kg reflecting wt maintenance. NFPE not completed d/t pt request. No malnutrition at this time.    Diet order:   Current Diet Order: cardiac        Evaluation of Received Nutrient/Fluid Intake  Energy Calories Required: not meeting needs  Protein Required: not meeting needs  Fluid Required: meeting needs  Tolerance: tolerating     % Intake of Estimated Energy Needs: 50 - 75 %  % Meal Intake: 25 - 50 %      Intake/Output Summary (Last 24 hours) at 2022 1357  Last data filed at 2022 1354  Gross per 24 hour   Intake 1051.85 ml   Output 1050 ml   Net 1.85 ml        Anthropometrics  Temp: 98 °F (36.7 °C)  Height Method: Stated  Height: 6' 1" (185.4 cm)  Height (inches): 73 in  Weight Method: Bed Scale  Weight: 85.7 kg (188 lb 15 oz)  Weight (lb): 188.94 lb  Ideal Body Weight (IBW), Male: 184 lb  % Ideal Body Weight, Male (lb): 102.68 %  BMI (Calculated): 24.9  BMI Grade: 18.5-24.9 - normal  Usual Body Weight (UBW), k.1 kg (3/2022)  % Usual Body Weight: 97.48  % Weight Change From Usual Weight: -2.73 %       Estimated/Assessed Needs  Weight Used For Calorie Calculations: 85.7 kg (188 lb 15 oz)  Energy Calorie Requirements (kcal): 7969-9203  Energy Need Method: Kcal/kg (25-30)  Protein Requirements:   Weight Used For Protein Calculations: 85.7 kg (188 lb 15 oz) " (1.0-1.2)  Fluid Requirements (mL): 3833-6137  Estimated Fluid Requirement Method: RDA Method  RDA Method (mL): 2143       Reason for Assessment  Reason For Assessment: identified at risk by screening criteria (ICU admit)  Diagnosis: other (see comments) (thrombus)  Relevant Medical History: HTN, HLD  Interdisciplinary Rounds: did not attend  Nutrition Discharge Planning: Pt to follow low Na diet.    Nutrition/Diet History  Spiritual, Cultural Beliefs, Baptism Practices, Values that Affect Care: no  Food Allergies: [facility]    Nutrition Risk Screen  Nutrition Risk Screen: no indicators present     MST Score: 0  Have you recently lost weight without trying?: No  Weight loss score: 0  Have you been eating poorly because of a decreased appetite?: No  Appetite score: 0       Weight History:  Wt Readings from Last 5 Encounters:   08/06/22 85.7 kg (188 lb 15 oz)   07/22/22 83.9 kg (185 lb)   06/01/22 85.2 kg (187 lb 11.6 oz)   03/09/22 88.1 kg (194 lb 3.6 oz)   01/03/22 87 kg (191 lb 12.8 oz)        Lab/Procedures/Meds: Pertinent Labs/Meds Reviewed    Medications:Pertinent Medications Reviewed  Scheduled Meds:   azelastine  1 spray Nasal BID    carvediloL  12.5 mg Oral BID    fluticasone furoate-vilanteroL  1 puff Inhalation Daily    losartan  25 mg Oral Daily    morphine         Continuous Infusions:   alteplase 50 mg (08/05/22 1126)    heparin (porcine) in 5 % dex 500 Units/hr (08/06/22 0243)    heparin (porcine) in D5W Stopped (08/05/22 1030)    custom IV infusion builder (for pharmacist use only) Stopped (08/06/22 0822)     PRN Meds:.sodium chloride, sodium chloride, acetaminophen, albuterol-ipratropium, alteplase, fluticasone propionate, heparin (PORCINE), heparin (PORCINE), heparin (porcine) in 5 % dex, hydrALAZINE, lorazepam, melatonin, morphine, morphine, ondansetron    Labs: Pertinent Labs Reviewed  Clinical Chemistry:  Recent Labs   Lab 08/05/22  0053 08/06/22  0334   * 138   K 3.8 4.2    104    CO2 25 24   GLU 82 89   BUN 18 16   CREATININE 0.9 0.9   CALCIUM 8.7 8.8   PROT 7.2  --    ALBUMIN 3.9  --    BILITOT 0.8  --    ALKPHOS 70  --    AST 20  --    ALT 12  --    ANIONGAP 8 10   MG  --  2.0     CBC:   Recent Labs   Lab 08/06/22  0917   WBC 8.21   RBC 4.71   HGB 14.9   HCT 43.1      MCV 92   MCH 31.6*   MCHC 34.6     Cardiac Profile:  Recent Labs   Lab 08/05/22  0053        Thyroid & Parathyroid:  Recent Labs   Lab 08/05/22  0958   TSH 1.840       Monitor and Evaluation  Food and Nutrient Intake: energy intake, food and beverage intake  Food and Nutrient Adminstration: diet order  Knowledge/Beliefs/Attitudes: food and nutrition knowledge/skill  Physical Activity and Function: nutrition-related ADLs and IADLs  Anthropometric Measurements: weight, weight change  Biochemical Data, Medical Tests and Procedures: electrolyte and renal panel, gastrointestinal profile, glucose/endocrine profile  Nutrition-Focused Physical Findings: overall appearance     Nutrition Risk  Level of Risk/Frequency of Follow-up: moderate     Nutrition Follow-Up  RD Follow-up?: Yes      Kelly Esquivel RD 08/06/2022 1:57 PM

## 2022-08-06 NOTE — RESPIRATORY THERAPY
08/05/22 2022   Patient Assessment/Suction   Level of Consciousness (AVPU) alert   Respiratory Effort Normal;Unlabored   Expansion/Accessory Muscles/Retractions no use of accessory muscles   All Lung Fields Breath Sounds clear   Rhythm/Pattern, Respiratory unlabored;pattern regular;depth regular   PRE-TX-O2   O2 Device (Oxygen Therapy) room air   SpO2 (!) 92 %   Pulse Oximetry Type Continuous   $ Pulse Oximetry - Multiple Charge Pulse Oximetry - Multiple   Pulse 74   Aerosol Therapy   $ Aerosol Therapy Charges PRN treatment not required   Education   $ Education Bronchodilator;15 min   Respiratory Evaluation   $ Care Plan Tech Time 15 min

## 2022-08-06 NOTE — OP NOTE
[facility]  Surgery Department  Operative Note    SUMMARY     Date of Procedure: 8/6/2022     Procedure: Procedure(s) (LRB):  Angioplasty-peripheral (Left)  THROMBECTOMY     Surgeon(s) and Role:     * Ismael Esquivel MD - Primary    Assisting Surgeon: None    Pre-Operative Diagnosis: Lower extremity pain [M79.606]    Post-Operative Diagnosis: Post-Op Diagnosis Codes:     * Lower extremity pain [M79.606]    Anesthesia: Choice    Operative Findings (including complications, if any):  Left popliteal interposition graft thrombosis.  Relook after tPA infusion overnight    Description of Technical Procedures:  Left lower extremity angiogram via existing catheter.  Angiojet thrombectomy.  Exchange of infusion catheter for a 20 cm infusion length catheter and re-initiation of popliteal artery tPA infusion    Patient brought back to the cath lab after tPA infusion overnight.  Injection through the existing catheter demonstrated patent superficial femoral artery down to the popliteal artery interposition graft.  The superficial femoral vein interposition graft has thrombus within the distal graft.  The majority of the proximal superficial femoral artery is without thrombus.  The tibial vessels are better visualized today with the peroneal being the main vessel visualized.  There is what appears to be the origin the anterior tibial visible.  The catheter was exchanged out over a wire and a 6 Mohawk up Round O sheath was placed and Angiojet thrombectomy was performed with a 6 [place]awk device.  This debulked a large amount of thrombus that was remaining in the popliteal graft.  We then exchanged out the catheter for a 20 cm long infusion catheter to isolate the tPA into the residual thrombus and tPA was resumed at half a mg an hour.    Significant Surgical Tasks Conducted by the Assistant(s), if Applicable:     Estimated Blood Loss (EBL): * No values recorded between 8/6/2022  8:05 AM and 8/6/2022  8:42 AM *            Implants: * No implants in log *    Specimens:   Specimen (24h ago, onward)            None                  Condition: Good    Disposition: ICU - extubated and stable.    Attestation: I was present and scrubbed for the entire procedure.

## 2022-08-06 NOTE — INTERVAL H&P NOTE
The patient has been examined and the H&P has been reviewed:    I concur with the findings and no changes have occurred since H&P was written.    Surgery risks, benefits and alternative options discussed and understood by patient/family.          Active Hospital Problems    Diagnosis  POA    *Femoral popliteal artery thrombus [I74.3]  Yes      Resolved Hospital Problems   No resolved problems to display.

## 2022-08-06 NOTE — INTERVAL H&P NOTE
The patient has been examined and the H&P has been reviewed:    I concur with the findings and no changes have occurred since H&P was written.    Surgery risks, benefits and alternative options discussed and understood by patient/family.          Active Hospital Problems    Diagnosis  POA    *Femoral popliteal artery thrombus [I74.3]  Yes    HTN (hypertension) [I10]  Yes      Resolved Hospital Problems   No resolved problems to display.

## 2022-08-06 NOTE — PLAN OF CARE
Problem: Skin Injury Risk Increased  Goal: Skin Health and Integrity  Intervention: Promote and Optimize Oral Intake  Flowsheets (Taken 8/6/2022 1351)  Oral Nutrition Promotion: calorie-dense foods provided     Problem: Oral Intake Inadequate  Goal: Improved Oral Intake  Outcome: Ongoing, Progressing  Intervention: Promote and Optimize Oral Intake  Flowsheets (Taken 8/6/2022 1359)  Oral Nutrition Promotion: calorie-dense foods provided

## 2022-08-06 NOTE — CARE UPDATE
Continue breo   08/06/22 1044   Patient Assessment/Suction   Level of Consciousness (AVPU) alert   Respiratory Effort Normal   Expansion/Accessory Muscles/Retractions no use of accessory muscles;expansion symmetric   All Lung Fields Breath Sounds clear   Rhythm/Pattern, Respiratory unlabored;depth regular   PRE-TX-O2   SpO2 (!) 94 %   Pulse 67   Resp 16   Inhaler   $ Inhaler Charges MDI (Metered Dose Inahler) Treatment;Mouth rinsed post treatment   Daily Review of Necessity (Inhaler) completed   Respiratory Treatment Status (Inhaler) given   Treatment Route (Inhaler) mouthpiece   Patient Position (Inhaler) semi-Coy's   Post Treatment Assessment (Inhaler) breath sounds unchanged   Signs of Intolerance (Inhaler) none   Education   $ Education Bronchodilator;15 min   Respiratory Evaluation   $ Care Plan Tech Time 15 min   $ Eval/Re-eval Charges Re-evaluation

## 2022-08-06 NOTE — PROGRESS NOTES
[facility] Medicine    Progress Note    Patient Name: Helder Brand  MRN: [id number]  Patient Class: IP- Inpatient   Admission Date: 8/4/2022 11:53 PM  Length of Stay: 1  Attending Physician: Nadiya Montes MD  Primary Care Provider: Karlos Woodruff MD  Face-to-Face encounter date: 08/06/2022  Code status:  Chief Complaint: Leg Pain (Left leg pain in calf and inner thigh x 2 days but has worsened since 2300 tonight.)        Subjective:    HPI:62 year old male with history of COPD, Popliteal Aneurysm/Repair (left), Hypothyroidism, HTN and has had 2/3 COVID vaccinations presented to ED complaining of 2 day history of worsening left lower extremity pain with cold foot. No LOP. Had left popliteal aneurysm repair July 2021. No CP/SOB.   In ED: US Artery revealed thrombosis mid superficial femoral artery downward. Labs reviewed and noted below: normal CBC, trivial hyponatremia otherwise normal CMP. CTA lower extremity ordered and pending. Discussed with ED MD: Vascular is aware and will see patient later today. Full dose heparinization started. NPO    Interval History:   8/6: Patient had initiation of thrombolysis done yesterday and is going back to the OR for thrombectomy today.  Patient is doing well. No concerns/issues overnight reported by the patient or the nursing staff.    Review of Systems All other Review of Systems were found to be negative expect for that mentioned already in HPI.     Objective:     Vitals:    08/06/22 0923 08/06/22 1031 08/06/22 1044 08/06/22 1101   BP:    (!) 164/104   Pulse:   67 72   Resp: (!) 22 (!) 22 16 19   Temp:    98 °F (36.7 °C)   TempSrc:       SpO2:   (!) 94% (!) 93%   Weight:       Height:            Vitals reviewed.  Constitutional: No distress.   HENT: NC  Head: Atraumatic.   Cardiovascular: Normal rate, regular rhythm and normal heart sounds.   Pulmonary/Chest: Effort normal. No wheezes.   Abdominal: Soft. Bowel sounds are normal. No distension and  no mass. No tenderness  Neurological: Alert.   Skin: Skin is warm and dry.   Psych: Appropriate mood and affect    Following labs were Reviewed   CBC:  Recent Labs   Lab 08/06/22  0917   WBC 8.21   HGB 14.9   HCT 43.1        CMP:  Recent Labs   Lab 08/06/22  0334   CALCIUM 8.8      K 4.2   CO2 24      BUN 16   CREATININE 0.9       Micro Results  Microbiology Results (last 7 days)     ** No results found for the last 168 hours. **           Radiology Reports  US Lower Extremity Arteries Left  Result Date: 8/5/2022  IMPRESSION: Extensive left lower extremity arterial thrombus from mid superficial femoral artery downward. Electronically signed by:  Piotr Cole MD  8/5/2022 2:54 AM CDT Workstation: [id number]    US Lower Extremity Veins Left  Result Date: 8/5/2022  IMPRESSION: 1.  No evidence of deep venous thrombosis. 2.  Large heterogeneous lesion in the left popliteal fossa measuring 7.1 x 3.3 x 3.9 cm of indeterminate etiology.  Recommend further evaluation with cross-sectional imaging. Electronically signed by:  Jordyn Shah MD  8/5/2022 3:18 AM CDT Workstation: [id number]    CTA Lower Extremity Bilateral  Result Date: 8/5/2022   IMPRESSION: 1.  Complete occlusion from the left proximal superficial femoral artery into all 3 infrapopliteal arteries. 2.  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 3.  Left popliteal/Baker's cyst. Electronically signed by:  Alvarado Tejada MD  8/5/2022 6:00 AM CDT Workstation: [id number]    Cardiac catheterization  Result Date: 8/6/2022  Procedure performed in the Invasive Lab  - See Procedure Log link below for nursing documentation  - See OpNote on Surgeries Tab for physician findings  - See Imaging Tab for radiologist dictation       Meds  Scheduled Meds:   azelastine  1 spray Nasal BID    carvediloL  12.5 mg Oral BID    fluticasone furoate-vilanteroL  1 puff Inhalation Daily    losartan  25 mg Oral Daily    morphine          Continuous Infusions:   alteplase 50 mg (08/05/22 1126)    heparin (porcine) in 5 % dex 500 Units/hr (08/06/22 0243)    heparin (porcine) in D5W Stopped (08/05/22 1030)    custom IV infusion builder (for pharmacist use only) Stopped (08/06/22 0822)     PRN Meds:.sodium chloride, acetaminophen, albuterol-ipratropium, alteplase, fluticasone propionate, heparin (PORCINE), heparin (PORCINE), heparin (porcine) in 5 % dex, hydrALAZINE, lorazepam, melatonin, morphine, morphine, ondansetron.    Assessment & Plan:     Active Hospital Problems    Diagnosis    *Femoral popliteal artery thrombus  Vascular surgeon on board  For thrombectomy today      HTN (hypertension)  Controlled  Continue antihypertensive medication           Discharge Planning:   Is the patient medically ready for discharge?: no    Reason for patient still in hospital (select all that apply): Patient trending condition and Treatment    Above encounter included review of the medical records, interviewing and examining the patient face-to-face, discussion with family and other health care providers, ordering and interpreting lab/test results and formulating a plan of care.     Medical Decision Making:      [_] Low Complexity  [_] Moderate Complexity  [x] High Complexity      Nadiya Montes MD  Department of Hospital Medicine   [facility]

## 2022-08-07 LAB
ANION GAP SERPL CALC-SCNC: 8 MMOL/L (ref 8–16)
BASOPHILS # BLD AUTO: 0.05 K/UL (ref 0–0.2)
BASOPHILS NFR BLD: 0.6 % (ref 0–1.9)
BUN SERPL-MCNC: 12 MG/DL (ref 8–23)
CALCIUM SERPL-MCNC: 8.2 MG/DL (ref 8.7–10.5)
CHLORIDE SERPL-SCNC: 104 MMOL/L (ref 95–110)
CO2 SERPL-SCNC: 22 MMOL/L (ref 23–29)
CREAT SERPL-MCNC: 0.7 MG/DL (ref 0.5–1.4)
DIFFERENTIAL METHOD: ABNORMAL
EOSINOPHIL # BLD AUTO: 0.3 K/UL (ref 0–0.5)
EOSINOPHIL NFR BLD: 3.3 % (ref 0–8)
ERYTHROCYTE [DISTWIDTH] IN BLOOD BY AUTOMATED COUNT: 13.4 % (ref 11.5–14.5)
EST. GFR  (NO RACE VARIABLE): >60 ML/MIN/1.73 M^2
GLUCOSE SERPL-MCNC: 110 MG/DL (ref 70–110)
GLUCOSE SERPL-MCNC: 88 MG/DL (ref 70–110)
HCT VFR BLD AUTO: 38.3 % (ref 40–54)
HGB BLD-MCNC: 13.6 G/DL (ref 14–18)
IMM GRANULOCYTES # BLD AUTO: 0.03 K/UL (ref 0–0.04)
IMM GRANULOCYTES NFR BLD AUTO: 0.3 % (ref 0–0.5)
LYMPHOCYTES # BLD AUTO: 2.1 K/UL (ref 1–4.8)
LYMPHOCYTES NFR BLD: 23.8 % (ref 18–48)
MAGNESIUM SERPL-MCNC: 1.7 MG/DL (ref 1.6–2.6)
MCH RBC QN AUTO: 31.8 PG (ref 27–31)
MCHC RBC AUTO-ENTMCNC: 35.5 G/DL (ref 32–36)
MCV RBC AUTO: 90 FL (ref 82–98)
MONOCYTES # BLD AUTO: 1.3 K/UL (ref 0.3–1)
MONOCYTES NFR BLD: 15 % (ref 4–15)
NEUTROPHILS # BLD AUTO: 5 K/UL (ref 1.8–7.7)
NEUTROPHILS NFR BLD: 57 % (ref 38–73)
NRBC BLD-RTO: 0 /100 WBC
PLATELET # BLD AUTO: 141 K/UL (ref 150–450)
PMV BLD AUTO: 9.5 FL (ref 9.2–12.9)
POTASSIUM SERPL-SCNC: 3.5 MMOL/L (ref 3.5–5.1)
RBC # BLD AUTO: 4.28 M/UL (ref 4.6–6.2)
SODIUM SERPL-SCNC: 134 MMOL/L (ref 136–145)
WBC # BLD AUTO: 8.84 K/UL (ref 3.9–12.7)

## 2022-08-07 PROCEDURE — 81291 MTHFR GENE: CPT | Performed by: INTERNAL MEDICINE

## 2022-08-07 PROCEDURE — 94799 UNLISTED PULMONARY SVC/PX: CPT

## 2022-08-07 PROCEDURE — 83090 ASSAY OF HOMOCYSTEINE: CPT | Performed by: INTERNAL MEDICINE

## 2022-08-07 PROCEDURE — 85025 COMPLETE CBC W/AUTO DIFF WBC: CPT | Performed by: INTERNAL MEDICINE

## 2022-08-07 PROCEDURE — 85306 CLOT INHIBIT PROT S FREE: CPT | Performed by: INTERNAL MEDICINE

## 2022-08-07 PROCEDURE — 36415 COLL VENOUS BLD VENIPUNCTURE: CPT | Performed by: INTERNAL MEDICINE

## 2022-08-07 PROCEDURE — 27201423 OPTIME MED/SURG SUP & DEVICES STERILE SUPPLY: Performed by: SURGERY

## 2022-08-07 PROCEDURE — 85303 CLOT INHIBIT PROT C ACTIVITY: CPT | Performed by: INTERNAL MEDICINE

## 2022-08-07 PROCEDURE — 81241 F5 GENE: CPT | Performed by: INTERNAL MEDICINE

## 2022-08-07 PROCEDURE — C1887 CATHETER, GUIDING: HCPCS | Performed by: SURGERY

## 2022-08-07 PROCEDURE — 94761 N-INVAS EAR/PLS OXIMETRY MLT: CPT

## 2022-08-07 PROCEDURE — 85305 CLOT INHIBIT PROT S TOTAL: CPT | Performed by: INTERNAL MEDICINE

## 2022-08-07 PROCEDURE — 37214 CESSJ THERAPY CATH REMOVAL: CPT | Performed by: SURGERY

## 2022-08-07 PROCEDURE — 80048 BASIC METABOLIC PNL TOTAL CA: CPT | Performed by: STUDENT IN AN ORGANIZED HEALTH CARE EDUCATION/TRAINING PROGRAM

## 2022-08-07 PROCEDURE — 86146 BETA-2 GLYCOPROTEIN ANTIBODY: CPT | Performed by: INTERNAL MEDICINE

## 2022-08-07 PROCEDURE — 85260 CLOT FACTOR X STUART-POWER: CPT | Performed by: INTERNAL MEDICINE

## 2022-08-07 PROCEDURE — 99153 MOD SED SAME PHYS/QHP EA: CPT | Performed by: SURGERY

## 2022-08-07 PROCEDURE — 20000000 HC ICU ROOM

## 2022-08-07 PROCEDURE — 86147 CARDIOLIPIN ANTIBODY EA IG: CPT | Mod: 59 | Performed by: INTERNAL MEDICINE

## 2022-08-07 PROCEDURE — 99900031 HC PATIENT EDUCATION (STAT)

## 2022-08-07 PROCEDURE — 81240 F2 GENE: CPT | Performed by: INTERNAL MEDICINE

## 2022-08-07 PROCEDURE — 85280 CLOT FACTOR XII HAGEMAN: CPT | Performed by: INTERNAL MEDICINE

## 2022-08-07 PROCEDURE — 85598 HEXAGNAL PHOSPH PLTLT NEUTRL: CPT | Mod: 91 | Performed by: INTERNAL MEDICINE

## 2022-08-07 PROCEDURE — 83735 ASSAY OF MAGNESIUM: CPT | Performed by: STUDENT IN AN ORGANIZED HEALTH CARE EDUCATION/TRAINING PROGRAM

## 2022-08-07 PROCEDURE — 85300 ANTITHROMBIN III ACTIVITY: CPT | Performed by: INTERNAL MEDICINE

## 2022-08-07 PROCEDURE — 85230 CLOT FACTOR VII PROCONVERTIN: CPT | Performed by: INTERNAL MEDICINE

## 2022-08-07 PROCEDURE — 63600175 PHARM REV CODE 636 W HCPCS: Performed by: SURGERY

## 2022-08-07 PROCEDURE — 36247 INS CATH ABD/L-EXT ART 3RD: CPT | Mod: LT | Performed by: SURGERY

## 2022-08-07 PROCEDURE — 85302 CLOT INHIBIT PROT C ANTIGEN: CPT | Performed by: INTERNAL MEDICINE

## 2022-08-07 PROCEDURE — 99900035 HC TECH TIME PER 15 MIN (STAT)

## 2022-08-07 PROCEDURE — C1769 GUIDE WIRE: HCPCS | Performed by: SURGERY

## 2022-08-07 PROCEDURE — 85250 CLOT FACTOR IX PTC/CHRSTMAS: CPT | Performed by: INTERNAL MEDICINE

## 2022-08-07 PROCEDURE — 25000003 PHARM REV CODE 250: Performed by: SURGERY

## 2022-08-07 PROCEDURE — 85613 RUSSELL VIPER VENOM DILUTED: CPT | Mod: 91 | Performed by: INTERNAL MEDICINE

## 2022-08-07 PROCEDURE — 63600175 PHARM REV CODE 636 W HCPCS: Performed by: STUDENT IN AN ORGANIZED HEALTH CARE EDUCATION/TRAINING PROGRAM

## 2022-08-07 PROCEDURE — 25000003 PHARM REV CODE 250: Performed by: INTERNAL MEDICINE

## 2022-08-07 PROCEDURE — 86147 CARDIOLIPIN ANTIBODY EA IG: CPT | Performed by: INTERNAL MEDICINE

## 2022-08-07 PROCEDURE — 85732 THROMBOPLASTIN TIME PARTIAL: CPT | Mod: 91 | Performed by: INTERNAL MEDICINE

## 2022-08-07 PROCEDURE — C1757 CATH, THROMBECTOMY/EMBOLECT: HCPCS | Performed by: SURGERY

## 2022-08-07 PROCEDURE — 99152 MOD SED SAME PHYS/QHP 5/>YRS: CPT | Performed by: SURGERY

## 2022-08-07 RX ORDER — ONDANSETRON 4 MG/1
8 TABLET, ORALLY DISINTEGRATING ORAL EVERY 8 HOURS PRN
Status: DISCONTINUED | OUTPATIENT
Start: 2022-08-07 | End: 2022-08-08 | Stop reason: HOSPADM

## 2022-08-07 RX ORDER — MAGNESIUM SULFATE HEPTAHYDRATE 40 MG/ML
2 INJECTION, SOLUTION INTRAVENOUS ONCE
Status: COMPLETED | OUTPATIENT
Start: 2022-08-07 | End: 2022-08-07

## 2022-08-07 RX ORDER — ACETAMINOPHEN 325 MG/1
650 TABLET ORAL EVERY 4 HOURS PRN
Status: DISCONTINUED | OUTPATIENT
Start: 2022-08-07 | End: 2022-08-08 | Stop reason: HOSPADM

## 2022-08-07 RX ORDER — SODIUM CHLORIDE 9 MG/ML
INJECTION, SOLUTION INTRAVENOUS CONTINUOUS
Status: DISCONTINUED | OUTPATIENT
Start: 2022-08-07 | End: 2022-08-08 | Stop reason: HOSPADM

## 2022-08-07 RX ORDER — MIDAZOLAM HYDROCHLORIDE 1 MG/ML
INJECTION INTRAMUSCULAR; INTRAVENOUS
Status: DISCONTINUED | OUTPATIENT
Start: 2022-08-07 | End: 2022-08-07 | Stop reason: HOSPADM

## 2022-08-07 RX ORDER — FENTANYL CITRATE 50 UG/ML
INJECTION, SOLUTION INTRAMUSCULAR; INTRAVENOUS
Status: DISCONTINUED | OUTPATIENT
Start: 2022-08-07 | End: 2022-08-07 | Stop reason: HOSPADM

## 2022-08-07 RX ADMIN — POTASSIUM BICARBONATE 25 MEQ: 977.5 TABLET, EFFERVESCENT ORAL at 07:08

## 2022-08-07 RX ADMIN — AZELASTINE HYDROCHLORIDE 137 MCG: 137 SPRAY, METERED NASAL at 08:08

## 2022-08-07 RX ADMIN — Medication 6 MG: at 08:08

## 2022-08-07 RX ADMIN — MAGNESIUM SULFATE HEPTAHYDRATE 2 G: 40 INJECTION, SOLUTION INTRAVENOUS at 07:08

## 2022-08-07 RX ADMIN — CARVEDILOL 12.5 MG: 12.5 TABLET, FILM COATED ORAL at 08:08

## 2022-08-07 RX ADMIN — CARVEDILOL 12.5 MG: 12.5 TABLET, FILM COATED ORAL at 09:08

## 2022-08-07 RX ADMIN — LOSARTAN POTASSIUM 25 MG: 25 TABLET, FILM COATED ORAL at 09:08

## 2022-08-07 NOTE — PROGRESS NOTES
[facility] Medicine    Progress Note    Patient Name: Helder Brand  MRN: [id number]  Patient Class: IP- Inpatient   Admission Date: 8/4/2022 11:53 PM  Length of Stay: 2  Attending Physician: Nadiya Montes MD  Primary Care Provider: Karlos Woodruff MD  Face-to-Face encounter date: 08/07/2022  Code status:  Chief Complaint: Leg Pain (Left leg pain in calf and inner thigh x 2 days but has worsened since 2300 tonight.)        Subjective:    HPI:62 year old male with history of COPD, Popliteal Aneurysm/Repair (left), Hypothyroidism, HTN and has had 2/3 COVID vaccinations presented to ED complaining of 2 day history of worsening left lower extremity pain with cold foot. No LOP. Had left popliteal aneurysm repair July 2021. No CP/SOB.   In ED: US Artery revealed thrombosis mid superficial femoral artery downward. Labs reviewed and noted below: normal CBC, trivial hyponatremia otherwise normal CMP. CTA lower extremity ordered and pending. Discussed with ED MD: Vascular is aware and will see patient later today. Full dose heparinization started. NPO    Interval History:   8/6: Patient had initiation of thrombolysis done yesterday and is going back to the OR for thrombectomy today.  Patient is doing well. No concerns/issues overnight reported by the patient or the nursing staff.    8/7:  Patient just returned back from the cath lab where he had thrombectomy done.  Hematology currently on board and has ordered clot workup.  No acute overnight event.    Review of Systems All other Review of Systems were found to be negative expect for that mentioned already in HPI.     Objective:     Vitals:    08/07/22 0701 08/07/22 0900 08/07/22 1101 08/07/22 1301   BP: (!) 130/90  115/81 114/76   Pulse: 68  71 69   Resp: 16  17 (!) 25   Temp: 98 °F (36.7 °C)  98.9 °F (37.2 °C)    TempSrc:       SpO2: (!) 92% (!) 92% (!) 90% (!) 90%   Weight:       Height:            Vitals reviewed.  Constitutional: No  distress.   HENT: NC  Head: Atraumatic.   Cardiovascular: Normal rate, regular rhythm and normal heart sounds.   Pulmonary/Chest: Effort normal. No wheezes.   Abdominal: Soft. Bowel sounds are normal. No distension and no mass. No tenderness  Neurological: Alert.   Skin: Skin is warm and dry.   Psych: Appropriate mood and affect    Following labs were Reviewed   CBC:  Recent Labs   Lab 08/07/22  0504   WBC 8.84   HGB 13.6*   HCT 38.3*   *     CMP:  Recent Labs   Lab 08/07/22  0504   CALCIUM 8.2*   *   K 3.5   CO2 22*      BUN 12   CREATININE 0.7       Micro Results  Microbiology Results (last 7 days)     ** No results found for the last 168 hours. **           Radiology Reports  US Lower Extremity Arteries Left  Result Date: 8/5/2022  IMPRESSION: Extensive left lower extremity arterial thrombus from mid superficial femoral artery downward. Electronically signed by:  Piotr Cole MD  8/5/2022 2:54 AM CDT Workstation: [id number]    US Lower Extremity Veins Left  Result Date: 8/5/2022  IMPRESSION: 1.  No evidence of deep venous thrombosis. 2.  Large heterogeneous lesion in the left popliteal fossa measuring 7.1 x 3.3 x 3.9 cm of indeterminate etiology.  Recommend further evaluation with cross-sectional imaging. Electronically signed by:  Jordyn Shah MD  8/5/2022 3:18 AM CDT Workstation: [id number]    CTA Lower Extremity Bilateral  Result Date: 8/5/2022   IMPRESSION: 1.  Complete occlusion from the left proximal superficial femoral artery into all 3 infrapopliteal arteries. 2.  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 3.  Left popliteal/Baker's cyst. Electronically signed by:  Alvarado Tejada MD  8/5/2022 6:00 AM CDT Workstation: [id number]    Cardiac catheterization  Result Date: 8/6/2022  Procedure performed in the Invasive Lab  - See Procedure Log link below for nursing documentation  - See OpNote on Surgeries Tab for physician findings  - See Imaging Tab for  radiologist dictation       Meds  Scheduled Meds:   azelastine  1 spray Nasal BID    carvediloL  12.5 mg Oral BID    fluticasone furoate-vilanteroL  1 puff Inhalation Daily    losartan  25 mg Oral Daily     Continuous Infusions:   sodium chloride 0.9% 75 mL/hr at 08/07/22 1000     PRN Meds:.sodium chloride, sodium chloride, sodium chloride, sodium chloride, acetaminophen, albuterol-ipratropium, fluticasone propionate, hydrALAZINE, lorazepam, melatonin, morphine, morphine, ondansetron.    Assessment & Plan:     Active Hospital Problems    Diagnosis    *Femoral popliteal artery thrombus  Vascular surgeon on board  Status post thrombectomy  Hematologist on board      HTN (hypertension)  Controlled  Continue antihypertensive medication           Discharge Planning:   Is the patient medically ready for discharge?: no    Reason for patient still in hospital (select all that apply): Patient trending condition and Treatment    Above encounter included review of the medical records, interviewing and examining the patient face-to-face, discussion with family and other health care providers, ordering and interpreting lab/test results and formulating a plan of care.     Medical Decision Making:      [_] Low Complexity  [_] Moderate Complexity  [x] High Complexity      Nadiya Montes MD  Department of Hospital Medicine   [facility]

## 2022-08-07 NOTE — CONSULTS
[facility]   Hematology/Oncology  Inpatient Consult Note          Patient Name: Helder Brand  MRN: [id number]  Admission Date: 8/4/2022  Hospital Length of Stay: 2 days  Code Status: Full Code   Attending Provider: Nadiya Montes MD  Referring Provider: Self, Aaareferral  Consulting Provider: Stefano Pichardo MD  Primary Care Physician: Karlos Woodruff MD  Principal Problem:Femoral popliteal artery thrombus    Consults  Subjective:     Chief Complaint: Leg Pain (Left leg pain in calf and inner thigh x 2 days but has worsened since 2300 tonight.)          History Present Illness:  62 y.o. male with diagnosis of severe recurrent PAD who presented to the ER at [facility] with severe left leg pain. He had prior left popliteal aneurysm repair with Dr daniel in July 2021. Doppler studies in ED showed thrombosis of the mid-superficial femoral artery. He underwent angiography yesterday on 8/6 with thrombectomy and TPA with Dr Daniel. He is currently in the ICU and is awake and alert. He denies any current pain in the left leg. No current CP, SOB, HA's or N/V. He is a [employer] and is absolutely against receiving any blood products or transfusions even at the risk of his own life. I discussed with his ICU nurse Mima and Dr Daniel both in person.       Past Medical/Surgical History:  Past Medical History:   Diagnosis Date    Emphysema lung 2021    Enlarged prostate     Hyperlipidemia 2021    Hypertension 2001    Kidney stone 2001    x3    Thyroid disease     benign growth, partial thyroidectomy     Past Surgical History:   Procedure Laterality Date    PROSTATE SURGERY  2018    REPAIR OF ANEURYSM Left 7/21/2021    Procedure: REPAIR POPLITEAL ARTERY ANEURYSM;  Surgeon: Ismael Daniel MD;  Location: [facility] OR;  Service: Cardiovascular;  Laterality: Left;    THYROIDECTOMY, PARTIAL  2011    TONSILLECTOMY      as a child    urolift  04/2019    [facility]         Allergies:  Review of patient's  "allergies indicates:  No Known Allergies    Social/Family History:  Social History     Socioeconomic History    Marital status:    Tobacco Use    Smoking status: Former Smoker     Packs/day: 0.25     Years: 5.00     Pack years: 1.25     Quit date: 1980     Years since quittin.7    Smokeless tobacco: Never Used   Substance and Sexual Activity    Alcohol use: Not Currently     Alcohol/week: 2.0 standard drinks     Types: 2 Cans of beer per week     Family History   Problem Relation Age of Onset    Hypertension Mother     COPD Mother     Pulmonary embolism Father          ROS:    GEN: severe leg pain on LLE for 3 days since resolved  HEENT: normal with no HA's, sore throat, stiff neck, changes in vision  CV: normal with no CP, SOB, PND, LAW or orthopnea  PULM: normal with no SOB, cough, hemoptysis, sputum or pleuritic pain  GI: normal with no abdominal pain, nausea, vomiting, constipation, diarrhea, melanotic stools, BRBPR, or hematemesis  : normal with no hematuria, dysuria  BREAST: normal with no mass, discharge, pain  SKIN: normal with no rash, erythema, bruising, or swelling        Medications:  Continuous Infusions:   sodium chloride 0.9%      custom IV infusion builder (for pharmacist use only) Stopped (22)     Scheduled Meds:   azelastine  1 spray Nasal BID    carvediloL  12.5 mg Oral BID    fluticasone furoate-vilanteroL  1 puff Inhalation Daily    losartan  25 mg Oral Daily     PRN Meds:sodium chloride, sodium chloride, sodium chloride, sodium chloride, acetaminophen, albuterol-ipratropium, fluticasone propionate, hydrALAZINE, lorazepam, melatonin, morphine, morphine, ondansetron         Objective:       Physical Exam:    Vitals:  Blood pressure (!) 130/90, pulse 68, temperature 98 °F (36.7 °C), resp. rate 16, height 6' 1" (1.854 m), weight 84.8 kg (186 lb 15.2 oz), SpO2 (!) 92 %.    GEN: no apparent distress, comfortable; AAOx3  HEAD: atraumatic and " normocephalic  EYES: no pallor, no icterus, PERRLA  ENT: OMM, no pharyngeal erythema, external ears WNL; no nasal discharge; no thrush  NECK: no masses, thyroid normal, trachea midline, no LAD/LN's, supple  CV: RRR with no murmur; normal pulse; normal S1 and S2; no pedal edema  CHEST: Normal respiratory effort; CTAB; normal breath sounds; no wheeze or crackles  ABDOM: nontender and nondistended; soft; normal bowel sounds; no rebound/guarding  MUSC/Skeletal: ROM normal; no crepitus; joints normal; no deformities or arthropathy  EXTREM: no clubbing, cyanosis, inflammation or swelling; IV right wrist; good warmth to bilat lower extremities (lft>rght)  SKIN: no rashes, lesions, ulcers, petechiae or subcutaneous nodules  : no dunbar  NEURO: grossly intact; motor/sensory WNL; AAOx3; no tremors  PSYCH: normal mood, affect and behavior  LYMPH: normal cervical, supraclavicular, axillary and groin LN's      Lab Review:   Lab Results   Component Value Date    WBC 8.84 08/07/2022    HGB 13.6 (L) 08/07/2022    HCT 38.3 (L) 08/07/2022    MCV 90 08/07/2022     (L) 08/07/2022     CMP  Sodium   Date Value Ref Range Status   08/07/2022 134 (L) 136 - 145 mmol/L Final     Potassium   Date Value Ref Range Status   08/07/2022 3.5 3.5 - 5.1 mmol/L Final     Chloride   Date Value Ref Range Status   08/07/2022 104 95 - 110 mmol/L Final     CO2   Date Value Ref Range Status   08/07/2022 22 (L) 23 - 29 mmol/L Final     Glucose   Date Value Ref Range Status   08/07/2022 88 70 - 110 mg/dL Final     BUN   Date Value Ref Range Status   08/07/2022 12 8 - 23 mg/dL Final     Creatinine   Date Value Ref Range Status   08/07/2022 0.7 0.5 - 1.4 mg/dL Final     Calcium   Date Value Ref Range Status   08/07/2022 8.2 (L) 8.7 - 10.5 mg/dL Final     Total Protein   Date Value Ref Range Status   08/05/2022 7.2 6.0 - 8.4 g/dL Final     Albumin   Date Value Ref Range Status   08/05/2022 3.9 3.5 - 5.2 g/dL Final     Total Bilirubin   Date Value Ref  Range Status   08/05/2022 0.8 0.1 - 1.0 mg/dL Final     Comment:     For infants and newborns, interpretation of results should be based  on gestational age, weight and in agreement with clinical  observations.    Premature Infant recommended reference ranges:  Up to 24 hours.............<8.0 mg/dL  Up to 48 hours............<12.0 mg/dL  3-5 days..................<15.0 mg/dL  6-29 days.................<15.0 mg/dL       Alkaline Phosphatase   Date Value Ref Range Status   08/05/2022 70 55 - 135 U/L Final     AST   Date Value Ref Range Status   08/05/2022 20 10 - 40 U/L Final     ALT   Date Value Ref Range Status   08/05/2022 12 10 - 44 U/L Final     Anion Gap   Date Value Ref Range Status   08/07/2022 8 8 - 16 mmol/L Final     eGFR if    Date Value Ref Range Status   03/09/2022 >60 >60 mL/min/1.73 m^2 Final     eGFR if non    Date Value Ref Range Status   03/09/2022 >60 >60 mL/min/1.73 m^2 Final     Comment:     Calculation used to obtain the estimated glomerular filtration  rate (eGFR) is the CKD-EPI equation.        Fibrinogen 194 - 545 mg/dL 229      PT 11.4 - 13.7 sec 15.1 High   14.4 High   13.7  13.5 R    INR  1.3  1.2 CM  1.1 CM  1.1 CM      aPTT 23.3 - 35.1 sec 146.3 High Panic   30.9 R, CM            Diagnostic Results:  CTA Lower Extremity Bilateral [[id number]] Collected: 08/05/22 0400   Order Status: Completed Updated: 08/05/22 0603   Narrative:     EXAM:   CT Angiography of the Bilateral Lower Extremities With Intravenous Contrast     CLINICAL HISTORY:   The patient is 62 years old and is Male; Arterial embolism, lower extremity     TECHNIQUE:   Axial computed tomographic angiography images of the bilateral lower extremities with intravenous contrast.  Sagittal and coronal reformatted images were created and reviewed.  This CT exam was performed using one or more of the following dose reduction techniques:  automated exposure control, adjustment of the mA and/or kV  according to patient size, and/or use of iterative reconstruction technique.   MIP reconstructed images were created and reviewed.     COMPARISON:   No relevant prior studies available.     FINDINGS:     VASCULATURE:   RIGHT FEMORAL/POPLITEAL ARTERIES:  No acute findings.  No occlusion or significant stenosis.   RIGHT CALF/FOOT ARTERIES:  The right posterior tibial artery is dominant and visualized into the hindfoot. The right peroneal and anterior tibial arteries contrast tapers gradually faded and are not visualized in the mid to distal lower extremity.  No occlusion or significant stenosis.     LEFT FEMORAL/POPLITEAL ARTERIES:  Complete occlusion from the left proximal superficial femoral artery into all 3 infrapopliteal arteries.  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  No filling defects in the visualized left deep femoral artery.   LEFT CALF/FOOT ARTERIES: No opacification.     LOWER EXTREMITIES:   BONES/JOINTS:  No acute fracture.  No dislocation.   SOFT TISSUES:  Left popliteal/Baker's cyst.   REPRODUCTIVE:  Fiducial markers in the prostate.     IMPRESSION:   1.  Complete occlusion from the left proximal superficial femoral artery into all 3 infrapopliteal arteries.   2.  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   3.  Left popliteal/Baker's cyst.      US Lower Extremity Arteries Left [[id number]] Collected: 08/05/22 0059   Order Status: Completed Updated: 08/05/22 0445   Narrative:          ADDENDUM #1       THIS REPORT CONTAINS FINDINGS THAT MAY BE CRITICAL TO PATIENT CARE:  Called, telephoned, verbal report was given oral to DR. VELMA AVALOS at 2:57 AM CDT on 8/5/2022.     Electronically signed by:  Piotr Cole MD  8/5/2022 4:43 AM CDT Workstation: [id number]      ORIGINAL REPORT       EXAM DESCRIPTION: US LOWER EXTREMITY ARTERIES LEFT 8/5/2022 2:49 AM CDT     CLINICAL HISTORY: 62 years, Male,     COMPARISON: None     FINDINGS:     Multiple grayscale images and  duplex Doppler ultrasound of the left arterial system was performed with color flow, spectral waveform and peak systolic velocities.     Left common femoral artery peak systolic velocity of 29 cm/sec. triphasic waveform   Left profunda peak systolic velocity of 38 cm/sec. triphasic waveform   Left superficial femoral artery proximal peak systolic velocity 19 cm/sec. triphasic waveform.   Left superficial femoral artery mid aspect demonstrate a filling defect with abnormal waveform and peak systolic velocity of 19 cm/s.   Left superficial femoral artery distally demonstrate filling defect with a markedly decreased velocity of 12 cm/s.   Left popliteal artery demonstrate filling defect with abnormal decreased velocity of 19 cm/s   Left posterior tibial artery demonstrated filling defect with lack of flow.   Left anterior tibial artery demonstrate filling defect within the lack of flow   Left peroneal artery demonstrate filling defect with lack of flow   Left dorsalis pedis artery demonstrate filling defect with lack of flow.     IMPRESSION:   Extensive left lower extremity arterial thrombus from mid superficial femoral artery downward.      US Lower Extremity Veins Left [[id number]] Collected: 08/05/22 0058   Order Status: Completed Updated: 08/05/22 0347   Narrative:          ADDENDUM #1       Findings were discussed with Dr. Alfa Keene on 8/5/2022 at 3:21 AM Central standard time via telephone conference. 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 Further evaluation with CTA of the left lower extremity will be obtained.     Electronically signed by:  Jordyn Shah MD  8/5/2022 3:45 AM CDT Workstation: [id number]      ORIGINAL REPORT       EXAM:   US Duplex Left Lower Extremity Veins     CLINICAL HISTORY:   The patient is 62 years old and is Male;     TECHNIQUE:   Real-time duplex ultrasound scan of the left lower extremity veins  integrating B-mode two-dimensional vascular structure, Doppler spectral analysis, color flow Doppler imaging and compression.     COMPARISON:   No relevant prior studies available.     FINDINGS:   DEEP VEINS: No DVT in the visualized common femoral, femoral, proximal deep femoral or popliteal veins.  The veins demonstrate normal color flow, are normally compressible, with normal phasic flow and/or augmentation response.   SUPERFICIAL VEINS:  Unremarkable.  No thrombus in the visualized great saphenous vein.   SOFT TISSUES:  Large heterogeneous lesion in the left popliteal fossa measuring 7.1 x 3.3 x 3.9 cm of indeterminate etiology.     IMPRESSION:   1.  No evidence of deep venous thrombosis.   2.  Large heterogeneous lesion in the left popliteal fossa measuring 7.1 x 3.3 x 3.9 cm of indeterminate etiology.  Recommend further evaluation with cross-sectional imaging         Assessment/Plan:     IMPRESSION:  (1) 62 y.o. male  with diagnosis of severe recurrent PAD who presented to the ER at [facility] with severe left leg pain. He had prior left popliteal aneurysm repair with Dr daniel in July 2021. Doppler studies in ED showed thrombosis of the mid-superficial femoral artery. He underwent angiography yesterday on 8/6 with thrombectomy and TPA with Dr Daniel. He is currently in the ICU and is awake and alert. He denies any current pain in the left leg. No current CP, SOB, HA's or N/V. He is a [employer] and is absolutely against receiving any blood products or transfusions even at the risk of his own life. I discussed with his ICU nurse Mima and Dr Daniel both in person.     8/7/2022:  - hematology consulted for evaluation for any underlying clot disorders  - vascular plans to start him on either eliquis of xarelto    (2) HTN and hypercholesterolemia    (3) Hx/of kidney stones    (4)  Thyroid disease s/p partial thyroidectomy    (5) BPH    (6) COPD    (7) Mild anemia - NCNc paramaters    (8) Mild borderline  thrombocytopenia          Active Diagnoses:    Diagnosis Date Noted POA    PRINCIPAL PROBLEM:  Femoral popliteal artery thrombus [I74.3] 08/05/2022 Yes    HTN (hypertension) [I10] 01/04/2022 Yes      Problems Resolved During this Admission:           PLAN:   1. Order clot workup  2. Monitor labs  3. Postop management as per vascular surgery directives  4. Agree with vascular surgery's plans for anticoagulation with either eliquis or xarelto  5. Will follow with you; if discharged in near future, please have him follow-up in hematology clinic to go over the results, etc        Thank you for your consult.      Stefano Pichardo MD  Hematology/Oncology  [facility]

## 2022-08-07 NOTE — PT/OT/SLP PROGRESS
Occupational Therapy      Patient Name:  Helder Brand   MRN:  [id number]    Patient not seen today secondary to  (Pt. needs new OT orders since post procedures.). Will follow-up when medically cleared to participate in therapy and new OT eval  and tx orders received.    8/7/2022

## 2022-08-07 NOTE — CARE UPDATE
Patient received from cath lab alert   08/07/22 0900   Patient Assessment/Suction   Level of Consciousness (AVPU) alert   Respiratory Effort Normal;Unlabored   Expansion/Accessory Muscles/Retractions no use of accessory muscles;expansion symmetric   All Lung Fields Breath Sounds clear   Rhythm/Pattern, Respiratory unlabored;depth regular   PRE-TX-O2   O2 Device (Oxygen Therapy) room air   SpO2 (!) 92 %   Pulse Oximetry Type Continuous   $ Pulse Oximetry - Multiple Charge Pulse Oximetry - Multiple   Inhaler   $ Inhaler Charges Refused   Education   $ Education Bronchodilator;15 min   Respiratory Evaluation   $ Care Plan Tech Time 15 min   $ Eval/Re-eval Charges Re-evaluation

## 2022-08-07 NOTE — RESPIRATORY THERAPY
08/06/22 2001   Patient Assessment/Suction   Level of Consciousness (AVPU) alert   Respiratory Effort Normal;Unlabored   Expansion/Accessory Muscles/Retractions no use of accessory muscles   All Lung Fields Breath Sounds clear   Rhythm/Pattern, Respiratory unlabored;pattern regular;depth regular   PRE-TX-O2   O2 Device (Oxygen Therapy) nasal cannula   $ Is the patient on Low Flow Oxygen? Yes   Flow (L/min) 4   SpO2 (!) 93 %   Pulse Oximetry Type Continuous   $ Pulse Oximetry - Multiple Charge Pulse Oximetry - Multiple   Pulse 75   Resp (!) 21   Aerosol Therapy   $ Aerosol Therapy Charges PRN treatment not required   Education   $ Education Bronchodilator;15 min   Respiratory Evaluation   $ Care Plan Tech Time 15 min

## 2022-08-07 NOTE — OP NOTE
Counts include 234 beds at the [facility]  Surgery Department  Operative Note    SUMMARY     Date of Procedure: 8/7/2022     Procedure: Procedure(s) (LRB):  Angiogram Extremity Unilateral (Left)  EMBOLECTOMY OR THROMBECTOMY, BLOOD VESSEL, LOWER EXTREMITY (Left)     Surgeon(s) and Role:     * Ismael Esquivel MD - Primary    Assisting Surgeon: None    Pre-Operative Diagnosis: Femoral artery thrombosis [I74.3]    Post-Operative Diagnosis: Post-Op Diagnosis Codes:     * Femoral artery thrombosis [I74.3]    Anesthesia: RN IV Sedation    Operative Findings (including complications, if any):  99% resolution of anterior tib and posterior tibial thrombus.  Patent popliteal interposition vein graft.      Description of Technical Procedures:  Relook angiography through existing catheter after tPA infusion.  Left posterior tibial Pronto catheter thrombectomy.  Termination of tPA infusion and removal of lysis catheter    Patient was brought back to the cath lab underwent angiography through the existing catheter.  The popliteal interposition vein graft is patent the anterior tibial artery is patent down to the very distal dorsalis pedis.  The posterior tibial artery has 90% resolution of thrombus with small amount of thrombus in the proximal 3rd of the posterior tibial and the distal 3rd of the posterior tibial.  Neither of which she is flow limiting.  The catheter was removed and a 014 wire was passed and the posterior tibial artery and Moss catheter was used to perform a thrombectomy with removal of about 20% of the residual thrombus.  At this time the sheath and catheter were removed and pressure was held.    Significant Surgical Tasks Conducted by the Assistant(s), if Applicable:     Estimated Blood Loss (EBL): * No values recorded between 8/7/2022  8:12 AM and 8/7/2022  8:51 AM *           Implants: * No implants in log *    Specimens:   Specimen (24h ago, onward)            None                  Condition: Good    Disposition: ICU -  extubated and stable.    Attestation: I was present and scrubbed for the entire procedure.

## 2022-08-08 VITALS
DIASTOLIC BLOOD PRESSURE: 97 MMHG | SYSTOLIC BLOOD PRESSURE: 137 MMHG | HEART RATE: 66 BPM | WEIGHT: 186.94 LBS | TEMPERATURE: 98 F | HEIGHT: 73 IN | BODY MASS INDEX: 24.77 KG/M2 | OXYGEN SATURATION: 97 % | RESPIRATION RATE: 19 BRPM

## 2022-08-08 LAB
ANION GAP SERPL CALC-SCNC: 8 MMOL/L (ref 8–16)
BUN SERPL-MCNC: 12 MG/DL (ref 8–23)
CALCIUM SERPL-MCNC: 8.4 MG/DL (ref 8.7–10.5)
CHLORIDE SERPL-SCNC: 105 MMOL/L (ref 95–110)
CO2 SERPL-SCNC: 22 MMOL/L (ref 23–29)
CREAT SERPL-MCNC: 0.7 MG/DL (ref 0.5–1.4)
ERYTHROCYTE [DISTWIDTH] IN BLOOD BY AUTOMATED COUNT: 13.6 % (ref 11.5–14.5)
EST. GFR  (NO RACE VARIABLE): >60 ML/MIN/1.73 M^2
GLUCOSE SERPL-MCNC: 104 MG/DL (ref 70–110)
HCT VFR BLD AUTO: 39.4 % (ref 40–54)
HGB BLD-MCNC: 13.4 G/DL (ref 14–18)
MAGNESIUM SERPL-MCNC: 2 MG/DL (ref 1.6–2.6)
MCH RBC QN AUTO: 31.2 PG (ref 27–31)
MCHC RBC AUTO-ENTMCNC: 34 G/DL (ref 32–36)
MCV RBC AUTO: 92 FL (ref 82–98)
PLATELET # BLD AUTO: 148 K/UL (ref 150–450)
PMV BLD AUTO: 10.1 FL (ref 9.2–12.9)
POTASSIUM SERPL-SCNC: 3.8 MMOL/L (ref 3.5–5.1)
RBC # BLD AUTO: 4.3 M/UL (ref 4.6–6.2)
SODIUM SERPL-SCNC: 135 MMOL/L (ref 136–145)
WBC # BLD AUTO: 9.98 K/UL (ref 3.9–12.7)

## 2022-08-08 PROCEDURE — 94640 AIRWAY INHALATION TREATMENT: CPT

## 2022-08-08 PROCEDURE — 94761 N-INVAS EAR/PLS OXIMETRY MLT: CPT

## 2022-08-08 PROCEDURE — 80048 BASIC METABOLIC PNL TOTAL CA: CPT | Performed by: STUDENT IN AN ORGANIZED HEALTH CARE EDUCATION/TRAINING PROGRAM

## 2022-08-08 PROCEDURE — 99900035 HC TECH TIME PER 15 MIN (STAT)

## 2022-08-08 PROCEDURE — 25000003 PHARM REV CODE 250: Performed by: INTERNAL MEDICINE

## 2022-08-08 PROCEDURE — 82962 GLUCOSE BLOOD TEST: CPT

## 2022-08-08 PROCEDURE — 83735 ASSAY OF MAGNESIUM: CPT | Performed by: STUDENT IN AN ORGANIZED HEALTH CARE EDUCATION/TRAINING PROGRAM

## 2022-08-08 PROCEDURE — 85027 COMPLETE CBC AUTOMATED: CPT | Performed by: STUDENT IN AN ORGANIZED HEALTH CARE EDUCATION/TRAINING PROGRAM

## 2022-08-08 RX ORDER — CHLORHEXIDINE GLUCONATE ORAL RINSE 1.2 MG/ML
15 SOLUTION DENTAL 2 TIMES DAILY
Status: DISCONTINUED | OUTPATIENT
Start: 2022-08-08 | End: 2022-08-08 | Stop reason: HOSPADM

## 2022-08-08 RX ORDER — MUPIROCIN 20 MG/G
OINTMENT TOPICAL 2 TIMES DAILY
Status: DISCONTINUED | OUTPATIENT
Start: 2022-08-08 | End: 2022-08-08 | Stop reason: HOSPADM

## 2022-08-08 RX ADMIN — FLUTICASONE FUROATE AND VILANTEROL TRIFENATATE 1 PUFF: 200; 25 POWDER RESPIRATORY (INHALATION) at 08:08

## 2022-08-08 RX ADMIN — CARVEDILOL 12.5 MG: 12.5 TABLET, FILM COATED ORAL at 08:08

## 2022-08-08 RX ADMIN — LOSARTAN POTASSIUM 25 MG: 25 TABLET, FILM COATED ORAL at 08:08

## 2022-08-08 NOTE — DISCHARGE SUMMARY
[facility] Medicine  Discharge Summary      Patient Name: Helder Brand  MRN: [id number]  Patient Class: IP- Inpatient  Admission Date: 8/4/2022  Hospital Length of Stay: 3 days  Discharge Date and Time:  08/08/2022 2:09 PM  Attending Physician: Nadiya Montes MD   Discharging Provider: Nadiya Montes MD  Primary Care Provider: Karlos Woodruff MD      HPI:   62 year old male with history of COPD, Popliteal Aneurysm/Repair (left), Hypothyroidism, HTN and has had 2/3 COVID vaccinations presented to ED complaining of 2 day history of worsening left lower extremity pain with cold foot. No LOP. Had left popliteal aneurysm repair July 2021. No CP/SOB.     In ED: US Artery revealed thrombosis mid superficial femoral artery downward. Labs reviewed and noted below: normal CBC, trivial hyponatremia otherwise normal CMP. CTA lower extremity ordered and pending. Discussed with ED MD: Vascular is aware and will see patient later today. Full dose heparinization started. NPO      Procedure(s) (LRB):  Angiogram Extremity Unilateral (Left)  EMBOLECTOMY OR THROMBECTOMY, BLOOD VESSEL, LOWER EXTREMITY (Left)      Hospital Course:   8/6: Patient had initiation of thrombolysis done yesterday and is going back to the OR for thrombectomy today.  Patient is doing well. No concerns/issues overnight reported by the patient or the nursing staff.     8/7:  Patient just returned back from the cath lab where he had thrombectomy done.  Hematology currently on board and has ordered clot workup.  No acute overnight event.    8/8:  Once cleared by vascular surgeon, patient subsequently discharged on oral Eliquis and is to follow-up with Hematology on discharge concerning clot workup.    Physical examination on discharge:  Constitutional: No distress.   HENT: NC  Head: Atraumatic.   Cardiovascular: Normal rate, regular rhythm and normal heart sounds.   Pulmonary/Chest: Effort normal. No wheezes.    Abdominal: Soft. Bowel sounds are normal. No distension and no mass. No tenderness  Neurological: Alert.   Skin: Skin is warm and dry.   Psych: Appropriate mood and affect    I have seen the patient on the day of discharge and reviewed the discharge instructions as outlined.            Goals of Care Treatment Preferences:  Code Status: Full Code      Consults:   Consults (From admission, onward)        Status Ordering Provider     Inpatient consult to Vascular Surgery  Once        Provider:  Ismael Esquivel MD    Completed NAZIA LINTON     Inpatient consult to Internal Medicine  Once        Provider:  Nazia Linton MD    Acknowledged NAZIA LINTON     Inpatient consult to Vascular Surgery  Once        Provider:  Ali Khoobehi, MD    Acknowledged NAZIA LINTON          No new Assessment & Plan notes have been filed under this hospital service since the last note was generated.  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    Specialties: Vascular Surgery, Cardiology  Contact information:  [address]  [address]  [place] [address]  [phone]             Stefano Pichardo MD Follow up in 1 week(s).    Specialties: Hematology, Hematology and Oncology, Oncology  Contact information:  [address]  [address]  [place] [address] [phone]             Karlos Woodruff MD Follow up in 1 week(s).    Specialty: Internal Medicine  Contact information:  [address] [place] [address] [address]  [place] [id number] [phone]                       Patient Instructions:      Activity as tolerated       Significant Diagnostic Studies: Labs:   BMP:    Recent Labs   Lab 08/07/22  0504 08/08/22  0258   GLU 88 104   * 135*   K 3.5 3.8    105   CO2 22* 22*   BUN 12 12   CREATININE 0.7 0.7   CALCIUM 8.2* 8.4*   MG 1.7 2.0   , CMP   Recent Labs   Lab 08/07/22  0504 08/08/22  0258   * 135*   K 3.5 3.8    105   CO2 22* 22*   GLU 88 104   BUN 12 12   CREATININE 0.7 0.7   CALCIUM 8.2* 8.4*   ANIONGAP 8 8   , CBC   Recent Labs   Lab 08/06/22  2325 08/07/22  0504 08/08/22  0258   WBC 10.18 8.84 9.98   HGB 13.4* 13.6* 13.4*   HCT 38.7* 38.3* 39.4*   * 141* 148*    and All labs within the past 24 hours have been reviewed  Radiology: X-Ray: CXR: X-Ray Chest 1 View (CXR): No results found for this visit on 08/04/22. and X-Ray Chest PA and Lateral (CXR): No results found for this visit on 08/04/22.  Cardiac Graphics: Echocardiogram: 2D echo with color flow doppler: No results found for this or any previous visit. and Transthoracic echo (TTE) complete (Cupid Only): No results found for this or any previous visit.    Pending Diagnostic Studies:     Procedure Component Value Units Date/Time    Antiphospholipid Syndrome [[id number]] Collected: 08/07/22 1056    Order Status: Sent Lab Status: In process Updated: 08/07/22 1106    Specimen: Blood     Antithrombin III [[id number]] Collected: 08/07/22 1057    Order Status: Sent Lab Status: In process Updated: 08/07/22 1106    Specimen: Blood     Cardiolipin antibody [[id number]] Collected: 08/07/22 1057    Order Status: Sent Lab Status: In process Updated: 08/07/22 1106    Specimen: Blood     Factor 10 Assay [[id number]] Collected: 08/07/22 1057    Order Status: Sent Lab Status: In process Updated: 08/07/22 1106    Specimen: Blood     Factor 12 Assay [[id number]] Collected: 08/07/22 1057    Order Status: Sent Lab Status: In process Updated: 08/07/22 1106    Specimen: Blood     Factor 5 leiden [[id number]] Collected: 08/07/22 1057    Order Status: Sent Lab Status: In process Updated: 08/07/22 1106    Specimen:  Blood     Factor 7 Assay [[id number]] Collected: 08/07/22 1057    Order Status: Sent Lab Status: In process Updated: 08/07/22 1106    Specimen: Blood     Factor 9 Assay [[id number]] Collected: 08/07/22 1056    Order Status: Sent Lab Status: In process Updated: 08/07/22 1106    Specimen: Blood     Homocysteine, Serum [[id number]] Collected: 08/07/22 1056    Order Status: Sent Lab Status: In process Updated: 08/07/22 1106    Specimen: Blood     Lupus Anticoagulant Eval w/Reflex [[id number]] Collected: 08/07/22 1057    Order Status: Sent Lab Status: In process Updated: 08/07/22 1106    Specimen: Blood     MTHFR Thermolabile Variant, DNA Analysis [[id number]] Collected: 08/07/22 1057    Order Status: Sent Lab Status: In process Updated: 08/07/22 1106    Specimen: Blood     Protein C Activity [[id number]] Collected: 08/07/22 1056    Order Status: Sent Lab Status: In process Updated: 08/07/22 1106    Specimen: Blood     Protein C Antigen, Total [[id number]] Collected: 08/07/22 1056    Order Status: Sent Lab Status: In process Updated: 08/07/22 1106    Specimen: Blood     Protein S Activity [[id number]] Collected: 08/07/22 1056    Order Status: Sent Lab Status: In process Updated: 08/07/22 1106    Specimen: Blood     Protein S Antigen, Free [[id number]] Collected: 08/07/22 1057    Order Status: Sent Lab Status: In process Updated: 08/07/22 1106    Specimen: Blood     Prothrombin [id number] Mutation [[id number]] Collected: 08/07/22 1057    Order Status: Sent Lab Status: In process Updated: 08/07/22 1106    Specimen: Blood          Medications:  Reconciled Home Medications:      Medication List      START taking these medications    apixaban 5 mg Tab  Commonly known as: ELIQUIS  Take 2 tablets (10 mg total) by mouth 2 (two) times daily for 7 days, THEN 1 tablet (5 mg total) 2 (two) times daily.  Start taking on: August 8, 2022        CONTINUE taking these medications    ADVAIR -21 mcg/actuation Hfaa inhaler  Generic drug:  fluticasone-salmeterol 230-21 mcg/dose  Inhale 2 puffs into the lungs 2 (two) times daily. Controller     albuterol 90 mcg/actuation inhaler  Commonly known as: PROVENTIL/VENTOLIN HFA  Inhale 2 puffs into the lungs every 6 (six) hours as needed.     albuterol-ipratropium 2.5 mg-0.5 mg/3 mL nebulizer solution  Commonly known as: DUO-NEB  Take 3 mLs by nebulization every 6 (six) hours as needed for Wheezing or Shortness of Breath. Rescue     azelastine 137 mcg (0.1 %) nasal spray  Commonly known as: ASTELIN  1 spray (137 mcg total) by Nasal route 2 (two) times daily.     carvediloL 12.5 MG tablet  Commonly known as: COREG  Take 12.5 mg by mouth 2 (two) times daily.     fluticasone propionate 50 mcg/actuation nasal spray  Commonly known as: FLONASE  2 sprays by Each Nostril route daily as needed.     losartan 25 MG tablet  Commonly known as: COZAAR  TAKE 1 TABLET BY MOUTH IN THE EVENING     mucus clearing device  Commonly known as: ACAPELLA, FLUTTER  1 Device by Misc.(Non-Drug; Combo Route) route 2 (two) times daily.            Indwelling Lines/Drains at time of discharge:   Lines/Drains/Airways     None                 Time spent on the discharge of patient: 35 minutes         Nadiya Montes MD  Department of Hospital Medicine  [facility]

## 2022-08-08 NOTE — PLAN OF CARE
Problem: Adult Inpatient Plan of Care  Goal: Plan of Care Review  Flowsheets (Taken 8/8/2022 3985)  Plan of Care Reviewed With:   patient   spouse     Problem: Pain Acute  Goal: Acceptable Pain Control and Functional Ability  Outcome: Ongoing, Progressing     Problem: Tissue Perfusion Altered (Revascularization)  Goal: Effective Tissue Perfusion  Outcome: Ongoing, Progressing     Patient (R) femoral dressing intact with bruising noted. No active hematoma or active bleeding. Lower extremities cool to touch with sensation intact. Pulses (R) > (L) . Full sensation in lower extremities. No c/o pain . Continue to monitor for bleeding .

## 2022-08-08 NOTE — PLAN OF CARE
08/08/22 1435   Post-Acute Status   Post-Acute Authorization Placement   Post-Acute Placement Status Set-up Complete/Auth obtained   Cm spoke with sister Jillian this am 0934 this am to discuss patient going to LTAC today, rn had informed rn sister wanting patient to go to [facility] cm contacted sister this am and cm was told she was ok with him going there([facility]), cm asked for discharge.  RN spoke to sister Jillian while cm unavailable and sister states she did not say she had chosen [facility] she still had not gone to [facility] and did not want patient discharged until she had the chance to make an informed decision, she asked to speak to md, cm contacted md and discharge post-phoned until 8/9/2022

## 2022-08-08 NOTE — CARE UPDATE
08/08/22 0802   Patient Assessment/Suction   All Lung Fields Breath Sounds clear   PO Breath Sounds clear   LLL Breath Sounds clear   RUL Breath Sounds clear   RML Breath Sounds clear   RLL Breath Sounds clear   PRE-TX-O2   O2 Device (Oxygen Therapy) room air   SpO2 (!) 94 %   Pulse Oximetry Type Continuous   $ Pulse Oximetry - Multiple Charge Pulse Oximetry - Multiple   Pulse 68   Resp 18   Inhaler   $ Inhaler Charges MDI (Metered Dose Inahler) Treatment   Daily Review of Necessity (Inhaler) completed   Respiratory Treatment Status (Inhaler) given   Treatment Route (Inhaler) mouthpiece   Patient Position (Inhaler) semi-Coy's   Post Treatment Assessment (Inhaler) breath sounds unchanged   Signs of Intolerance (Inhaler) none   Respiratory Evaluation   $ Care Plan Tech Time 15 min

## 2022-08-08 NOTE — PLAN OF CARE
[facility]  Initial Discharge Assessment       Primary Care Provider: Karlos Woodruff MD    Admission Diagnosis: Femoral artery thrombosis [I74.3]  Lower extremity pain [M79.606]    Admission Date: 8/4/2022  Expected Discharge Date:     Discharge Barriers Identified: (P) Underinsured    Assessment completed at bedside.  Patient has advanced directives in system.  Patient intends to discharge home where his son lives with him.  Patient will have no discharge needs at this time.    Payor: MEDICAID / Plan: [facility] / Product Type: Managed Medicaid /     Extended Emergency Contact Information  Primary Emergency Contact: celso brand  Mobile Phone: [phone]  Relation: Son  Preferred language: English   needed? No    Discharge Plan A: (P) Home  Discharge Plan B: (P) Home with family      [facility] [address] [place] - [address]  [address]  [place] [address]  Phone: [phone] Fax: [phone]    [facility]  [address]  [facility] [address]  Phone: [phone] Fax: [phone]      Initial Assessment (most recent)       Adult Discharge Assessment - 08/08/22 1018          Discharge Assessment    Assessment Type Discharge Planning Assessment (P)      Confirmed/corrected address, phone number and insurance Yes (P)      Confirmed Demographics Correct on Facesheet (P)      Source of Information patient (P)      When was your last doctors appointment? -- (P)    july    Communicated PETER with patient/caregiver Date not available/Unable to determine (P)      Reason For Admission blood clots (P)      Lives With child(yojana), adult (P)      Facility Arrived From: home (P)      Do you expect to return to your current living situation? Yes (P)      Do you have help at home or someone to help you manage your care at home? Yes (P)      Who are your caregiver(s) and their phone number(s)? Celso Brand [phone] (P)       Prior to hospitilization cognitive status: Alert/Oriented (P)      Current cognitive status: Alert/Oriented (P)      Walking or Climbing Stairs Difficulty none (P)      Dressing/Bathing Difficulty none (P)      Equipment Currently Used at Home none (P)      Readmission within 30 days? No (P)      Patient currently being followed by outpatient case management? No (P)      Do you currently have service(s) that help you manage your care at home? No (P)      Do you take prescription medications? Yes (P)      Do you have prescription coverage? Yes (P)      Coverage [employer] medicaid (P)      Do you have any problems affording any of your prescribed medications? No (P)      Is the patient taking medications as prescribed? yes (P)      Who is going to help you get home at discharge? self, car in parking lot (P)      How do you get to doctors appointments? car, drives self (P)      Are you on dialysis? No (P)      Do you take coumadin? No (P)      Discharge Plan A Home (P)      Discharge Plan B Home with family (P)      DME Needed Upon Discharge  none (P)      Discharge Plan discussed with: Patient (P)      Discharge Barriers Identified Underinsured (P)         Relationship/Environment    Name(s) of Who Lives With Patient /galo Lewisblanc [phone] (P)

## 2022-08-08 NOTE — HOSPITAL COURSE
62 year old male with history of COPD, Popliteal Aneurysm/Repair (left), Hypothyroidism, HTN and has had 2/3 COVID vaccinations presented to ED complaining of 2 day history of worsening left lower extremity pain with cold foot. No LOP. Had left popliteal aneurysm repair July 2021. No CP/SOB.   In ED: US Artery revealed thrombosis mid superficial femoral artery downward. Labs reviewed and noted below: normal CBC, trivial hyponatremia otherwise normal CMP. CTA lower extremity ordered and pending. Discussed with ED MD: Vascular is aware and will see patient later today. Full dose heparinization started. NPO     Interval History:   8/6: Patient had initiation of thrombolysis done yesterday and is going back to the OR for thrombectomy today.  Patient is doing well. No concerns/issues overnight reported by the patient or the nursing staff.     8/7:  Patient just returned back from the cath lab where he had thrombectomy done.  Hematology currently on board and has ordered clot workup.  No acute overnight event.

## 2022-08-09 LAB — HCYS SERPL-SCNC: 9.6 UMOL/L (ref 0–17.2)

## 2022-08-09 NOTE — PLAN OF CARE
08/09/22 0752   Final Note   Assessment Type Final Discharge Note   Anticipated Discharge Disposition Home   What phone number can be called within the next 1-3 days to see how you are doing after discharge? [phone]   Post-Acute Status   Post-Acute Authorization Other   Other Status No Post-Acute Service Needs   Discharge Delays None known at this time

## 2022-08-09 NOTE — PROGRESS NOTES
PT without complaints. Denies pain left foot.  Afeb VSS  Left leg with 1+ edema, foot warm, palp 2+ left PT pulse    Pt OK for DC from vascular standpoint  -Eliquis 10 mg BID, taper to 5 mg BID after one week  -RTC in 1 to 2 weeks

## 2022-08-10 LAB
CARDIOLIPIN IGA SER IA-ACNC: 9 MPL U/ML (ref 0–12)
CARDIOLIPIN IGG SER IA-ACNC: <9 GPL U/ML (ref 0–14)
CARDIOLIPIN IGM SER IA-ACNC: <9 APL U/ML (ref 0–11)
PROT C AG ACT/NOR PPP IA: 74 % (ref 60–150)

## 2022-08-11 LAB
AT III ACT/NOR PPP CHRO: 93 % (ref 75–135)
DRVVT CONFIRM: 1 RATIO (ref 0.8–1.2)
FACT IX ACT/NOR PPP: 99 % (ref 60–177)
FACT VII ACT/NOR PPP: 58 % (ref 51–186)
FACT X ACT/NOR PPP: 89 % (ref 76–183)
FACT XII ACT/NOR PPP: 67 % (ref 50–150)
LA 2 SCREEN W REFLEX-IMP: ABNORMAL
PROT C ACT/NOR PPP: 78 % (ref 73–180)
PROT S ACT/NOR PPP: 68 % (ref 63–140)
PROT S AG ACT/NOR PPP IA: 106 % (ref 60–150)
PROT S FREE AG ACT/NOR PPP IA: 85 % (ref 61–136)
SCREEN APTT: 52.9 SEC (ref 0–48.9)
SCREEN APTT: 55.9 SEC (ref 0–51.9)
SCREEN APTT: 8 SEC (ref 0–11)
SCREEN DRVVT: 43.6 SEC (ref 0–40.4)
SCREEN DRVVT: 50.2 SEC (ref 0–47)

## 2022-08-12 ENCOUNTER — HOSPITAL ENCOUNTER (EMERGENCY)
Facility: HOSPITAL | Age: 62
Discharge: HOME | End: 2022-08-13
Attending: EMERGENCY MEDICINE
Payer: MEDICAID

## 2022-08-12 DIAGNOSIS — R07.9 CHEST PAIN: ICD-10-CM

## 2022-08-12 DIAGNOSIS — R07.9 CHEST PAIN, UNSPECIFIED TYPE: Primary | ICD-10-CM

## 2022-08-12 LAB — F5 GENE MUT ANL BLD/T: NORMAL

## 2022-08-12 PROCEDURE — 93010 ELECTROCARDIOGRAM REPORT: CPT | Mod: ,,, | Performed by: INTERNAL MEDICINE

## 2022-08-12 PROCEDURE — 93010 EKG 12-LEAD: ICD-10-PCS | Mod: ,,, | Performed by: INTERNAL MEDICINE

## 2022-08-12 PROCEDURE — 93005 ELECTROCARDIOGRAM TRACING: CPT | Performed by: INTERNAL MEDICINE

## 2022-08-12 PROCEDURE — 99285 EMERGENCY DEPT VISIT HI MDM: CPT

## 2022-08-13 VITALS
DIASTOLIC BLOOD PRESSURE: 112 MMHG | WEIGHT: 188 LBS | SYSTOLIC BLOOD PRESSURE: 168 MMHG | HEART RATE: 60 BPM | OXYGEN SATURATION: 95 % | BODY MASS INDEX: 24.8 KG/M2 | TEMPERATURE: 98 F | RESPIRATION RATE: 18 BRPM

## 2022-08-13 LAB
ALBUMIN SERPL BCP-MCNC: 3.5 G/DL (ref 3.5–5.2)
ALP SERPL-CCNC: 60 U/L (ref 55–135)
ALT SERPL W/O P-5'-P-CCNC: 17 U/L (ref 10–44)
ANION GAP SERPL CALC-SCNC: 8 MMOL/L (ref 8–16)
AST SERPL-CCNC: 18 U/L (ref 10–40)
BASOPHILS # BLD AUTO: 0.05 K/UL (ref 0–0.2)
BASOPHILS NFR BLD: 0.6 % (ref 0–1.9)
BILIRUB SERPL-MCNC: 0.8 MG/DL (ref 0.1–1)
BNP SERPL-MCNC: 61 PG/ML (ref 0–99)
BUN SERPL-MCNC: 14 MG/DL (ref 8–23)
CALCIUM SERPL-MCNC: 8.5 MG/DL (ref 8.7–10.5)
CHLORIDE SERPL-SCNC: 104 MMOL/L (ref 95–110)
CO2 SERPL-SCNC: 23 MMOL/L (ref 23–29)
CREAT SERPL-MCNC: 0.8 MG/DL (ref 0.5–1.4)
DIFFERENTIAL METHOD: ABNORMAL
EOSINOPHIL # BLD AUTO: 0.4 K/UL (ref 0–0.5)
EOSINOPHIL NFR BLD: 4.3 % (ref 0–8)
ERYTHROCYTE [DISTWIDTH] IN BLOOD BY AUTOMATED COUNT: 13.8 % (ref 11.5–14.5)
EST. GFR  (NO RACE VARIABLE): >60 ML/MIN/1.73 M^2
GLUCOSE SERPL-MCNC: 88 MG/DL (ref 70–110)
HCT VFR BLD AUTO: 38.9 % (ref 40–54)
HGB BLD-MCNC: 13.3 G/DL (ref 14–18)
IMM GRANULOCYTES # BLD AUTO: 0.02 K/UL (ref 0–0.04)
IMM GRANULOCYTES NFR BLD AUTO: 0.2 % (ref 0–0.5)
LYMPHOCYTES # BLD AUTO: 2.5 K/UL (ref 1–4.8)
LYMPHOCYTES NFR BLD: 30.5 % (ref 18–48)
MAGNESIUM SERPL-MCNC: 2 MG/DL (ref 1.6–2.6)
MCH RBC QN AUTO: 32 PG (ref 27–31)
MCHC RBC AUTO-ENTMCNC: 34.2 G/DL (ref 32–36)
MCV RBC AUTO: 94 FL (ref 82–98)
MONOCYTES # BLD AUTO: 0.9 K/UL (ref 0.3–1)
MONOCYTES NFR BLD: 11.6 % (ref 4–15)
NEUTROPHILS # BLD AUTO: 4.3 K/UL (ref 1.8–7.7)
NEUTROPHILS NFR BLD: 52.8 % (ref 38–73)
NRBC BLD-RTO: 0 /100 WBC
PLATELET # BLD AUTO: 283 K/UL (ref 150–450)
PMV BLD AUTO: 9 FL (ref 9.2–12.9)
POTASSIUM SERPL-SCNC: 3.7 MMOL/L (ref 3.5–5.1)
PROT SERPL-MCNC: 6.7 G/DL (ref 6–8.4)
RBC # BLD AUTO: 4.16 M/UL (ref 4.6–6.2)
SODIUM SERPL-SCNC: 135 MMOL/L (ref 136–145)
TROPONIN I SERPL DL<=0.01 NG/ML-MCNC: <0.03 NG/ML
WBC # BLD AUTO: 8.11 K/UL (ref 3.9–12.7)

## 2022-08-13 PROCEDURE — 84484 ASSAY OF TROPONIN QUANT: CPT | Performed by: STUDENT IN AN ORGANIZED HEALTH CARE EDUCATION/TRAINING PROGRAM

## 2022-08-13 PROCEDURE — 80053 COMPREHEN METABOLIC PANEL: CPT | Performed by: STUDENT IN AN ORGANIZED HEALTH CARE EDUCATION/TRAINING PROGRAM

## 2022-08-13 PROCEDURE — 25500020 PHARM REV CODE 255: Performed by: EMERGENCY MEDICINE

## 2022-08-13 PROCEDURE — 83880 ASSAY OF NATRIURETIC PEPTIDE: CPT | Performed by: STUDENT IN AN ORGANIZED HEALTH CARE EDUCATION/TRAINING PROGRAM

## 2022-08-13 PROCEDURE — 85025 COMPLETE CBC W/AUTO DIFF WBC: CPT | Performed by: STUDENT IN AN ORGANIZED HEALTH CARE EDUCATION/TRAINING PROGRAM

## 2022-08-13 PROCEDURE — 83735 ASSAY OF MAGNESIUM: CPT | Performed by: STUDENT IN AN ORGANIZED HEALTH CARE EDUCATION/TRAINING PROGRAM

## 2022-08-13 RX ADMIN — IOHEXOL 100 ML: 350 INJECTION, SOLUTION INTRAVENOUS at 03:08

## 2022-08-13 NOTE — ED PROVIDER NOTES
Encounter Date: 8/12/2022       History     Chief Complaint   Patient presents with    Chest Pain     Started at 8pm, left sided radiating into mid chest, tightness, lightheaded, numbness in right pinky     HPI Helder Brand 62 y.o. male with history of recent blood clot and left lower extremity who presented emergency department complaining of chest pain that started approximately 8:00 p.m. this evening.  Patient describes as sharp intermittent chest pain that he rates at a 3/10.  He states that the pain goes across his chest.  Of denies fever, chills, shortness of breath, abdominal pain, nausea, vomiting.  Of note, patient was recently discharged from the hospital after undergoing a thrombectomy secondary to blood clot in his left lower extremity.  He is currently anticoagulated on Eliquis.    Review of patient's allergies indicates:  No Known Allergies  Past Medical History:   Diagnosis Date    Emphysema lung 2021    Enlarged prostate     Hyperlipidemia 2021    Hypertension 2001    Kidney stone 2001    x3    Thyroid disease     benign growth, partial thyroidectomy     Past Surgical History:   Procedure Laterality Date    ANGIOGRAPHY OF LOWER EXTREMITY Left 8/5/2022    Procedure: Angiogram Extremity Unilateral;  Surgeon: Ali Khoobehi, MD;  Location: [facility] CATH/EP LAB;  Service: Peripheral Vascular;  Laterality: Left;    ANGIOGRAPHY OF LOWER EXTREMITY Left 8/6/2022    Procedure: Angioplasty-peripheral;  Surgeon: Ismael Esquivel MD;  Location: [facility] CATH/EP LAB;  Service: General;  Laterality: Left;    ANGIOGRAPHY OF LOWER EXTREMITY Left 8/6/2022    Procedure: Angiogram Extremity Unilateral;  Surgeon: Ismael Esquivel MD;  Location: [facility] CATH/EP LAB;  Service: General;  Laterality: Left;    ANGIOGRAPHY OF LOWER EXTREMITY Left 8/7/2022    Procedure: Angiogram Extremity Unilateral;  Surgeon: Ismael Esquivel MD;  Location: [facility] CATH/EP LAB;  Service: General;  Laterality: Left;    INJECTION OF TISSUE  PLASMINOGEN ACTIVATOR Left 2022    Procedure: INJECTION, TISSUE PLASMINOGEN ACTIVATOR;  Surgeon: Ismael Esquivel MD;  Location: [facility] CATH/EP LAB;  Service: General;  Laterality: Left;    INJECTION OF TISSUE PLASMINOGEN ACTIVATOR Left 2022    Procedure: INJECTION, TISSUE PLASMINOGEN ACTIVATOR;  Surgeon: Ismael Esquivel MD;  Location: [facility] CATH/EP LAB;  Service: General;  Laterality: Left;    PROSTATE SURGERY  2018    REPAIR OF ANEURYSM Left 2021    Procedure: REPAIR POPLITEAL ARTERY ANEURYSM;  Surgeon: Ismael Esquivel MD;  Location: [facility] OR;  Service: Cardiovascular;  Laterality: Left;    THROMBECTOMY  2022    Procedure: THROMBECTOMY;  Surgeon: Ismael Esquivel MD;  Location: [facility] CATH/EP LAB;  Service: General;;    THROMBECTOMY Left 2022    Procedure: THROMBECTOMY;  Surgeon: Ismael Esquivel MD;  Location: [facility] CATH/EP LAB;  Service: General;  Laterality: Left;    THYROIDECTOMY, PARTIAL  2011    TONSILLECTOMY      as a child    urolift  2019    [facility]     Family History   Problem Relation Age of Onset    Hypertension Mother     COPD Mother     Pulmonary embolism Father      Social History     Tobacco Use    Smoking status: Former Smoker     Packs/day: 0.25     Years: 5.00     Pack years: 1.25     Quit date: 1980     Years since quittin.7    Smokeless tobacco: Never Used   Substance Use Topics    Alcohol use: Not Currently     Alcohol/week: 2.0 standard drinks     Types: 2 Cans of beer per week     Review of Systems   Constitutional: Negative for chills and fever.   HENT: Negative for congestion and rhinorrhea.    Eyes: Negative for discharge and redness.   Respiratory: Negative for cough and shortness of breath.    Cardiovascular: Positive for chest pain.   Gastrointestinal: Negative for nausea and vomiting.   Genitourinary: Negative for dysuria and urgency.   Musculoskeletal: Negative for arthralgias and myalgias.   Skin: Negative for rash and wound.    Neurological: Negative for syncope and weakness.       Physical Exam     Initial Vitals [08/12/22 2344]   BP Pulse Resp Temp SpO2   (!) 160/102 (!) 59 16 97.9 °F (36.6 °C) 97 %      MAP       --         Physical Exam    Nursing note and vitals reviewed.  Constitutional: He appears well-developed and well-nourished.   Anxious appearing   HENT:   Head: Normocephalic and atraumatic.   Cardiovascular: Normal rate, regular rhythm, normal heart sounds and intact distal pulses.   Regular rate and rhythm.  No rubs, murmurs, gallops  2+ radial and dorsalis pedis pulses   Pulmonary/Chest: Breath sounds normal.   Speaking in full sentences.  Even unlabored respirations.  Lungs clear to auscultation   Abdominal: Abdomen is soft. Bowel sounds are normal. He exhibits no distension. There is no abdominal tenderness. There is no rebound and no guarding.     Neurological: He is oriented to person, place, and time.           ED Course   Procedures  Labs Reviewed   CBC W/ AUTO DIFFERENTIAL - Abnormal; Notable for the following components:       Result Value    RBC 4.16 (*)     Hemoglobin 13.3 (*)     Hematocrit 38.9 (*)     MCH 32.0 (*)     MPV 9.0 (*)     All other components within normal limits   COMPREHENSIVE METABOLIC PANEL - Abnormal; Notable for the following components:    Sodium 135 (*)     Calcium 8.5 (*)     All other components within normal limits   MAGNESIUM   TROPONIN I   B-TYPE NATRIURETIC PEPTIDE        ECG Results          EKG 12-lead (In process)  Result time 08/13/22 05:59:41    In process by Interface, Lab In [facility] (08/13/22 05:59:41)                 Narrative:    Test Reason : R07.9,    Vent. Rate : 054 BPM     Atrial Rate : 054 BPM     P-R Int : 186 ms          QRS Dur : 090 ms      QT Int : 434 ms       P-R-T Axes : 033 023 044 degrees     QTc Int : 411 ms    Sinus bradycardia  Otherwise normal ECG  When compared with ECG of 02-JAN-2022 22:44,  No significant change was found    Referred By: [email or web address]    SELF           Confirmed By:                             Imaging Results          CTA Chest Non-Coronary - PE Study (Final result)  Result time 08/13/22 03:56:45    Final result by Piotr Chapa MD (08/13/22 03:56:45)                 Narrative:      CT angiogram chest with contrast on 8/13/2022    CLINICAL INDICATION: Chest pain    TECHNIQUE: Multiple axial images are obtained throughout the chest following the administration of IV contrast.  Computer generated 3D reconstructions/MIPS were performed. This exam was performed according to our departmental dose-optimization program, which includes automated exposure control, adjustment of the mA and/or kV according to patient size and/or use of iterative reconstruction technique.  Total DLP is 387.1 mGycm.    COMPARISON: 1/3/2022    FINDINGS: There is no thoracic aortic aneurysm or dissection. There is a small left hepatic cyst. Limited visualized upper abdomen is otherwise unremarkable. There is no pleural or pericardial effusion. There is no thoracic adenopathy. There are not no filling defects in the pulmonary arteries to suggest pulmonary embolus. There is minimal bilateral dependent and basilar atelectasis. The lungs are otherwise clear. No acute bony abnormality is noted.    IMPRESSION:  1.  No evidence of pulmonary embolus.  2.  No acute abnormality.    Electronically signed by:  Jasbir Chapa  8/13/2022 3:56 AM CDT Workstation: [id number]                               Medications   iohexoL (OMNIPAQUE 350) injection 100 mL (100 mLs Intravenous Given 8/13/22 0315)     Medical Decision Making:   Clinical Tests:   Lab Tests: Ordered and Reviewed  Radiological Study: Ordered and Reviewed  Medical Tests: Ordered and Reviewed  ED Management:  Patient is a 62-year-old gentleman with a history of left lower extremity thrombosis and HTN who presented to the emergency department complaining of sharp chest pain.    Patient nontoxic appearing, hemodynamically stable,  afebrile.    Given history, high suspicion for pulmonary embolism.  Low suspicion for ACS or pneumonia.    Workup included labs and CT chest PE study.    EKG demonstrated sinus bradycardia with a rate of 54 beats per minute.  Normal axis, normal intervals.  No ST elevations, depressions, or patterns of ischemia.  No significant change when compared to priors.     Work up demonstrated CBC and CMP grossly unremarkable.  Troponin negative.  BNP within normal limits.  CT angio chest negative for pulmonary embolism.    On reassessment, patient reports resolution of symptoms.  Discussed with patient the results of lab work and imaging.  Offered patient admission for further workup.  Patient declining at this time stating he would like to go home.  Again, recommended admission.  Patient stating he is feeling better and would like to go home. Strict ED return precautions provided. In stable condition.    Cassie Manzano MD PGY2  [facility]  5:33 AM             Attending Attestation:   Physician Attestation Statement for Resident:  As the supervising MD  I agree with the above history. -:   As the supervising MD I agree with the above PE.    As the supervising MD I agree with the above treatment, course, plan, and disposition.  I have reviewed and agree with the residents interpretation of the following: lab data, x-rays, EKG and CT scans.                ED Course as of 08/13/22 0612   Sat Aug 13, 2022   0257 Hemoglobin(!): 13.3  At baseline of 13 [MT]   0258 Troponin I: <0.030 [MT]   0258 BNP: 61 [MT]   0414 CTA Chest Non-Coronary - PE Study  FINDINGS: There is no thoracic aortic aneurysm or dissection. There is a small left hepatic cyst. Limited visualized upper abdomen is otherwise unremarkable. There is no pleural or pericardial effusion. There is no thoracic adenopathy. There are not no filling defects in the pulmonary arteries to suggest pulmonary embolus. There is minimal bilateral dependent and basilar  atelectasis. The lungs are otherwise clear. No acute bony abnormality is noted.     IMPRESSION:  1.  No evidence of pulmonary embolus.  2.  No acute abnormality.     Electronically signed by:  Jasbir Chapa  8/13/2022 3:56 AM CDT Workstation: [id number]          Specimen Collected: 08/13/22 02:56 Last Resulted: 08/13/22 03:56         [MT]      ED Course User Index  [MT] Cassie Manzano MD             Clinical Impression:   Final diagnoses:  [R07.9] Chest pain  [R07.9] Chest pain, unspecified type (Primary)          ED Disposition Condition    Discharge Stable        ED Prescriptions     None        Follow-up Information     Follow up With Specialties Details Why Contact Info Additional Information    Karlos Woodruff MD Internal Medicine Schedule an appointment as soon as possible for a visit on 8/15/2022  [address] [place] [address] Dr Ryder [address] [place] [id number]  [phone]       [facility] - Emergency Dept Emergency Medicine  For chest pain, shortness of breath, abdominal pain, dizziness, persistent nausea/vomiting, loss of consciousness, leg swelling, fever, chills, bleeding, and new or concerning signs/symptoms [address] [facility] [id number]  [phone] 1st floor           Cassie Manzano MD  Resident  08/13/22 0536       Caio Moulton MD  08/13/22 0612

## 2022-08-15 LAB
F2 GENE MUT ANL BLD/T: NORMAL
MTHFR GENE MUT ANL BLD/T: NORMAL

## 2022-08-17 LAB
APTT HEX PL PPP: 0 SEC
APTT IMM NP PPP: ABNORMAL SEC
APTT PPP 1:1 SALINE: ABNORMAL SEC
APTT PPP: 30.8 SEC
B2 GLYCOPROT1 IGA SER-ACNC: <10 SAU
B2 GLYCOPROT1 IGG SER-ACNC: <10 SGU
B2 GLYCOPROT1 IGG SER-ACNC: <10 SMU
CARDIOLIPIN IGA SER IA-ACNC: <10 APL
CARDIOLIPIN IGG SER IA-ACNC: <10 GPL
CARDIOLIPIN IGM SER IA-ACNC: <10 MPL
CONFIRM APTT: 0 SEC
CONFIRM DRVVT: ABNORMAL SEC
DRVVT SCREEN TO CONFIRM RATIO: ABNORMAL RATIO
LABORATORY COMMENT REPORT: ABNORMAL
PROTHROM IGG SERPL-ACNC: 2 G UNITS
PS IGG SER IA-ACNC: 2 GPS
PS IGM SER IA-ACNC: 4 MPS
SCREEN DRVVT: 46.6 SEC

## 2022-08-26 NOTE — PROGRESS NOTES
[facility] Hematology/Oncology  PROGRESS NOTE - 2nd Follow-up Visit      Subjective:       Patient ID:   NAME: Helder Brand : 1960     62 y.o. male    Referring Doc: Sierra  Other Physicians: Chapito Raza ([facility])    Chief Complaint:  PAD/clot workup    History of Present Illness:     Patient returns today for a 2nd regularly scheduled follow-up visit.  The patient is here today to go over the results of the recently ordered labs, tests and studies. He is here by himself. He is doing ok since hospitalization. He is on eliquis po bid. No excessive bleeding or bruising. He saw Dr Esquivel post-hospitalization and his vessels are open per his report.    Breathing ok, no CP, HA's or N/V    Discussed covid precautions - he had covid on 2022 - he has been vaccinated            ROS:   GEN: normal without any fever, night sweats or weight loss; some residual numbness etc in the LLE>RLE  HEENT: normal with no HA's, sore throat, stiff neck, changes in vision  CV: normal with no CP, SOB, PND, LAW or orthopnea  PULM: normal with no SOB, cough, hemoptysis, sputum or pleuritic pain  GI: normal with no abdominal pain, nausea, vomiting, constipation, diarrhea, melanotic stools, BRBPR, or hematemesis  : normal with no hematuria, dysuria  BREAST: normal with no mass, discharge, pain  SKIN: normal with no rash, erythema, bruising, or swelling    Pain Scale: 0    Allergies:  Review of patient's allergies indicates:  No Known Allergies    Medications:    Current Outpatient Medications:     albuterol (PROVENTIL/VENTOLIN HFA) 90 mcg/actuation inhaler, Inhale 2 puffs into the lungs every 6 (six) hours as needed. , Disp: , Rfl:     albuterol-ipratropium (DUO-NEB) 2.5 mg-0.5 mg/3 mL nebulizer solution, Take 3 mLs by nebulization every 6 (six) hours as needed for Wheezing or Shortness of Breath. Rescue, Disp: 240 mL, Rfl: 5    apixaban (ELIQUIS) 5 mg Tab, Take 2 tablets (10 mg total) by mouth 2 (two) times daily for 7 days,  THEN 1 tablet (5 mg total) 2 (two) times daily., Disp: 208 tablet, Rfl: 0    azelastine (ASTELIN) 137 mcg (0.1 %) nasal spray, 1 spray (137 mcg total) by Nasal route 2 (two) times daily., Disp: 30 mL, Rfl: 0    carvediloL (COREG) 12.5 MG tablet, Take 12.5 mg by mouth 2 (two) times daily., Disp: , Rfl:     fluticasone propionate (FLONASE) 50 mcg/actuation nasal spray, 2 sprays by Each Nostril route daily as needed. , Disp: , Rfl:     fluticasone-salmeterol 230-21 mcg/dose (ADVAIR HFA) 230-21 mcg/actuation HFAA inhaler, Inhale 2 puffs into the lungs 2 (two) times daily. Controller, Disp: 12 g, Rfl: 11    losartan (COZAAR) 25 MG tablet, TAKE 1 TABLET BY MOUTH IN THE EVENING, Disp: 30 tablet, Rfl: 0    mucus clearing device (ACAPELLA, FLUTTER), 1 Device by Misc.(Non-Drug; Combo Route) route 2 (two) times daily., Disp: 1 each, Rfl: 0    folic acid-vit B6-vit B12 2.5-25-2 mg (FOLBIC OR EQUIV) 2.5-25-2 mg Tab, Take 1 tablet by mouth once daily., Disp: 30 tablet, Rfl: 6  No current facility-administered medications for this visit.    Facility-Administered Medications Ordered in Other Visits:     lactated ringers infusion, , Intravenous, Continuous, Jasbir Aragon MD, Last Rate: 75 mL/hr at 11/07/19 1333, 1,000 mL at 11/07/19 1333    PMHx/PSHx Updates:  See patient's last visit with me on 8/7/2022  See H&P on 8/7/2022        Pathology:   Cancer Staging   No matching staging information was found for the patient.          Objective:     Vitals:  Blood pressure (!) 151/85, pulse (!) 53, temperature 98.6 °F (37 °C), resp. rate 18, height 6' (1.829 m), weight 83.9 kg (185 lb).    Physical Examination:   GEN: no apparent distress, comfortable; AAOx3  HEAD: atraumatic and normocephalic  EYES: no pallor, no icterus, PERRLA  ENT: OMM, no pharyngeal erythema, external ears WNL; no nasal discharge; no thrush  NECK: no masses, thyroid normal, trachea midline, no LAD/LN's, supple  CV: RRR with no murmur; normal pulse; normal S1  and S2; no pedal edema  CHEST: Normal respiratory effort; CTAB; normal breath sounds; no wheeze or crackles  ABDOM: nontender and nondistended; soft; normal bowel sounds; no rebound/guarding  MUSC/Skeletal: ROM normal; no crepitus; joints normal; no deformities or arthropathy  EXTREM: no clubbing, cyanosis, inflammation or swelling  SKIN: no rashes, lesions, ulcers, petechiae or subcutaneous nodules  : no dunbar  NEURO: grossly intact; motor/sensory WNL; AAOx3; no tremors  PSYCH: normal mood, affect and behavior  LYMPH: normal cervical, supraclavicular, axillary and groin LN's            Labs:     Lab Results   Component Value Date    WBC 8.11 08/13/2022    HGB 13.3 (L) 08/13/2022    HCT 38.9 (L) 08/13/2022    MCV 94 08/13/2022     08/13/2022       CMP  Sodium   Date Value Ref Range Status   08/13/2022 135 (L) 136 - 145 mmol/L Final     Potassium   Date Value Ref Range Status   08/13/2022 3.7 3.5 - 5.1 mmol/L Final     Chloride   Date Value Ref Range Status   08/13/2022 104 95 - 110 mmol/L Final     CO2   Date Value Ref Range Status   08/13/2022 23 23 - 29 mmol/L Final     Glucose   Date Value Ref Range Status   08/13/2022 88 70 - 110 mg/dL Final     BUN   Date Value Ref Range Status   08/13/2022 14 8 - 23 mg/dL Final     Creatinine   Date Value Ref Range Status   08/13/2022 0.8 0.5 - 1.4 mg/dL Final     Calcium   Date Value Ref Range Status   08/13/2022 8.5 (L) 8.7 - 10.5 mg/dL Final     Total Protein   Date Value Ref Range Status   08/13/2022 6.7 6.0 - 8.4 g/dL Final     Albumin   Date Value Ref Range Status   08/13/2022 3.5 3.5 - 5.2 g/dL Final     Total Bilirubin   Date Value Ref Range Status   08/13/2022 0.8 0.1 - 1.0 mg/dL Final     Comment:     For infants and newborns, interpretation of results should be based  on gestational age, weight and in agreement with clinical  observations.    Premature Infant recommended reference ranges:  Up to 24 hours.............<8.0 mg/dL  Up to 48  hours............<12.0 mg/dL  3-5 days..................<15.0 mg/dL  6-29 days.................<15.0 mg/dL       Alkaline Phosphatase   Date Value Ref Range Status   08/13/2022 60 55 - 135 U/L Final     AST   Date Value Ref Range Status   08/13/2022 18 10 - 40 U/L Final     ALT   Date Value Ref Range Status   08/13/2022 17 10 - 44 U/L Final     Anion Gap   Date Value Ref Range Status   08/13/2022 8 8 - 16 mmol/L Final     eGFR if    Date Value Ref Range Status   03/09/2022 >60 >60 mL/min/1.73 m^2 Final     eGFR if non    Date Value Ref Range Status   03/09/2022 >60 >60 mL/min/1.73 m^2 Final     Comment:     Calculation used to obtain the estimated glomerular filtration  rate (eGFR) is the CKD-EPI equation.             Fibrinogen 194 - 545 mg/dL 229       PT 11.4 - 13.7 sec 15.1 High   14.4 High   13.7  13.5 R    INR   1.3  1.2 CM  1.1 CM  1.1 CM       aPTT 23.3 - 35.1 sec 146.3 High Panic   30.9      Lupus Reflex Interpretation  Comment:    Comment: No lupus anticoagulant was detected. Mixing studies suggest the presence of an inhibitor         MTHFR Comment    Comment: Result:   c.665C>T (p. [id number]), legacy name:   C677T - Detected, heterozygous c.1286A>C (p.   [id number]), legacy name: [id number] - Not      Homocysteine 0.0 - 17.2 umol/L 9.6        Protein S Ag, Total 60 - 150 % 106    Comment: This test was developed and its performance   characteristics determined by "[employer]. It   has not been cleared or approved   by the Food and Drug Administration.    Protein S Ag, Free 61 - 136 % 85      Prothrombin Mutation Comment    Comment: Result: c.*97G>A - Not Detected      Anticardiolipin IgG 0 - 14 GPL U/mL <9      Anticardiolipin IgA 0 - 11 APL U/mL <9     Anticardiolipin IgM 0 - 12 MPL U/mL 9        Radiology/Diagnostic Studies:    CTA Chest Non-Coronary - PE Study    Result Date: 8/13/2022  CT angiogram chest with contrast on 8/13/2022 CLINICAL INDICATION: Chest pain TECHNIQUE:  Multiple axial images are obtained throughout the chest following the administration of IV contrast.  Computer generated 3D reconstructions/MIPS were performed. This exam was performed according to our departmental dose-optimization program, which includes automated exposure control, adjustment of the mA and/or kV according to patient size and/or use of iterative reconstruction technique. Total DLP is 387.1 mGycm. COMPARISON: 1/3/2022 FINDINGS: There is no thoracic aortic aneurysm or dissection. There is a small left hepatic cyst. Limited visualized upper abdomen is otherwise unremarkable. There is no pleural or pericardial effusion. There is no thoracic adenopathy. There are not no filling defects in the pulmonary arteries to suggest pulmonary embolus. There is minimal bilateral dependent and basilar atelectasis. The lungs are otherwise clear. No acute bony abnormality is noted. IMPRESSION: 1.  No evidence of pulmonary embolus. 2.  No acute abnormality. Electronically signed by:  Jasbir Chapa  8/13/2022 3:56 AM CDT Workstation: [id number]    US Lower Extremity Arteries Left    Result Date: 8/5/2022   ADDENDUM #1 THIS REPORT CONTAINS FINDINGS THAT MAY BE CRITICAL TO PATIENT CARE:  Called, telephoned, verbal report was given oral to DR. VELMA AVALOS at 2:57 AM CDT on 8/5/2022. Electronically signed by:  Piotr Cole MD  8/5/2022 4:43 AM CDT Workstation: [id number]  ORIGINAL REPORT EXAM DESCRIPTION: US LOWER EXTREMITY ARTERIES LEFT 8/5/2022 2:49 AM CDT CLINICAL HISTORY: 62 years, Male, COMPARISON: None FINDINGS: Multiple grayscale images and duplex Doppler ultrasound of the left arterial system was performed with color flow, spectral waveform and peak systolic velocities. Left common femoral artery peak systolic velocity of 29 cm/sec. triphasic waveform Left profunda peak systolic velocity of 38 cm/sec. triphasic waveform Left superficial femoral artery proximal peak systolic velocity 19 cm/sec. triphasic waveform.  Left superficial femoral artery mid aspect demonstrate a filling defect with abnormal waveform and peak systolic velocity of 19 cm/s. Left superficial femoral artery distally demonstrate filling defect with a markedly decreased velocity of 12 cm/s. Left popliteal artery demonstrate filling defect with abnormal decreased velocity of 19 cm/s Left posterior tibial artery demonstrated filling defect with lack of flow. Left anterior tibial artery demonstrate filling defect within the lack of flow Left peroneal artery demonstrate filling defect with lack of flow Left dorsalis pedis artery demonstrate filling defect with lack of flow. IMPRESSION: Extensive left lower extremity arterial thrombus from mid superficial femoral artery downward. Electronically signed by:  Piotr Cole MD  8/5/2022 2:54 AM CDT Workstation: [id number]    US Lower Extremity Veins Left    Result Date: 8/5/2022   ADDENDUM #1 Findings were discussed with Dr. Alfa Keene on 8/5/2022 at 3:21 AM Central standard time via telephone conference. 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 Further evaluation with CTA of the left lower extremity will be obtained. Electronically signed by:  Jordyn Shah MD  8/5/2022 3:45 AM CDT Workstation: [id number]  ORIGINAL REPORT EXAM: US Duplex Left Lower Extremity Veins CLINICAL HISTORY: The patient is 62 years old and is Male; TECHNIQUE: Real-time duplex ultrasound scan of the left lower extremity veins integrating B-mode two-dimensional vascular structure, Doppler spectral analysis, color flow Doppler imaging and compression. COMPARISON: No relevant prior studies available. FINDINGS: DEEP VEINS: No DVT in the visualized common femoral, femoral, proximal deep femoral or popliteal veins.  The veins demonstrate normal color flow, are normally compressible, with normal phasic flow and/or augmentation response. SUPERFICIAL VEINS:   Unremarkable.  No thrombus in the visualized great saphenous vein. SOFT TISSUES:  Large heterogeneous lesion in the left popliteal fossa measuring 7.1 x 3.3 x 3.9 cm of indeterminate etiology. IMPRESSION: 1.  No evidence of deep venous thrombosis. 2.  Large heterogeneous lesion in the left popliteal fossa measuring 7.1 x 3.3 x 3.9 cm of indeterminate etiology.  Recommend further evaluation with cross-sectional imaging. Electronically signed by:  Jordyn Shah MD  8/5/2022 3:18 AM CDT Workstation: [id number]    CTA Lower Extremity Bilateral    Result Date: 8/5/2022  EXAM: CT Angiography of the Bilateral Lower Extremities With Intravenous Contrast CLINICAL HISTORY: The patient is 62 years old and is Male; Arterial embolism, lower extremity TECHNIQUE: Axial computed tomographic angiography images of the bilateral lower extremities with intravenous contrast.  Sagittal and coronal reformatted images were created and reviewed.  This CT exam was performed using one or more of the following dose reduction techniques:  automated exposure control, adjustment of the mA and/or kV according to patient size, and/or use of iterative reconstruction technique. MIP reconstructed images were created and reviewed. COMPARISON: No relevant prior studies available. FINDINGS: VASCULATURE: RIGHT FEMORAL/POPLITEAL ARTERIES:  No acute findings.  No occlusion or significant stenosis. RIGHT CALF/FOOT ARTERIES:  The right posterior tibial artery is dominant and visualized into the hindfoot. The right peroneal and anterior tibial arteries contrast tapers gradually faded and are not visualized in the mid to distal lower extremity.  No occlusion or significant stenosis. LEFT FEMORAL/POPLITEAL ARTERIES:  Complete occlusion from the left proximal superficial femoral artery into all 3 infrapopliteal arteries.  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  No filling defects in the visualized left deep femoral artery. LEFT CALF/FOOT  ARTERIES: No opacification. LOWER EXTREMITIES: BONES/JOINTS:  No acute fracture.  No dislocation. SOFT TISSUES:  Left popliteal/Baker's cyst. REPRODUCTIVE:  Fiducial markers in the prostate. IMPRESSION: 1.  Complete occlusion from the left proximal superficial femoral artery into all 3 infrapopliteal arteries. 2.  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 3.  Left popliteal/Baker's cyst. Electronically signed by:  Alvarado Tejada MD  8/5/2022 6:00 AM CDT Workstation: [id number]      I have reviewed all available lab results and radiology reports.    Assessment/Plan:   (1) 62 y.o. male with diagnosis of severe recurrent PAD who presented to the ER at [facility] with severe left leg pain. He had prior left popliteal aneurysm repair with Dr daniel in July 2021. Doppler studies in ED showed thrombosis of the mid-superficial femoral artery. He underwent angiography yesterday on 8/6 with thrombectomy and TPA with Dr Daniel. He is currently in the ICU and is awake and alert. He denies any current pain in the left leg. No current CP, SOB, HA's or N/V. He is a [employer] and is absolutely against receiving any blood products or transfusions even at the risk of his own life. I discussed with his ICU nurse Mima and Dr Daniel both in person.      8/7/2022: seen as consult in-hospital  - hematology consulted for evaluation for any underlying clot disorders  - vascular plans to start him on either eliquis of xarelto    8/29/2022:  - he is doing ok post-hospitalization  - he saw Lacho and had repeat studies which were good per patient  - clot workup essentially WNL except for MTHFR-C gene heterozygous positive but the homocysteine was WNL  - discussed the genetic implications in children, siblings, etc  - add folbic  - continue eliquis as per the directions of Dr Daniel  - LA was negative but he was on heparinoid products at time of test - thus the presence of an inhibitor was detected     (2) HTN and  hypercholesterolemia     (3) Hx/of kidney stones     (4)  Thyroid disease s/p partial thyroidectomy     (5) BPH     (6) COPD     (7) Mild anemia - NCNc paramaters     (8) Mild borderline thrombocytopenia       VISIT DIAGNOSES:      Acute embolism and thrombosis of left popliteal vein    Femoral popliteal artery thrombus    Heterozygous MTHFR mutation C677T  -     folic acid-vit B6-vit B12 2.5-25-2 mg (FOLBIC OR EQUIV) 2.5-25-2 mg Tab; Take 1 tablet by mouth once daily.  Dispense: 30 tablet; Refill: 6        PLAN:  Continue eliquis per direction of Dr Esquivel  2. Add folbic  3. Discussed the genetic implications of the MTHFR gene abnormality in children and siblings, etc  4. F/u with PCP, Vascualr, Card etc  RTC in 6 months  Fax note to Karlos Esquivel MD,     Discussion:     COVID-19 Discussion:    I had long discussion with patient and any applicable family about the COVID-19 coronavirus epidemic and the recommended precautions with regard to cancer and/or hematology patients. I have re-iterated the CDC recommendations for adequate hand washing, use of hand -like products, and coughing into elbow, etc. In addition, especially for our patients who are on chemotherapy and/or our otherwise immunocompromised patients, I have recommended avoidance of crowds, including movie theaters, restaurants, churches, etc. I have recommended avoidance of any sick or symptomatic family members and/or friends. I have also recommended avoidance of any raw and unwashed food products, and general avoidance of food items that have not been prepared by themselves. The patient has been asked to call us immediately with any symptom developments, issues, questions or other general concerns.       Anticoagulation Discussion:    Discussed with patient and any applicable family members about the benefit and/or need for anticoagulation. I communicated about the risks of bleeding while on any anticoagulation, which could be  serious and/or life-threatening, and which can occur at any time, regardless of degree of the level of anticoagulation. I expressed the need for compliance with any anticoagulation regimen and that failure to do so could potential lead to excessive bleeding, and risk to health and/or life. In particular, with patients on coumadin therapy, compliance with requested blood work is absolutely essential, as coumadin levels can vary from time to time, and failure to do so could potentially place the patient at risk for bleeding and/or clotting events which could be fatal. Patients on coumadin are encouraged to call the day after they have their levels drawn, as to obtain the appropriate instructions from my staff. Patients are aware that self-regulating or self-dosing of their medications is strictly prohibited.       I spent over 25 mins of time with the patient. Reviewed results of the recently ordered labs, tests and studies; made directives with regards to the results. Over half of this time was spent couseling and coordinating care.    I have explained all of the above in detail and the patient understands all of the current recommendation(s). I have answered all of their questions to the best of my ability and to their complete satisfaction.   The patient is to continue with the current management plan.            Electronically signed by Stefano Pichardo MD

## 2022-08-29 ENCOUNTER — OFFICE VISIT (OUTPATIENT)
Dept: HEMATOLOGY/ONCOLOGY | Facility: CLINIC | Age: 62
End: 2022-08-29
Payer: MEDICAID

## 2022-08-29 VITALS
HEART RATE: 53 BPM | HEIGHT: 72 IN | WEIGHT: 185 LBS | SYSTOLIC BLOOD PRESSURE: 151 MMHG | BODY MASS INDEX: 25.06 KG/M2 | DIASTOLIC BLOOD PRESSURE: 85 MMHG | TEMPERATURE: 99 F | RESPIRATION RATE: 18 BRPM

## 2022-08-29 DIAGNOSIS — I74.3 FEMORAL POPLITEAL ARTERY THROMBUS: ICD-10-CM

## 2022-08-29 DIAGNOSIS — Z15.89 HETEROZYGOUS MTHFR MUTATION C677T: ICD-10-CM

## 2022-08-29 DIAGNOSIS — I82.432 ACUTE EMBOLISM AND THROMBOSIS OF LEFT POPLITEAL VEIN: Primary | ICD-10-CM

## 2022-08-29 PROCEDURE — 3079F DIAST BP 80-89 MM HG: CPT | Mod: CPTII,S$GLB,, | Performed by: INTERNAL MEDICINE

## 2022-08-29 PROCEDURE — 3044F PR MOST RECENT HEMOGLOBIN A1C LEVEL <7.0%: ICD-10-PCS | Mod: CPTII,S$GLB,, | Performed by: INTERNAL MEDICINE

## 2022-08-29 PROCEDURE — 4010F PR ACE/ARB THEARPY RXD/TAKEN: ICD-10-PCS | Mod: CPTII,S$GLB,, | Performed by: INTERNAL MEDICINE

## 2022-08-29 PROCEDURE — 3044F HG A1C LEVEL LT 7.0%: CPT | Mod: CPTII,S$GLB,, | Performed by: INTERNAL MEDICINE

## 2022-08-29 PROCEDURE — 4010F ACE/ARB THERAPY RXD/TAKEN: CPT | Mod: CPTII,S$GLB,, | Performed by: INTERNAL MEDICINE

## 2022-08-29 PROCEDURE — 3008F BODY MASS INDEX DOCD: CPT | Mod: CPTII,S$GLB,, | Performed by: INTERNAL MEDICINE

## 2022-08-29 PROCEDURE — 1159F PR MEDICATION LIST DOCUMENTED IN MEDICAL RECORD: ICD-10-PCS | Mod: CPTII,S$GLB,, | Performed by: INTERNAL MEDICINE

## 2022-08-29 PROCEDURE — 1160F PR REVIEW ALL MEDS BY PRESCRIBER/CLIN PHARMACIST DOCUMENTED: ICD-10-PCS | Mod: CPTII,S$GLB,, | Performed by: INTERNAL MEDICINE

## 2022-08-29 PROCEDURE — 3077F PR MOST RECENT SYSTOLIC BLOOD PRESSURE >= 140 MM HG: ICD-10-PCS | Mod: CPTII,S$GLB,, | Performed by: INTERNAL MEDICINE

## 2022-08-29 PROCEDURE — 1160F RVW MEDS BY RX/DR IN RCRD: CPT | Mod: CPTII,S$GLB,, | Performed by: INTERNAL MEDICINE

## 2022-08-29 PROCEDURE — 3008F PR BODY MASS INDEX (BMI) DOCUMENTED: ICD-10-PCS | Mod: CPTII,S$GLB,, | Performed by: INTERNAL MEDICINE

## 2022-08-29 PROCEDURE — 3077F SYST BP >= 140 MM HG: CPT | Mod: CPTII,S$GLB,, | Performed by: INTERNAL MEDICINE

## 2022-08-29 PROCEDURE — 99215 PR OFFICE/OUTPT VISIT, EST, LEVL V, 40-54 MIN: ICD-10-PCS | Mod: S$GLB,,, | Performed by: INTERNAL MEDICINE

## 2022-08-29 PROCEDURE — 99215 OFFICE O/P EST HI 40 MIN: CPT | Mod: S$GLB,,, | Performed by: INTERNAL MEDICINE

## 2022-08-29 PROCEDURE — 1111F DSCHRG MED/CURRENT MED MERGE: CPT | Mod: CPTII,S$GLB,, | Performed by: INTERNAL MEDICINE

## 2022-08-29 PROCEDURE — 1111F PR DISCHARGE MEDS RECONCILED W/ CURRENT OUTPATIENT MED LIST: ICD-10-PCS | Mod: CPTII,S$GLB,, | Performed by: INTERNAL MEDICINE

## 2022-08-29 PROCEDURE — 3079F PR MOST RECENT DIASTOLIC BLOOD PRESSURE 80-89 MM HG: ICD-10-PCS | Mod: CPTII,S$GLB,, | Performed by: INTERNAL MEDICINE

## 2022-08-29 PROCEDURE — 1159F MED LIST DOCD IN RCRD: CPT | Mod: CPTII,S$GLB,, | Performed by: INTERNAL MEDICINE

## 2022-10-20 ENCOUNTER — OFFICE VISIT (OUTPATIENT)
Dept: PULMONOLOGY | Facility: CLINIC | Age: 62
End: 2022-10-20
Payer: MEDICAID

## 2022-10-20 VITALS
HEART RATE: 63 BPM | HEIGHT: 72 IN | DIASTOLIC BLOOD PRESSURE: 89 MMHG | SYSTOLIC BLOOD PRESSURE: 134 MMHG | OXYGEN SATURATION: 98 % | BODY MASS INDEX: 25.16 KG/M2 | WEIGHT: 185.75 LBS

## 2022-10-20 DIAGNOSIS — R91.1 LUNG NODULE: Primary | ICD-10-CM

## 2022-10-20 DIAGNOSIS — J44.9 CHRONIC OBSTRUCTIVE PULMONARY DISEASE, UNSPECIFIED COPD TYPE: ICD-10-CM

## 2022-10-20 PROCEDURE — 1159F PR MEDICATION LIST DOCUMENTED IN MEDICAL RECORD: ICD-10-PCS | Mod: CPTII,,, | Performed by: NURSE PRACTITIONER

## 2022-10-20 PROCEDURE — 3079F DIAST BP 80-89 MM HG: CPT | Mod: CPTII,,, | Performed by: NURSE PRACTITIONER

## 2022-10-20 PROCEDURE — 1159F MED LIST DOCD IN RCRD: CPT | Mod: CPTII,,, | Performed by: NURSE PRACTITIONER

## 2022-10-20 PROCEDURE — 3079F PR MOST RECENT DIASTOLIC BLOOD PRESSURE 80-89 MM HG: ICD-10-PCS | Mod: CPTII,,, | Performed by: NURSE PRACTITIONER

## 2022-10-20 PROCEDURE — 3075F PR MOST RECENT SYSTOLIC BLOOD PRESS GE 130-139MM HG: ICD-10-PCS | Mod: CPTII,,, | Performed by: NURSE PRACTITIONER

## 2022-10-20 PROCEDURE — 3075F SYST BP GE 130 - 139MM HG: CPT | Mod: CPTII,,, | Performed by: NURSE PRACTITIONER

## 2022-10-20 PROCEDURE — 99213 PR OFFICE/OUTPT VISIT, EST, LEVL III, 20-29 MIN: ICD-10-PCS | Mod: S$PBB,,, | Performed by: NURSE PRACTITIONER

## 2022-10-20 PROCEDURE — 4010F ACE/ARB THERAPY RXD/TAKEN: CPT | Mod: CPTII,,, | Performed by: NURSE PRACTITIONER

## 2022-10-20 PROCEDURE — 3044F PR MOST RECENT HEMOGLOBIN A1C LEVEL <7.0%: ICD-10-PCS | Mod: CPTII,,, | Performed by: NURSE PRACTITIONER

## 2022-10-20 PROCEDURE — 3044F HG A1C LEVEL LT 7.0%: CPT | Mod: CPTII,,, | Performed by: NURSE PRACTITIONER

## 2022-10-20 PROCEDURE — 99999 PR PBB SHADOW E&M-EST. PATIENT-LVL III: ICD-10-PCS | Mod: PBBFAC,,, | Performed by: NURSE PRACTITIONER

## 2022-10-20 PROCEDURE — 99213 OFFICE O/P EST LOW 20 MIN: CPT | Mod: S$PBB,,, | Performed by: NURSE PRACTITIONER

## 2022-10-20 PROCEDURE — 4010F PR ACE/ARB THEARPY RXD/TAKEN: ICD-10-PCS | Mod: CPTII,,, | Performed by: NURSE PRACTITIONER

## 2022-10-20 PROCEDURE — 99999 PR PBB SHADOW E&M-EST. PATIENT-LVL III: CPT | Mod: PBBFAC,,, | Performed by: NURSE PRACTITIONER

## 2022-10-20 PROCEDURE — 99213 OFFICE O/P EST LOW 20 MIN: CPT | Mod: PBBFAC,PO | Performed by: NURSE PRACTITIONER

## 2022-10-20 RX ORDER — AMLODIPINE BESYLATE 5 MG/1
5 TABLET ORAL DAILY
COMMUNITY
Start: 2022-10-17 | End: 2023-09-01

## 2022-10-20 RX ORDER — OLMESARTAN MEDOXOMIL 40 MG/1
40 TABLET ORAL EVERY MORNING
COMMUNITY
Start: 2022-10-17 | End: 2023-05-29 | Stop reason: SDUPTHER

## 2022-10-20 NOTE — PROGRESS NOTES
10/20/2022    Helder Brand  Follow up    Chief Complaint   Patient presents with    3m f/u    COPD    Abnormal Ct Scan       HPI:   10/20/22- had COVID 19 July 2022, states no current complaint of shortness of breath, cough, chest tightness, or wheeze. not currently using advair or nebulizer machine.   Left DVT in August currently on Eliquis,     6/1/2022- states breathing is improved after started nebulizer as needed. PCP treated with antibiotics for productive green mucous in April, resolved with therapy.   SOB- recurrent complaint, had trouble breathing while laying down improved with albuterol inhaler. Associated with chest tightness and wheeze in late evenings, most nights. Nocturnal arousals 1x weekly. States doing better while on antibiotics.   On advair most day, sometimes forgets. Started on Trelegy but insurance coverage is not affordable.   Able to work in garden but still having to take breaks. Worse in high heat.   Declined sleep apnea therapy.     3/9/2022-Cough- worsened in past 2 weeks, has to sleep in recliner, coughing fits at night. Productive thick mucous green in color. Using nebulizer 3x daily with benefit for few hours.   SOB- severe, worse with exertion, associated with wheeze and chest tightness.     States was previously doing well, able to do gardening for 10 minutes then having to rest. Not using stiolto due to difficultly with device.       11/19/2021- Complaint of worsening cough- onset 2 weeks, tx by PCP with azithromycin and Levaquin with minimal benefit. Coughing through out night. Productive thick green mucous that has turned yellow in color.   Associated with chest tightness and wheeze.        10/06/2021- since last visit pt had blood clot to left popliteal artery and required vascular surgery. He has a L leg wound vacc and getting wound care now.   He hasn't smoked for 40 yrs. Feels some mild throat clearing w/ cough.   Used to swim a lot when young. Worked [employer] 8-10 yrs and  got lots of exercise.  Mother smoked a little and got bad copd.    6/17/21-  Need disc of images from [facility]- please bring disc from home and drop off to our office. Once I review the images I can give more advice- possible biopsy?  Get pulmonary function tests  Follow up with cardiologist  Pt is a 60 yo male with HTN, thyroid disease presenting for new evaluation.  Pt reports he had abnormal chest x-ray which was found after he had episode of chest tightness.  Has been getting these episodes of pressure like anterior chest discomfort, off and on for several months, usually while at rest and lasts few minutes. He rides bike 3-6 miles a day and denies LAW or chest tightness w/ exertion. Sometimes has slight wheeze when laying down at night but no cough/mucous. No inhaler use. No childhood asthma or breathing trouble. Denies frequent bronchitis.  Secondhand smoke from grandparents and parents- teenage cigarette smoke but quit at age 17. Mother smoked and now on hospice with COPD.  Pt had CT chest after abnormality seen on CXR 3/2021 with RLL nodule- forgot disc at home. (done at [facility])  He is going to have an echo at 3pm today.  In past had growth on thyroid- benign, had partial thyroidectomy.  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 Denies asbestos or other exposures    The chief complaint problem varies with instablilty at time      PFSH:  Past Medical History:   Diagnosis Date    Emphysema lung 2021    Enlarged prostate     Hyperlipidemia 2021    Hypertension 2001    Kidney stone 2001    x3    Thyroid disease     benign growth, partial thyroidectomy         Past Surgical History:   Procedure Laterality Date    ANGIOGRAPHY OF LOWER EXTREMITY Left 8/5/2022    Procedure: Angiogram Extremity Unilateral;  Surgeon: Ali Khoobehi, MD;  Location: [facility] CATH/EP LAB;  Service: Peripheral Vascular;  Laterality: Left;    ANGIOGRAPHY OF LOWER EXTREMITY Left 8/6/2022    Procedure: Angioplasty-peripheral;  Surgeon: Ismael Esquivel MD;   Location: [facility] CATH/EP LAB;  Service: General;  Laterality: Left;    ANGIOGRAPHY OF LOWER EXTREMITY Left 2022    Procedure: Angiogram Extremity Unilateral;  Surgeon: Ismael Esquivel MD;  Location: [facility] CATH/EP LAB;  Service: General;  Laterality: Left;    ANGIOGRAPHY OF LOWER EXTREMITY Left 2022    Procedure: Angiogram Extremity Unilateral;  Surgeon: Ismael Esquivel MD;  Location: [facility] CATH/EP LAB;  Service: General;  Laterality: Left;    INJECTION OF TISSUE PLASMINOGEN ACTIVATOR Left 2022    Procedure: INJECTION, TISSUE PLASMINOGEN ACTIVATOR;  Surgeon: Ismael Esquivel MD;  Location: [facility] CATH/EP LAB;  Service: General;  Laterality: Left;    INJECTION OF TISSUE PLASMINOGEN ACTIVATOR Left 2022    Procedure: INJECTION, TISSUE PLASMINOGEN ACTIVATOR;  Surgeon: Ismael Esquivel MD;  Location: [facility] CATH/EP LAB;  Service: General;  Laterality: Left;    PROSTATE SURGERY  2018    REPAIR OF ANEURYSM Left 2021    Procedure: REPAIR POPLITEAL ARTERY ANEURYSM;  Surgeon: Ismael Esquivel MD;  Location: [facility] OR;  Service: Cardiovascular;  Laterality: Left;    THROMBECTOMY  2022    Procedure: THROMBECTOMY;  Surgeon: Ismael Esquivel MD;  Location: [facility] CATH/EP LAB;  Service: General;;    THROMBECTOMY Left 2022    Procedure: THROMBECTOMY;  Surgeon: Ismael Esquivel MD;  Location: [facility] CATH/EP LAB;  Service: General;  Laterality: Left;    THYROIDECTOMY, PARTIAL  2011    TONSILLECTOMY      as a child    urolift  2019    [facility]     Social History     Tobacco Use    Smoking status: Former     Packs/day: 0.25     Years: 5.00     Pack years: 1.25     Types: Cigarettes     Quit date: 1980     Years since quittin.9    Smokeless tobacco: Never   Substance Use Topics    Alcohol use: Not Currently     Alcohol/week: 2.0 standard drinks     Types: 2 Cans of beer per week     Family History   Problem Relation Age of Onset    Hypertension Mother     COPD Mother     Pulmonary embolism Father       Review of patient's allergies indicates:  No Known Allergies    Performance Status:The patient's activity level is regular exercise.      Review of Systems:  a review of eleven systems covering constitutional, Eye, HEENT, Psych, Respiratory, Cardiac, GI, , Musculoskeletal, Endocrine, Dermatologic was negative except for pertinent findings as listed ABOVE and below:  All negative with pertinent positives as above       Exam:Comprehensive exam done. /89 (BP Location: Right arm, Patient Position: Sitting, BP Method: Medium (Automatic))   Pulse 63   Ht 6' (1.829 m)   Wt 84.2 kg (185 lb 11.8 oz)   SpO2 98% Comment: on room air at rest  BMI 25.19 kg/m²   Exam included Vitals as listed, and patient's appearance and affect and alertness and mood, oral exam for yeast and hygiene and pharynx lesions and Mallapatti (M) score, neck with inspection for jvd and masses and thyroid abnormalities and lymph nodes (supraclavicular and infraclavicular nodes and axillary also examined and noted if abn), chest exam included symmetry and effort and fremitus and percussion and auscultation, cardiac exam included rhythm and gallops and murmur and rubs and jvd and edema, abdominal exam for mass and hepatosplenomegaly and tenderness and hernias and bowel sounds, Musculoskeletal exam with muscle tone and posture and mobility/gait and  strength, and skin for rashes and cyanosis and pallor and turgor, extremity for clubbing.  Findings were normal except for pertinent findings listed below:  Thin, athletic build  Lungs clear        Radiographs (ct chest and cxr) reviewed: view by direct vision   CTA Chest Non-Coronary 08/13/22 no PE seen; right lower lung nodule decreased in size, left is stable    [facility] Chest x-ray: 4/21/22 findings appear consitent wtih chronic small airwasy disease. no consolidation or other adute process in evident, no significnat or detrimental interval changes in comparison to CXR 11/15/2021      X-Ray  Chest AP Portable 01/02/22    Minimal basilar parenchymal opacities or atelectasis.  Small nodular opacity observed in the right lung base laterally.      CTA Chest Non-Coronary (PE Study) 01/03/22 Bibasilar atelectasis/infiltrate. Pneumonia must be considered     Outside CT (uploaded) chest 6/9/21- mild linear atelectasis bilat lower lobes, mild pleural based nodules which look like rounded atelectasis.  CXR 3/9/21- RLL nodule  CT abd/p 8/2019- rounded atelectasis bibasilar present at that time    Labs reviewed    3/2021- wnl  Lab Results   Component Value Date    WBC 8.11 08/13/2022    RBC 4.16 (L) 08/13/2022    HGB 13.3 (L) 08/13/2022    HCT 38.9 (L) 08/13/2022    MCV 94 08/13/2022    MCH 32.0 (H) 08/13/2022    MCHC 34.2 08/13/2022    RDW 13.8 08/13/2022     08/13/2022    MPV 9.0 (L) 08/13/2022    GRAN 4.3 08/13/2022    GRAN 52.8 08/13/2022    LYMPH 2.5 08/13/2022    LYMPH 30.5 08/13/2022    MONO 0.9 08/13/2022    MONO 11.6 08/13/2022    EOS 0.4 08/13/2022    BASO 0.05 08/13/2022    EOSINOPHIL 4.3 08/13/2022    BASOPHIL 0.6 08/13/2022         Culture, Respiratory with Gram Stain 03/10/22   Normal respiratory whitney       PFT reviewed- mild obstruction, increased lung vol. DLCO normal          Plan:  Clinical impression is apparently straight forward and impression with management as below.    Helder was seen today for 3m f/u, copd and abnormal ct scan.    Diagnoses and all orders for this visit:    Lung nodule   - improved, continue to monitor    Chronic obstructive pulmonary disease, unspecified COPD type   - continue current medication regiment     Follow up in about 1 year (around 10/20/2023), or if symptoms worsen or fail to improve.    Discussed with patient above for education the following:      Patient Instructions   Continue current medication regiment

## 2022-11-15 ENCOUNTER — HOSPITAL ENCOUNTER (EMERGENCY)
Facility: HOSPITAL | Age: 62
Discharge: HOME | End: 2022-11-16
Attending: EMERGENCY MEDICINE
Payer: MEDICAID

## 2022-11-15 DIAGNOSIS — I10 HYPERTENSION, UNSPECIFIED TYPE: Primary | ICD-10-CM

## 2022-11-15 DIAGNOSIS — R07.9 CHEST PAIN: ICD-10-CM

## 2022-11-15 LAB
BASOPHILS # BLD AUTO: 0.07 K/UL (ref 0–0.2)
BASOPHILS NFR BLD: 0.8 % (ref 0–1.9)
DIFFERENTIAL METHOD: ABNORMAL
EOSINOPHIL # BLD AUTO: 0.3 K/UL (ref 0–0.5)
EOSINOPHIL NFR BLD: 3.8 % (ref 0–8)
ERYTHROCYTE [DISTWIDTH] IN BLOOD BY AUTOMATED COUNT: 13.1 % (ref 11.5–14.5)
HCT VFR BLD AUTO: 48.2 % (ref 40–54)
HGB BLD-MCNC: 16.1 G/DL (ref 14–18)
IMM GRANULOCYTES # BLD AUTO: 0.01 K/UL (ref 0–0.04)
IMM GRANULOCYTES NFR BLD AUTO: 0.1 % (ref 0–0.5)
LYMPHOCYTES # BLD AUTO: 3.3 K/UL (ref 1–4.8)
LYMPHOCYTES NFR BLD: 39.5 % (ref 18–48)
MCH RBC QN AUTO: 30.7 PG (ref 27–31)
MCHC RBC AUTO-ENTMCNC: 33.4 G/DL (ref 32–36)
MCV RBC AUTO: 92 FL (ref 82–98)
MONOCYTES # BLD AUTO: 1.3 K/UL (ref 0.3–1)
MONOCYTES NFR BLD: 15.2 % (ref 4–15)
NEUTROPHILS # BLD AUTO: 3.4 K/UL (ref 1.8–7.7)
NEUTROPHILS NFR BLD: 40.6 % (ref 38–73)
NRBC BLD-RTO: 0 /100 WBC
PLATELET # BLD AUTO: 233 K/UL (ref 150–450)
PMV BLD AUTO: 9 FL (ref 9.2–12.9)
RBC # BLD AUTO: 5.24 M/UL (ref 4.6–6.2)
WBC # BLD AUTO: 8.43 K/UL (ref 3.9–12.7)

## 2022-11-15 PROCEDURE — 99284 EMERGENCY DEPT VISIT MOD MDM: CPT | Mod: 25

## 2022-11-15 PROCEDURE — 85025 COMPLETE CBC W/AUTO DIFF WBC: CPT | Performed by: NURSE PRACTITIONER

## 2022-11-15 PROCEDURE — 83880 ASSAY OF NATRIURETIC PEPTIDE: CPT | Performed by: NURSE PRACTITIONER

## 2022-11-15 PROCEDURE — 84484 ASSAY OF TROPONIN QUANT: CPT | Performed by: NURSE PRACTITIONER

## 2022-11-15 PROCEDURE — 93010 EKG 12-LEAD: ICD-10-PCS | Mod: ,,, | Performed by: INTERNAL MEDICINE

## 2022-11-15 PROCEDURE — 93010 ELECTROCARDIOGRAM REPORT: CPT | Mod: ,,, | Performed by: INTERNAL MEDICINE

## 2022-11-15 PROCEDURE — 93005 ELECTROCARDIOGRAM TRACING: CPT | Performed by: INTERNAL MEDICINE

## 2022-11-15 PROCEDURE — 80053 COMPREHEN METABOLIC PANEL: CPT | Performed by: NURSE PRACTITIONER

## 2022-11-16 VITALS
SYSTOLIC BLOOD PRESSURE: 136 MMHG | BODY MASS INDEX: 25.47 KG/M2 | HEIGHT: 72 IN | HEART RATE: 58 BPM | DIASTOLIC BLOOD PRESSURE: 95 MMHG | TEMPERATURE: 98 F | RESPIRATION RATE: 18 BRPM | OXYGEN SATURATION: 95 % | WEIGHT: 188 LBS

## 2022-11-16 PROBLEM — R07.9 CHEST PAIN: Status: ACTIVE | Noted: 2022-11-16

## 2022-11-16 LAB
ALBUMIN SERPL BCP-MCNC: 4 G/DL (ref 3.5–5.2)
ALP SERPL-CCNC: 77 U/L (ref 55–135)
ALT SERPL W/O P-5'-P-CCNC: 19 U/L (ref 10–44)
ANION GAP SERPL CALC-SCNC: 5 MMOL/L (ref 8–16)
AST SERPL-CCNC: 26 U/L (ref 10–40)
BILIRUB SERPL-MCNC: 0.7 MG/DL (ref 0.1–1)
BNP SERPL-MCNC: 27 PG/ML (ref 0–99)
BUN SERPL-MCNC: 14 MG/DL (ref 8–23)
CALCIUM SERPL-MCNC: 9.1 MG/DL (ref 8.7–10.5)
CHLORIDE SERPL-SCNC: 105 MMOL/L (ref 95–110)
CO2 SERPL-SCNC: 27 MMOL/L (ref 23–29)
CREAT SERPL-MCNC: 0.9 MG/DL (ref 0.5–1.4)
EST. GFR  (NO RACE VARIABLE): >60 ML/MIN/1.73 M^2
GLUCOSE SERPL-MCNC: 89 MG/DL (ref 70–110)
POTASSIUM SERPL-SCNC: 4 MMOL/L (ref 3.5–5.1)
PROT SERPL-MCNC: 7.5 G/DL (ref 6–8.4)
SODIUM SERPL-SCNC: 137 MMOL/L (ref 136–145)
TROPONIN I SERPL HS-MCNC: 3.2 PG/ML (ref 0–14.9)
TROPONIN I SERPL HS-MCNC: 4 PG/ML (ref 0–14.9)

## 2022-11-16 PROCEDURE — 25000003 PHARM REV CODE 250: Performed by: INTERNAL MEDICINE

## 2022-11-16 PROCEDURE — 84484 ASSAY OF TROPONIN QUANT: CPT | Performed by: NURSE PRACTITIONER

## 2022-11-16 RX ORDER — HYDROCHLOROTHIAZIDE 12.5 MG/1
12.5 TABLET ORAL DAILY
Status: DISCONTINUED | OUTPATIENT
Start: 2022-11-16 | End: 2022-11-16 | Stop reason: HOSPADM

## 2022-11-16 RX ADMIN — HYDROCHLOROTHIAZIDE 12.5 MG: 12.5 TABLET ORAL at 05:11

## 2022-11-16 NOTE — ED PROVIDER NOTES
Encounter Date: 11/15/2022       History     Chief Complaint   Patient presents with    Chest Pain     Patient presents emergency department with reported chest pain associated shortness of breath chest pain on exertion noted tonight patient states that blood pressure has been elevated as well when he checked it patient does have a history of hypertension hyperlipidemia he is a history of peripheral arterial disease and apparently sustained a arterial thrombus with resultant thrombectomy after developing omicron he is currently on Eliquis he has a family history of coronary disease in his father    He did have a blood clot after omicron is currently on Eliquis  Review of patient's allergies indicates:  No Known Allergies  Past Medical History:   Diagnosis Date    Emphysema lung 2021    Enlarged prostate     Hyperlipidemia 2021    Hypertension 2001    Kidney stone 2001    x3    Thyroid disease     benign growth, partial thyroidectomy     Past Surgical History:   Procedure Laterality Date    ANGIOGRAPHY OF LOWER EXTREMITY Left 8/5/2022    Procedure: Angiogram Extremity Unilateral;  Surgeon: Ali Khoobehi, MD;  Location: [facility] CATH/EP LAB;  Service: Peripheral Vascular;  Laterality: Left;    ANGIOGRAPHY OF LOWER EXTREMITY Left 8/6/2022    Procedure: Angioplasty-peripheral;  Surgeon: Ismael Esquivel MD;  Location: [facility] CATH/EP LAB;  Service: General;  Laterality: Left;    ANGIOGRAPHY OF LOWER EXTREMITY Left 8/6/2022    Procedure: Angiogram Extremity Unilateral;  Surgeon: Ismael Esquivel MD;  Location: [facility] CATH/EP LAB;  Service: General;  Laterality: Left;    ANGIOGRAPHY OF LOWER EXTREMITY Left 8/7/2022    Procedure: Angiogram Extremity Unilateral;  Surgeon: Ismael Esquivel MD;  Location: [facility] CATH/EP LAB;  Service: General;  Laterality: Left;    INJECTION OF TISSUE PLASMINOGEN ACTIVATOR Left 8/6/2022    Procedure: INJECTION, TISSUE PLASMINOGEN ACTIVATOR;  Surgeon: Ismael Esquivel MD;  Location: [facility] CATH/EP LAB;   Service: General;  Laterality: Left;    INJECTION OF TISSUE PLASMINOGEN ACTIVATOR Left 2022    Procedure: INJECTION, TISSUE PLASMINOGEN ACTIVATOR;  Surgeon: Ismael Esquivel MD;  Location: [facility] CATH/EP LAB;  Service: General;  Laterality: Left;    PROSTATE SURGERY  2018    REPAIR OF ANEURYSM Left 2021    Procedure: REPAIR POPLITEAL ARTERY ANEURYSM;  Surgeon: Ismael Esquivel MD;  Location: [facility] OR;  Service: Cardiovascular;  Laterality: Left;    THROMBECTOMY  2022    Procedure: THROMBECTOMY;  Surgeon: Ismael Esquivel MD;  Location: [facility] CATH/EP LAB;  Service: General;;    THROMBECTOMY Left 2022    Procedure: THROMBECTOMY;  Surgeon: Ismael Esquivel MD;  Location: [facility] CATH/EP LAB;  Service: General;  Laterality: Left;    THYROIDECTOMY, PARTIAL  2011    TONSILLECTOMY      as a child    urolift  2019    [facility]     Family History   Problem Relation Age of Onset    Hypertension Mother     COPD Mother     Pulmonary embolism Father      Social History     Tobacco Use    Smoking status: Former     Packs/day: 0.25     Years: 5.00     Pack years: 1.25     Types: Cigarettes     Quit date: 1980     Years since quittin.0    Smokeless tobacco: Never   Substance Use Topics    Alcohol use: Not Currently     Alcohol/week: 2.0 standard drinks     Types: 2 Cans of beer per week     Review of Systems   Constitutional:  Positive for fatigue. Negative for chills, diaphoresis and fever.   HENT:  Negative for congestion.    Respiratory:  Positive for shortness of breath.    Cardiovascular:  Positive for chest pain. Negative for palpitations and leg swelling.   Gastrointestinal:  Negative for abdominal pain.   Genitourinary:  Negative for dysuria.   All other systems reviewed and are negative.    Physical Exam     Initial Vitals [11/15/22 2253]   BP Pulse Resp Temp SpO2   (!) 184/116 62 18 98.4 °F (36.9 °C) 96 %      MAP       --         Physical Exam    Constitutional: He appears well-developed and  well-nourished. No distress.   HENT:   Head: Normocephalic and atraumatic.   Right Ear: External ear normal.   Left Ear: External ear normal.   Mouth/Throat: Oropharynx is clear and moist.   Eyes: EOM are normal. Pupils are equal, round, and reactive to light.   Neck: Neck supple.   Normal range of motion.  Cardiovascular:  Normal rate, regular rhythm, S1 normal, S2 normal, normal heart sounds and intact distal pulses.           Pulmonary/Chest: Breath sounds normal.   Abdominal: Abdomen is soft. Bowel sounds are normal. There is no abdominal tenderness.   Musculoskeletal:         General: Normal range of motion.      Cervical back: Normal range of motion and neck supple.     Neurological: He is alert and oriented to person, place, and time. GCS eye subscore is 4. GCS verbal subscore is 5. GCS motor subscore is 6.   Skin: Skin is warm and dry. No rash noted.   Psychiatric: He has a normal mood and affect. His behavior is normal.       ED Course   Procedures  Labs Reviewed   CBC W/ AUTO DIFFERENTIAL - Abnormal; Notable for the following components:       Result Value    MPV 9.0 (*)     Mono # 1.3 (*)     Mono % 15.2 (*)     All other components within normal limits   COMPREHENSIVE METABOLIC PANEL - Abnormal; Notable for the following components:    Anion Gap 5 (*)     All other components within normal limits   TROPONIN I HIGH SENSITIVITY   B-TYPE NATRIURETIC PEPTIDE   TROPONIN I HIGH SENSITIVITY        ECG Results              EKG 12-lead (In process)  Result time 11/16/22 05:17:11      In process by Interface, Lab In [facility] (11/16/22 05:17:11)                   Narrative:    Test Reason : R07.9,    Vent. Rate : 057 BPM     Atrial Rate : 057 BPM     P-R Int : 208 ms          QRS Dur : 096 ms      QT Int : 416 ms       P-R-T Axes : 066 031 064 degrees     QTc Int : 404 ms    Sinus bradycardia  Otherwise normal ECG  When compared with ECG of 15-NOV-2022 22:54,  No significant change was found    Referred By:  [email or web address]   SELF           Confirmed By:                                   Imaging Results              X-Ray Chest AP Portable (Final result)  Result time 11/16/22 07:15:38      Final result by Mohsen Mercado MD (11/16/22 07:15:38)                   Narrative:    HISTORY: Chest Pain    FINDINGS: Portable chest radiograph at 2323 hours compared to 01/02/2022 shows the cardiomediastinal silhouette and pulmonary vasculature are within normal limits.    The lungs are normally expanded, with no consolidation, large pleural effusion, or evidence of pulmonary edema. No confluent infiltrates or pneumothorax. There are no significant osseous abnormalities.    IMPRESSION: No evidence of active cardiopulmonary disease.    Electronically signed by:  Mohsen Mercado MD  11/16/2022 7:15 AM CST Workstation: [id number]                                     Medications - No data to display  Medical Decision Making:   ED Management:  EKG shows no acute abnormalities patient's blood pressure has improved in the emergency department he is received aspirin in the ER I discussed patient's findings with Dr. Mitchell who will evaluate patient in the ER                        Clinical Impression:   Final diagnoses:  [R07.9] Chest pain  [I10] Hypertension, unspecified type (Primary)        ED Disposition Condition    Discharge Stable          ED Prescriptions    None       Follow-up Information       Follow up With Specialties Details Why Contact Info Additional Information    Karlos Woodruff MD Internal Medicine Call today  [address] [place] [address] [address]  [facility] [address]  [phone]       [facility] - Emergency Dept Emergency Medicine Go to  If symptoms worsen [address] [facility] [address]  [phone] 1st floor    Carlos Hardy MD Cardiology, Interventional Cardiology Schedule an appointment as soon as possible for a visit   [address]  [address]  [place]  [address]  [phone]                Caio Moulton MD  11/16/22 1928

## 2022-11-16 NOTE — CONSULTS
[facility] - Emergency Dept  Hospital Medicine  Consult Note    Patient Name: Helder Brand  MRN: [id number]  Admission Date: 11/15/2022  Hospital Length of Stay: 0 days  Attending Physician: Yessi Mitchell MD  Primary Care Provider: Karlos Woodruff MD           Patient information was obtained from patient and ER records.     Consults  Subjective:     Principal Problem: Chest pain    Chief Complaint:   Chief Complaint   Patient presents with    Chest Pain        HPI: 62 year old male with PMHx HTN, PAD with LE arterial thrombosis requiring thrombectomy presents to the ED with elevated blood pressures at home and chest pressure.  He reports that since his vascular procedure, his blood pressure had been running on the lower side and he had been off some of his blood pressure medication.  His outpatient MD had been watching it with the expectation that his blood pressure would start going back up and he has been titrating his blood pressure medication as it has risen. His Lisinopril was changed to Olmesartan.  He is on Coreg.  He did not like the way Norvasc made him feel and so he is not on Norvasc.  He states he was recently started on HCTZ 12.5mg and has the prescription at home but has not started it.  He is keeping a blood pressure log. This evening, he noted his pressure was higher than usual prior to getting into the shower.  He rechecked it, noted it was still rising and got nervous.  He then developed some mid chest pressure.  When SBP got to 180, he decided to come to the ED for evaluation. Since presentation, HsTrop x 2 has been normal.  His blood pressure has spontaneously improved without any medication but is still on the high side for him.  It is 140/100 at the time of my exam.  His chest pressure resolved with improvement of his blood pressure.  Per chart review, he had a stress test 7/2021 that was negative for any reversible ischemia.  EKG with no ischemic features.  He would  prefer to go home and follow up with his outpatient MD.  Given 2 negative troponin and no ischemic features on EKG, I do feel this is reasonable.  I have ordered HCTZ now in the ED and have discussed with ED provider.      Past Medical History:   Diagnosis Date    Emphysema lung 2021    Enlarged prostate     Hyperlipidemia 2021    Hypertension 2001    Kidney stone 2001    x3    Thyroid disease     benign growth, partial thyroidectomy       Past Surgical History:   Procedure Laterality Date    ANGIOGRAPHY OF LOWER EXTREMITY Left 8/5/2022    Procedure: Angiogram Extremity Unilateral;  Surgeon: Ali Khoobehi, MD;  Location: [facility] CATH/EP LAB;  Service: Peripheral Vascular;  Laterality: Left;    ANGIOGRAPHY OF LOWER EXTREMITY Left 8/6/2022    Procedure: Angioplasty-peripheral;  Surgeon: Ismael Esquivel MD;  Location: [facility] CATH/EP LAB;  Service: General;  Laterality: Left;    ANGIOGRAPHY OF LOWER EXTREMITY Left 8/6/2022    Procedure: Angiogram Extremity Unilateral;  Surgeon: Ismael Esquivel MD;  Location: [facility] CATH/EP LAB;  Service: General;  Laterality: Left;    ANGIOGRAPHY OF LOWER EXTREMITY Left 8/7/2022    Procedure: Angiogram Extremity Unilateral;  Surgeon: Ismael Esquivel MD;  Location: [facility] CATH/EP LAB;  Service: General;  Laterality: Left;    INJECTION OF TISSUE PLASMINOGEN ACTIVATOR Left 8/6/2022    Procedure: INJECTION, TISSUE PLASMINOGEN ACTIVATOR;  Surgeon: Ismael Esquivel MD;  Location: [facility] CATH/EP LAB;  Service: General;  Laterality: Left;    INJECTION OF TISSUE PLASMINOGEN ACTIVATOR Left 8/6/2022    Procedure: INJECTION, TISSUE PLASMINOGEN ACTIVATOR;  Surgeon: Ismael Eqsuivel MD;  Location: [facility] CATH/EP LAB;  Service: General;  Laterality: Left;    PROSTATE SURGERY  2018    REPAIR OF ANEURYSM Left 7/21/2021    Procedure: REPAIR POPLITEAL ARTERY ANEURYSM;  Surgeon: Ismael Esquivel MD;  Location: [facility] OR;  Service: Cardiovascular;  Laterality: Left;    THROMBECTOMY  8/6/2022    Procedure:  THROMBECTOMY;  Surgeon: Ismael Esquivel MD;  Location: [facility] CATH/EP LAB;  Service: General;;    THROMBECTOMY Left 8/6/2022    Procedure: THROMBECTOMY;  Surgeon: Ismael Esquivel MD;  Location: [facility] CATH/EP LAB;  Service: General;  Laterality: Left;    THYROIDECTOMY, PARTIAL  2011    TONSILLECTOMY      as a child    [facility]rolift  04/2019    [facility]       Review of patient's allergies indicates:  No Known Allergies    Current Facility-Administered Medications on File Prior to Encounter   Medication    lactated ringers infusion     Current Outpatient Medications on File Prior to Encounter   Medication Sig    albuterol (PROVENTIL/VENTOLIN HFA) 90 mcg/actuation inhaler Inhale 2 puffs into the lungs every 6 (six) hours as needed.     albuterol-ipratropium (DUO-NEB) 2.5 mg-0.5 mg/3 mL nebulizer solution Take 3 mLs by nebulization every 6 (six) hours as needed for Wheezing or Shortness of Breath. Rescue    amLODIPine (NORVASC) 5 MG tablet Take 5 mg by mouth once daily.    azelastine (ASTELIN) 137 mcg (0.1 %) nasal spray 1 spray (137 mcg total) by Nasal route 2 (two) times daily.    carvediloL (COREG) 12.5 MG tablet Take 12.5 mg by mouth 2 (two) times daily.    fluticasone propionate (FLONASE) 50 mcg/actuation nasal spray 2 sprays by Each Nostril route daily as needed.     fluticasone-salmeterol 230-21 mcg/dose (ADVAIR HFA) 230-21 mcg/actuation HFAA inhaler Inhale 2 puffs into the lungs 2 (two) times daily. Controller    folic acid-vit B6-vit B12 2.5-25-2 mg (FOLBIC OR EQUIV) 2.5-25-2 mg Tab Take 1 tablet by mouth once daily.    losartan (COZAAR) 25 MG tablet TAKE 1 TABLET BY MOUTH IN THE EVENING    mucus clearing device (ACAPELLA, FLUTTER) 1 Device by Misc.(Non-Drug; Combo Route) route 2 (two) times daily.    olmesartan (BENICAR) 40 MG tablet Take 40 mg by mouth every morning.     Family History       Problem Relation (Age of Onset)    COPD Mother    Hypertension Mother    Pulmonary embolism Father           Tobacco Use    Smoking status: Former     Packs/day: 0.25     Years: 5.00     Pack years: 1.25     Types: Cigarettes     Quit date: 1980     Years since quittin.0    Smokeless tobacco: Never   Substance and Sexual Activity    Alcohol use: Not Currently     Alcohol/week: 2.0 standard drinks     Types: 2 Cans of beer per week    Drug use: Not on file    Sexual activity: Not on file     Review of Systems   Constitutional: Negative.    HENT: Negative.     Eyes: Negative.    Respiratory: Negative.     Cardiovascular:  Positive for chest pain.   Gastrointestinal: Negative.    Endocrine: Negative.    Genitourinary: Negative.    Musculoskeletal: Negative.    Skin: Negative.    Allergic/Immunologic: Negative.    Neurological: Negative.    Hematological: Negative.    Psychiatric/Behavioral: Negative.     Objective:     Vital Signs (Most Recent):  Temp: 98.4 °F (36.9 °C) (11/15/22 2253)  Pulse: (!) 49 (22)  Resp: 19 (22)  BP: (!) 139/98 (22)  SpO2: 96 % (22)   Vital Signs (24h Range):  Temp:  [98.4 °F (36.9 °C)] 98.4 °F (36.9 °C)  Pulse:  [49-62] 49  Resp:  [12-19] 19  SpO2:  [94 %-96 %] 96 %  BP: (128-184)/() 139/98     Weight: 85.3 kg (188 lb)  Body mass index is 25.5 kg/m².    Physical Exam  Vitals and nursing note reviewed.   Constitutional:       General: He is not in acute distress.     Appearance: He is well-developed.   HENT:      Head: Normocephalic and atraumatic.   Eyes:      Pupils: Pupils are equal, round, and reactive to light.   Cardiovascular:      Rate and Rhythm: Regular rhythm.      Heart sounds: No murmur heard.  Pulmonary:      Effort: Pulmonary effort is normal.      Breath sounds: Normal breath sounds. No wheezing.   Abdominal:      General: There is no distension.      Palpations: Abdomen is soft.      Tenderness: There is no abdominal tenderness. There is no guarding or rebound.   Musculoskeletal:         General: Normal range of  motion.      Cervical back: Normal range of motion.   Skin:     Findings: No rash.   Neurological:      Mental Status: He is alert and oriented to person, place, and time.      Cranial Nerves: No cranial nerve deficit.      Sensory: No sensory deficit.       Significant Labs: All pertinent labs within the past 24 hours have been reviewed.  CBC:   Recent Labs   Lab 11/15/22  2338   WBC 8.43   HGB 16.1   HCT 48.2        CMP:   Recent Labs   Lab 11/15/22  2338      K 4.0      CO2 27   GLU 89   BUN 14   CREATININE 0.9   CALCIUM 9.1   PROT 7.5   ALBUMIN 4.0   BILITOT 0.7   ALKPHOS 77   AST 26   ALT 19   ANIONGAP 5*     Cardiac Markers:   Recent Labs   Lab 11/15/22  2338   BNP 27     Troponin:   Recent Labs   Lab 11/15/22  2338 11/16/22  0214   TROPONINIHS 3.2 4.0       Significant Imaging: I have reviewed all pertinent imaging results/findings within the past 24 hours.  EKG: I have reviewed all pertinent results/findings within the past 24 hours and my personal findings are: NSR.  No ST changes.  FTW AVR, V1, V2.    Assessment/Plan:     Active Hospital Problems    Diagnosis    *Chest pain    Femoral popliteal artery thrombus    HTN (hypertension)    COPD (chronic obstructive pulmonary disease)       Plan:    -HsTrop x 2 has been normal.  No further chest pressure.  No ischemic features on EKG.  He prefers to go home to follow up with his outpatient MD and I feel this is reasonable. He had a negative stress test approximately one year ago.  -I have ordered HCTZ. If blood pressure improves, I feel he can be discharged home and I recommended to start taking the HCTZ he has at home and follow up with his MD.  -Discussed return precautions for worsening or worrisome symptoms.   VTE Risk Mitigation (From admission, onward)    None              Thank you for your consult. I will sign off. Please contact us if you have any additional questions.    Yessi Mitchell MD  Department of Hospital Medicine    [facility] - Emergency Dept

## 2022-11-16 NOTE — HPI
62 year old male with PMHx HTN, PAD with LE arterial thrombosis requiring thrombectomy presents to the ED with elevated blood pressures at home and chest pressure.  He reports that since his vascular procedure, his blood pressure had been running on the lower side and he had been off some of his blood pressure medication.  His outpatient MD had been watching it with the expectation that his blood pressure would start going back up and he has been titrating his blood pressure medication as it has risen. His Lisinopril was changed to Olmesartan.  He is on Coreg.  He did not like the way Norvasc made him feel and so he is not on Norvasc.  He states he was recently started on HCTZ 12.5mg and has the prescription at home but has not started it.  He is keeping a blood pressure log. This evening, he noted his pressure was higher than usual prior to getting into the shower.  He rechecked it, noted it was still rising and got nervous.  He then developed some mid chest pressure.  When SBP got to 180, he decided to come to the ED for evaluation. Since presentation, HsTrop x 2 has been normal.  His blood pressure has spontaneously improved without any medication but is still on the high side for him.  It is 140/100 at the time of my exam.  His chest pressure resolved with improvement of his blood pressure.  Per chart review, he had a stress test 7/2021 that was negative for any reversible ischemia.  EKG with no ischemic features.  He would prefer to go home and follow up with his outpatient MD.  Given 2 negative troponin and no ischemic features on EKG, I do feel this is reasonable.  I have ordered HCTZ now in the ED and have discussed with ED provider.

## 2022-11-16 NOTE — SUBJECTIVE & OBJECTIVE
Past Medical History:   Diagnosis Date    Emphysema lung 2021    Enlarged prostate     Hyperlipidemia 2021    Hypertension 2001    Kidney stone 2001    x3    Thyroid disease     benign growth, partial thyroidectomy       Past Surgical History:   Procedure Laterality Date    ANGIOGRAPHY OF LOWER EXTREMITY Left 8/5/2022    Procedure: Angiogram Extremity Unilateral;  Surgeon: Ali Khoobehi, MD;  Location: [facility] CATH/EP LAB;  Service: Peripheral Vascular;  Laterality: Left;    ANGIOGRAPHY OF LOWER EXTREMITY Left 8/6/2022    Procedure: Angioplasty-peripheral;  Surgeon: Ismael Esquivel MD;  Location: [facility] CATH/EP LAB;  Service: General;  Laterality: Left;    ANGIOGRAPHY OF LOWER EXTREMITY Left 8/6/2022    Procedure: Angiogram Extremity Unilateral;  Surgeon: Ismael Esquivel MD;  Location: [facility] CATH/EP LAB;  Service: General;  Laterality: Left;    ANGIOGRAPHY OF LOWER EXTREMITY Left 8/7/2022    Procedure: Angiogram Extremity Unilateral;  Surgeon: Ismael Esquivel MD;  Location: [facility] CATH/EP LAB;  Service: General;  Laterality: Left;    INJECTION OF TISSUE PLASMINOGEN ACTIVATOR Left 8/6/2022    Procedure: INJECTION, TISSUE PLASMINOGEN ACTIVATOR;  Surgeon: Ismael Esquivel MD;  Location: [facility] CATH/EP LAB;  Service: General;  Laterality: Left;    INJECTION OF TISSUE PLASMINOGEN ACTIVATOR Left 8/6/2022    Procedure: INJECTION, TISSUE PLASMINOGEN ACTIVATOR;  Surgeon: Ismael Esquivel MD;  Location: [facility] CATH/EP LAB;  Service: General;  Laterality: Left;    PROSTATE SURGERY  2018    REPAIR OF ANEURYSM Left 7/21/2021    Procedure: REPAIR POPLITEAL ARTERY ANEURYSM;  Surgeon: Ismael Esquivel MD;  Location: [facility] OR;  Service: Cardiovascular;  Laterality: Left;    THROMBECTOMY  8/6/2022    Procedure: THROMBECTOMY;  Surgeon: Ismael Esquivel MD;  Location: [facility] CATH/EP LAB;  Service: General;;    THROMBECTOMY Left 8/6/2022    Procedure: THROMBECTOMY;  Surgeon: Ismael Esquivel MD;  Location: [facility] CATH/EP LAB;  Service: General;   Laterality: Left;    THYROIDECTOMY, PARTIAL  2011    TONSILLECTOMY      as a child    urolift  2019    [facility]       Review of patient's allergies indicates:  No Known Allergies    Current Facility-Administered Medications on File Prior to Encounter   Medication    lactated ringers infusion     Current Outpatient Medications on File Prior to Encounter   Medication Sig    albuterol (PROVENTIL/VENTOLIN HFA) 90 mcg/actuation inhaler Inhale 2 puffs into the lungs every 6 (six) hours as needed.     albuterol-ipratropium (DUO-NEB) 2.5 mg-0.5 mg/3 mL nebulizer solution Take 3 mLs by nebulization every 6 (six) hours as needed for Wheezing or Shortness of Breath. Rescue    amLODIPine (NORVASC) 5 MG tablet Take 5 mg by mouth once daily.    azelastine (ASTELIN) 137 mcg (0.1 %) nasal spray 1 spray (137 mcg total) by Nasal route 2 (two) times daily.    carvediloL (COREG) 12.5 MG tablet Take 12.5 mg by mouth 2 (two) times daily.    fluticasone propionate (FLONASE) 50 mcg/actuation nasal spray 2 sprays by Each Nostril route daily as needed.     fluticasone-salmeterol 230-21 mcg/dose (ADVAIR HFA) 230-21 mcg/actuation HFAA inhaler Inhale 2 puffs into the lungs 2 (two) times daily. Controller    folic acid-vit B6-vit B12 2.5-25-2 mg (FOLBIC OR EQUIV) 2.5-25-2 mg Tab Take 1 tablet by mouth once daily.    losartan (COZAAR) 25 MG tablet TAKE 1 TABLET BY MOUTH IN THE EVENING    mucus clearing device (ACAPELLA, FLUTTER) 1 Device by Misc.(Non-Drug; Combo Route) route 2 (two) times daily.    olmesartan (BENICAR) 40 MG tablet Take 40 mg by mouth every morning.     Family History       Problem Relation (Age of Onset)    COPD Mother    Hypertension Mother    Pulmonary embolism Father          Tobacco Use    Smoking status: Former     Packs/day: 0.25     Years: 5.00     Pack years: 1.25     Types: Cigarettes     Quit date: 1980     Years since quittin.0    Smokeless tobacco: Never   Substance and Sexual Activity    Alcohol use:  Not Currently     Alcohol/week: 2.0 standard drinks     Types: 2 Cans of beer per week    Drug use: Not on file    Sexual activity: Not on file     Review of Systems   Constitutional: Negative.    HENT: Negative.     Eyes: Negative.    Respiratory: Negative.     Cardiovascular:  Positive for chest pain.   Gastrointestinal: Negative.    Endocrine: Negative.    Genitourinary: Negative.    Musculoskeletal: Negative.    Skin: Negative.    Allergic/Immunologic: Negative.    Neurological: Negative.    Hematological: Negative.    Psychiatric/Behavioral: Negative.     Objective:     Vital Signs (Most Recent):  Temp: 98.4 °F (36.9 °C) (11/15/22 2253)  Pulse: (!) 49 (11/16/22 0609)  Resp: 19 (11/16/22 0609)  BP: (!) 139/98 (11/16/22 0610)  SpO2: 96 % (11/16/22 0609)   Vital Signs (24h Range):  Temp:  [98.4 °F (36.9 °C)] 98.4 °F (36.9 °C)  Pulse:  [49-62] 49  Resp:  [12-19] 19  SpO2:  [94 %-96 %] 96 %  BP: (128-184)/() 139/98     Weight: 85.3 kg (188 lb)  Body mass index is 25.5 kg/m².    Physical Exam  Vitals and nursing note reviewed.   Constitutional:       General: He is not in acute distress.     Appearance: He is well-developed.   HENT:      Head: Normocephalic and atraumatic.   Eyes:      Pupils: Pupils are equal, round, and reactive to light.   Cardiovascular:      Rate and Rhythm: Regular rhythm.      Heart sounds: No murmur heard.  Pulmonary:      Effort: Pulmonary effort is normal.      Breath sounds: Normal breath sounds. No wheezing.   Abdominal:      General: There is no distension.      Palpations: Abdomen is soft.      Tenderness: There is no abdominal tenderness. There is no guarding or rebound.   Musculoskeletal:         General: Normal range of motion.      Cervical back: Normal range of motion.   Skin:     Findings: No rash.   Neurological:      Mental Status: He is alert and oriented to person, place, and time.      Cranial Nerves: No cranial nerve deficit.      Sensory: No sensory deficit.        Significant Labs: All pertinent labs within the past 24 hours have been reviewed.  CBC:   Recent Labs   Lab 11/15/22  2338   WBC 8.43   HGB 16.1   HCT 48.2        CMP:   Recent Labs   Lab 11/15/22  2338      K 4.0      CO2 27   GLU 89   BUN 14   CREATININE 0.9   CALCIUM 9.1   PROT 7.5   ALBUMIN 4.0   BILITOT 0.7   ALKPHOS 77   AST 26   ALT 19   ANIONGAP 5*     Cardiac Markers:   Recent Labs   Lab 11/15/22  2338   BNP 27     Troponin:   Recent Labs   Lab 11/15/22  2338 11/16/22  0214   TROPONINIHS 3.2 4.0       Significant Imaging: I have reviewed all pertinent imaging results/findings within the past 24 hours.  EKG: I have reviewed all pertinent results/findings within the past 24 hours and my personal findings are: NSR.  No ST changes.  FTW AVR, V1, V2.

## 2023-02-24 NOTE — PROGRESS NOTES
[facility] Hematology/Oncology  PROGRESS NOTE -  Follow-up Visit      Subjective:       Patient ID:   NAME: Helder Brand : 1960     62 y.o. male    Referring Doc: Sierra  Other Physicians: Chapito Raza (CTS)    Chief Complaint:  PAD/clot workup    History of Present Illness:     Patient returns today for a regularly scheduled follow-up visit.  The patient is here today to go over the results of the recently ordered labs, tests and studies. He is here by himself.     He is on eliquis po bid. No excessive bleeding or bruising.     Having some BP issues    Breathing ok, no CP, HA's or N/V    Discussed covid precautions - he had covid on 2022 - he has been vaccinated            ROS:   GEN: normal without any fever, night sweats or weight loss; some residual numbness etc in the LLE>RLE but better  HEENT: normal with no HA's, sore throat, stiff neck, changes in vision  CV: normal with no CP, SOB, PND, LAW or orthopnea  PULM: normal with no SOB, cough, hemoptysis, sputum or pleuritic pain  GI: normal with no abdominal pain, nausea, vomiting, constipation, diarrhea, melanotic stools, BRBPR, or hematemesis  : normal with no hematuria, dysuria  BREAST: normal with no mass, discharge, pain  SKIN: normal with no rash, erythema, bruising, or swelling    Pain Scale: 0    Allergies:  Review of patient's allergies indicates:  No Known Allergies    Medications:    Current Outpatient Medications:     albuterol (PROVENTIL/VENTOLIN HFA) 90 mcg/actuation inhaler, Inhale 2 puffs into the lungs every 6 (six) hours as needed. , Disp: , Rfl:     albuterol-ipratropium (DUO-NEB) 2.5 mg-0.5 mg/3 mL nebulizer solution, Take 3 mLs by nebulization every 6 (six) hours as needed for Wheezing or Shortness of Breath. Rescue, Disp: 240 mL, Rfl: 5    amLODIPine (NORVASC) 5 MG tablet, Take 5 mg by mouth once daily., Disp: , Rfl:     azelastine (ASTELIN) 137 mcg (0.1 %) nasal spray, 1 spray (137 mcg total) by Nasal route 2 (two)  times daily., Disp: 30 mL, Rfl: 0    carvediloL (COREG) 12.5 MG tablet, Take 12.5 mg by mouth 2 (two) times daily., Disp: , Rfl:     fluticasone propionate (FLONASE) 50 mcg/actuation nasal spray, 2 sprays by Each Nostril route daily as needed. , Disp: , Rfl:     fluticasone-salmeterol 230-21 mcg/dose (ADVAIR HFA) 230-21 mcg/actuation HFAA inhaler, Inhale 2 puffs into the lungs 2 (two) times daily. Controller, Disp: 12 g, Rfl: 11    folic acid-vit B6-vit B12 2.5-25-2 mg (FOLBIC OR EQUIV) 2.5-25-2 mg Tab, Take 1 tablet by mouth once daily., Disp: 30 tablet, Rfl: 6    losartan (COZAAR) 25 MG tablet, TAKE 1 TABLET BY MOUTH IN THE EVENING, Disp: 30 tablet, Rfl: 0    mucus clearing device (ACAPELLA, FLUTTER), 1 Device by Misc.(Non-Drug; Combo Route) route 2 (two) times daily., Disp: 1 each, Rfl: 0    olmesartan (BENICAR) 40 MG tablet, Take 40 mg by mouth every morning., Disp: , Rfl:   No current facility-administered medications for this visit.    Facility-Administered Medications Ordered in Other Visits:     lactated ringers infusion, , Intravenous, Continuous, Jasbir Aragon MD, Last Rate: 75 mL/hr at 11/07/19 1333, 1,000 mL at 11/07/19 1333    PMHx/PSHx Updates:  See patient's last visit with me on 8/29/2022  See H&P on 8/7/2022        Pathology:   Cancer Staging   No matching staging information was found for the patient.          Objective:     Vitals:  Blood pressure 138/84, pulse (!) 59, temperature 98.1 °F (36.7 °C), resp. rate 16, height 6' (1.829 m), weight 88.3 kg (194 lb 9.6 oz).    Physical Examination:   GEN: no apparent distress, comfortable; AAOx3  HEAD: atraumatic and normocephalic  EYES: no pallor, no icterus, PERRLA  ENT: OMM, no pharyngeal erythema, external ears WNL; no nasal discharge; no thrush  NECK: no masses, thyroid normal, trachea midline, no LAD/LN's, supple  CV: RRR with no murmur; normal pulse; normal S1 and S2; no pedal edema  CHEST: Normal respiratory effort; CTAB; normal breath  sounds; no wheeze or crackles  ABDOM: nontender and nondistended; soft; normal bowel sounds; no rebound/guarding  MUSC/Skeletal: ROM normal; no crepitus; joints normal; no deformities or arthropathy  EXTREM: no clubbing, cyanosis, inflammation or swelling  SKIN: no rashes, lesions, ulcers, petechiae or subcutaneous nodules  : no dunbar  NEURO: grossly intact; motor/sensory WNL; AAOx3; no tremors  PSYCH: normal mood, affect and behavior  LYMPH: normal cervical, supraclavicular, axillary and groin LN's            Labs:     Lab Results   Component Value Date    WBC 8.43 11/15/2022    HGB 16.1 11/15/2022    HCT 48.2 11/15/2022    MCV 92 11/15/2022     11/15/2022       CMP  Sodium   Date Value Ref Range Status   11/15/2022 137 136 - 145 mmol/L Final     Potassium   Date Value Ref Range Status   11/15/2022 4.0 3.5 - 5.1 mmol/L Final     Chloride   Date Value Ref Range Status   11/15/2022 105 95 - 110 mmol/L Final     CO2   Date Value Ref Range Status   11/15/2022 27 23 - 29 mmol/L Final     Glucose   Date Value Ref Range Status   11/15/2022 89 70 - 110 mg/dL Final     BUN   Date Value Ref Range Status   11/15/2022 14 8 - 23 mg/dL Final     Creatinine   Date Value Ref Range Status   11/15/2022 0.9 0.5 - 1.4 mg/dL Final     Calcium   Date Value Ref Range Status   11/15/2022 9.1 8.7 - 10.5 mg/dL Final     Total Protein   Date Value Ref Range Status   11/15/2022 7.5 6.0 - 8.4 g/dL Final     Albumin   Date Value Ref Range Status   11/15/2022 4.0 3.5 - 5.2 g/dL Final     Total Bilirubin   Date Value Ref Range Status   11/15/2022 0.7 0.1 - 1.0 mg/dL Final     Comment:     For infants and newborns, interpretation of results should be based  on gestational age, weight and in agreement with clinical  observations.    Premature Infant recommended reference ranges:  Up to 24 hours.............<8.0 mg/dL  Up to 48 hours............<12.0 mg/dL  3-5 days..................<15.0 mg/dL  6-29 days.................<15.0 mg/dL        Alkaline Phosphatase   Date Value Ref Range Status   11/15/2022 77 55 - 135 U/L Final     AST   Date Value Ref Range Status   11/15/2022 26 10 - 40 U/L Final     ALT   Date Value Ref Range Status   11/15/2022 19 10 - 44 U/L Final     Anion Gap   Date Value Ref Range Status   11/15/2022 5 (L) 8 - 16 mmol/L Final     eGFR if    Date Value Ref Range Status   03/09/2022 >60 >60 mL/min/1.73 m^2 Final     eGFR if non    Date Value Ref Range Status   03/09/2022 >60 >60 mL/min/1.73 m^2 Final     Comment:     Calculation used to obtain the estimated glomerular filtration  rate (eGFR) is the CKD-EPI equation.             Fibrinogen 194 - 545 mg/dL 229       PT 11.4 - 13.7 sec 15.1 High   14.4 High   13.7  13.5 R    INR   1.3  1.2 CM  1.1 CM  1.1 CM       aPTT 23.3 - 35.1 sec 146.3 High Panic   30.9      Lupus Reflex Interpretation  Comment:    Comment: No lupus anticoagulant was detected. Mixing studies suggest the presence of an inhibitor         MTHFR Comment    Comment: Result:   c.665C>T (p. [id number]), legacy name:   C677T - Detected, heterozygous c.1286A>C (p.   [id number]), legacy name: [id number] - Not      Homocysteine 0.0 - 17.2 umol/L 9.6        Protein S Ag, Total 60 - 150 % 106    Comment: This test was developed and its performance   characteristics determined by [facility]. It   has not been cleared or approved   by the Food and Drug Administration.    Protein S Ag, Free 61 - 136 % 85      Prothrombin Mutation Comment    Comment: Result: c.*97G>A - Not Detected      Anticardiolipin IgG 0 - 14 GPL U/mL <9      Anticardiolipin IgA 0 - 11 APL U/mL <9     Anticardiolipin IgM 0 - 12 MPL U/mL 9        Radiology/Diagnostic Studies:    CTA Chest Non-Coronary - PE Study    Result Date: 8/13/2022  CT angiogram chest with contrast on 8/13/2022 CLINICAL INDICATION: Chest pain TECHNIQUE: Multiple axial images are obtained throughout the chest following the administration of IV contrast.  [employer]  generated 3D reconstructions/MIPS were performed. This exam was performed according to our departmental dose-optimization program, which includes automated exposure control, adjustment of the mA and/or kV according to patient size and/or use of iterative reconstruction technique. Total DLP is 387.1 mGycm. COMPARISON: 1/3/2022 FINDINGS: There is no thoracic aortic aneurysm or dissection. There is a small left hepatic cyst. Limited visualized upper abdomen is otherwise unremarkable. There is no pleural or pericardial effusion. There is no thoracic adenopathy. There are not no filling defects in the pulmonary arteries to suggest pulmonary embolus. There is minimal bilateral dependent and basilar atelectasis. The lungs are otherwise clear. No acute bony abnormality is noted. IMPRESSION: 1.  No evidence of pulmonary embolus. 2.  No acute abnormality. Electronically signed by:  Jasbir Chapa  8/13/2022 3:56 AM CDT Workstation: [id number]    US Lower Extremity Arteries Left    Result Date: 8/5/2022   ADDENDUM #1 THIS REPORT CONTAINS FINDINGS THAT MAY BE CRITICAL TO PATIENT CARE:  Called, telephoned, verbal report was given oral to DR. VELMA AVALOS at 2:57 AM CDT on 8/5/2022. Electronically signed by:  Piotr Cole MD  8/5/2022 4:43 AM CDT Workstation: [id number]  ORIGINAL REPORT EXAM DESCRIPTION: US LOWER EXTREMITY ARTERIES LEFT 8/5/2022 2:49 AM CDT CLINICAL HISTORY: 62 years, Male, COMPARISON: None FINDINGS: Multiple grayscale images and duplex Doppler ultrasound of the left arterial system was performed with color flow, spectral waveform and peak systolic velocities. Left common femoral artery peak systolic velocity of 29 cm/sec. triphasic waveform Left profunda peak systolic velocity of 38 cm/sec. triphasic waveform Left superficial femoral artery proximal peak systolic velocity 19 cm/sec. triphasic waveform. Left superficial femoral artery mid aspect demonstrate a filling defect with abnormal waveform and peak  systolic velocity of 19 cm/s. Left superficial femoral artery distally demonstrate filling defect with a markedly decreased velocity of 12 cm/s. Left popliteal artery demonstrate filling defect with abnormal decreased velocity of 19 cm/s Left posterior tibial artery demonstrated filling defect with lack of flow. Left anterior tibial artery demonstrate filling defect within the lack of flow Left peroneal artery demonstrate filling defect with lack of flow Left dorsalis pedis artery demonstrate filling defect with lack of flow. IMPRESSION: Extensive left lower extremity arterial thrombus from mid superficial femoral artery downward. Electronically signed by:  Piotr Cole MD  8/5/2022 2:54 AM CDT Workstation: [id number]    US Lower Extremity Veins Left    Result Date: 8/5/2022   ADDENDUM #1 Findings were discussed with Dr. Alfa Keene on 8/5/2022 at 3:21 AM Central standard time via telephone conference. 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 Further evaluation with CTA of the left lower extremity will be obtained. Electronically signed by:  Jordyn Shah MD  8/5/2022 3:45 AM [employer] Workstation: [id number]  ORIGINAL REPORT EXAM: US Duplex Left Lower Extremity Veins CLINICAL HISTORY: The patient is 62 years old and is Male; TECHNIQUE: Real-time duplex ultrasound scan of the left lower extremity veins integrating B-mode two-dimensional vascular structure, Doppler spectral analysis, color flow Doppler imaging and compression. COMPARISON: No relevant prior studies available. FINDINGS: DEEP VEINS: No DVT in the visualized common femoral, femoral, proximal deep femoral or popliteal veins.  The veins demonstrate normal color flow, are normally compressible, with normal phasic flow and/or augmentation response. SUPERFICIAL VEINS:  Unremarkable.  No thrombus in the visualized great saphenous vein. SOFT TISSUES:  Large heterogeneous lesion in  the left popliteal fossa measuring 7.1 x 3.3 x 3.9 cm of indeterminate etiology. IMPRESSION: 1.  No evidence of deep venous thrombosis. 2.  Large heterogeneous lesion in the left popliteal fossa measuring 7.1 x 3.3 x 3.9 cm of indeterminate etiology.  Recommend further evaluation with cross-sectional imaging. Electronically signed by:  Jordyn Shah MD  8/5/2022 3:18 AM CDT Workstation: [id number]    CTA Lower Extremity Bilateral    Result Date: 8/5/2022  EXAM: CT Angiography of the Bilateral Lower Extremities With Intravenous Contrast CLINICAL HISTORY: The patient is 62 years old and is Male; Arterial embolism, lower extremity TECHNIQUE: Axial computed tomographic angiography images of the bilateral lower extremities with intravenous contrast.  Sagittal and coronal reformatted images were created and reviewed.  This CT exam was performed using one or more of the following dose reduction techniques:  automated exposure control, adjustment of the mA and/or kV according to patient size, and/or use of iterative reconstruction technique. MIP reconstructed images were created and reviewed. COMPARISON: No relevant prior studies available. FINDINGS: VASCULATURE: RIGHT FEMORAL/POPLITEAL ARTERIES:  No acute findings.  No occlusion or significant stenosis. RIGHT CALF/FOOT ARTERIES:  The right posterior tibial artery is dominant and visualized into the hindfoot. The right peroneal and anterior tibial arteries contrast tapers gradually faded and are not visualized in the mid to distal lower extremity.  No occlusion or significant stenosis. LEFT FEMORAL/POPLITEAL ARTERIES:  Complete occlusion from the left proximal superficial femoral artery into all 3 infrapopliteal arteries.  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  No filling defects in the visualized left deep femoral artery. LEFT CALF/FOOT ARTERIES: No opacification. LOWER EXTREMITIES: BONES/JOINTS:  No acute fracture.  No dislocation. SOFT TISSUES:   Left popliteal/Baker's cyst. REPRODUCTIVE:  Fiducial markers in the prostate. IMPRESSION: 1.  Complete occlusion from the left proximal superficial femoral artery into all 3 infrapopliteal arteries. 2.  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 3.  Left popliteal/Baker's cyst. Electronically signed by:  Alvarado Tejada MD  8/5/2022 6:00 AM CDT Workstation: [id number]      I have reviewed all available lab results and radiology reports.    Assessment/Plan:   (1) 62 y.o. male with diagnosis of severe recurrent PAD who presented to the ER at [facility] with severe left leg pain. He had prior left popliteal aneurysm repair with Dr daniel in July 2021. Doppler studies in ED showed thrombosis of the mid-superficial femoral artery. He underwent angiography yesterday on 8/6 with thrombectomy and TPA with Dr Daniel. He is currently in the ICU and is awake and alert. He denies any current pain in the left leg. No current CP, SOB, HA's or N/V. He is a [employer] and is absolutely against receiving any blood products or transfusions even at the risk of his own life. I discussed with his ICU nurse Mima and Dr Daniel both in person.      8/7/2022: seen as consult in-hospital  - hematology consulted for evaluation for any underlying clot disorders  - vascular plans to start him on either eliquis of xarelto    8/29/2022:  - he is doing ok post-hospitalization  - he saw Lacho and had repeat studies which were good per patient  - clot workup essentially WNL except for MTHFR-C gene heterozygous positive but the homocysteine was WNL  - discussed the genetic implications in children, siblings, etc  - add folbic  - continue eliquis as per the directions of Dr Daniel  - LA was negative but he was on heparinoid products at time of test - thus the presence of an inhibitor was detected    2/27/2023:  - he is doing ok with no new issues  - continued on eliquis  - he sees Dr Daniel every 6 months  - no excessive  bleeding or bruising     (2) HTN and hypercholesterolemia     (3) Hx/of kidney stones     (4)  Thyroid disease s/p partial thyroidectomy     (5) BPH     (6) COPD     (7) Mild anemia - NCNc paramaters     (8) Mild borderline thrombocytopenia       VISIT DIAGNOSES:      Acute embolism and thrombosis of left popliteal vein    Femoral popliteal artery thrombus          PLAN:  Continued on eliquis ; followed Dr Esquivel  2. Continue folbic  3. Discussed the genetic implications of the MTHFR gene abnormality in children and siblings, etc  4. F/u with PCP, Vascualr, Card etc  RTC in 6 months  Fax note to Karlos Esquivel MD,     Discussion:     COVID-19 Discussion:    I had long discussion with patient and any applicable family about the COVID-19 coronavirus epidemic and the recommended precautions with regard to cancer and/or hematology patients. I have re-iterated the CDC recommendations for adequate hand washing, use of hand -like products, and coughing into elbow, etc. In addition, especially for our patients who are on chemotherapy and/or our otherwise immunocompromised patients, I have recommended avoidance of crowds, including movie theaters, restaurants, churches, etc. I have recommended avoidance of any sick or symptomatic family members and/or friends. I have also recommended avoidance of any raw and unwashed food products, and general avoidance of food items that have not been prepared by themselves. The patient has been asked to call us immediately with any symptom developments, issues, questions or other general concerns.       Anticoagulation Discussion:    Discussed with patient and any applicable family members about the benefit and/or need for anticoagulation. I communicated about the risks of bleeding while on any anticoagulation, which could be serious and/or life-threatening, and which can occur at any time, regardless of degree of the level of anticoagulation. I expressed the need for  compliance with any anticoagulation regimen and that failure to do so could potential lead to excessive bleeding, and risk to health and/or life. In particular, with patients on coumadin therapy, compliance with requested blood work is absolutely essential, as coumadin levels can vary from time to time, and failure to do so could potentially place the patient at risk for bleeding and/or clotting events which could be fatal. Patients on coumadin are encouraged to call the day after they have their levels drawn, as to obtain the appropriate instructions from my staff. Patients are aware that self-regulating or self-dosing of their medications is strictly prohibited.       I spent over 25 mins of time with the patient. Reviewed results of the recently ordered labs, tests and studies; made directives with regards to the results. Over half of this time was spent couseling and coordinating care.    I have explained all of the above in detail and the patient understands all of the current recommendation(s). I have answered all of their questions to the best of my ability and to their complete satisfaction.   The patient is to continue with the current management plan.            Electronically signed by Stefano Pichardo MD

## 2023-02-27 ENCOUNTER — OFFICE VISIT (OUTPATIENT)
Dept: HEMATOLOGY/ONCOLOGY | Facility: CLINIC | Age: 63
End: 2023-02-27
Payer: MEDICAID

## 2023-02-27 VITALS
HEART RATE: 59 BPM | TEMPERATURE: 98 F | HEIGHT: 72 IN | DIASTOLIC BLOOD PRESSURE: 84 MMHG | SYSTOLIC BLOOD PRESSURE: 138 MMHG | RESPIRATION RATE: 16 BRPM | WEIGHT: 194.63 LBS | BODY MASS INDEX: 26.36 KG/M2

## 2023-02-27 DIAGNOSIS — I82.432 ACUTE EMBOLISM AND THROMBOSIS OF LEFT POPLITEAL VEIN: Primary | ICD-10-CM

## 2023-02-27 DIAGNOSIS — I74.3 FEMORAL POPLITEAL ARTERY THROMBUS: ICD-10-CM

## 2023-02-27 PROCEDURE — 3075F SYST BP GE 130 - 139MM HG: CPT | Mod: CPTII,S$GLB,, | Performed by: INTERNAL MEDICINE

## 2023-02-27 PROCEDURE — 4010F ACE/ARB THERAPY RXD/TAKEN: CPT | Mod: CPTII,S$GLB,, | Performed by: INTERNAL MEDICINE

## 2023-02-27 PROCEDURE — 1159F MED LIST DOCD IN RCRD: CPT | Mod: CPTII,S$GLB,, | Performed by: INTERNAL MEDICINE

## 2023-02-27 PROCEDURE — 3075F PR MOST RECENT SYSTOLIC BLOOD PRESS GE 130-139MM HG: ICD-10-PCS | Mod: CPTII,S$GLB,, | Performed by: INTERNAL MEDICINE

## 2023-02-27 PROCEDURE — 4010F PR ACE/ARB THEARPY RXD/TAKEN: ICD-10-PCS | Mod: CPTII,S$GLB,, | Performed by: INTERNAL MEDICINE

## 2023-02-27 PROCEDURE — 1160F RVW MEDS BY RX/DR IN RCRD: CPT | Mod: CPTII,S$GLB,, | Performed by: INTERNAL MEDICINE

## 2023-02-27 PROCEDURE — 3079F PR MOST RECENT DIASTOLIC BLOOD PRESSURE 80-89 MM HG: ICD-10-PCS | Mod: CPTII,S$GLB,, | Performed by: INTERNAL MEDICINE

## 2023-02-27 PROCEDURE — 99213 PR OFFICE/OUTPT VISIT, EST, LEVL III, 20-29 MIN: ICD-10-PCS | Mod: S$GLB,,, | Performed by: INTERNAL MEDICINE

## 2023-02-27 PROCEDURE — 1160F PR REVIEW ALL MEDS BY PRESCRIBER/CLIN PHARMACIST DOCUMENTED: ICD-10-PCS | Mod: CPTII,S$GLB,, | Performed by: INTERNAL MEDICINE

## 2023-02-27 PROCEDURE — 99213 OFFICE O/P EST LOW 20 MIN: CPT | Mod: S$GLB,,, | Performed by: INTERNAL MEDICINE

## 2023-02-27 PROCEDURE — 3008F PR BODY MASS INDEX (BMI) DOCUMENTED: ICD-10-PCS | Mod: CPTII,S$GLB,, | Performed by: INTERNAL MEDICINE

## 2023-02-27 PROCEDURE — 3008F BODY MASS INDEX DOCD: CPT | Mod: CPTII,S$GLB,, | Performed by: INTERNAL MEDICINE

## 2023-02-27 PROCEDURE — 1159F PR MEDICATION LIST DOCUMENTED IN MEDICAL RECORD: ICD-10-PCS | Mod: CPTII,S$GLB,, | Performed by: INTERNAL MEDICINE

## 2023-02-27 PROCEDURE — 3079F DIAST BP 80-89 MM HG: CPT | Mod: CPTII,S$GLB,, | Performed by: INTERNAL MEDICINE

## 2023-03-13 DIAGNOSIS — I10 ESSENTIAL HYPERTENSION, MALIGNANT: Primary | ICD-10-CM

## 2023-03-22 ENCOUNTER — HOSPITAL ENCOUNTER (OUTPATIENT)
Dept: RADIOLOGY | Facility: HOSPITAL | Age: 63
Discharge: HOME | End: 2023-03-22
Attending: INTERNAL MEDICINE
Payer: MEDICAID

## 2023-03-22 DIAGNOSIS — I10 ESSENTIAL HYPERTENSION, MALIGNANT: ICD-10-CM

## 2023-03-22 LAB
CREAT SERPL-MCNC: 0.9 MG/DL (ref 0.5–1.4)
SAMPLE: NORMAL

## 2023-03-22 PROCEDURE — 82565 ASSAY OF CREATININE: CPT | Mod: PO

## 2023-03-22 PROCEDURE — 71275 CT ANGIOGRAPHY CHEST: CPT | Mod: TC,PO

## 2023-03-22 PROCEDURE — 25500020 PHARM REV CODE 255: Mod: PO

## 2023-03-22 RX ADMIN — IOHEXOL 100 ML: 350 INJECTION, SOLUTION INTRAVENOUS at 01:03

## 2023-05-10 ENCOUNTER — OFFICE VISIT (OUTPATIENT)
Dept: PULMONOLOGY | Facility: CLINIC | Age: 63
End: 2023-05-10
Payer: MEDICARE

## 2023-05-10 VITALS
DIASTOLIC BLOOD PRESSURE: 86 MMHG | HEART RATE: 56 BPM | SYSTOLIC BLOOD PRESSURE: 129 MMHG | OXYGEN SATURATION: 97 % | BODY MASS INDEX: 26.32 KG/M2 | WEIGHT: 194.31 LBS | HEIGHT: 72 IN

## 2023-05-10 DIAGNOSIS — J44.1 CHRONIC OBSTRUCTIVE PULMONARY DISEASE WITH ACUTE EXACERBATION: Primary | ICD-10-CM

## 2023-05-10 PROCEDURE — 1157F ADVNC CARE PLAN IN RCRD: CPT | Mod: CPTII,S$GLB,, | Performed by: NURSE PRACTITIONER

## 2023-05-10 PROCEDURE — 4010F PR ACE/ARB THEARPY RXD/TAKEN: ICD-10-PCS | Mod: CPTII,S$GLB,, | Performed by: NURSE PRACTITIONER

## 2023-05-10 PROCEDURE — 1157F PR ADVANCE CARE PLAN OR EQUIV PRESENT IN MEDICAL RECORD: ICD-10-PCS | Mod: CPTII,S$GLB,, | Performed by: NURSE PRACTITIONER

## 2023-05-10 PROCEDURE — 3079F DIAST BP 80-89 MM HG: CPT | Mod: CPTII,S$GLB,, | Performed by: NURSE PRACTITIONER

## 2023-05-10 PROCEDURE — 3074F PR MOST RECENT SYSTOLIC BLOOD PRESSURE < 130 MM HG: ICD-10-PCS | Mod: CPTII,S$GLB,, | Performed by: NURSE PRACTITIONER

## 2023-05-10 PROCEDURE — 3008F PR BODY MASS INDEX (BMI) DOCUMENTED: ICD-10-PCS | Mod: CPTII,S$GLB,, | Performed by: NURSE PRACTITIONER

## 2023-05-10 PROCEDURE — 3074F SYST BP LT 130 MM HG: CPT | Mod: CPTII,S$GLB,, | Performed by: NURSE PRACTITIONER

## 2023-05-10 PROCEDURE — 99213 OFFICE O/P EST LOW 20 MIN: CPT | Mod: S$GLB,,, | Performed by: NURSE PRACTITIONER

## 2023-05-10 PROCEDURE — 3288F FALL RISK ASSESSMENT DOCD: CPT | Mod: CPTII,S$GLB,, | Performed by: NURSE PRACTITIONER

## 2023-05-10 PROCEDURE — 1101F PR PT FALLS ASSESS DOC 0-1 FALLS W/OUT INJ PAST YR: ICD-10-PCS | Mod: CPTII,S$GLB,, | Performed by: NURSE PRACTITIONER

## 2023-05-10 PROCEDURE — 99213 PR OFFICE/OUTPT VISIT, EST, LEVL III, 20-29 MIN: ICD-10-PCS | Mod: S$GLB,,, | Performed by: NURSE PRACTITIONER

## 2023-05-10 PROCEDURE — 3008F BODY MASS INDEX DOCD: CPT | Mod: CPTII,S$GLB,, | Performed by: NURSE PRACTITIONER

## 2023-05-10 PROCEDURE — 99999 PR PBB SHADOW E&M-EST. PATIENT-LVL IV: CPT | Mod: PBBFAC,,, | Performed by: NURSE PRACTITIONER

## 2023-05-10 PROCEDURE — 1159F PR MEDICATION LIST DOCUMENTED IN MEDICAL RECORD: ICD-10-PCS | Mod: CPTII,S$GLB,, | Performed by: NURSE PRACTITIONER

## 2023-05-10 PROCEDURE — 99999 PR PBB SHADOW E&M-EST. PATIENT-LVL IV: ICD-10-PCS | Mod: PBBFAC,,, | Performed by: NURSE PRACTITIONER

## 2023-05-10 PROCEDURE — 1159F MED LIST DOCD IN RCRD: CPT | Mod: CPTII,S$GLB,, | Performed by: NURSE PRACTITIONER

## 2023-05-10 PROCEDURE — 1160F PR REVIEW ALL MEDS BY PRESCRIBER/CLIN PHARMACIST DOCUMENTED: ICD-10-PCS | Mod: CPTII,S$GLB,, | Performed by: NURSE PRACTITIONER

## 2023-05-10 PROCEDURE — 1101F PT FALLS ASSESS-DOCD LE1/YR: CPT | Mod: CPTII,S$GLB,, | Performed by: NURSE PRACTITIONER

## 2023-05-10 PROCEDURE — 3079F PR MOST RECENT DIASTOLIC BLOOD PRESSURE 80-89 MM HG: ICD-10-PCS | Mod: CPTII,S$GLB,, | Performed by: NURSE PRACTITIONER

## 2023-05-10 PROCEDURE — 1160F RVW MEDS BY RX/DR IN RCRD: CPT | Mod: CPTII,S$GLB,, | Performed by: NURSE PRACTITIONER

## 2023-05-10 PROCEDURE — 4010F ACE/ARB THERAPY RXD/TAKEN: CPT | Mod: CPTII,S$GLB,, | Performed by: NURSE PRACTITIONER

## 2023-05-10 PROCEDURE — 3288F PR FALLS RISK ASSESSMENT DOCUMENTED: ICD-10-PCS | Mod: CPTII,S$GLB,, | Performed by: NURSE PRACTITIONER

## 2023-05-10 RX ORDER — APIXABAN 5 MG/1
5 TABLET, FILM COATED ORAL 2 TIMES DAILY
COMMUNITY
Start: 2023-02-27 | End: 2023-05-29 | Stop reason: SDUPTHER

## 2023-05-10 RX ORDER — AZITHROMYCIN 250 MG/1
TABLET, FILM COATED ORAL
Qty: 6 TABLET | Refills: 0 | Status: SHIPPED | OUTPATIENT
Start: 2023-05-10 | End: 2023-06-22

## 2023-05-10 RX ORDER — ROSUVASTATIN CALCIUM 20 MG/1
10 TABLET, COATED ORAL
COMMUNITY
Start: 2023-03-25 | End: 2023-05-29 | Stop reason: SDUPTHER

## 2023-05-10 RX ORDER — PREDNISONE 10 MG/1
TABLET ORAL
Qty: 18 TABLET | Refills: 0 | Status: SHIPPED | OUTPATIENT
Start: 2023-05-10 | End: 2023-06-22

## 2023-05-10 RX ORDER — LABETALOL 200 MG/1
TABLET, FILM COATED ORAL
COMMUNITY
Start: 2023-03-07 | End: 2023-05-29 | Stop reason: SDUPTHER

## 2023-05-10 NOTE — PATIENT INSTRUCTIONS
Take over the counter Mucinex (not DM) daily until breathing improves    Take prednisone pills for three days, repeat for shortness of breath      Use nebulizer

## 2023-05-10 NOTE — PROGRESS NOTES
5/10/2023    Helder Brand  Follow up    Chief Complaint   Patient presents with    6m f/u    Cough       HPI:   5/10/23- complaint of cough, onset 2 weeks, improved with nebulizer therapy.   Difficult to clear chest of mucous. Clear mucous. Worse in late evenings.   Associated with wheeze and chest tightness.   Currently on Eliquis for history DVT    10/20/22- had COVID 19 July 2022, states no current complaint of shortness of breath, cough, chest tightness, or wheeze. not currently using advair or nebulizer machine.   Left DVT in August currently on Eliquis,     6/1/2022- states breathing is improved after started nebulizer as needed. PCP treated with antibiotics for productive green mucous in April, resolved with therapy.   SOB- recurrent complaint, had trouble breathing while laying down improved with albuterol inhaler. Associated with chest tightness and wheeze in late evenings, most nights. Nocturnal arousals 1x weekly. States doing better while on antibiotics.   On advair most day, sometimes forgets. Started on Trelegy but insurance coverage is not affordable.   Able to work in garden but still having to take breaks. Worse in high heat.   Declined sleep apnea therapy.     3/9/2022-Cough- worsened in past 2 weeks, has to sleep in recliner, coughing fits at night. Productive thick mucous green in color. Using nebulizer 3x daily with benefit for few hours.   SOB- severe, worse with exertion, associated with wheeze and chest tightness.     States was previously doing well, able to do gardening for 10 minutes then having to rest. Not using stiolto due to difficultly with device.       11/19/2021- Complaint of worsening cough- onset 2 weeks, tx by PCP with azithromycin and Levaquin with minimal benefit. Coughing through out night. Productive thick green mucous that has turned yellow in color.   Associated with chest tightness and wheeze.        10/06/2021- since last visit pt had blood clot to left popliteal  artery and required vascular surgery. He has a L leg wound vacc and getting wound care now.   He hasn't smoked for 40 yrs. Feels some mild throat clearing w/ cough.   Used to swim a lot when young. Worked [employer] 8-10 yrs and got lots of exercise.  Mother smoked a little and got bad copd.    6/17/21-  Need disc of images from [facility]- please bring disc from home and drop off to our office. Once I review the images I can give more advice- possible biopsy?  Get pulmonary function tests  Follow up with cardiologist  Pt is a 62 yo male with HTN, thyroid disease presenting for new evaluation.  Pt reports he had abnormal chest x-ray which was found after he had episode of chest tightness.  Has been getting these episodes of pressure like anterior chest discomfort, off and on for several months, usually while at rest and lasts few minutes. He rides bike 3-6 miles a day and denies LAW or chest tightness w/ exertion. Sometimes has slight wheeze when laying down at night but no cough/mucous. No inhaler use. No childhood asthma or breathing trouble. Denies frequent bronchitis.  Secondhand smoke from grandparents and parents- teenage cigarette smoke but quit at age 17. Mother smoked and now on hospice with COPD.  Pt had CT chest after abnormality seen on CXR 3/2021 with RLL nodule- forgot disc at home. (done at [facility])  He is going to have an echo at 3pm today.  In past had growth on thyroid- benign, had partial thyroidectomy.  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 Denies asbestos or other exposures    The chief complaint problem varies with instablilty at time      PFSH:  Past Medical History:   Diagnosis Date    Emphysema lung 2021    Enlarged prostate     Hyperlipidemia 2021    Hypertension 2001    Kidney stone 2001    x3    Thyroid disease     benign growth, partial thyroidectomy         Past Surgical History:   Procedure Laterality Date    ANGIOGRAPHY OF LOWER EXTREMITY Left 8/5/2022    Procedure: Angiogram Extremity Unilateral;   Surgeon: Ali Khoobehi, MD;  Location: [facility] CATH/EP LAB;  Service: Peripheral Vascular;  Laterality: Left;    ANGIOGRAPHY OF LOWER EXTREMITY Left 2022    Procedure: Angioplasty-peripheral;  Surgeon: Ismael Esquivel MD;  Location: [facility] CATH/EP LAB;  Service: General;  Laterality: Left;    ANGIOGRAPHY OF LOWER EXTREMITY Left 2022    Procedure: Angiogram Extremity Unilateral;  Surgeon: Ismael Esquivel MD;  Location: [facility] CATH/EP LAB;  Service: General;  Laterality: Left;    ANGIOGRAPHY OF LOWER EXTREMITY Left 2022    Procedure: Angiogram Extremity Unilateral;  Surgeon: Ismael Esquivel MD;  Location: [facility] CATH/EP LAB;  Service: General;  Laterality: Left;    INJECTION OF TISSUE PLASMINOGEN ACTIVATOR Left 2022    Procedure: INJECTION, TISSUE PLASMINOGEN ACTIVATOR;  Surgeon: Ismael Esquivel MD;  Location: [facility] CATH/EP LAB;  Service: General;  Laterality: Left;    INJECTION OF TISSUE PLASMINOGEN ACTIVATOR Left 2022    Procedure: INJECTION, TISSUE PLASMINOGEN ACTIVATOR;  Surgeon: Ismael Esquivel MD;  Location: [facility] CATH/EP LAB;  Service: General;  Laterality: Left;    PROSTATE SURGERY  2018    REPAIR OF ANEURYSM Left 2021    Procedure: REPAIR POPLITEAL ARTERY ANEURYSM;  Surgeon: Ismael Esquivel MD;  Location: [facility] OR;  Service: Cardiovascular;  Laterality: Left;    THROMBECTOMY  2022    Procedure: THROMBECTOMY;  Surgeon: Ismael Esquivel MD;  Location: [facility] CATH/EP LAB;  Service: General;;    THROMBECTOMY Left 2022    Procedure: THROMBECTOMY;  Surgeon: Ismael Esquivel MD;  Location: [facility] CATH/EP LAB;  Service: General;  Laterality: Left;    THYROIDECTOMY, PARTIAL  2011    TONSILLECTOMY      as a child    urolift  2019    [facility]     Social History     Tobacco Use    Smoking status: Former     Packs/day: 0.25     Years: 5.00     Pack years: 1.25     Types: Cigarettes     Quit date: 1980     Years since quittin.5    Smokeless tobacco: Never   Substance Use Topics     Alcohol use: Not Currently     Alcohol/week: 2.0 standard drinks     Types: 2 Cans of beer per week     Family History   Problem Relation Age of Onset    Hypertension Mother     COPD Mother     Pulmonary embolism Father      Review of patient's allergies indicates:  No Known Allergies    Performance Status:The patient's activity level is regular exercise.      Review of Systems:  a review of eleven systems covering constitutional, Eye, HEENT, Psych, Respiratory, Cardiac, GI, , Musculoskeletal, Endocrine, Dermatologic was negative except for pertinent findings as listed ABOVE and below:  All negative with pertinent positives as above   Cough   Wheeze  Chest tightness    Exam:Comprehensive exam done. /86 (BP Location: Right arm, Patient Position: Sitting, BP Method: Medium (Automatic))   Pulse (!) 56   Ht 6' (1.829 m)   Wt 88.2 kg (194 lb 5.4 oz)   SpO2 97% Comment: on room air at rest  BMI 26.36 kg/m²   Exam included Vitals as listed, and patient's appearance and affect and alertness and mood, oral exam for yeast and hygiene and pharynx lesions and Mallapatti (M) score, neck with inspection for jvd and masses and thyroid abnormalities and lymph nodes (supraclavicular and infraclavicular nodes and axillary also examined and noted if abn), chest exam included symmetry and effort and fremitus and percussion and auscultation, cardiac exam included rhythm and gallops and murmur and rubs and jvd and edema, abdominal exam for mass and hepatosplenomegaly and tenderness and hernias and bowel sounds, Musculoskeletal exam with muscle tone and posture and mobility/gait and  strength, and skin for rashes and cyanosis and pallor and turgor, extremity for clubbing.  Findings were normal except for pertinent findings listed below:  Thin, athletic build  Lungs clear        Radiographs (ct chest and cxr) reviewed: view by direct vision     CTA Chest Non-Coronary 03/22/23 dependent atelectasis, no lung nodules    CTA  Chest Non-Coronary 08/13/22 no PE seen; right lower lung nodule decreased in size, left is stable    [facility] Chest x-ray: 4/21/22 findings appear consitent wtih chronic small airwasy disease. no consolidation or other adute process in evident, no significnat or detrimental interval changes in comparison to CXR 11/15/2021      X-Ray Chest AP Portable 01/02/22    Minimal basilar parenchymal opacities or atelectasis.  Small nodular opacity observed in the right lung base laterally.      CTA Chest Non-Coronary (PE Study) 01/03/22 Bibasilar atelectasis/infiltrate. Pneumonia must be considered     Outside CT (uploaded) chest 6/9/21- mild linear atelectasis bilat lower lobes, mild pleural based nodules which look like rounded atelectasis.  CXR 3/9/21- RLL nodule  CT abd/p 8/2019- rounded atelectasis bibasilar present at that time    Labs reviewed    3/2021- wnl  Lab Results   Component Value Date    WBC 8.43 11/15/2022    RBC 5.24 11/15/2022    HGB 16.1 11/15/2022    HCT 48.2 11/15/2022    MCV 92 11/15/2022    MCH 30.7 11/15/2022    MCHC 33.4 11/15/2022    RDW 13.1 11/15/2022     11/15/2022    MPV 9.0 (L) 11/15/2022    GRAN 3.4 11/15/2022    GRAN 40.6 11/15/2022    LYMPH 3.3 11/15/2022    LYMPH 39.5 11/15/2022    MONO 1.3 (H) 11/15/2022    MONO 15.2 (H) 11/15/2022    EOS 0.3 11/15/2022    BASO 0.07 11/15/2022    EOSINOPHIL 3.8 11/15/2022    BASOPHIL 0.8 11/15/2022         Culture, Respiratory with Gram Stain 03/10/22   Normal respiratory whitney       PFT reviewed- mild obstruction, increased lung vol. DLCO normal          Plan:  Clinical impression is apparently straight forward and impression with management as below.    Helder was seen today for 6m f/u and cough.    Diagnoses and all orders for this visit:    Chronic obstructive pulmonary disease with acute exacerbation  -     predniSONE (DELTASONE) 10 MG tablet; Take 1-2 pills a day for three days, repeat for shortness of breath  -     azithromycin (Z-ALONDRA) 250 MG  tablet; 2 pills day one, then 1 pill for 4 days          Problem List Items Addressed This Visit       COPD (chronic obstructive pulmonary disease) - Primary    Relevant Medications    predniSONE (DELTASONE) 10 MG tablet    azithromycin (Z-ALONDRA) 250 MG tablet             Follow up in about 6 months (around 11/10/2023), or if symptoms worsen or fail to improve.            Discussed with patient above for education the following:      Patient Instructions   Take over the counter Mucinex (not DM) daily until breathing improves    Take prednisone pills for three days, repeat for shortness of breath      Use nebulizer

## 2023-05-29 ENCOUNTER — OFFICE VISIT (OUTPATIENT)
Dept: FAMILY MEDICINE | Facility: CLINIC | Age: 63
End: 2023-05-29
Payer: MEDICARE

## 2023-05-29 VITALS
WEIGHT: 196.63 LBS | HEIGHT: 73 IN | HEART RATE: 60 BPM | SYSTOLIC BLOOD PRESSURE: 128 MMHG | DIASTOLIC BLOOD PRESSURE: 88 MMHG | OXYGEN SATURATION: 98 % | BODY MASS INDEX: 26.06 KG/M2

## 2023-05-29 DIAGNOSIS — Z12.5 ENCOUNTER FOR SCREENING FOR MALIGNANT NEOPLASM OF PROSTATE: ICD-10-CM

## 2023-05-29 DIAGNOSIS — Z12.11 SCREEN FOR COLON CANCER: ICD-10-CM

## 2023-05-29 DIAGNOSIS — I10 HYPERTENSION, UNSPECIFIED TYPE: ICD-10-CM

## 2023-05-29 DIAGNOSIS — E78.5 HYPERLIPIDEMIA, UNSPECIFIED HYPERLIPIDEMIA TYPE: ICD-10-CM

## 2023-05-29 DIAGNOSIS — I74.3 FEMORAL POPLITEAL ARTERY THROMBUS: ICD-10-CM

## 2023-05-29 DIAGNOSIS — R35.1 BPH ASSOCIATED WITH NOCTURIA: ICD-10-CM

## 2023-05-29 DIAGNOSIS — N40.1 BPH ASSOCIATED WITH NOCTURIA: ICD-10-CM

## 2023-05-29 DIAGNOSIS — J44.9 CHRONIC OBSTRUCTIVE PULMONARY DISEASE, UNSPECIFIED COPD TYPE: Primary | ICD-10-CM

## 2023-05-29 DIAGNOSIS — Z15.89 MTHFR GENE MUTATION: ICD-10-CM

## 2023-05-29 PROCEDURE — 3288F PR FALLS RISK ASSESSMENT DOCUMENTED: ICD-10-PCS | Mod: CPTII,S$GLB,, | Performed by: PHYSICIAN ASSISTANT

## 2023-05-29 PROCEDURE — 3079F PR MOST RECENT DIASTOLIC BLOOD PRESSURE 80-89 MM HG: ICD-10-PCS | Mod: CPTII,S$GLB,, | Performed by: PHYSICIAN ASSISTANT

## 2023-05-29 PROCEDURE — 1157F ADVNC CARE PLAN IN RCRD: CPT | Mod: CPTII,S$GLB,, | Performed by: PHYSICIAN ASSISTANT

## 2023-05-29 PROCEDURE — 4010F PR ACE/ARB THEARPY RXD/TAKEN: ICD-10-PCS | Mod: CPTII,S$GLB,, | Performed by: PHYSICIAN ASSISTANT

## 2023-05-29 PROCEDURE — 3288F FALL RISK ASSESSMENT DOCD: CPT | Mod: CPTII,S$GLB,, | Performed by: PHYSICIAN ASSISTANT

## 2023-05-29 PROCEDURE — 1101F PR PT FALLS ASSESS DOC 0-1 FALLS W/OUT INJ PAST YR: ICD-10-PCS | Mod: CPTII,S$GLB,, | Performed by: PHYSICIAN ASSISTANT

## 2023-05-29 PROCEDURE — 1157F PR ADVANCE CARE PLAN OR EQUIV PRESENT IN MEDICAL RECORD: ICD-10-PCS | Mod: CPTII,S$GLB,, | Performed by: PHYSICIAN ASSISTANT

## 2023-05-29 PROCEDURE — 3008F PR BODY MASS INDEX (BMI) DOCUMENTED: ICD-10-PCS | Mod: CPTII,S$GLB,, | Performed by: PHYSICIAN ASSISTANT

## 2023-05-29 PROCEDURE — 99204 PR OFFICE/OUTPT VISIT, NEW, LEVL IV, 45-59 MIN: ICD-10-PCS | Mod: S$GLB,,, | Performed by: PHYSICIAN ASSISTANT

## 2023-05-29 PROCEDURE — 1159F MED LIST DOCD IN RCRD: CPT | Mod: CPTII,S$GLB,, | Performed by: PHYSICIAN ASSISTANT

## 2023-05-29 PROCEDURE — 1101F PT FALLS ASSESS-DOCD LE1/YR: CPT | Mod: CPTII,S$GLB,, | Performed by: PHYSICIAN ASSISTANT

## 2023-05-29 PROCEDURE — 4010F ACE/ARB THERAPY RXD/TAKEN: CPT | Mod: CPTII,S$GLB,, | Performed by: PHYSICIAN ASSISTANT

## 2023-05-29 PROCEDURE — 3079F DIAST BP 80-89 MM HG: CPT | Mod: CPTII,S$GLB,, | Performed by: PHYSICIAN ASSISTANT

## 2023-05-29 PROCEDURE — 3074F PR MOST RECENT SYSTOLIC BLOOD PRESSURE < 130 MM HG: ICD-10-PCS | Mod: CPTII,S$GLB,, | Performed by: PHYSICIAN ASSISTANT

## 2023-05-29 PROCEDURE — 3074F SYST BP LT 130 MM HG: CPT | Mod: CPTII,S$GLB,, | Performed by: PHYSICIAN ASSISTANT

## 2023-05-29 PROCEDURE — 3008F BODY MASS INDEX DOCD: CPT | Mod: CPTII,S$GLB,, | Performed by: PHYSICIAN ASSISTANT

## 2023-05-29 PROCEDURE — 1159F PR MEDICATION LIST DOCUMENTED IN MEDICAL RECORD: ICD-10-PCS | Mod: CPTII,S$GLB,, | Performed by: PHYSICIAN ASSISTANT

## 2023-05-29 PROCEDURE — 99204 OFFICE O/P NEW MOD 45 MIN: CPT | Mod: S$GLB,,, | Performed by: PHYSICIAN ASSISTANT

## 2023-05-29 RX ORDER — ROSUVASTATIN CALCIUM 10 MG/1
10 TABLET, COATED ORAL DAILY
Qty: 90 TABLET | Refills: 1 | Status: SHIPPED | OUTPATIENT
Start: 2023-05-29

## 2023-05-29 RX ORDER — LABETALOL 300 MG/1
300 TABLET, FILM COATED ORAL 2 TIMES DAILY
Qty: 180 TABLET | Refills: 1 | Status: SHIPPED | OUTPATIENT
Start: 2023-05-29

## 2023-05-29 RX ORDER — APIXABAN 5 MG/1
5 TABLET, FILM COATED ORAL 2 TIMES DAILY
Qty: 180 TABLET | Refills: 1 | Status: ON HOLD | OUTPATIENT
Start: 2023-05-29 | End: 2023-12-03 | Stop reason: SDUPTHER

## 2023-05-29 RX ORDER — OLMESARTAN MEDOXOMIL 40 MG/1
40 TABLET ORAL EVERY MORNING
Qty: 90 TABLET | Refills: 1 | Status: SHIPPED | OUTPATIENT
Start: 2023-05-29 | End: 2024-03-14 | Stop reason: SDUPTHER

## 2023-05-29 RX ORDER — TAMSULOSIN HYDROCHLORIDE 0.4 MG/1
0.4 CAPSULE ORAL DAILY
Qty: 30 CAPSULE | Refills: 5 | Status: SHIPPED | OUTPATIENT
Start: 2023-05-29 | End: 2023-06-22

## 2023-05-29 NOTE — PROGRESS NOTES
SUBJECTIVE:    Patient ID: Helder Brand is a 63 y.o. male.    Chief Complaint: Establish Care (Bottles brought//Pt is here to establish care with a PCP//[place] done about 5 yr ago, with Dr. Nunes//discuss pna vaccine//JOHN )    This is a 63-year-old male who presents today to establish care.  Past medical history significant for COPD, hypertension, hyperlipidemia and thyroid disease.  Upon chart review follows with pulmonology (Robert/[facility]) and Cardiology (Antony). Has hx of DVT, MTHFR mutation. Required thrombectomy. He is still followed with heme/onc. Dr. Pichardo. Maintains on eliquis BID.    He reports to be in normal state of health. He is experiencing some nocturia. 3-4 times at night.  This is his main concern.  Keeping him from sleeping well at night.  Reports colonoscopy completed 5 years ago at age 58.  He is open to completing again now with new gastroenterologist.  Reports a few small polyps previously.      Hospital Outpatient Visit on 03/22/2023   Component Date Value Ref Range Status    POC Creatinine 03/22/2023 0.9  0.5 - 1.4 mg/dL Final    Sample 03/22/2023 VENOUS   Final       Past Medical History:   Diagnosis Date    Emphysema lung 2021    Enlarged prostate     Hyperlipidemia 2021    Hypertension 2001    Kidney stone 2001    x3    Thyroid disease     benign growth, partial thyroidectomy     Past Surgical History:   Procedure Laterality Date    ANGIOGRAPHY OF LOWER EXTREMITY Left 8/5/2022    Procedure: Angiogram Extremity Unilateral;  Surgeon: Ali Khoobehi, MD;  Location: [facility] CATH/EP LAB;  Service: Peripheral Vascular;  Laterality: Left;    ANGIOGRAPHY OF LOWER EXTREMITY Left 8/6/2022    Procedure: Angioplasty-peripheral;  Surgeon: Ismael Esquivel MD;  Location: [facility] CATH/EP LAB;  Service: General;  Laterality: Left;    ANGIOGRAPHY OF LOWER EXTREMITY Left 8/6/2022    Procedure: Angiogram Extremity Unilateral;  Surgeon: Ismael Esquivel MD;  Location: [facility] CATH/EP LAB;  Service: General;   Laterality: Left;    ANGIOGRAPHY OF LOWER EXTREMITY Left 8/7/2022    Procedure: Angiogram Extremity Unilateral;  Surgeon: Ismael Esquivel MD;  Location: [facility] CATH/EP LAB;  Service: General;  Laterality: Left;    INJECTION OF TISSUE PLASMINOGEN ACTIVATOR Left 8/6/2022    Procedure: INJECTION, TISSUE PLASMINOGEN ACTIVATOR;  Surgeon: Ismael Esquivel MD;  Location: [facility] CATH/EP LAB;  Service: General;  Laterality: Left;    INJECTION OF TISSUE PLASMINOGEN ACTIVATOR Left 8/6/2022    Procedure: INJECTION, TISSUE PLASMINOGEN ACTIVATOR;  Surgeon: Ismael Esquivel MD;  Location: [facility] CATH/EP LAB;  Service: General;  Laterality: Left;    PROSTATE SURGERY  2018    REPAIR OF ANEURYSM Left 7/21/2021    Procedure: REPAIR POPLITEAL ARTERY ANEURYSM;  Surgeon: Ismael Esquivel MD;  Location: [facility] OR;  Service: Cardiovascular;  Laterality: Left;    THROMBECTOMY  8/6/2022    Procedure: THROMBECTOMY;  Surgeon: Ismael Esquivel MD;  Location: [facility] CATH/EP LAB;  Service: General;;    THROMBECTOMY Left 8/6/2022    Procedure: THROMBECTOMY;  Surgeon: Ismael Esquivel MD;  Location: [facility] CATH/EP LAB;  Service: General;  Laterality: Left;    THYROIDECTOMY, PARTIAL  2011    TONSILLECTOMY      as a child    urolift  04/2019    [facility]     Family History   Problem Relation Age of Onset    Hypertension Mother     COPD Mother     Pulmonary embolism Father        Marital Status:   Alcohol History:  reports that he does not currently use alcohol after a past usage of about 2.0 standard drinks per week.  Tobacco History:  reports that he quit smoking about 42 years ago. His smoking use included cigarettes. He has a 1.25 pack-year smoking history. He has been exposed to tobacco smoke. He has never used smokeless tobacco.  Drug History:  reports no history of drug use.    Review of patient's allergies indicates:  No Known Allergies    Current Outpatient Medications:     albuterol (PROVENTIL/VENTOLIN HFA) 90 mcg/actuation inhaler, Inhale 2  puffs into the lungs every 6 (six) hours as needed. , Disp: , Rfl:     albuterol-ipratropium (DUO-NEB) 2.5 mg-0.5 mg/3 mL nebulizer solution, Take 3 mLs by nebulization every 6 (six) hours as needed for Wheezing or Shortness of Breath. Rescue, Disp: 240 mL, Rfl: 5    azelastine (ASTELIN) 137 mcg (0.1 %) nasal spray, 1 spray (137 mcg total) by Nasal route 2 (two) times daily., Disp: 30 mL, Rfl: 0    folic acid-vit B6-vit B12 2.5-25-2 mg (FOLBIC OR EQUIV) 2.5-25-2 mg Tab, Take 1 tablet by mouth once daily., Disp: 30 tablet, Rfl: 6    amLODIPine (NORVASC) 5 MG tablet, Take 5 mg by mouth once daily., Disp: , Rfl:     azithromycin (Z-ALONDRA) 250 MG tablet, 2 pills day one, then 1 pill for 4 days (Patient not taking: Reported on 5/29/2023), Disp: 6 tablet, Rfl: 0    carvediloL (COREG) 12.5 MG tablet, Take 12.5 mg by mouth 2 (two) times daily., Disp: , Rfl:     ELIQUIS 5 mg Tab, Take 1 tablet (5 mg total) by mouth 2 (two) times daily., Disp: 180 tablet, Rfl: 1    fluticasone propionate (FLONASE) 50 mcg/actuation nasal spray, 2 sprays by Each Nostril route daily as needed. , Disp: , Rfl:     fluticasone-salmeterol 230-21 mcg/dose (ADVAIR HFA) 230-21 mcg/actuation HFAA inhaler, Inhale 2 puffs into the lungs 2 (two) times daily. Controller, Disp: 12 g, Rfl: 11    labetaloL (NORMODYNE) 300 MG tablet, Take 1 tablet (300 mg total) by mouth 2 (two) times a day., Disp: 180 tablet, Rfl: 1    olmesartan (BENICAR) 40 MG tablet, Take 1 tablet (40 mg total) by mouth every morning., Disp: 90 tablet, Rfl: 1    predniSONE (DELTASONE) 10 MG tablet, Take 1-2 pills a day for three days, repeat for shortness of breath (Patient not taking: Reported on 5/29/2023), Disp: 18 tablet, Rfl: 0    rosuvastatin (CRESTOR) 10 MG tablet, Take 1 tablet (10 mg total) by mouth once daily., Disp: 90 tablet, Rfl: 1    tamsulosin (FLOMAX) 0.4 mg Cap, Take 1 capsule (0.4 mg total) by mouth once daily., Disp: 30 capsule, Rfl: 5  No current facility-administered  "medications for this visit.    Facility-Administered Medications Ordered in Other Visits:     lactated ringers infusion, , Intravenous, Continuous, Jasbir Aragon MD, Last Rate: 75 mL/hr at 11/07/19 1333, 1,000 mL at 11/07/19 1333    Review of Systems   Constitutional:  Negative for activity change, fatigue, fever and unexpected weight change.   HENT:  Negative for congestion.    Respiratory:  Negative for apnea, cough, chest tightness and shortness of breath.    Cardiovascular:  Negative for chest pain and palpitations.   Gastrointestinal:  Negative for abdominal distention and abdominal pain.   Genitourinary:  Negative for difficulty urinating, dysuria and testicular pain.        Nocturia 3-4 times at night   Musculoskeletal:  Negative for arthralgias and back pain.   Neurological:  Negative for dizziness and weakness.        Objective:      Vitals:    05/29/23 1104   BP: 128/88   Pulse: 60   SpO2: 98%   Weight: 89.2 kg (196 lb 9.6 oz)   Height: 6' 1" (1.854 m)     Physical Exam  Constitutional:       General: He is not in acute distress.     Appearance: He is well-developed.   HENT:      Head: Normocephalic and atraumatic.   Eyes:      Pupils: Pupils are equal, round, and reactive to light.   Neck:      Thyroid: No thyromegaly.   Cardiovascular:      Rate and Rhythm: Normal rate and regular rhythm.      Heart sounds: Normal heart sounds.   Pulmonary:      Effort: Pulmonary effort is normal.      Breath sounds: Normal breath sounds.   Abdominal:      General: Bowel sounds are normal. There is no distension.      Palpations: Abdomen is soft.      Tenderness: There is no abdominal tenderness.   Musculoskeletal:         General: Normal range of motion.      Cervical back: Normal range of motion and neck supple.   Skin:     General: Skin is warm and dry.      Findings: No erythema or rash.   Neurological:      Mental Status: He is alert and oriented to person, place, and time.      Cranial Nerves: No cranial " nerve deficit.         Assessment:       1. Chronic obstructive pulmonary disease, unspecified COPD type    2. Hypertension, unspecified type    3. MTHFR gene mutation    4. Femoral popliteal artery thrombus    5. Hyperlipidemia, unspecified hyperlipidemia type    6. BPH associated with nocturia    7. Screen for colon cancer    8. Encounter for screening for malignant neoplasm of prostate         Plan:       Chronic obstructive pulmonary disease, unspecified COPD type    Hypertension, unspecified type  -     olmesartan (BENICAR) 40 MG tablet; Take 1 tablet (40 mg total) by mouth every morning.  Dispense: 90 tablet; Refill: 1  -     labetaloL (NORMODYNE) 300 MG tablet; Take 1 tablet (300 mg total) by mouth 2 (two) times a day.  Dispense: 180 tablet; Refill: 1  -     Ambulatory referral/consult to Cardiology; Future; Expected date: 05/29/2023  -     CBC Auto Differential; Future; Expected date: 05/29/2023  -     Comprehensive Metabolic Panel; Future; Expected date: 05/29/2023  -     Lipid Panel; Future; Expected date: 05/29/2023  -     TSH; Future; Expected date: 05/29/2023  -     Urinalysis, Reflex to Urine Culture Urine, Clean Catch  -     Microalbumin/creatinine urine ratio    MTHFR gene mutation  -     ELIQUIS 5 mg Tab; Take 1 tablet (5 mg total) by mouth 2 (two) times daily.  Dispense: 180 tablet; Refill: 1    Femoral popliteal artery thrombus  -     ELIQUIS 5 mg Tab; Take 1 tablet (5 mg total) by mouth 2 (two) times daily.  Dispense: 180 tablet; Refill: 1    Hyperlipidemia, unspecified hyperlipidemia type  -     rosuvastatin (CRESTOR) 10 MG tablet; Take 1 tablet (10 mg total) by mouth once daily.  Dispense: 90 tablet; Refill: 1    BPH associated with nocturia  -     tamsulosin (FLOMAX) 0.4 mg Cap; Take 1 capsule (0.4 mg total) by mouth once daily.  Dispense: 30 capsule; Refill: 5  -     PSA, Screening; Future; Expected date: 05/29/2023    Screen for colon cancer  -     Ambulatory referral/consult to  Gastroenterology; Future; Expected date: 05/29/2023    Encounter for screening for malignant neoplasm of prostate  -     PSA, Screening; Future; Expected date: 05/29/2023      Follow up in about 3 months (around 8/29/2023) for checkup and review labs.        5/29/2023 Riley Atkinson PA-C

## 2023-05-30 ENCOUNTER — TELEPHONE (OUTPATIENT)
Dept: CARDIOLOGY | Facility: CLINIC | Age: 63
End: 2023-05-30
Payer: MEDICARE

## 2023-05-30 ENCOUNTER — OFFICE VISIT (OUTPATIENT)
Dept: CARDIOLOGY | Facility: CLINIC | Age: 63
End: 2023-05-30
Payer: MEDICARE

## 2023-05-30 VITALS
BODY MASS INDEX: 25.71 KG/M2 | HEIGHT: 73 IN | DIASTOLIC BLOOD PRESSURE: 72 MMHG | WEIGHT: 194 LBS | HEART RATE: 68 BPM | SYSTOLIC BLOOD PRESSURE: 118 MMHG

## 2023-05-30 DIAGNOSIS — E78.5 DYSLIPIDEMIA: ICD-10-CM

## 2023-05-30 DIAGNOSIS — I10 HYPERTENSION, UNSPECIFIED TYPE: Primary | ICD-10-CM

## 2023-05-30 PROCEDURE — 3288F PR FALLS RISK ASSESSMENT DOCUMENTED: ICD-10-PCS | Mod: CPTII,S$GLB,, | Performed by: INTERNAL MEDICINE

## 2023-05-30 PROCEDURE — 4010F PR ACE/ARB THEARPY RXD/TAKEN: ICD-10-PCS | Mod: CPTII,S$GLB,, | Performed by: INTERNAL MEDICINE

## 2023-05-30 PROCEDURE — 3074F PR MOST RECENT SYSTOLIC BLOOD PRESSURE < 130 MM HG: ICD-10-PCS | Mod: CPTII,S$GLB,, | Performed by: INTERNAL MEDICINE

## 2023-05-30 PROCEDURE — 1101F PR PT FALLS ASSESS DOC 0-1 FALLS W/OUT INJ PAST YR: ICD-10-PCS | Mod: CPTII,S$GLB,, | Performed by: INTERNAL MEDICINE

## 2023-05-30 PROCEDURE — 1160F RVW MEDS BY RX/DR IN RCRD: CPT | Mod: CPTII,S$GLB,, | Performed by: INTERNAL MEDICINE

## 2023-05-30 PROCEDURE — 93000 ELECTROCARDIOGRAM COMPLETE: CPT | Mod: S$GLB,,, | Performed by: INTERNAL MEDICINE

## 2023-05-30 PROCEDURE — 4010F ACE/ARB THERAPY RXD/TAKEN: CPT | Mod: CPTII,S$GLB,, | Performed by: INTERNAL MEDICINE

## 2023-05-30 PROCEDURE — 93000 EKG 12-LEAD: ICD-10-PCS | Mod: S$GLB,,, | Performed by: INTERNAL MEDICINE

## 2023-05-30 PROCEDURE — 1101F PT FALLS ASSESS-DOCD LE1/YR: CPT | Mod: CPTII,S$GLB,, | Performed by: INTERNAL MEDICINE

## 2023-05-30 PROCEDURE — 1159F PR MEDICATION LIST DOCUMENTED IN MEDICAL RECORD: ICD-10-PCS | Mod: CPTII,S$GLB,, | Performed by: INTERNAL MEDICINE

## 2023-05-30 PROCEDURE — 3008F PR BODY MASS INDEX (BMI) DOCUMENTED: ICD-10-PCS | Mod: CPTII,S$GLB,, | Performed by: INTERNAL MEDICINE

## 2023-05-30 PROCEDURE — 99999 PR PBB SHADOW E&M-EST. PATIENT-LVL IV: CPT | Mod: PBBFAC,,, | Performed by: INTERNAL MEDICINE

## 2023-05-30 PROCEDURE — 1159F MED LIST DOCD IN RCRD: CPT | Mod: CPTII,S$GLB,, | Performed by: INTERNAL MEDICINE

## 2023-05-30 PROCEDURE — 99999 PR PBB SHADOW E&M-EST. PATIENT-LVL IV: ICD-10-PCS | Mod: PBBFAC,,, | Performed by: INTERNAL MEDICINE

## 2023-05-30 PROCEDURE — 1157F PR ADVANCE CARE PLAN OR EQUIV PRESENT IN MEDICAL RECORD: ICD-10-PCS | Mod: CPTII,S$GLB,, | Performed by: INTERNAL MEDICINE

## 2023-05-30 PROCEDURE — 99204 PR OFFICE/OUTPT VISIT, NEW, LEVL IV, 45-59 MIN: ICD-10-PCS | Mod: S$GLB,,, | Performed by: INTERNAL MEDICINE

## 2023-05-30 PROCEDURE — 1157F ADVNC CARE PLAN IN RCRD: CPT | Mod: CPTII,S$GLB,, | Performed by: INTERNAL MEDICINE

## 2023-05-30 PROCEDURE — 99204 OFFICE O/P NEW MOD 45 MIN: CPT | Mod: S$GLB,,, | Performed by: INTERNAL MEDICINE

## 2023-05-30 PROCEDURE — 3288F FALL RISK ASSESSMENT DOCD: CPT | Mod: CPTII,S$GLB,, | Performed by: INTERNAL MEDICINE

## 2023-05-30 PROCEDURE — 1160F PR REVIEW ALL MEDS BY PRESCRIBER/CLIN PHARMACIST DOCUMENTED: ICD-10-PCS | Mod: CPTII,S$GLB,, | Performed by: INTERNAL MEDICINE

## 2023-05-30 PROCEDURE — 3078F PR MOST RECENT DIASTOLIC BLOOD PRESSURE < 80 MM HG: ICD-10-PCS | Mod: CPTII,S$GLB,, | Performed by: INTERNAL MEDICINE

## 2023-05-30 PROCEDURE — 3074F SYST BP LT 130 MM HG: CPT | Mod: CPTII,S$GLB,, | Performed by: INTERNAL MEDICINE

## 2023-05-30 PROCEDURE — 3008F BODY MASS INDEX DOCD: CPT | Mod: CPTII,S$GLB,, | Performed by: INTERNAL MEDICINE

## 2023-05-30 PROCEDURE — 3078F DIAST BP <80 MM HG: CPT | Mod: CPTII,S$GLB,, | Performed by: INTERNAL MEDICINE

## 2023-05-30 NOTE — PROGRESS NOTES
Subjective:    Patient ID:  Helder Brand is a 63 y.o. male patient here for evaluation Establish Care      History of Present Illness:     63-year-old male with past medical history of hypertension hyperlipidemia, emphysema, former smoker, lower extremity arterial and venous thrombosis due to MTHFR mutation on Eliquis came here for establishment of care.  He had a negative stress test in 2021. Recently had a CT scan which was negative for PE and showed mild aortic ectasia and coronary artery calcifications.  He had abdominal aortic ultrasound with vascular surgery office. He is functionally very active at baseline and denies any anginal symptoms.  Compliant with medications.       Review of patient's allergies indicates:  No Known Allergies    Past Medical History:   Diagnosis Date    Emphysema lung 2021    Enlarged prostate     Hyperlipidemia 2021    Hypertension 2001    Kidney stone 2001    x3    Thyroid disease     benign growth, partial thyroidectomy     Past Surgical History:   Procedure Laterality Date    ANGIOGRAPHY OF LOWER EXTREMITY Left 8/5/2022    Procedure: Angiogram Extremity Unilateral;  Surgeon: Ali Khoobehi, MD;  Location: [facility] CATH/EP LAB;  Service: Peripheral Vascular;  Laterality: Left;    ANGIOGRAPHY OF LOWER EXTREMITY Left 8/6/2022    Procedure: Angioplasty-peripheral;  Surgeon: Ismael Esquivel MD;  Location: [facility] CATH/EP LAB;  Service: General;  Laterality: Left;    ANGIOGRAPHY OF LOWER EXTREMITY Left 8/6/2022    Procedure: Angiogram Extremity Unilateral;  Surgeon: Ismael Esquivel MD;  Location: [facility] CATH/EP LAB;  Service: General;  Laterality: Left;    ANGIOGRAPHY OF LOWER EXTREMITY Left 8/7/2022    Procedure: Angiogram Extremity Unilateral;  Surgeon: Ismael Esquivel MD;  Location: [facility] CATH/EP LAB;  Service: General;  Laterality: Left;    INJECTION OF TISSUE PLASMINOGEN ACTIVATOR Left 8/6/2022    Procedure: INJECTION, TISSUE PLASMINOGEN ACTIVATOR;  Surgeon: Ismael Esquivel MD;   Location: [facility] CATH/EP LAB;  Service: General;  Laterality: Left;    INJECTION OF TISSUE PLASMINOGEN ACTIVATOR Left 2022    Procedure: INJECTION, TISSUE PLASMINOGEN ACTIVATOR;  Surgeon: Ismael Esquivel MD;  Location: [facility] CATH/EP LAB;  Service: General;  Laterality: Left;    PROSTATE SURGERY  2018    REPAIR OF ANEURYSM Left 2021    Procedure: REPAIR POPLITEAL ARTERY ANEURYSM;  Surgeon: Ismael Esquivel MD;  Location: [facility] OR;  Service: Cardiovascular;  Laterality: Left;    THROMBECTOMY  2022    Procedure: THROMBECTOMY;  Surgeon: Ismael Esquivel MD;  Location: [facility] CATH/EP LAB;  Service: General;;    THROMBECTOMY Left 2022    Procedure: THROMBECTOMY;  Surgeon: Ismael Esquivel MD;  Location: [facility] CATH/EP LAB;  Service: General;  Laterality: Left;    THYROIDECTOMY, PARTIAL  2011    TONSILLECTOMY      as a child    urolift  2019    [facility]     Social History     Tobacco Use    Smoking status: Former     Packs/day: 0.25     Years: 5.00     Pack years: 1.25     Types: Cigarettes     Quit date: 1980     Years since quittin.5     Passive exposure: Past    Smokeless tobacco: Never   Substance Use Topics    Alcohol use: Not Currently     Alcohol/week: 2.0 standard drinks     Types: 2 Cans of beer per week    Drug use: Never        Review of Systems   Negative except as mentioned in HPI         Objective        Vitals:    23 1506   BP: 118/72   Pulse: 68       LIPIDS - LAST 2   Lab Results   Component Value Date    CHOL 197 2022    HDL 57 2022    LDLCALC 130.2 2022    TRIG 49 2022    CHOLHDL 28.9 2022       CBC - LAST 2  Lab Results   Component Value Date    WBC 8.43 11/15/2022    WBC 8.11 2022    RBC 5.24 11/15/2022    RBC 4.16 (L) 2022    HGB 16.1 11/15/2022    HGB 13.3 (L) 2022    HCT 48.2 11/15/2022    HCT 38.9 (L) 2022    MCV 92 11/15/2022    MCV 94 2022    MCH 30.7 11/15/2022    MCH 32.0 (H) 2022    MCHC 33.4  11/15/2022    MCHC 34.2 08/13/2022    RDW 13.1 11/15/2022    RDW 13.8 08/13/2022     11/15/2022     08/13/2022    MPV 9.0 (L) 11/15/2022    MPV 9.0 (L) 08/13/2022    GRAN 3.4 11/15/2022    GRAN 40.6 11/15/2022    LYMPH 3.3 11/15/2022    LYMPH 39.5 11/15/2022    MONO 1.3 (H) 11/15/2022    MONO 15.2 (H) 11/15/2022    BASO 0.07 11/15/2022    BASO 0.05 08/13/2022    NRBC 0 11/15/2022    NRBC 0 08/13/2022       CHEMISTRY & LIVER FUNCTION - LAST 2  Lab Results   Component Value Date     11/15/2022     (L) 08/13/2022    K 4.0 11/15/2022    K 3.7 08/13/2022     11/15/2022     08/13/2022    CO2 27 11/15/2022    CO2 23 08/13/2022    ANIONGAP 5 (L) 11/15/2022    ANIONGAP 8 08/13/2022    BUN 14 11/15/2022    BUN 14 08/13/2022    CREATININE 0.9 11/15/2022    CREATININE 0.8 08/13/2022    GLU 89 11/15/2022    GLU 88 08/13/2022    CALCIUM 9.1 11/15/2022    CALCIUM 8.5 (L) 08/13/2022    MG 2.0 08/13/2022    MG 2.0 08/08/2022    ALBUMIN 4.0 11/15/2022    ALBUMIN 3.5 08/13/2022    PROT 7.5 11/15/2022    PROT 6.7 08/13/2022    ALKPHOS 77 11/15/2022    ALKPHOS 60 08/13/2022    ALT 19 11/15/2022    ALT 17 08/13/2022    AST 26 11/15/2022    AST 18 08/13/2022    BILITOT 0.7 11/15/2022    BILITOT 0.8 08/13/2022        CARDIAC PROFILE - LAST 2  Lab Results   Component Value Date    BNP 27 11/15/2022    BNP 61 08/13/2022     08/05/2022    TROPONINI <0.030 08/13/2022    TROPONINI <0.030 01/03/2022    TROPONINIHS 4.0 11/16/2022    TROPONINIHS 3.2 11/15/2022        COAGULATION - LAST 2  Lab Results   Component Value Date    LABPT 15.1 (H) 08/05/2022    LABPT 14.4 (H) 08/05/2022    INR 1.3 08/05/2022    INR 1.2 08/05/2022    APTT 30.8 (H) 08/07/2022    APTT 146.3 (HH) 08/05/2022       ENDOCRINE & PSA - LAST 2  Lab Results   Component Value Date    HGBA1C 5.2 03/09/2022    HGBA1C 5.5 07/14/2021    TSH 1.840 08/05/2022    PROCAL <0.05 01/03/2022        ECHOCARDIOGRAM RESULTS  No results found for this or  any previous visit.      CURRENT/PREVIOUS VISIT EKG  Results for orders placed or performed during the hospital encounter of 11/15/22   EKG 12-lead    Collection Time: 11/15/22 11:17 PM    Narrative    Test Reason : R07.9,    Vent. Rate : 057 BPM     Atrial Rate : 057 BPM     P-R Int : 208 ms          QRS Dur : 096 ms      QT Int : 416 ms       P-R-T Axes : 066 031 064 degrees     QTc Int : 404 ms    Sinus bradycardia  Otherwise normal ECG  When compared with ECG of 15-NOV-2022 22:54,  No significant change was found  Confirmed by Joseph Reyes MD ([id number]) on 11/18/2022 5:12:24 PM    Referred By: KIM   SELF           Confirmed By:Joseph Reyes MD     No valid procedures specified.   Results for orders placed during the hospital encounter of 07/13/21    Nuclear Stress Test    Interpretation Summary    The EKG portion of this study is negative for ischemia.    The patient reported no chest pain during the stress test.    There were no arrhythmias during stress.    The nuclear portion of this study will be reported separately.    No valid procedures specified.          PREVIOUS STRESS TEST              PREVIOUS ANGIOGRAM        PHYSICAL EXAM    CONSTITUTIONAL: Well built, well nourished in no apparent distress  HEENT: No pallor  NECK: no JVD  LUNGS: CTA b/l  HEART: regular rate and rhythm, S1, S2 normal, no murmur   ABDOMEN: soft, non-tender; bowel sounds normal  EXTREMITIES: No edema  NEURO: AAO X 3   SKIN:  No rash  Psych:  Normal affect    I HAVE REVIEWED :    The vital signs, nurses notes, and all the pertinent radiology and labs.        Current Outpatient Medications   Medication Instructions    albuterol (PROVENTIL/VENTOLIN HFA) 90 mcg/actuation inhaler 2 puffs, Inhalation, Every 6 hours PRN    albuterol-ipratropium (DUO-NEB) 2.5 mg-0.5 mg/3 mL nebulizer solution 3 mLs, Nebulization, Every 6 hours PRN, Rescue    amLODIPine (NORVASC) 5 mg, Oral, Daily    azelastine (ASTELIN) 137 mcg, Nasal, 2 times daily     azithromycin (Z-ALONDRA) 250 MG tablet 2 pills day one, then 1 pill for 4 days    carvediloL (COREG) 12.5 mg, Oral, 2 times daily    ELIQUIS 5 mg, Oral, 2 times daily    fluticasone propionate (FLONASE) 50 mcg/actuation nasal spray 2 sprays, Each Nostril, Daily PRN    fluticasone-salmeterol 230-21 mcg/dose (ADVAIR HFA) 230-21 mcg/actuation HFAA inhaler 2 puffs, Inhalation, 2 times daily, Controller    folic acid-vit B6-vit B12 2.5-25-2 mg (FOLBIC OR EQUIV) 2.5-25-2 mg Tab 1 tablet, Oral, Daily    labetaloL (NORMODYNE) 300 mg, Oral, 2 times daily    olmesartan (BENICAR) 40 mg, Oral, Every morning    predniSONE (DELTASONE) 10 MG tablet Take 1-2 pills a day for three days, repeat for shortness of breath    rosuvastatin (CRESTOR) 10 mg, Oral, Daily    tamsulosin (FLOMAX) 0.4 mg, Oral, Daily        ECG reviewed by me:  Normal sinus rhythm  Assessment & Plan     63-year-old male with past medical history of hypertension hyperlipidemia, emphysema, former smoker, lower extremity arterial and venous thrombosis due to MTHFR mutation on Eliquis came here for establishment of care.  He had a negative stress test in 2021. Recently had a CT scan which was negative for PE and showed mild aortic ectasia and coronary artery calcifications.  He had abdominal aortic ultrasound with vascular surgery office. He is functionally very active at baseline and denies any anginal symptoms.  Compliant with medications.   -continue current medications  -BP controlled  -echocardiogram for evaluation of LV function and valves  -repeat blood work with PCP including lipid profile.  Target LDL less than 70.  We will need to increase the Crestor dose if LDL is elevated on repeat blood work.           Follow up in about 5 months (around 10/30/2023).

## 2023-06-12 ENCOUNTER — TELEPHONE (OUTPATIENT)
Dept: FAMILY MEDICINE | Facility: CLINIC | Age: 63
End: 2023-06-12

## 2023-06-12 DIAGNOSIS — Z12.11 SCREEN FOR COLON CANCER: ICD-10-CM

## 2023-06-12 DIAGNOSIS — R07.0 THROAT PAIN: Primary | ICD-10-CM

## 2023-06-12 NOTE — TELEPHONE ENCOUNTER
----- Message from Kelly Narvaez sent at 6/12/2023  2:13 PM CDT -----  Vm- 1:10-pt is having soreness in the back of his throat and in his neck. It is not a sore throat. Does he need to come here or go see an ent   [phone]

## 2023-06-12 NOTE — TELEPHONE ENCOUNTER
Pt states he has been having trouble with pain deep into his throat. That is causing neck pain. This has been going on for about a month. Pain is gradually getting worse. States it is worse when he swallows. Pt would like to know if he should see ent.

## 2023-06-13 ENCOUNTER — TELEPHONE (OUTPATIENT)
Dept: FAMILY MEDICINE | Facility: CLINIC | Age: 63
End: 2023-06-13

## 2023-06-13 DIAGNOSIS — R07.0 THROAT PAIN: Primary | ICD-10-CM

## 2023-06-13 NOTE — TELEPHONE ENCOUNTER
----- Message from Kelly Narvaez sent at 6/13/2023  2:04 PM CDT -----  Vm- the ent we referred him to is not on his [employer] health plan. Please refer to an [employer] doctor   [phone]

## 2023-06-22 ENCOUNTER — HOSPITAL ENCOUNTER (OUTPATIENT)
Dept: RADIOLOGY | Facility: HOSPITAL | Age: 63
Discharge: HOME | End: 2023-06-22
Attending: OTOLARYNGOLOGY
Payer: MEDICARE

## 2023-06-22 ENCOUNTER — OFFICE VISIT (OUTPATIENT)
Dept: OTOLARYNGOLOGY | Facility: CLINIC | Age: 63
End: 2023-06-22
Payer: MEDICARE

## 2023-06-22 ENCOUNTER — PATIENT MESSAGE (OUTPATIENT)
Dept: OTOLARYNGOLOGY | Facility: CLINIC | Age: 63
End: 2023-06-22

## 2023-06-22 VITALS
SYSTOLIC BLOOD PRESSURE: 134 MMHG | HEIGHT: 73 IN | OXYGEN SATURATION: 95 % | HEART RATE: 63 BPM | BODY MASS INDEX: 25.6 KG/M2 | DIASTOLIC BLOOD PRESSURE: 90 MMHG | WEIGHT: 193.13 LBS

## 2023-06-22 DIAGNOSIS — J01.90 ACUTE NON-RECURRENT SINUSITIS, UNSPECIFIED LOCATION: ICD-10-CM

## 2023-06-22 DIAGNOSIS — J18.9 PNEUMONIA OF LEFT LOWER LOBE DUE TO INFECTIOUS ORGANISM: ICD-10-CM

## 2023-06-22 DIAGNOSIS — R07.0 THROAT PAIN: ICD-10-CM

## 2023-06-22 DIAGNOSIS — J18.9 PNEUMONIA OF LEFT LOWER LOBE DUE TO INFECTIOUS ORGANISM: Primary | ICD-10-CM

## 2023-06-22 PROCEDURE — 1159F MED LIST DOCD IN RCRD: CPT | Mod: CPTII,S$GLB,, | Performed by: OTOLARYNGOLOGY

## 2023-06-22 PROCEDURE — 1157F PR ADVANCE CARE PLAN OR EQUIV PRESENT IN MEDICAL RECORD: ICD-10-PCS | Mod: CPTII,S$GLB,, | Performed by: OTOLARYNGOLOGY

## 2023-06-22 PROCEDURE — 1157F ADVNC CARE PLAN IN RCRD: CPT | Mod: CPTII,S$GLB,, | Performed by: OTOLARYNGOLOGY

## 2023-06-22 PROCEDURE — 71046 X-RAY EXAM CHEST 2 VIEWS: CPT | Mod: 26,,, | Performed by: RADIOLOGY

## 2023-06-22 PROCEDURE — 3080F DIAST BP >= 90 MM HG: CPT | Mod: CPTII,S$GLB,, | Performed by: OTOLARYNGOLOGY

## 2023-06-22 PROCEDURE — 3008F BODY MASS INDEX DOCD: CPT | Mod: CPTII,S$GLB,, | Performed by: OTOLARYNGOLOGY

## 2023-06-22 PROCEDURE — 1160F PR REVIEW ALL MEDS BY PRESCRIBER/CLIN PHARMACIST DOCUMENTED: ICD-10-PCS | Mod: CPTII,S$GLB,, | Performed by: OTOLARYNGOLOGY

## 2023-06-22 PROCEDURE — 1101F PR PT FALLS ASSESS DOC 0-1 FALLS W/OUT INJ PAST YR: ICD-10-PCS | Mod: CPTII,S$GLB,, | Performed by: OTOLARYNGOLOGY

## 2023-06-22 PROCEDURE — 3008F PR BODY MASS INDEX (BMI) DOCUMENTED: ICD-10-PCS | Mod: CPTII,S$GLB,, | Performed by: OTOLARYNGOLOGY

## 2023-06-22 PROCEDURE — 99204 OFFICE O/P NEW MOD 45 MIN: CPT | Mod: S$GLB,,, | Performed by: OTOLARYNGOLOGY

## 2023-06-22 PROCEDURE — 4010F PR ACE/ARB THEARPY RXD/TAKEN: ICD-10-PCS | Mod: CPTII,S$GLB,, | Performed by: OTOLARYNGOLOGY

## 2023-06-22 PROCEDURE — 71046 X-RAY EXAM CHEST 2 VIEWS: CPT | Mod: TC,FY

## 2023-06-22 PROCEDURE — 99999 PR PBB SHADOW E&M-EST. PATIENT-LVL V: CPT | Mod: PBBFAC,,, | Performed by: OTOLARYNGOLOGY

## 2023-06-22 PROCEDURE — 3075F SYST BP GE 130 - 139MM HG: CPT | Mod: CPTII,S$GLB,, | Performed by: OTOLARYNGOLOGY

## 2023-06-22 PROCEDURE — 99204 PR OFFICE/OUTPT VISIT, NEW, LEVL IV, 45-59 MIN: ICD-10-PCS | Mod: S$GLB,,, | Performed by: OTOLARYNGOLOGY

## 2023-06-22 PROCEDURE — 3288F PR FALLS RISK ASSESSMENT DOCUMENTED: ICD-10-PCS | Mod: CPTII,S$GLB,, | Performed by: OTOLARYNGOLOGY

## 2023-06-22 PROCEDURE — 1159F PR MEDICATION LIST DOCUMENTED IN MEDICAL RECORD: ICD-10-PCS | Mod: CPTII,S$GLB,, | Performed by: OTOLARYNGOLOGY

## 2023-06-22 PROCEDURE — 3075F PR MOST RECENT SYSTOLIC BLOOD PRESS GE 130-139MM HG: ICD-10-PCS | Mod: CPTII,S$GLB,, | Performed by: OTOLARYNGOLOGY

## 2023-06-22 PROCEDURE — 1101F PT FALLS ASSESS-DOCD LE1/YR: CPT | Mod: CPTII,S$GLB,, | Performed by: OTOLARYNGOLOGY

## 2023-06-22 PROCEDURE — 3080F PR MOST RECENT DIASTOLIC BLOOD PRESSURE >= 90 MM HG: ICD-10-PCS | Mod: CPTII,S$GLB,, | Performed by: OTOLARYNGOLOGY

## 2023-06-22 PROCEDURE — 1160F RVW MEDS BY RX/DR IN RCRD: CPT | Mod: CPTII,S$GLB,, | Performed by: OTOLARYNGOLOGY

## 2023-06-22 PROCEDURE — 99999 PR PBB SHADOW E&M-EST. PATIENT-LVL V: ICD-10-PCS | Mod: PBBFAC,,, | Performed by: OTOLARYNGOLOGY

## 2023-06-22 PROCEDURE — 4010F ACE/ARB THERAPY RXD/TAKEN: CPT | Mod: CPTII,S$GLB,, | Performed by: OTOLARYNGOLOGY

## 2023-06-22 PROCEDURE — 3288F FALL RISK ASSESSMENT DOCD: CPT | Mod: CPTII,S$GLB,, | Performed by: OTOLARYNGOLOGY

## 2023-06-22 PROCEDURE — 71046 XR CHEST PA AND LATERAL: ICD-10-PCS | Mod: 26,,, | Performed by: RADIOLOGY

## 2023-06-22 RX ORDER — DOXYCYCLINE 100 MG/1
100 CAPSULE ORAL EVERY 12 HOURS
Qty: 20 CAPSULE | Refills: 0 | Status: SHIPPED | OUTPATIENT
Start: 2023-06-22 | End: 2023-07-02

## 2023-06-22 RX ORDER — METHYLPREDNISOLONE 4 MG/1
TABLET ORAL
Qty: 21 EACH | Refills: 0 | Status: SHIPPED | OUTPATIENT
Start: 2023-06-22 | End: 2023-07-06 | Stop reason: ALTCHOICE

## 2023-06-22 NOTE — PROGRESS NOTES
" [facility]    Subjective:      Patient: Helder Brand Patient PCP: Riley Atkinson PA-C         :  1960     Sex:  male      MRN:  [id number]          Date of Visit: 2023      Chief Complaint: Sore Throat (States had a pain in his throat, states "looked like something was back there". States went into chest and into sinuses.States symptoms for 1 week.  States coughs up mucous. States has COPD and is using albuterol. )      Patient ID: Helder Brand is a 63 y.o. male former smoker with a past medical history of COPD, lung nodule, HTN on ARB and BB (no ACE), DVT/PVD on Eliquis referred to me by Riley Atkinson in consultation for throat pain.  Feels a FB sensation in the throat. Some cough and sinus drainage (on Flonase no rinses).  No gross reflux, brash or history of GERD.  No voice change, hemoptysis or dysphagia.  No actual sore throat and no otalgia.    CTA Chest 3/22/23 with normal lower neck findings, some atelectasis, and no PE.    MRI Brain 2020 with torduous vertebral and basilar arteries.  T2 axial images reviewed with S deformity of the septum with marked nasal septal deviation/obstruction of the right.  Minimal ethmoid increased T2 signal no opacification or destructive process.  Normal middle ears and mastoids.    Review of Systems     Past Medical History  He has a past medical history of Emphysema lung, Enlarged prostate, Hyperlipidemia, Hypertension, Kidney stone, and Thyroid disease.    Family / Surgical / Social History  His family history includes COPD in his mother; Hypertension in his mother; Pulmonary embolism in his father.    Past Surgical History:   Procedure Laterality Date    ANGIOGRAPHY OF LOWER EXTREMITY Left 2022    Procedure: Angiogram Extremity Unilateral;  Surgeon: Ali Khoobehi, MD;  Location: [facility] CATH/EP LAB;  Service: Peripheral Vascular;  Laterality: Left;    ANGIOGRAPHY OF LOWER EXTREMITY Left 2022    Procedure: Angioplasty-peripheral;  " Surgeon: Ismael Esquivel MD;  Location: [facility] CATH/EP LAB;  Service: General;  Laterality: Left;    ANGIOGRAPHY OF LOWER EXTREMITY Left 2022    Procedure: Angiogram Extremity Unilateral;  Surgeon: Ismael Esquivel MD;  Location: [facility] CATH/EP LAB;  Service: General;  Laterality: Left;    ANGIOGRAPHY OF LOWER EXTREMITY Left 2022    Procedure: Angiogram Extremity Unilateral;  Surgeon: Ismael Esquivel MD;  Location: [facility] CATH/EP LAB;  Service: General;  Laterality: Left;    INJECTION OF TISSUE PLASMINOGEN ACTIVATOR Left 2022    Procedure: INJECTION, TISSUE PLASMINOGEN ACTIVATOR;  Surgeon: Ismael Esquivel MD;  Location: [facility] CATH/EP LAB;  Service: General;  Laterality: Left;    INJECTION OF TISSUE PLASMINOGEN ACTIVATOR Left 2022    Procedure: INJECTION, TISSUE PLASMINOGEN ACTIVATOR;  Surgeon: Ismael Esquivel MD;  Location: [facility] CATH/EP LAB;  Service: General;  Laterality: Left;    PROSTATE SURGERY  2018    REPAIR OF ANEURYSM Left 2021    Procedure: REPAIR POPLITEAL ARTERY ANEURYSM;  Surgeon: Ismael Esquivel MD;  Location: [facility] OR;  Service: Cardiovascular;  Laterality: Left;    THROMBECTOMY  2022    Procedure: THROMBECTOMY;  Surgeon: Ismael Esquivel MD;  Location: [facility] CATH/EP LAB;  Service: General;;    THROMBECTOMY Left 2022    Procedure: THROMBECTOMY;  Surgeon: Ismael Esquivel MD;  Location: [facility] CATH/EP LAB;  Service: General;  Laterality: Left;    THYROIDECTOMY, PARTIAL  2011    TONSILLECTOMY      as a child    urolift  2019    [facility]       Social History     Tobacco Use    Smoking status: Former     Packs/day: 0.25     Years: 5.00     Pack years: 1.25     Types: Cigarettes     Quit date: 1980     Years since quittin.6     Passive exposure: Past    Smokeless tobacco: Never   Substance and Sexual Activity    Alcohol use: Not Currently     Alcohol/week: 2.0 standard drinks     Types: 2 Cans of beer per week    Drug use: Never    Sexual activity: Not on file        Medications  He has a current medication list which includes the following prescription(s): albuterol, albuterol-ipratropium, amlodipine, eliquis, fluticasone propionate, labetalol, olmesartan, rosuvastatin, and tamsulosin, and the following Facility-Administered Medications: lactated ringers.      Allergies  Review of patient's allergies indicates:  No Known Allergies    All medications, allergies, and past history have been reviewed.    Objective:      Vitals:  Vitals - 1 value per visit 5/30/2023 6/22/2023 6/22/2023   SYSTOLIC 118 - 134   DIASTOLIC 72 - 90   Pulse 68 - 63   Temp - - -   Resp - - -   SPO2 - - -   Weight (lb) 194.01 - 193.12   Weight (kg) 88 - 87.6   Height 73 - 73   BMI (Calculated) 25.6 - 25.5   VISIT REPORT - - -   Pain Score  - 0 -       Body surface area is 2.12 meters squared.    Physical Exam:    GENERAL  APPEARANCE -  alert, appears stated age, cooperative, no distress, and tired and mildly acutely ill   BARRIER(S) TO COMMUNICATION -  none VOICE - hyponasal    INTEGUMENTARY  no suspicious head and neck lesions    HEENT  HEAD: Normocephalic, without obvious abnormality, atraumatic  FACE: INSPECTION - Symmetric, no signs of trauma, no suspicious lesion(s)  PALPATION -  No masses SALIVARY GLANDS - non-tender with no appreciable mass  STRENGTH - facial symmetry  NECK/THYROID: normal atraumatic, no neck masses, normal thyroid, no jvd    EYES  Normal occular alignment and mobility with no visible nystagmus at rest    EARS/NOSE/MOUTH/THROAT  EARS  PINNAE AND EXTERNAL EARS - no suspicious lesion OTOSCOPIC EXAM (surgical microscopy was not used for visualization/instrumentation): EAR EXAM - heavy lateral wax w/o impaction Normal ear canals, tympanic membranes and mobility, and middle ear spaces bilaterally.  HEARING - grossly intact to voice/finger rub    NOSE AND SINUSES  EXTERNAL NOSE - Grossly normal for age/sex  SEPTUM - marked deviation and spurring left with superficial ulceration of  spur w/o active bleeding TURBINATES - within normal limits MUCOSA - moderate congestion,  no gross pus anteriorly    MOUTH AND THROAT   ORAL CAVITY, LIPS, TEETH, GUMS & TONGUE - moist, no suspicious lesions  OROPHARYNX /TONSILS/PHARYNGEAL WALLS/HYPOPHARYNX - minimal hyperemia, 1+ tonsils w/o exudate  NASOPHARYNX - limited mirror exam - unable to visualize due to anatomy/gag  LARYNX -  - limited mirror exam - Supraglottis, false and true vocal cord were normal., limited of cords      CHEST AND LUNG   INSPECTION & AUSCULTATION - Decreased to absent left inferior BS posteriorly with some axillary wheezing, right normal    CARDIOVASCULAR  AUSCULTATION & PERIPHERAL VASCULAR - regular rate and rhythm.    NEUROLOGIC  MENTAL STATUS - alert, interactive CRANIAL NERVES - normal    LYMPHATIC  HEAD AND NECK - non-palpable      Procedure(s):  None    Labs:  WBC   Date Value Ref Range Status   11/15/2022 8.43 3.90 - 12.70 K/uL Final     Hemoglobin   Date Value Ref Range Status   11/15/2022 16.1 14.0 - 18.0 g/dL Final     Platelets   Date Value Ref Range Status   11/15/2022 233 150 - 450 K/uL Final     Creatinine   Date Value Ref Range Status   11/15/2022 0.9 0.5 - 1.4 mg/dL Final     TSH   Date Value Ref Range Status   08/05/2022 1.840 0.340 - 5.600 uIU/mL Final     Glucose   Date Value Ref Range Status   11/15/2022 89 70 - 110 mg/dL Final     Hemoglobin A1C   Date Value Ref Range Status   03/09/2022 5.2 4.0 - 5.6 % Final     Comment:     ADA Screening Guidelines:  5.7-6.4%  Consistent with prediabetes  >or=6.5%  Consistent with diabetes    High levels of fetal hemoglobin interfere with the HbA1C  assay. Heterozygous hemoglobin variants (HbS, HgC, etc)do  not significantly interfere with this assay.   However, presence of multiple variants may affect accuracy.           Assessment:      Problem List Items Addressed This Visit    None  Visit Diagnoses       Pneumonia of left lower lobe due to infectious organism    -  Primary     Throat pain        Acute non-recurrent sinusitis, unspecified location                     Plan:      Doxycycline (antibiotic) and a short steroid Dosepak (Medrol) as prescribed.  Use the DuoNeb 4 times daily and the rescue inhaler albuterol as needed.  Discontinue the Flonase for now and use lots of saline and even some Aquaphor/Vaseline into the left nostril to prevent any bleeding from the dryness the deviated septum as discussed.  This could become severe on Eliquis and bleeding needs to be avoided.  A short round of decongestants (Afrin) as outlined and no longer can be used.  Regular use this medication will cause rebound congestion and needs to be avoided.    As discussed if symptoms are worsening with worsening shortness of breath or failure to improve further evaluation maybe necessary even in the ER setting.  Chest x-ray today.

## 2023-06-22 NOTE — PATIENT INSTRUCTIONS
Doxycycline (antibiotic) and a short steroid Dosepak (Medrol) as prescribed.  Use the DuoNeb 4 times daily and the rescue inhaler albuterol as needed.  Discontinue the Flonase for now and use lots of saline and even some Aquaphor/Vaseline into the left nostril to prevent any bleeding from the dryness the deviated septum as discussed.  This could become severe on Eliquis and bleeding needs to be avoided.  A short round of decongestants (Afrin) as outlined and no longer can be used.  Regular use this medication will cause rebound congestion and needs to be avoided.    As discussed if symptoms are worsening with worsening shortness of breath or failure to improve further evaluation maybe necessary even in the ER setting.  Chest x-ray today.      AFRIN/OXYMETAZOLINE NASAL DECONGESTANT REGIMEN    Afrin decongestant nasal spray 2 sprays towards the ear each side twice daily for 3 days, stop for 1 day, return for 3 more days then discontinue use entirely.    NASAL SALINE    Still saline comes in many preparations including sprays/mists, gels, and rinses.  Different preparations served different purposes.  Saline spray helps to briefly moisturize the nose and help clear mucus.  Saline gels coat the nose for longer protective benefit of keeping the linings the nose moist.  Saline rinses clear the nose and sinuses and a more thorough way in her best used for significant postnasal drip and sinus complaints.  A combination of saline sprays/mists, gels and rinses should be used to address routine nasal clearing and dryness issues as well as flushing for better control of allergy and postnasal drip symptoms.  There is no real risk of over use of nasal saline products.  Saline sprays do not have any of the potential rebound or addiction of nasal decongestant sprays.  Nasal saline sprays and rinses should be used prior to the application of any medicated nasal sprays such as nasal steroids or nasal antihistamine  sprays.      DRY NOSE CARE    Use lots of saline throughout the day to keep the nose moist.  Consider using saline gel for longer-term moisturizing.  Aquaphor or Vaseline should be applied to the nostrils at night when needed for crusting/more severe dryness. Antibiotic ointment can be used up to a week for tender dryness/crusting.  Follow-up for further evaluation treatment if symptoms of are not resolved.

## 2023-07-03 DIAGNOSIS — J44.1 CHRONIC OBSTRUCTIVE PULMONARY DISEASE WITH ACUTE EXACERBATION: ICD-10-CM

## 2023-07-03 RX ORDER — IPRATROPIUM BROMIDE AND ALBUTEROL SULFATE 2.5; .5 MG/3ML; MG/3ML
3 SOLUTION RESPIRATORY (INHALATION) EVERY 6 HOURS PRN
Qty: 240 ML | Refills: 5 | Status: CANCELLED | OUTPATIENT
Start: 2023-07-03 | End: 2024-07-02

## 2023-07-03 NOTE — TELEPHONE ENCOUNTER
----- Message from Shireen Sha sent at 7/3/2023  9:52 AM CDT -----  Contact: patient  Type:  RX Refill Request    Who Called: patient     Refill or New Rx: refill     RX Name and Strength: albuterol-ipratropium (DUO-NEB) 2.5 mg-0.5 mg/3 mL nebulizer solution    How is the patient currently taking it? (ex. 1XDay):    Is this a 30 day or 90 day RX:90    Preferred Pharmacy with phone number:     [facility] [address]        Local or Mail Order:local     Ordering Provider:    Would the patient rather a call back or a response via [facility]? Call     Best Call Back Number:[phone] (home)      Additional Information:

## 2023-07-05 DIAGNOSIS — J44.1 CHRONIC OBSTRUCTIVE PULMONARY DISEASE WITH ACUTE EXACERBATION: ICD-10-CM

## 2023-07-05 RX ORDER — IPRATROPIUM BROMIDE AND ALBUTEROL SULFATE 2.5; .5 MG/3ML; MG/3ML
3 SOLUTION RESPIRATORY (INHALATION) EVERY 6 HOURS PRN
Qty: 240 ML | Refills: 5 | Status: SHIPPED | OUTPATIENT
Start: 2023-07-05 | End: 2023-12-28 | Stop reason: SDUPTHER

## 2023-07-05 NOTE — TELEPHONE ENCOUNTER
Sent refill request to NP   ----- Message from Lev Holloway sent at 7/5/2023 11:31 AM CDT -----  Type:  RX Refill Request    Who Called:  pt  Refill or New Rx:  REFILL  RX Name and Strength:  albuterol-ipratropium (DUO-NEB) 2.5 mg-0.5 mg/3 mL nebulizer solution  How is the patient currently taking it? (ex. 1XDay):  as directed  Is this a 30 day or 90 day RX:  90  Preferred Pharmacy with phone number:    [id number] - [place], [place] - [address] AT [facility] [address] [facility] [address]  [address]  [place] [address]  Phone: [phone] Fax: [phone]  Local or Mail Order:  local  Ordering Provider:  Robert Amin Call Back Number:  [phone]  Additional Information:  pt is sick and has very bad congestion. He is out of this medication and needs it sent in ASAP today. Please call back to advise. Thanks!

## 2023-07-06 ENCOUNTER — TELEPHONE (OUTPATIENT)
Dept: CARDIOLOGY | Facility: CLINIC | Age: 63
End: 2023-07-06
Payer: MEDICARE

## 2023-07-06 ENCOUNTER — OFFICE VISIT (OUTPATIENT)
Dept: PULMONOLOGY | Facility: CLINIC | Age: 63
End: 2023-07-06
Payer: MEDICARE

## 2023-07-06 VITALS
SYSTOLIC BLOOD PRESSURE: 153 MMHG | DIASTOLIC BLOOD PRESSURE: 101 MMHG | HEIGHT: 73 IN | BODY MASS INDEX: 25.65 KG/M2 | OXYGEN SATURATION: 98 % | WEIGHT: 193.56 LBS | HEART RATE: 71 BPM

## 2023-07-06 DIAGNOSIS — J44.1 COPD WITH ACUTE EXACERBATION: Primary | ICD-10-CM

## 2023-07-06 PROCEDURE — 3008F BODY MASS INDEX DOCD: CPT | Mod: CPTII,S$GLB,, | Performed by: NURSE PRACTITIONER

## 2023-07-06 PROCEDURE — 3077F SYST BP >= 140 MM HG: CPT | Mod: CPTII,S$GLB,, | Performed by: NURSE PRACTITIONER

## 2023-07-06 PROCEDURE — 1160F RVW MEDS BY RX/DR IN RCRD: CPT | Mod: CPTII,S$GLB,, | Performed by: NURSE PRACTITIONER

## 2023-07-06 PROCEDURE — 3080F PR MOST RECENT DIASTOLIC BLOOD PRESSURE >= 90 MM HG: ICD-10-PCS | Mod: CPTII,S$GLB,, | Performed by: NURSE PRACTITIONER

## 2023-07-06 PROCEDURE — 1159F PR MEDICATION LIST DOCUMENTED IN MEDICAL RECORD: ICD-10-PCS | Mod: CPTII,S$GLB,, | Performed by: NURSE PRACTITIONER

## 2023-07-06 PROCEDURE — 1160F PR REVIEW ALL MEDS BY PRESCRIBER/CLIN PHARMACIST DOCUMENTED: ICD-10-PCS | Mod: CPTII,S$GLB,, | Performed by: NURSE PRACTITIONER

## 2023-07-06 PROCEDURE — 1157F ADVNC CARE PLAN IN RCRD: CPT | Mod: CPTII,S$GLB,, | Performed by: NURSE PRACTITIONER

## 2023-07-06 PROCEDURE — 3288F FALL RISK ASSESSMENT DOCD: CPT | Mod: CPTII,S$GLB,, | Performed by: NURSE PRACTITIONER

## 2023-07-06 PROCEDURE — 1157F PR ADVANCE CARE PLAN OR EQUIV PRESENT IN MEDICAL RECORD: ICD-10-PCS | Mod: CPTII,S$GLB,, | Performed by: NURSE PRACTITIONER

## 2023-07-06 PROCEDURE — 1101F PR PT FALLS ASSESS DOC 0-1 FALLS W/OUT INJ PAST YR: ICD-10-PCS | Mod: CPTII,S$GLB,, | Performed by: NURSE PRACTITIONER

## 2023-07-06 PROCEDURE — 3008F PR BODY MASS INDEX (BMI) DOCUMENTED: ICD-10-PCS | Mod: CPTII,S$GLB,, | Performed by: NURSE PRACTITIONER

## 2023-07-06 PROCEDURE — 4010F PR ACE/ARB THEARPY RXD/TAKEN: ICD-10-PCS | Mod: CPTII,S$GLB,, | Performed by: NURSE PRACTITIONER

## 2023-07-06 PROCEDURE — 96372 PR INJECTION,THERAP/PROPH/DIAG2ST, IM OR SUBCUT: ICD-10-PCS | Mod: S$GLB,,, | Performed by: NURSE PRACTITIONER

## 2023-07-06 PROCEDURE — 99214 PR OFFICE/OUTPT VISIT, EST, LEVL IV, 30-39 MIN: ICD-10-PCS | Mod: 25,S$GLB,, | Performed by: NURSE PRACTITIONER

## 2023-07-06 PROCEDURE — 3288F PR FALLS RISK ASSESSMENT DOCUMENTED: ICD-10-PCS | Mod: CPTII,S$GLB,, | Performed by: NURSE PRACTITIONER

## 2023-07-06 PROCEDURE — 1159F MED LIST DOCD IN RCRD: CPT | Mod: CPTII,S$GLB,, | Performed by: NURSE PRACTITIONER

## 2023-07-06 PROCEDURE — 3077F PR MOST RECENT SYSTOLIC BLOOD PRESSURE >= 140 MM HG: ICD-10-PCS | Mod: CPTII,S$GLB,, | Performed by: NURSE PRACTITIONER

## 2023-07-06 PROCEDURE — 3080F DIAST BP >= 90 MM HG: CPT | Mod: CPTII,S$GLB,, | Performed by: NURSE PRACTITIONER

## 2023-07-06 PROCEDURE — 99999 PR PBB SHADOW E&M-EST. PATIENT-LVL IV: CPT | Mod: PBBFAC,,, | Performed by: NURSE PRACTITIONER

## 2023-07-06 PROCEDURE — 1101F PT FALLS ASSESS-DOCD LE1/YR: CPT | Mod: CPTII,S$GLB,, | Performed by: NURSE PRACTITIONER

## 2023-07-06 PROCEDURE — 99999 PR PBB SHADOW E&M-EST. PATIENT-LVL IV: ICD-10-PCS | Mod: PBBFAC,,, | Performed by: NURSE PRACTITIONER

## 2023-07-06 PROCEDURE — 99214 OFFICE O/P EST MOD 30 MIN: CPT | Mod: 25,S$GLB,, | Performed by: NURSE PRACTITIONER

## 2023-07-06 PROCEDURE — 96372 THER/PROPH/DIAG INJ SC/IM: CPT | Mod: S$GLB,,, | Performed by: NURSE PRACTITIONER

## 2023-07-06 PROCEDURE — 4010F ACE/ARB THERAPY RXD/TAKEN: CPT | Mod: CPTII,S$GLB,, | Performed by: NURSE PRACTITIONER

## 2023-07-06 RX ORDER — BUDESONIDE, GLYCOPYRROLATE, AND FORMOTEROL FUMARATE 160; 9; 4.8 UG/1; UG/1; UG/1
2 AEROSOL, METERED RESPIRATORY (INHALATION) 2 TIMES DAILY
Qty: 10.7 G | Refills: 0 | Status: SHIPPED | OUTPATIENT
Start: 2023-07-06 | End: 2024-03-13 | Stop reason: SDUPTHER

## 2023-07-06 RX ORDER — PREDNISONE 20 MG/1
TABLET ORAL
Qty: 36 TABLET | Refills: 0 | Status: SHIPPED | OUTPATIENT
Start: 2023-07-06 | End: 2023-08-11 | Stop reason: SDUPTHER

## 2023-07-06 RX ORDER — BETAMETHASONE SODIUM PHOSPHATE AND BETAMETHASONE ACETATE 3; 3 MG/ML; MG/ML
6 INJECTION, SUSPENSION INTRA-ARTICULAR; INTRALESIONAL; INTRAMUSCULAR; SOFT TISSUE
Status: COMPLETED | OUTPATIENT
Start: 2023-07-06 | End: 2023-07-06

## 2023-07-06 RX ADMIN — BETAMETHASONE SODIUM PHOSPHATE AND BETAMETHASONE ACETATE 6 MG: 3; 3 INJECTION, SUSPENSION INTRA-ARTICULAR; INTRALESIONAL; INTRAMUSCULAR; SOFT TISSUE at 11:07

## 2023-07-06 NOTE — PROGRESS NOTES
7/6/2023    Helder Brand  Follow up    Chief Complaint   Patient presents with    Cough       HPI:   7/6/2023- complaint of COPD exacerbation onset 3 weeks treated with antibiotics and oral steroids took two doses of therapy.   Complaint of productive cough. Green mucous. Treated with z-pack and doxycycline with no benefit.   Associated with chest tightness and wheeze.       5/10/23- complaint of cough, onset 2 weeks, improved with nebulizer therapy.   Difficult to clear chest of mucous. Clear mucous. Worse in late evenings.   Associated with wheeze and chest tightness.   Currently on Eliquis for history DVT    10/20/22- had COVID 19 July 2022, states no current complaint of shortness of breath, cough, chest tightness, or wheeze. not currently using advair or nebulizer machine.   Left DVT in August currently on Eliquis,     6/1/2022- states breathing is improved after started nebulizer as needed. PCP treated with antibiotics for productive green mucous in April, resolved with therapy.   SOB- recurrent complaint, had trouble breathing while laying down improved with albuterol inhaler. Associated with chest tightness and wheeze in late evenings, most nights. Nocturnal arousals 1x weekly. States doing better while on antibiotics.   On advair most day, sometimes forgets. Started on Trelegy but insurance coverage is not affordable.   Able to work in garden but still having to take breaks. Worse in high heat.   Declined sleep apnea therapy.     3/9/2022-Cough- worsened in past 2 weeks, has to sleep in recliner, coughing fits at night. Productive thick mucous green in color. Using nebulizer 3x daily with benefit for few hours.   SOB- severe, worse with exertion, associated with wheeze and chest tightness.     States was previously doing well, able to do gardening for 10 minutes then having to rest. Not using stiolto due to difficultly with device.       11/19/2021- Complaint of worsening cough- onset 2 weeks, tx by  PCP with azithromycin and Levaquin with minimal benefit. Coughing through out night. Productive thick green mucous that has turned yellow in color.   Associated with chest tightness and wheeze.        10/06/2021- since last visit pt had blood clot to left popliteal artery and required vascular surgery. He has a L leg wound vacc and getting wound care now.   He hasn't smoked for 40 yrs. Feels some mild throat clearing w/ cough.   Used to swim a lot when young. Worked [employer] 8-10 yrs and got lots of exercise.  Mother smoked a little and got bad copd.    6/17/21-  Need disc of images from [facility]- please bring disc from home and drop off to our office. Once I review the images I can give more advice- possible biopsy?  Get pulmonary function tests  Follow up with cardiologist  Pt is a 62 yo male with HTN, thyroid disease presenting for new evaluation.  Pt reports he had abnormal chest x-ray which was found after he had episode of chest tightness.  Has been getting these episodes of pressure like anterior chest discomfort, off and on for several months, usually while at rest and lasts few minutes. He rides bike 3-6 miles a day and denies LAW or chest tightness w/ exertion. Sometimes has slight wheeze when laying down at night but no cough/mucous. No inhaler use. No childhood asthma or breathing trouble. Denies frequent bronchitis.  Secondhand smoke from grandparents and parents- teenage cigarette smoke but quit at age 17. Mother smoked and now on hospice with COPD.  Pt had CT chest after abnormality seen on CXR 3/2021 with RLL nodule- forgot disc at home. (done at [facility])  He is going to have an echo at 3pm today.  In past had growth on thyroid- benign, had partial thyroidectomy.  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 Denies asbestos or other exposures    The chief complaint problem varies with instablilty at time      PFSH:  Past Medical History:   Diagnosis Date    Emphysema lung 2021    Enlarged prostate     Hyperlipidemia 2021     Hypertension 2001    Kidney stone 2001    x3    Thyroid disease     benign growth, partial thyroidectomy         Past Surgical History:   Procedure Laterality Date    ANGIOGRAPHY OF LOWER EXTREMITY Left 8/5/2022    Procedure: Angiogram Extremity Unilateral;  Surgeon: Ali Khoobehi, MD;  Location: [facility] CATH/EP LAB;  Service: Peripheral Vascular;  Laterality: Left;    ANGIOGRAPHY OF LOWER EXTREMITY Left 8/6/2022    Procedure: Angioplasty-peripheral;  Surgeon: Ismael Esquivel MD;  Location: [facility] CATH/EP LAB;  Service: General;  Laterality: Left;    ANGIOGRAPHY OF LOWER EXTREMITY Left 8/6/2022    Procedure: Angiogram Extremity Unilateral;  Surgeon: Ismael Esquivel MD;  Location: [facility] CATH/EP LAB;  Service: General;  Laterality: Left;    ANGIOGRAPHY OF LOWER EXTREMITY Left 8/7/2022    Procedure: Angiogram Extremity Unilateral;  Surgeon: Ismael Esquivel MD;  Location: [facility] CATH/EP LAB;  Service: General;  Laterality: Left;    INJECTION OF TISSUE PLASMINOGEN ACTIVATOR Left 8/6/2022    Procedure: INJECTION, TISSUE PLASMINOGEN ACTIVATOR;  Surgeon: Ismael Esquivel MD;  Location: [facility] CATH/EP LAB;  Service: General;  Laterality: Left;    INJECTION OF TISSUE PLASMINOGEN ACTIVATOR Left 8/6/2022    Procedure: INJECTION, TISSUE PLASMINOGEN ACTIVATOR;  Surgeon: Ismael Esquivel MD;  Location: [facility] CATH/EP LAB;  Service: General;  Laterality: Left;    PROSTATE SURGERY  2018    REPAIR OF ANEURYSM Left 7/21/2021    Procedure: REPAIR POPLITEAL ARTERY ANEURYSM;  Surgeon: Ismael Esquivel MD;  Location: [facility] OR;  Service: Cardiovascular;  Laterality: Left;    THROMBECTOMY  8/6/2022    Procedure: THROMBECTOMY;  Surgeon: Ismael Esquivel MD;  Location: [facility] CATH/EP LAB;  Service: General;;    THROMBECTOMY Left 8/6/2022    Procedure: THROMBECTOMY;  Surgeon: Ismael Esquivel MD;  Location: [facility] CATH/EP LAB;  Service: General;  Laterality: Left;    THYROIDECTOMY, PARTIAL  2011    TONSILLECTOMY      as a child    [facility]  04/2019    [facility]     Social History     Tobacco Use    Smoking status: Former     Packs/day: 0.25     Years: 5.00     Pack years: 1.25     Types: Cigarettes     Quit date: 1980     Years since quittin.6     Passive exposure: Past    Smokeless tobacco: Never   Substance Use Topics    Alcohol use: Not Currently     Alcohol/week: 2.0 standard drinks     Types: 2 Cans of beer per week    Drug use: Never     Family History   Problem Relation Age of Onset    Hypertension Mother     COPD Mother     Pulmonary embolism Father      Review of patient's allergies indicates:  No Known Allergies    Performance Status:The patient's activity level is regular exercise.      Review of Systems:  a review of eleven systems covering constitutional, Eye, HEENT, Psych, Respiratory, Cardiac, GI, , Musculoskeletal, Endocrine, Dermatologic was negative except for pertinent findings as listed ABOVE and below:  All negative with pertinent positives as above   Cough   Wheeze  Chest tightness  Shortness of breath    Exam:Comprehensive exam done. BP (!) 153/101 (BP Location: Right arm, Patient Position: Sitting, BP Method: Medium (Automatic))   Pulse 71   Ht 6' 1" (1.854 m)   Wt 87.8 kg (193 lb 9 oz)   SpO2 98% Comment: on room air at rest  BMI 25.54 kg/m²   Exam included Vitals as listed, and patient's appearance and affect and alertness and mood, oral exam for yeast and hygiene and pharynx lesions and Mallapatti (M) score, neck with inspection for jvd and masses and thyroid abnormalities and lymph nodes (supraclavicular and infraclavicular nodes and axillary also examined and noted if abn), chest exam included symmetry and effort and fremitus and percussion and auscultation, cardiac exam included rhythm and gallops and murmur and rubs and jvd and edema, abdominal exam for mass and hepatosplenomegaly and tenderness and hernias and bowel sounds, Musculoskeletal exam with muscle tone and posture and mobility/gait and  strength, and " skin for rashes and cyanosis and pallor and turgor, extremity for clubbing.  Findings were normal except for pertinent findings listed below:  Thin, athletic build  Breath sounds bilateral rhonchi        Radiographs (ct chest and cxr) reviewed: view by direct vision   X-Ray Chest PA And Lateral 06/22/2023 lungs clear.  CTA Chest Non-Coronary 03/22/23 dependent atelectasis, no lung nodules    CTA Chest Non-Coronary 08/13/22 no PE seen; right lower lung nodule decreased in size, left is stable    [facility] Chest x-ray: 4/21/22 findings appear consitent wtih chronic small airwasy disease. no consolidation or other adute process in evident, no significnat or detrimental interval changes in comparison to CXR 11/15/2021      X-Ray Chest AP Portable 01/02/22    Minimal basilar parenchymal opacities or atelectasis.  Small nodular opacity observed in the right lung base laterally.      CTA Chest Non-Coronary (PE Study) 01/03/22 Bibasilar atelectasis/infiltrate. Pneumonia must be considered     Outside CT (uploaded) chest 6/9/21- mild linear atelectasis bilat lower lobes, mild pleural based nodules which look like rounded atelectasis.  CXR 3/9/21- RLL nodule  CT abd/p 8/2019- rounded atelectasis bibasilar present at that time    Labs reviewed    3/2021- wnl  Lab Results   Component Value Date    WBC 8.43 11/15/2022    RBC 5.24 11/15/2022    HGB 16.1 11/15/2022    HCT 48.2 11/15/2022    MCV 92 11/15/2022    MCH 30.7 11/15/2022    MCHC 33.4 11/15/2022    RDW 13.1 11/15/2022     11/15/2022    MPV 9.0 (L) 11/15/2022    GRAN 3.4 11/15/2022    GRAN 40.6 11/15/2022    LYMPH 3.3 11/15/2022    LYMPH 39.5 11/15/2022    MONO 1.3 (H) 11/15/2022    MONO 15.2 (H) 11/15/2022    EOS 0.3 11/15/2022    BASO 0.07 11/15/2022    EOSINOPHIL 3.8 11/15/2022    BASOPHIL 0.8 11/15/2022         Culture, Respiratory with Gram Stain 03/10/22   Normal respiratory whitney       PFT reviewed- mild obstruction, increased lung vol. DLCO  normal          Plan:  Clinical impression is apparently straight forward and impression with management as below.    Helder was seen today for cough.    Diagnoses and all orders for this visit:    COPD with acute exacerbation  -     Culture, Respiratory with Gram Stain; Future  -     betamethasone acetate-betamethasone sodium phosphate injection 6 mg  -     budesonide-glycopyr-formoterol (BREZTRI AEROSPHERE) 160-9-4.8 mcg/actuation HFAA; Inhale 2 puffs into the lungs 2 (two) times a day.  -     predniSONE (DELTASONE) 20 MG tablet; 3 pills for 3 days, 2 for 3 days, 1 for 3 days. Repeat for cough        Follow up in about 6 months (around 1/6/2024), or if symptoms worsen or fail to improve.      Discussed with patient above for education the following:      Patient Instructions   Start new COPD medication regiment   Breztri 2 puffs twice a day every day, rinse mouth after using due to risk for thrush if mouth or tongue has white sores contact clinic    Prednisone 20 mg taper repeat for shortness of breath or wheeze    Albuterol Inhaler 1-2 puffs every 4 hours, for cough or shortness of breath    Use nebulizer 1-2 times a day until cough improves    Bring sputum sample to any [facility] lab with in 4 hours of collecting

## 2023-07-06 NOTE — PATIENT INSTRUCTIONS
Start new COPD medication regiment   Breztri 2 puffs twice a day every day, rinse mouth after using due to risk for thrush if mouth or tongue has white sores contact clinic    Prednisone 20 mg taper repeat for shortness of breath or wheeze    Albuterol Inhaler 1-2 puffs every 4 hours, for cough or shortness of breath    Use nebulizer 1-2 times a day until cough improves    Bring sputum sample to any [facility] lab with in 4 hours of collecting

## 2023-07-06 NOTE — TELEPHONE ENCOUNTER
----- Message from Meryl Mercedes, Patient Care Assistant sent at 7/6/2023  9:03 AM CDT -----  Regarding: orders  Type: Needs Medical Advice    Who Called:  pt     Best Call Back Number: [phone] (home)     Additional Information: pt states he would like a callback regarding lab orders. Please call pt to advise. Thanks!

## 2023-07-08 LAB
ALBUMIN SERPL-MCNC: 4 G/DL (ref 3.6–5.1)
ALBUMIN/GLOB SERPL: 1.5 (CALC) (ref 1–2.5)
ALP SERPL-CCNC: 69 U/L (ref 35–144)
ALT SERPL-CCNC: 21 U/L (ref 9–46)
AST SERPL-CCNC: 23 U/L (ref 10–35)
BASOPHILS # BLD AUTO: 26 CELLS/UL (ref 0–200)
BASOPHILS NFR BLD AUTO: 0.2 %
BILIRUB SERPL-MCNC: 0.6 MG/DL (ref 0.2–1.2)
BUN SERPL-MCNC: 15 MG/DL (ref 7–25)
BUN/CREAT SERPL: ABNORMAL (CALC) (ref 6–22)
CALCIUM SERPL-MCNC: 9.2 MG/DL (ref 8.6–10.3)
CHLORIDE SERPL-SCNC: 106 MMOL/L (ref 98–110)
CHOLEST SERPL-MCNC: 138 MG/DL
CHOLEST/HDLC SERPL: 2.1 (CALC)
CO2 SERPL-SCNC: 26 MMOL/L (ref 20–32)
CREAT SERPL-MCNC: 0.86 MG/DL (ref 0.7–1.35)
EGFR: 96 ML/MIN/1.73M2
EOSINOPHIL # BLD AUTO: 13 CELLS/UL (ref 15–500)
EOSINOPHIL NFR BLD AUTO: 0.1 %
ERYTHROCYTE [DISTWIDTH] IN BLOOD BY AUTOMATED COUNT: 13.1 % (ref 11–15)
GLOBULIN SER CALC-MCNC: 2.7 G/DL (CALC) (ref 1.9–3.7)
GLUCOSE SERPL-MCNC: 109 MG/DL (ref 65–99)
HCT VFR BLD AUTO: 45.3 % (ref 38.5–50)
HDLC SERPL-MCNC: 66 MG/DL
HGB BLD-MCNC: 15.7 G/DL (ref 13.2–17.1)
LDLC SERPL CALC-MCNC: 61 MG/DL (CALC)
LYMPHOCYTES # BLD AUTO: 1558 CELLS/UL (ref 850–3900)
LYMPHOCYTES NFR BLD AUTO: 11.8 %
MCH RBC QN AUTO: 32.3 PG (ref 27–33)
MCHC RBC AUTO-ENTMCNC: 34.7 G/DL (ref 32–36)
MCV RBC AUTO: 93.2 FL (ref 80–100)
MONOCYTES # BLD AUTO: 1043 CELLS/UL (ref 200–950)
MONOCYTES NFR BLD AUTO: 7.9 %
NEUTROPHILS # BLD AUTO: ABNORMAL CELLS/UL (ref 1500–7800)
NEUTROPHILS NFR BLD AUTO: 80 %
NONHDLC SERPL-MCNC: 72 MG/DL (CALC)
PLATELET # BLD AUTO: 233 THOUSAND/UL (ref 140–400)
PMV BLD REES-ECKER: 9.7 FL (ref 7.5–12.5)
POTASSIUM SERPL-SCNC: 4.1 MMOL/L (ref 3.5–5.3)
PROT SERPL-MCNC: 6.7 G/DL (ref 6.1–8.1)
PSA SERPL-MCNC: 9.33 NG/ML
RBC # BLD AUTO: 4.86 MILLION/UL (ref 4.2–5.8)
SODIUM SERPL-SCNC: 138 MMOL/L (ref 135–146)
TRIGL SERPL-MCNC: 37 MG/DL
TSH SERPL-ACNC: 0.57 MIU/L (ref 0.4–4.5)
WBC # BLD AUTO: 13.2 THOUSAND/UL (ref 3.8–10.8)

## 2023-07-10 ENCOUNTER — TELEPHONE (OUTPATIENT)
Dept: FAMILY MEDICINE | Facility: CLINIC | Age: 63
End: 2023-07-10

## 2023-07-10 DIAGNOSIS — D72.829 LEUKOCYTOSIS, UNSPECIFIED TYPE: Primary | ICD-10-CM

## 2023-07-10 DIAGNOSIS — R97.20 ELEVATED PSA: ICD-10-CM

## 2023-07-10 NOTE — TELEPHONE ENCOUNTER
----- Message from Kat Lawrence sent at 7/10/2023 11:26 AM CDT -----  Patient called and stated that he would like to schedule an appointment with Maxi he [facility] Please give him a call at [phone]

## 2023-07-10 NOTE — TELEPHONE ENCOUNTER
He has a mild elevation in WBC count that should be rechecked in about 3-4 weeks. He can discuss further with hematology at appt in one month.     Regarding the PSA-it is markedly elevated. Anytime I get one over 7 I ilke to refer to urology for further eval. We can send this to Dr. Reese unless he already sees someone.

## 2023-07-10 NOTE — TELEPHONE ENCOUNTER
Spoke to pt verbatim per Maxi . Pt voiced  understanding. Referral set up to sign. Gave pt contact info to schedule. Labs ordered

## 2023-07-10 NOTE — TELEPHONE ENCOUNTER
Spoke with pt states he does not have a urologist. Pt states he wants to know about all his abnormal labs. States his blood sugar was elevated and thing on CBC out of wack. Pt very concerned.

## 2023-07-12 ENCOUNTER — TELEPHONE (OUTPATIENT)
Dept: UROLOGY | Facility: CLINIC | Age: 63
End: 2023-07-12
Payer: MEDICARE

## 2023-07-12 NOTE — TELEPHONE ENCOUNTER
Pre appt call   Referral placed for elevated psa   Pts past urologist is Dr Montague  Psa hx is goran/ojsue   Pt was scheduled for 7/18 @ 915 am with dr pereira   Pt agreed

## 2023-07-17 NOTE — PROGRESS NOTES
[facility] Urology New Patient/H&P:     Helder Brand is a 63 y.o. male who presents for evaluation of elevated psa at referral of KEAGAN Atkinson    HTN, hypothyroid, COPD/emphysema (followed by pulm, last exacerbation June 2023), PVD w/ L popliteal artery clot and vasc surgery 2021, L DVT 8/22,  MTHFR mutation on eliqus (Calabresi)  Started new medical COPD treatment regimen at last o/v with NP Robert at last o/v 7/6/23.   HTN/CAD/aortic ectasia and recently est care with Dr Bundy 5/30/23 with negative stress in 2021 and recent neg CT PE protocol  He est primary care with Maxi BOOGIE on 5/29/23 noting   He is experiencing some nocturia. 3-4 times at night.  This is his main concern.  Keeping him from sleeping well at night.  Reports colonoscopy completed 5 years ago at age 58.  Started on flomax, and psa ordered with screening labs  Labs 7/7/23 included PSA of 9.33 (as well as Cr 0.86, eGFR 96, WBC 13.2)  - advised to see urology about elevated psa and recheck labs in a few months before seeing hemonc re his leukocytosis (Calabresi 8/28/23)    He was previously followed by Dr Montague  11/7/2019 [facility] TURP with Dr Montague  Was following with Dr. Montague for quite some time for his BPH/LUTS and did have UroLift approximally 5 years ago, which did not work and was followed by laser TURP as above.  He reports a family history of prostate cancer in his father, diagnosed in his 50s and treated with prostatectomy at that time.  AUA symptom score:  20/5 (4: Urgency, straining; 3: Emptying, nocturia; 2: Frequency, intermittency, weak stream). NTF most bothersome. Similar to preop  Does drink tea at night/before bed.  PVR 83 cc by bladder scan.  Urinalysis with small blood.  No hx UTI or prostatitis. No dysuria, hematuria, denies perineal, perirectal, testicular ejaculatory pain.   Wife passed away 7 yrs ago and not sexually active.  Quit smoking 40 yrs ago.   Was on Flomax prior to BPH intervention.  Has not  restarted any meds. Doesn't snore to his knowledge    On record review from Dr. Montague, in May of 2019, after his UroLift, still complained of urinary urgency and had a uroflow at that time voiding 600 cc with a peak flow of only 12.6 cc/second and an average flow of only 4 cc/second with a PVR of 23 cc after which cystoscopy was discussed and laser prostate was considered, which ultimately happened as above.  AUA symptom score 17 after UroLift  UroLift was on 3/27/19, with a total of 5 implants.  There was additional obstructing tissue on the patient's right for with 5th implant was placed here.  Last visit with Dr. Montague was 12/20/19 approximally 1 month after laser TURP noting urinary problem was improving.  Doing well, no leakage, happy with stream.    Takes his eliqus 5mg only once daily in evening. Had discussed 2.5 bid but elected just take 1      Past Medical History:   Diagnosis Date    Emphysema lung 2021    Enlarged prostate     Hyperlipidemia 2021    Hypertension 2001    Kidney stone 2001    x3    Thyroid disease     benign growth, partial thyroidectomy       Past Surgical History:   Procedure Laterality Date    ANGIOGRAPHY OF LOWER EXTREMITY Left 8/5/2022    Procedure: Angiogram Extremity Unilateral;  Surgeon: Ali Khoobehi, MD;  Location: [facility] CATH/EP LAB;  Service: Peripheral Vascular;  Laterality: Left;    ANGIOGRAPHY OF LOWER EXTREMITY Left 8/6/2022    Procedure: Angioplasty-peripheral;  Surgeon: Ismael Esquivel MD;  Location: [facility] CATH/EP LAB;  Service: General;  Laterality: Left;    ANGIOGRAPHY OF LOWER EXTREMITY Left 8/6/2022    Procedure: Angiogram Extremity Unilateral;  Surgeon: Ismael Esquivel MD;  Location: [facility] CATH/EP LAB;  Service: General;  Laterality: Left;    ANGIOGRAPHY OF LOWER EXTREMITY Left 8/7/2022    Procedure: Angiogram Extremity Unilateral;  Surgeon: Ismael Esquivel MD;  Location: [facility] CATH/EP LAB;  Service: General;  Laterality: Left;    INJECTION OF TISSUE PLASMINOGEN  ACTIVATOR Left 2022    Procedure: INJECTION, TISSUE PLASMINOGEN ACTIVATOR;  Surgeon: Ismael Esquivel MD;  Location: [facility] CATH/EP LAB;  Service: General;  Laterality: Left;    INJECTION OF TISSUE PLASMINOGEN ACTIVATOR Left 2022    Procedure: INJECTION, TISSUE PLASMINOGEN ACTIVATOR;  Surgeon: Ismael Esquivel MD;  Location: [facility] CATH/EP LAB;  Service: General;  Laterality: Left;    PROSTATE SURGERY  2018    REPAIR OF ANEURYSM Left 2021    Procedure: REPAIR POPLITEAL ARTERY ANEURYSM;  Surgeon: Ismael Esquivel MD;  Location: [facility] OR;  Service: Cardiovascular;  Laterality: Left;    THROMBECTOMY  2022    Procedure: THROMBECTOMY;  Surgeon: Ismael Esquivel MD;  Location: [facility] CATH/EP LAB;  Service: General;;    THROMBECTOMY Left 2022    Procedure: THROMBECTOMY;  Surgeon: Ismael Esquivel MD;  Location: [facility] CATH/EP LAB;  Service: General;  Laterality: Left;    THYROIDECTOMY, PARTIAL  2011    TONSILLECTOMY      as a child    urolift  2019    [facility]   Hemorrhoid surgery    Family History   Problem Relation Age of Onset    Hypertension Mother     COPD Mother     Pulmonary embolism Father        Social History     Socioeconomic History    Marital status:    Tobacco Use    Smoking status: Former     Packs/day: 0.25     Years: 5.00     Pack years: 1.25     Types: Cigarettes     Quit date: 1980     Years since quittin.7     Passive exposure: Past    Smokeless tobacco: Never   Substance and Sexual Activity    Alcohol use: Not Currently     Alcohol/week: 2.0 standard drinks     Types: 2 Cans of beer per week    Drug use: Never       Review of patient's allergies indicates:  No Known Allergies    Medications Reviewed: see MAR    Focused Physical Exam    Vitals:    23 0854   BP: (!) 159/106   Pulse: 67     Body mass index is 26.45 kg/m². Weight: 88.5 kg (195 lb) Height: 6' (182.9 cm)       Abdomen: Soft, non-tender, nondistended, no CVA tenderness  :  circ normal phallus without  plaques/lesions, orthotopic urethral meatus, bilaterally desc testes in normal scrotum without mass or lesion  SEGUNDO: normal sphincter tone, no masses, small ext hemmorrhoids   PROSTATE: 35-40g, no nodules, non-tender, symmetrical.       LABS:    Recent Results (from the past 336 hour(s))   CBC Auto Differential    Collection Time: 07/07/23  8:35 AM   Result Value Ref Range    WBC 13.2 (H) 3.8 - 10.8 Thousand/uL    RBC 4.86 4.20 - 5.80 Million/uL    Hemoglobin 15.7 13.2 - 17.1 g/dL    Hematocrit 45.3 38.5 - 50.0 %    MCV 93.2 80.0 - 100.0 fL    MCH 32.3 27.0 - 33.0 pg    MCHC 34.7 32.0 - 36.0 g/dL    RDW 13.1 11.0 - 15.0 %    Platelets 233 140 - 400 Thousand/uL    MPV 9.7 7.5 - 12.5 fL    Neutrophils, Abs 10,560 (H) 1,500 - 7,800 cells/uL    Lymph # 1,558 850 - 3,900 cells/uL    Mono # 1,043 (H) 200 - 950 cells/uL    Eos # 13 (L) 15 - 500 cells/uL    Baso # 26 0 - 200 cells/uL    Neutrophils Relative 80 %    Lymph % 11.8 %    Mono % 7.9 %    Eosinophil % 0.1 %    Basophil % 0.2 %   Comprehensive Metabolic Panel    Collection Time: 07/07/23  8:35 AM   Result Value Ref Range    Glucose 109 (H) 65 - 99 mg/dL    BUN 15 7 - 25 mg/dL    Creatinine 0.86 0.70 - 1.35 mg/dL    eGFR 96 > OR = 60 mL/min/1.73m2    BUN/Creatinine Ratio NOT APPLICABLE 6 - 22 (calc)    Sodium 138 135 - 146 mmol/L    Potassium 4.1 3.5 - 5.3 mmol/L    Chloride 106 98 - 110 mmol/L    CO2 26 20 - 32 mmol/L    Calcium 9.2 8.6 - 10.3 mg/dL    Total Protein 6.7 6.1 - 8.1 g/dL    Albumin 4.0 3.6 - 5.1 g/dL    Globulin, Total 2.7 1.9 - 3.7 g/dL (calc)    Albumin/Globulin Ratio 1.5 1.0 - 2.5 (calc)    Total Bilirubin 0.6 0.2 - 1.2 mg/dL    Alkaline Phosphatase 69 35 - 144 U/L    AST 23 10 - 35 U/L    ALT 21 9 - 46 U/L   Lipid Panel    Collection Time: 07/07/23  8:35 AM   Result Value Ref Range    Cholesterol 138 <200 mg/dL    HDL 66 > OR = 40 mg/dL    Triglycerides 37 <150 mg/dL    LDL Cholesterol 61 mg/dL (calc)    HDL/Cholesterol Ratio 2.1 <5.0 (calc)    Non  HDL Chol. (LDL+VLDL) 72 <130 mg/dL (calc)   TSH    Collection Time: 07/07/23  8:35 AM   Result Value Ref Range    TSH 0.57 0.40 - 4.50 mIU/L   PSA, Screening    Collection Time: 07/07/23  8:35 AM   Result Value Ref Range    PROSTATE SPECIFIC ANTIGEN, SCR - QUEST 9.33 (H) < OR = 4.00 ng/mL   POCT Urinalysis(Instrument)    Collection Time: 07/18/23  9:02 AM   Result Value Ref Range    Color, POC UA Yellow Yellow, Straw, Colorless    Clarity, POC UA Clear Clear    Glucose, POC UA Negative Negative    Bilirubin, POC UA Negative Negative    Ketones, POC UA Negative Negative    Spec Grav POC UA 1.010 1.005 - 1.030    Blood, POC UA Small (A) Negative    pH, POC UA 6.0 5.0 - 8.0    Protein, POC UA Negative Negative    Urobilinogen, POC UA 0.2 <=1.0    Nitrite, POC UA Negative Negative    WBC, POC UA Negative Negative   POCT Bladder Scan    Collection Time: 07/18/23  9:03 AM   Result Value Ref Range    POC Residual Urine Volume 83 0 - 100 mL         Assessment/Diagnosis:    1. Elevated PSA  Ambulatory referral/consult to Urology    POCT Bladder Scan    POCT Urinalysis(Instrument)    Urinalysis Microscopic    Urine culture    Procedure Order to Urology      2. BPH with obstruction/lower urinary tract symptoms  Procedure Order to Urology          Plans:  Extensive review of medical record including recent primary care workup and referral as above and care with his other subspecialists as summarized.  As well, reviewed all outside medical records available from and provided by his prior urology office noting his BPH interventions etcetera.  No PSA was included in this, and his only PSA on file is the 1 elevated value above.  He does report normal PSA history prior to BPH intervention, and his previous PCP was Dr. Stefano Woodruff and will request the PSA history, however focused on his current elevation and further planning.    I had a long discussion with the patient regarding the natural history of cancer in men as well as when  diagnostics are indicated. We also discussed differential for elevated psa which also includes benign enlargement and prostatitis.  We did discuss that an elevated PSA is considered a PSA greater than 4 because statistically 20% of people in this value range are found to have prostate cancer, however we also discussed a bit about PSA velocity and trends and age specific psa elevations. Given his true elevation with family history of prostate cancer and prior procedures to decrease prostate volume, I therefore recommended prostate biopsy to evaluate for underlying malignancy.  We discussed biopsy and in detail, including 1% risk of infectious complications including sepsis but that it is an otherwise safe diagnostic procedure with expected hematuria hematospermia after.      I went over the details of a transrectal ultrasound-guided biopsy of the prostate, and described the technique in detail.   The patient will be given local injection anesthetic to block the prostate so as to minimize any pain. 12-14 biopsy specimens will be taken. These will be sent for histopathology analysis.   Complications include bleeding, fever and chills. He was also instructed to watch for any signs of fever. If he does have any fever or chills after, he was advised to come to the emergency room right away for intravenous antibiotics and possible admission to the hospital. He is to refrain from any strenuous activity including sexual activity for the next 72 hours after biopsy. He was also advised that he may have blood while urinating, during bowel movements as well as during ejaculations. He was given a prebiopsy/postbiopsy instruction sheet was reminding him to avoid aspirin and blood thinners for 7 days prior, take the Rxed antibiotics the day before, day of, day after biopsy, and perform a fleet enema at home morning of biopsy. All questions he had were answered in detail.     Prior to prostate biopsy will repeat the PSA with free  and total PSA to recheck it, as well as assess free PSA percentage.  Discuss the utility of free PSA percentage in helping to risk stratify for concern for underlying disease.  Will also get MRI of the prostate.  Reviewed the utility of MRI of the prostate and workup of elevated PSA noting that a negative MRI does not rule out prostate cancer, however any clinically significant index lesion noted on MRI can be targeted at the time of biopsy to increase the sensitivity    As well, reviewed his extensive history of BPH with long-term use of Flomax until UroLift, which failed and then had laser TURP, and now has recurrent severe obstructive lower urinary tract symptoms.  His most bothersome symptom is nocturia and at this time after risk benefit discussion about restarting Flomax versus focusing on conservative recommendations, he would like to focus on conservative recommendations for urgency and frequency namely limiting bladder irritants in the afternoon and evening hours such as tea and stopping fluids 2 hours before bed, and these were provided in writing as well.  Given his severe recurrence of LUTS with this BPH intervention in the past we also discuss cystoscopy at the time of his prostate biopsy and reviewed procedure in detail and he is agreeable to proceed.  As well, his urinalysis dipstick has small blood today and he has some incomplete emptying, and we did note that true micro hematuria would mandate cystoscopy as well as upper tract evaluation.  Will send microscopic analysis of urine and if there is true micro hematuria greater than 5 red blood cells, even despite his chronic anticoagulation, would get upper tract imaging as well prior to his cystoscopy and prostate biopsy.     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  Will schedule procedures after this appointment so we can be cleared to hold his Eliquis 3 days prior.  As well, with his repeat labs at time of MRI will go ahead and repeat a CBC so Hematology has this on file.    Cystoscopy and TRUS/bx scheduled at [facility] on 9/5/23  With Hematology clearance to hold Eliquis 3 days prior  MRI prostate with PSA free and total, CBC, and creatinine on arrival in the next 2-4 weeks  Will follow-up urine studies and add on upper tract imaging such as CT urogram if true micro hematuria      Total time spent in/on encounter today, including face to face time with patient, counseling, medical record review, interpretation of tests/results, , and treatment plan coordination: 90 minutes

## 2023-07-18 ENCOUNTER — OFFICE VISIT (OUTPATIENT)
Dept: UROLOGY | Facility: CLINIC | Age: 63
End: 2023-07-18
Payer: MEDICARE

## 2023-07-18 VITALS
SYSTOLIC BLOOD PRESSURE: 159 MMHG | WEIGHT: 195 LBS | HEART RATE: 67 BPM | HEIGHT: 72 IN | BODY MASS INDEX: 26.41 KG/M2 | DIASTOLIC BLOOD PRESSURE: 106 MMHG

## 2023-07-18 DIAGNOSIS — R97.20 ELEVATED PSA: Primary | ICD-10-CM

## 2023-07-18 DIAGNOSIS — N13.8 ENLARGED PROSTATE WITH URINARY OBSTRUCTION: ICD-10-CM

## 2023-07-18 DIAGNOSIS — N40.1 BPH WITH OBSTRUCTION/LOWER URINARY TRACT SYMPTOMS: ICD-10-CM

## 2023-07-18 DIAGNOSIS — N13.8 BPH WITH OBSTRUCTION/LOWER URINARY TRACT SYMPTOMS: ICD-10-CM

## 2023-07-18 DIAGNOSIS — N40.1 ENLARGED PROSTATE WITH URINARY OBSTRUCTION: ICD-10-CM

## 2023-07-18 LAB
BACTERIA #/AREA URNS AUTO: NORMAL /HPF
BILIRUBIN, UA POC OHS: NEGATIVE
BLOOD, UA POC OHS: ABNORMAL
CLARITY, UA POC OHS: CLEAR
COLOR, UA POC OHS: YELLOW
GLUCOSE, UA POC OHS: NEGATIVE
KETONES, UA POC OHS: NEGATIVE
LEUKOCYTES, UA POC OHS: NEGATIVE
MICROSCOPIC COMMENT: NORMAL
NITRITE, UA POC OHS: NEGATIVE
PH, UA POC OHS: 6
POC RESIDUAL URINE VOLUME: 83 ML (ref 0–100)
PROTEIN, UA POC OHS: NEGATIVE
RBC #/AREA URNS AUTO: 0 /HPF (ref 0–4)
SPECIFIC GRAVITY, UA POC OHS: 1.01
UROBILINOGEN, UA POC OHS: 0.2
WBC #/AREA URNS AUTO: 0 /HPF (ref 0–5)

## 2023-07-18 PROCEDURE — 3077F SYST BP >= 140 MM HG: CPT | Mod: CPTII,S$GLB,, | Performed by: UROLOGY

## 2023-07-18 PROCEDURE — 99417 PR PROLONGED SVC, OUTPT, W/WO DIRECT PT CONTACT,  EA ADDTL 15 MIN: ICD-10-PCS | Mod: S$GLB,,, | Performed by: UROLOGY

## 2023-07-18 PROCEDURE — 1101F PT FALLS ASSESS-DOCD LE1/YR: CPT | Mod: CPTII,S$GLB,, | Performed by: UROLOGY

## 2023-07-18 PROCEDURE — 99999 PR PBB SHADOW E&M-EST. PATIENT-LVL IV: ICD-10-PCS | Mod: PBBFAC,,, | Performed by: UROLOGY

## 2023-07-18 PROCEDURE — 4010F ACE/ARB THERAPY RXD/TAKEN: CPT | Mod: CPTII,S$GLB,, | Performed by: UROLOGY

## 2023-07-18 PROCEDURE — 1101F PR PT FALLS ASSESS DOC 0-1 FALLS W/OUT INJ PAST YR: ICD-10-PCS | Mod: CPTII,S$GLB,, | Performed by: UROLOGY

## 2023-07-18 PROCEDURE — 81001 URINALYSIS AUTO W/SCOPE: CPT | Performed by: UROLOGY

## 2023-07-18 PROCEDURE — 1159F MED LIST DOCD IN RCRD: CPT | Mod: CPTII,S$GLB,, | Performed by: UROLOGY

## 2023-07-18 PROCEDURE — 1157F ADVNC CARE PLAN IN RCRD: CPT | Mod: CPTII,S$GLB,, | Performed by: UROLOGY

## 2023-07-18 PROCEDURE — 1157F PR ADVANCE CARE PLAN OR EQUIV PRESENT IN MEDICAL RECORD: ICD-10-PCS | Mod: CPTII,S$GLB,, | Performed by: UROLOGY

## 2023-07-18 PROCEDURE — 3008F PR BODY MASS INDEX (BMI) DOCUMENTED: ICD-10-PCS | Mod: CPTII,S$GLB,, | Performed by: UROLOGY

## 2023-07-18 PROCEDURE — 3077F PR MOST RECENT SYSTOLIC BLOOD PRESSURE >= 140 MM HG: ICD-10-PCS | Mod: CPTII,S$GLB,, | Performed by: UROLOGY

## 2023-07-18 PROCEDURE — 99999 PR PBB SHADOW E&M-EST. PATIENT-LVL IV: CPT | Mod: PBBFAC,,, | Performed by: UROLOGY

## 2023-07-18 PROCEDURE — 81003 POCT URINALYSIS(INSTRUMENT): ICD-10-PCS | Mod: QW,S$GLB,, | Performed by: UROLOGY

## 2023-07-18 PROCEDURE — 99205 OFFICE O/P NEW HI 60 MIN: CPT | Mod: S$GLB,,, | Performed by: UROLOGY

## 2023-07-18 PROCEDURE — 51798 US URINE CAPACITY MEASURE: CPT | Mod: S$GLB,,, | Performed by: UROLOGY

## 2023-07-18 PROCEDURE — 3008F BODY MASS INDEX DOCD: CPT | Mod: CPTII,S$GLB,, | Performed by: UROLOGY

## 2023-07-18 PROCEDURE — 1159F PR MEDICATION LIST DOCUMENTED IN MEDICAL RECORD: ICD-10-PCS | Mod: CPTII,S$GLB,, | Performed by: UROLOGY

## 2023-07-18 PROCEDURE — 87086 URINE CULTURE/COLONY COUNT: CPT | Performed by: UROLOGY

## 2023-07-18 PROCEDURE — 3288F PR FALLS RISK ASSESSMENT DOCUMENTED: ICD-10-PCS | Mod: CPTII,S$GLB,, | Performed by: UROLOGY

## 2023-07-18 PROCEDURE — 81003 URINALYSIS AUTO W/O SCOPE: CPT | Mod: QW,S$GLB,, | Performed by: UROLOGY

## 2023-07-18 PROCEDURE — 3080F DIAST BP >= 90 MM HG: CPT | Mod: CPTII,S$GLB,, | Performed by: UROLOGY

## 2023-07-18 PROCEDURE — 51798 POCT BLADDER SCAN: ICD-10-PCS | Mod: S$GLB,,, | Performed by: UROLOGY

## 2023-07-18 PROCEDURE — 1160F PR REVIEW ALL MEDS BY PRESCRIBER/CLIN PHARMACIST DOCUMENTED: ICD-10-PCS | Mod: CPTII,S$GLB,, | Performed by: UROLOGY

## 2023-07-18 PROCEDURE — 3288F FALL RISK ASSESSMENT DOCD: CPT | Mod: CPTII,S$GLB,, | Performed by: UROLOGY

## 2023-07-18 PROCEDURE — 99417 PROLNG OP E/M EACH 15 MIN: CPT | Mod: S$GLB,,, | Performed by: UROLOGY

## 2023-07-18 PROCEDURE — 99205 PR OFFICE/OUTPT VISIT, NEW, LEVL V, 60-74 MIN: ICD-10-PCS | Mod: S$GLB,,, | Performed by: UROLOGY

## 2023-07-18 PROCEDURE — 4010F PR ACE/ARB THEARPY RXD/TAKEN: ICD-10-PCS | Mod: CPTII,S$GLB,, | Performed by: UROLOGY

## 2023-07-18 PROCEDURE — 3080F PR MOST RECENT DIASTOLIC BLOOD PRESSURE >= 90 MM HG: ICD-10-PCS | Mod: CPTII,S$GLB,, | Performed by: UROLOGY

## 2023-07-18 PROCEDURE — 1160F RVW MEDS BY RX/DR IN RCRD: CPT | Mod: CPTII,S$GLB,, | Performed by: UROLOGY

## 2023-07-18 RX ORDER — CIPROFLOXACIN 500 MG/1
500 TABLET ORAL 2 TIMES DAILY
Qty: 6 TABLET | Refills: 0 | Status: SHIPPED | OUTPATIENT
Start: 2023-07-18 | End: 2023-10-16 | Stop reason: ALTCHOICE

## 2023-07-18 RX ORDER — GENTAMICIN SULFATE 40 MG/ML
80 INJECTION, SOLUTION INTRAMUSCULAR; INTRAVENOUS
Status: DISCONTINUED | OUTPATIENT
Start: 2023-07-18 | End: 2023-10-16

## 2023-07-18 NOTE — PROGRESS NOTES
Procedure Order to Urology [[id number]]    Electronically signed by: Adam Tyler MD on 07/18/23 0958 Status: Active   Ordering user: Adam Tyler MD 07/18/23 0958 Authorized by: Adam Tyler MD   Ordering mode: Standard   Frequency:  07/18/23 -     Diagnoses   Elevated PSA [R97.20]   BPH with obstruction/lower urinary tract symptoms [N40.1, N13.8]   Questionnaire    Question Answer   Procedure Prostate Biopsy Comment - 9/5   Cystoscopy   Facility Name: [facility]   [facility], please order local sedation, UA poct without micro,  preop IM Gentamyacin 80mg,160mg if over 300 lbs    [facility], Please order Urine POCT without micro . Local sedation

## 2023-07-19 ENCOUNTER — TELEPHONE (OUTPATIENT)
Dept: UROLOGY | Facility: CLINIC | Age: 63
End: 2023-07-19
Payer: MEDICARE

## 2023-07-19 LAB — BACTERIA UR CULT: NO GROWTH

## 2023-07-19 NOTE — TELEPHONE ENCOUNTER
----- Message from Erika Frankel sent at 7/19/2023  1:35 PM CDT -----  Type:  Test Result         Who Called: pt            Name of Test:       Would the patient rather a call back or a response via [facility]? Call back          Best Call Back Number:[phone]      Can also leave message through

## 2023-07-25 ENCOUNTER — TELEPHONE (OUTPATIENT)
Dept: UROLOGY | Facility: CLINIC | Age: 63
End: 2023-07-25
Payer: MEDICARE

## 2023-07-25 NOTE — TELEPHONE ENCOUNTER
Spoke w pt he voiced that he is having a echocardiogram on 9/6 and would like to make sure that it wont conflict with biopsy on 9/5  Please advise

## 2023-07-25 NOTE — TELEPHONE ENCOUNTER
----- Message from Nurys Gross sent at 7/25/2023 10:47 AM CDT -----  Type: Needs Medical Advice  Who Called:  dave  Best Call Back Number: [phone]  Additional Information: pt is requesting a call from the nurse about his upcoming procedure on 9/5, pl call bk to advise thanks

## 2023-07-28 NOTE — TELEPHONE ENCOUNTER
Should be no problem to have echo after biopsy  But also no problem to move biopsy 1 week forward to 9/12 if agreeable can notify asc

## 2023-07-28 NOTE — TELEPHONE ENCOUNTER
Patient informed of recommendations. Verbally voiced understanding. Will keep biopsy as scheduled.

## 2023-08-01 ENCOUNTER — TELEPHONE (OUTPATIENT)
Dept: FAMILY MEDICINE | Facility: CLINIC | Age: 63
End: 2023-08-01

## 2023-08-01 NOTE — TELEPHONE ENCOUNTER
Spoke to patient that lab is due to recheck blood counts. He has lab for Dr Tyler, including CBC, on August 9th. Updated remind me.

## 2023-08-01 NOTE — TELEPHONE ENCOUNTER
----- Message from RT Val sent at 7/31/2023  8:23 AM CDT -----    ----- Message -----  From: Love Aaron LPN  Sent: 7/31/2023  12:00 AM CDT  To: [facility] Staff    Repeat CBC in 3-4 weeks.

## 2023-08-08 ENCOUNTER — TELEPHONE (OUTPATIENT)
Dept: PULMONOLOGY | Facility: CLINIC | Age: 63
End: 2023-08-08
Payer: MEDICARE

## 2023-08-09 ENCOUNTER — HOSPITAL ENCOUNTER (OUTPATIENT)
Dept: RADIOLOGY | Facility: HOSPITAL | Age: 63
Discharge: HOME | End: 2023-08-09
Attending: UROLOGY
Payer: MEDICARE

## 2023-08-09 DIAGNOSIS — R97.20 ELEVATED PSA: ICD-10-CM

## 2023-08-09 PROCEDURE — 72197 MRI PELVIS W/O & W/DYE: CPT | Mod: 26,,, | Performed by: RADIOLOGY

## 2023-08-09 PROCEDURE — 25500020 PHARM REV CODE 255

## 2023-08-09 PROCEDURE — 72197 MRI PROSTATE W W/O CONTRAST: ICD-10-PCS | Mod: 26,,, | Performed by: RADIOLOGY

## 2023-08-09 PROCEDURE — A9585 GADOBUTROL INJECTION: HCPCS

## 2023-08-09 PROCEDURE — 72197 MRI PELVIS W/O & W/DYE: CPT | Mod: TC

## 2023-08-09 RX ORDER — GADOBUTROL 604.72 MG/ML
INJECTION INTRAVENOUS
Status: COMPLETED
Start: 2023-08-09 | End: 2023-08-09

## 2023-08-09 RX ADMIN — GADOBUTROL 8 ML: 604.72 INJECTION INTRAVENOUS at 12:08

## 2023-08-10 ENCOUNTER — TELEPHONE (OUTPATIENT)
Dept: FAMILY MEDICINE | Facility: CLINIC | Age: 63
End: 2023-08-10

## 2023-08-11 DIAGNOSIS — J44.1 CHRONIC OBSTRUCTIVE PULMONARY DISEASE WITH ACUTE EXACERBATION: ICD-10-CM

## 2023-08-11 DIAGNOSIS — J44.1 COPD WITH ACUTE EXACERBATION: ICD-10-CM

## 2023-08-11 RX ORDER — AZITHROMYCIN 250 MG/1
TABLET, FILM COATED ORAL
Qty: 6 TABLET | Refills: 0 | Status: SHIPPED | OUTPATIENT
Start: 2023-08-11 | End: 2023-09-01

## 2023-08-11 RX ORDER — PREDNISONE 20 MG/1
TABLET ORAL
Qty: 36 TABLET | Refills: 0 | Status: SHIPPED | OUTPATIENT
Start: 2023-08-11 | End: 2024-02-19 | Stop reason: SDUPTHER

## 2023-08-11 NOTE — TELEPHONE ENCOUNTER
----- Message from Ashley Brian sent at 8/11/2023  9:07 AM CDT -----  Regarding: Needs return call/ script sent  Type: Needs Medical Advice  Who Called:  Helder  Symptoms (please be specific):  pt states he has an infection in his lungs again     Pharmacy name and phone #:    [id number] - [place], [place] - [address] [address]  Phone: [phone] Fax: [phone]        Best Call Back Number: [phone]    Additional Information: Pt was wondering if Kayley can call in the same antibiotics he got last time this happen please call to kalyani. Pt was wanting to get this taken care of before the weekend

## 2023-08-27 PROBLEM — Z15.89 HETEROZYGOUS MTHFR MUTATION C677T: Status: ACTIVE | Noted: 2023-08-27

## 2023-08-27 NOTE — PROGRESS NOTES
[facility] Hematology/Oncology  PROGRESS NOTE -  Follow-up Visit      Subjective:       Patient ID:   NAME: Helder Brand : 1960     63 y.o. male    Referring Doc: Sierra  Other Physicians: Chapito Raza ([facility])    Chief Complaint:  PAD/clot workup    History of Present Illness:     Patient returns today for a regularly scheduled follow-up visit.  The patient is here today to go over the results of the recently ordered labs, tests and studies. He is here by himself.     He had some recent labs    He is on eliquis po bid and vitamins.  No excessive bleeding or bruising.     Recent BP has been running good per patient    Breathing ok, no CP, HA's or N/V    He fell in shower recently but wasn't a bad fall per patient    He saw Dr Tyler in 2023 and they are planning prostate biopsy with elevated PSA; he saw Robert in 2023; Dr Llamas in 2023; he saw Riley Atkinson in May 2023    Discussed covid precautions - he had covid on 2022 - he has been vaccinated            ROS:   GEN: normal without any fever, night sweats or weight loss;  balance issues better overall  HEENT: normal with no HA's, sore throat, stiff neck, changes in vision  CV: normal with no CP, SOB, PND, LAW or orthopnea  PULM: normal with no SOB, cough, hemoptysis, sputum or pleuritic pain  GI: normal with no abdominal pain, nausea, vomiting, constipation, diarrhea, melanotic stools, BRBPR, or hematemesis  : normal with no hematuria, dysuria  BREAST: normal with no mass, discharge, pain  SKIN: normal with no rash, erythema, bruising, or swelling    Pain Scale: 0    Allergies:  Review of patient's allergies indicates:  No Known Allergies    Medications:    Current Outpatient Medications:     albuterol (PROVENTIL/VENTOLIN HFA) 90 mcg/actuation inhaler, Inhale 2 puffs into the lungs every 6 (six) hours as needed. , Disp: , Rfl:     albuterol-ipratropium (DUO-NEB) 2.5 mg-0.5 mg/3 mL nebulizer solution, Take 3 mLs by  nebulization every 6 (six) hours as needed for Wheezing or Shortness of Breath. Rescue, Disp: 240 mL, Rfl: 5    azithromycin (Z-ALONDRA) 250 MG tablet, 2 pills day one, then 1 pill for 4 days, Disp: 6 tablet, Rfl: 0    budesonide-glycopyr-formoterol (BREZTRI AEROSPHERE) 160-9-4.8 mcg/actuation HFAA, Inhale 2 puffs into the lungs 2 (two) times a day., Disp: 10.7 g, Rfl: 0    ciprofloxacin HCl (CIPRO) 500 MG tablet, Take 1 tablet (500 mg total) by mouth 2 (two) times daily., Disp: 6 tablet, Rfl: 0    ELIQUIS 5 mg Tab, Take 1 tablet (5 mg total) by mouth 2 (two) times daily., Disp: 180 tablet, Rfl: 1    fluticasone propionate (FLONASE) 50 mcg/actuation nasal spray, 2 sprays by Each Nostril route daily as needed. , Disp: , Rfl:     labetaloL (NORMODYNE) 300 MG tablet, Take 1 tablet (300 mg total) by mouth 2 (two) times a day., Disp: 180 tablet, Rfl: 1    olmesartan (BENICAR) 40 MG tablet, Take 1 tablet (40 mg total) by mouth every morning., Disp: 90 tablet, Rfl: 1    predniSONE (DELTASONE) 20 MG tablet, 3 pills for 3 days, 2 for 3 days, 1 for 3 days. Repeat for cough, Disp: 36 tablet, Rfl: 0    rosuvastatin (CRESTOR) 10 MG tablet, Take 1 tablet (10 mg total) by mouth once daily., Disp: 90 tablet, Rfl: 1    amLODIPine (NORVASC) 5 MG tablet, Take 5 mg by mouth once daily., Disp: , Rfl:     Current Facility-Administered Medications:     gentamicin injection 80 mg, 80 mg, Intramuscular, Q12H, Adam Tyler MD    Facility-Administered Medications Ordered in Other Visits:     lactated ringers infusion, , Intravenous, Continuous, Jasbir Aragon MD, Last Rate: 75 mL/hr at 11/07/19 1333, 1,000 mL at 11/07/19 1333    PMHx/PSHx Updates:  See patient's last visit with me on 2/27/2023  See H&P on 8/7/2022        Pathology:   Cancer Staging   No matching staging information was found for the patient.          Objective:     Vitals:  Blood pressure (!) 154/92, pulse 64, temperature 97.4 °F (36.3 °C), resp. rate 18, height 6'  (1.829 m), weight 88.3 kg (194 lb 9.6 oz).    Physical Examination:   GEN: no apparent distress, comfortable; AAOx3  HEAD: atraumatic and normocephalic  EYES: no pallor, no icterus, PERRLA  ENT: OMM, no pharyngeal erythema, external ears WNL; no nasal discharge; no thrush  NECK: no masses, thyroid normal, trachea midline, no LAD/LN's, supple  CV: RRR with no murmur; normal pulse; normal S1 and S2; no pedal edema  CHEST: Normal respiratory effort; CTAB; normal breath sounds; no wheeze or crackles  ABDOM: nontender and nondistended; soft; normal bowel sounds; no rebound/guarding  MUSC/Skeletal: ROM normal; no crepitus; joints normal; no deformities or arthropathy  EXTREM: no clubbing, cyanosis, inflammation or swelling  SKIN: no rashes, lesions, ulcers, petechiae or subcutaneous nodules  : no dunbar  NEURO: grossly intact; motor/sensory WNL; AAOx3; no tremors  PSYCH: normal mood, affect and behavior  LYMPH: normal cervical, supraclavicular, axillary and groin LN's            Labs:     Lab Results   Component Value Date    WBC 12.84 (H) 08/09/2023    HGB 16.3 08/09/2023    HCT 47.4 08/09/2023    MCV 93 08/09/2023     08/09/2023     Gran % 38.0 - 73.0 % 86.8 High      Lymph % 18.0 - 48.0 % 8.8 Low      Mono % 4.0 - 15.0 % 3.9         CMP  Sodium   Date Value Ref Range Status   07/07/2023 138 135 - 146 mmol/L Final     Potassium   Date Value Ref Range Status   07/07/2023 4.1 3.5 - 5.3 mmol/L Final     Chloride   Date Value Ref Range Status   07/07/2023 106 98 - 110 mmol/L Final     CO2   Date Value Ref Range Status   07/07/2023 26 20 - 32 mmol/L Final     Glucose   Date Value Ref Range Status   07/07/2023 109 (H) 65 - 99 mg/dL Final     Comment:                   Fasting reference interval     For someone without known diabetes, a glucose value  between 100 and 125 mg/dL is consistent with  prediabetes and should be confirmed with a  follow-up test.          BUN   Date Value Ref Range Status   07/07/2023 15 7 -  25 mg/dL Final     Creatinine   Date Value Ref Range Status   08/09/2023 1.0 0.5 - 1.4 mg/dL Final     Calcium   Date Value Ref Range Status   07/07/2023 9.2 8.6 - 10.3 mg/dL Final     Total Protein   Date Value Ref Range Status   07/07/2023 6.7 6.1 - 8.1 g/dL Final     Albumin   Date Value Ref Range Status   07/07/2023 4.0 3.6 - 5.1 g/dL Final     Total Bilirubin   Date Value Ref Range Status   07/07/2023 0.6 0.2 - 1.2 mg/dL Final     Alkaline Phosphatase   Date Value Ref Range Status   11/15/2022 77 55 - 135 U/L Final     AST   Date Value Ref Range Status   07/07/2023 23 10 - 35 U/L Final     ALT   Date Value Ref Range Status   07/07/2023 21 9 - 46 U/L Final     Anion Gap   Date Value Ref Range Status   11/15/2022 5 (L) 8 - 16 mmol/L Final     eGFR if    Date Value Ref Range Status   03/09/2022 >60 >60 mL/min/1.73 m^2 Final     eGFR if non    Date Value Ref Range Status   03/09/2022 >60 >60 mL/min/1.73 m^2 Final     Comment:     Calculation used to obtain the estimated glomerular filtration  rate (eGFR) is the CKD-EPI equation.             Fibrinogen 194 - 545 mg/dL 229       PT 11.4 - 13.7 sec 15.1 High   14.4 High   13.7  13.5 R    INR   1.3  1.2 CM  1.1 CM  1.1 CM       aPTT 23.3 - 35.1 sec 146.3 High Panic   30.9      Lupus Reflex Interpretation  Comment:    Comment: No lupus anticoagulant was detected. Mixing studies suggest the presence of an inhibitor         MTHFR Comment    Comment: Result:   c.665C>T (p. [id number]), legacy name:   C677T - Detected, heterozygous c.1286A>C (p.   [id number]), legacy name: [id number] - Not      Homocysteine 0.0 - 17.2 umol/L 9.6        Protein S Ag, Total 60 - 150 % 106    Comment: This test was developed and its performance   characteristics determined by [facility]. It   has not been cleared or approved   by the Food and Drug Administration.    Protein S Ag, Free 61 - 136 % 85      Prothrombin Mutation Comment    Comment: Result: c.*97G>A - Not  Detected      Anticardiolipin IgG 0 - 14 GPL U/mL <9      Anticardiolipin IgA 0 - 11 APL U/mL <9     Anticardiolipin IgM 0 - 12 MPL U/mL 9        Radiology/Diagnostic Studies:    CTA Chest Non-Coronary - PE Study    Result Date: 8/13/2022  CT angiogram chest with contrast on 8/13/2022 CLINICAL INDICATION: Chest pain TECHNIQUE: Multiple axial images are obtained throughout the chest following the administration of IV contrast.  Computer generated 3D reconstructions/MIPS were performed. This exam was performed according to our departmental dose-optimization program, which includes automated exposure control, adjustment of the mA and/or kV according to patient size and/or use of iterative reconstruction technique. Total DLP is 387.1 mGycm. COMPARISON: 1/3/2022 FINDINGS: There is no thoracic aortic aneurysm or dissection. There is a small left hepatic cyst. Limited visualized upper abdomen is otherwise unremarkable. There is no pleural or pericardial effusion. There is no thoracic adenopathy. There are not no filling defects in the pulmonary arteries to suggest pulmonary embolus. There is minimal bilateral dependent and basilar atelectasis. The lungs are otherwise clear. No acute bony abnormality is noted. IMPRESSION: 1.  No evidence of pulmonary embolus. 2.  No acute abnormality. Electronically signed by:  Jasbir Chapa  8/13/2022 3:56 AM CDT Workstation: [id number]    US Lower Extremity Arteries Left    Result Date: 8/5/2022   ADDENDUM #1 THIS REPORT CONTAINS FINDINGS THAT MAY BE CRITICAL TO PATIENT CARE:  Called, telephoned, verbal report was given oral to DR. VELMA AVALOS at 2:57 AM CDT on 8/5/2022. Electronically signed by:  Piotr Cole MD  8/5/2022 4:43 AM CDT Workstation: [id number]  ORIGINAL REPORT EXAM DESCRIPTION: US LOWER EXTREMITY ARTERIES LEFT 8/5/2022 2:49 AM CDT CLINICAL HISTORY: 62 years, Male, COMPARISON: None FINDINGS: Multiple grayscale images and duplex Doppler ultrasound of the left arterial  system was performed with color flow, spectral waveform and peak systolic velocities. Left common femoral artery peak systolic velocity of 29 cm/sec. triphasic waveform Left profunda peak systolic velocity of 38 cm/sec. triphasic waveform Left superficial femoral artery proximal peak systolic velocity 19 cm/sec. triphasic waveform. Left superficial femoral artery mid aspect demonstrate a filling defect with abnormal waveform and peak systolic velocity of 19 cm/s. Left superficial femoral artery distally demonstrate filling defect with a markedly decreased velocity of 12 cm/s. Left popliteal artery demonstrate filling defect with abnormal decreased velocity of 19 cm/s Left posterior tibial artery demonstrated filling defect with lack of flow. Left anterior tibial artery demonstrate filling defect within the lack of flow Left peroneal artery demonstrate filling defect with lack of flow Left dorsalis pedis artery demonstrate filling defect with lack of flow. IMPRESSION: Extensive left lower extremity arterial thrombus from mid superficial femoral artery downward. Electronically signed by:  Piotr Cole MD  8/5/2022 2:54 AM CDT Workstation: [id number]    US Lower Extremity Veins Left    Result Date: 8/5/2022   ADDENDUM #1 Findings were discussed with Dr. Alfa Keene on 8/5/2022 at 3:21 AM Central standard time via telephone conference. 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 Further evaluation with CTA of the left lower extremity will be obtained. Electronically signed by:  Jordyn Shah MD  8/5/2022 3:45 AM CDT Workstation: [id number]  ORIGINAL REPORT EXAM: US Duplex Left Lower Extremity Veins CLINICAL HISTORY: The patient is 62 years old and is Male; TECHNIQUE: Real-time duplex ultrasound scan of the left lower extremity veins integrating B-mode two-dimensional vascular structure, Doppler spectral analysis, color flow Doppler  imaging and compression. COMPARISON: No relevant prior studies available. FINDINGS: DEEP VEINS: No DVT in the visualized common femoral, femoral, proximal deep femoral or popliteal veins.  The veins demonstrate normal color flow, are normally compressible, with normal phasic flow and/or augmentation response. SUPERFICIAL VEINS:  Unremarkable.  No thrombus in the visualized great saphenous vein. SOFT TISSUES:  Large heterogeneous lesion in the left popliteal fossa measuring 7.1 x 3.3 x 3.9 cm of indeterminate etiology. IMPRESSION: 1.  No evidence of deep venous thrombosis. 2.  Large heterogeneous lesion in the left popliteal fossa measuring 7.1 x 3.3 x 3.9 cm of indeterminate etiology.  Recommend further evaluation with cross-sectional imaging. Electronically signed by:  Jordyn Shah MD  8/5/2022 3:18 AM CDT Workstation: [id number]    CTA Lower Extremity Bilateral    Result Date: 8/5/2022  EXAM: CT Angiography of the Bilateral Lower Extremities With Intravenous Contrast CLINICAL HISTORY: The patient is 62 years old and is Male; Arterial embolism, lower extremity TECHNIQUE: Axial computed tomographic angiography images of the bilateral lower extremities with intravenous contrast.  Sagittal and coronal reformatted images were created and reviewed.  This CT exam was performed using one or more of the following dose reduction techniques:  automated exposure control, adjustment of the mA and/or kV according to patient size, and/or use of iterative reconstruction technique. MIP reconstructed images were created and reviewed. COMPARISON: No relevant prior studies available. FINDINGS: VASCULATURE: RIGHT FEMORAL/POPLITEAL ARTERIES:  No acute findings.  No occlusion or significant stenosis. RIGHT CALF/FOOT ARTERIES:  The right posterior tibial artery is dominant and visualized into the hindfoot. The right peroneal and anterior tibial arteries contrast tapers gradually faded and are not visualized in the mid to distal  lower extremity.  No occlusion or significant stenosis. LEFT FEMORAL/POPLITEAL ARTERIES:  Complete occlusion from the left proximal superficial femoral artery into all 3 infrapopliteal arteries.  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  No filling defects in the visualized left deep femoral artery. LEFT CALF/FOOT ARTERIES: No opacification. LOWER EXTREMITIES: BONES/JOINTS:  No acute fracture.  No dislocation. SOFT TISSUES:  Left popliteal/Baker's cyst. REPRODUCTIVE:  Fiducial markers in the prostate. IMPRESSION: 1.  Complete occlusion from the left proximal superficial femoral artery into all 3 infrapopliteal arteries. 2.  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 3.  Left popliteal/Baker's cyst. Electronically signed by:  Alvarado Tejada MD  8/5/2022 6:00 AM CDT Workstation: [id number]      I have reviewed all available lab results and radiology reports.    Assessment/Plan:   (1) 63 y.o. male with diagnosis of severe recurrent PAD who presented to the ER at [facility] with severe left leg pain. He had prior left popliteal aneurysm repair with Dr daniel in July 2021. Doppler studies in ED showed thrombosis of the mid-superficial femoral artery. He underwent angiography yesterday on 8/6 with thrombectomy and TPA with Dr Daniel. He is currently in the ICU and is awake and alert. He denies any current pain in the left leg. No current CP, SOB, HA's or N/V. He is a [place] and is absolutely against receiving any blood products or transfusions even at the risk of his own life. I discussed with his ICU nurse Mima and Dr Daniel both in person.      8/7/2022: seen as consult in-hospital  - hematology consulted for evaluation for any underlying clot disorders  - vascular plans to start him on either eliquis of xarelto    8/29/2022:  - he is doing ok post-hospitalization  - he saw Lacho and had repeat studies which were good per patient  - clot workup essentially WNL except for  "MTHFR-C gene heterozygous positive but the homocysteine was WNL  - discussed the genetic implications in children, siblings, etc  - add folbic  - continue eliquis as per the directions of Dr Esquivel  - LA was negative but he was on heparinoid products at time of test - thus the presence of an inhibitor was detected    2/27/2023:  - he is doing ok with no new issues  - continued on eliquis  - he sees Dr Esquivel every 6 months  - no excessive bleeding or bruising    8/28/2023:  - continued on eliquis  - no excessive bleeding or bruising  - Sierra is no longer on his insurance  - He saw Dr Tyler in July 2023 and they are planning prostate biopsy with elevated PSA; he saw Robert in July 2023; Dr Llamas in June 2023; he saw Riley Atkinson in May 2023     (2) HTN and hypercholesterolemia     (3) Hx/of kidney stones     (4)  Thyroid disease s/p partial thyroidectomy     (5) BPH     (6) COPD     (7) Mild anemia - NCNC parameters - resolved     (8) Mild borderline thrombocytopenia - resolved    (9) Mild leucocytosis - suspect secondary to the use of steroids    (10) Elevated PSA - planned biopsy with Dr Tyler    (11) COPD/"Runner's Lungs" - followed by Robert and treated with steroids and periodically on abx       VISIT DIAGNOSES:      Femoral popliteal artery thrombus    Acute embolism and thrombosis of left popliteal vein    Heterozygous MTHFR mutation [id number]          PLAN:  Continued on eliquis and folbic  2. Proceed with planned prostate biopsy  3. Previously discussed the genetic implications of the MTHFR gene abnormality in children and siblings, etc  4. F/u with PCP, Vascular, Pulm, Card etc - planned echo in near future  RTC in 6 months  Fax note to Demetrio Esquivel Matthew J., PA-C, Robert Tyler    Discussion:     COVID-19 Discussion:    I had long discussion with patient and any applicable family about the COVID-19 coronavirus epidemic and the recommended precautions with regard to cancer and/or hematology " patients. I have re-iterated the CDC recommendations for adequate hand washing, use of hand -like products, and coughing into elbow, etc. In addition, especially for our patients who are on chemotherapy and/or our otherwise immunocompromised patients, I have recommended avoidance of crowds, including movie theaters, restaurants, churches, etc. I have recommended avoidance of any sick or symptomatic family members and/or friends. I have also recommended avoidance of any raw and unwashed food products, and general avoidance of food items that have not been prepared by themselves. The patient has been asked to call us immediately with any symptom developments, issues, questions or other general concerns.       Anticoagulation Discussion:    Discussed with patient and any applicable family members about the benefit and/or need for anticoagulation. I communicated about the risks of bleeding while on any anticoagulation, which could be serious and/or life-threatening, and which can occur at any time, regardless of degree of the level of anticoagulation. I expressed the need for compliance with any anticoagulation regimen and that failure to do so could potential lead to excessive bleeding, and risk to health and/or life. In particular, with patients on coumadin therapy, compliance with requested blood work is absolutely essential, as coumadin levels can vary from time to time, and failure to do so could potentially place the patient at risk for bleeding and/or clotting events which could be fatal. Patients on coumadin are encouraged to call the day after they have their levels drawn, as to obtain the appropriate instructions from my staff. Patients are aware that self-regulating or self-dosing of their medications is strictly prohibited.       I spent over 25 mins of time with the patient. Reviewed results of the recently ordered labs, tests and studies; made directives with regards to the results. Over half of  this time was spent couseling and coordinating care.    I have explained all of the above in detail and the patient understands all of the current recommendation(s). I have answered all of their questions to the best of my ability and to their complete satisfaction.   The patient is to continue with the current management plan.            Electronically signed by Stefano Pichardo MD            Answers submitted by the patient for this visit:  Review of Systems Questionnaire (Submitted on 8/25/2023)  appetite change : No  unexpected weight change: No  mouth sores: Yes  visual disturbance: Yes  cough: Yes  shortness of breath: Yes  chest pain: No  abdominal pain: No  diarrhea: No  frequency: Yes  back pain: Yes  rash: No  headaches: No  adenopathy: No  nervous/ anxious: No

## 2023-08-28 ENCOUNTER — OFFICE VISIT (OUTPATIENT)
Dept: HEMATOLOGY/ONCOLOGY | Facility: CLINIC | Age: 63
End: 2023-08-28
Payer: MEDICARE

## 2023-08-28 VITALS
TEMPERATURE: 97 F | RESPIRATION RATE: 18 BRPM | WEIGHT: 194.63 LBS | DIASTOLIC BLOOD PRESSURE: 92 MMHG | SYSTOLIC BLOOD PRESSURE: 154 MMHG | HEIGHT: 72 IN | HEART RATE: 64 BPM | BODY MASS INDEX: 26.36 KG/M2

## 2023-08-28 DIAGNOSIS — I82.432 ACUTE EMBOLISM AND THROMBOSIS OF LEFT POPLITEAL VEIN: ICD-10-CM

## 2023-08-28 DIAGNOSIS — I74.3 FEMORAL POPLITEAL ARTERY THROMBUS: Primary | ICD-10-CM

## 2023-08-28 DIAGNOSIS — Z15.89 HETEROZYGOUS MTHFR MUTATION C677T: ICD-10-CM

## 2023-08-28 PROCEDURE — 4010F PR ACE/ARB THEARPY RXD/TAKEN: ICD-10-PCS | Mod: CPTII,S$GLB,, | Performed by: INTERNAL MEDICINE

## 2023-08-28 PROCEDURE — 3077F SYST BP >= 140 MM HG: CPT | Mod: CPTII,S$GLB,, | Performed by: INTERNAL MEDICINE

## 2023-08-28 PROCEDURE — 1159F PR MEDICATION LIST DOCUMENTED IN MEDICAL RECORD: ICD-10-PCS | Mod: CPTII,S$GLB,, | Performed by: INTERNAL MEDICINE

## 2023-08-28 PROCEDURE — 3080F PR MOST RECENT DIASTOLIC BLOOD PRESSURE >= 90 MM HG: ICD-10-PCS | Mod: CPTII,S$GLB,, | Performed by: INTERNAL MEDICINE

## 2023-08-28 PROCEDURE — 99213 OFFICE O/P EST LOW 20 MIN: CPT | Mod: S$GLB,,, | Performed by: INTERNAL MEDICINE

## 2023-08-28 PROCEDURE — 3077F PR MOST RECENT SYSTOLIC BLOOD PRESSURE >= 140 MM HG: ICD-10-PCS | Mod: CPTII,S$GLB,, | Performed by: INTERNAL MEDICINE

## 2023-08-28 PROCEDURE — 3288F PR FALLS RISK ASSESSMENT DOCUMENTED: ICD-10-PCS | Mod: CPTII,S$GLB,, | Performed by: INTERNAL MEDICINE

## 2023-08-28 PROCEDURE — 3080F DIAST BP >= 90 MM HG: CPT | Mod: CPTII,S$GLB,, | Performed by: INTERNAL MEDICINE

## 2023-08-28 PROCEDURE — 3288F FALL RISK ASSESSMENT DOCD: CPT | Mod: CPTII,S$GLB,, | Performed by: INTERNAL MEDICINE

## 2023-08-28 PROCEDURE — 99213 PR OFFICE/OUTPT VISIT, EST, LEVL III, 20-29 MIN: ICD-10-PCS | Mod: S$GLB,,, | Performed by: INTERNAL MEDICINE

## 2023-08-28 PROCEDURE — 1160F PR REVIEW ALL MEDS BY PRESCRIBER/CLIN PHARMACIST DOCUMENTED: ICD-10-PCS | Mod: CPTII,S$GLB,, | Performed by: INTERNAL MEDICINE

## 2023-08-28 PROCEDURE — 1160F RVW MEDS BY RX/DR IN RCRD: CPT | Mod: CPTII,S$GLB,, | Performed by: INTERNAL MEDICINE

## 2023-08-28 PROCEDURE — 1157F PR ADVANCE CARE PLAN OR EQUIV PRESENT IN MEDICAL RECORD: ICD-10-PCS | Mod: CPTII,S$GLB,, | Performed by: INTERNAL MEDICINE

## 2023-08-28 PROCEDURE — 3008F PR BODY MASS INDEX (BMI) DOCUMENTED: ICD-10-PCS | Mod: CPTII,S$GLB,, | Performed by: INTERNAL MEDICINE

## 2023-08-28 PROCEDURE — 1101F PR PT FALLS ASSESS DOC 0-1 FALLS W/OUT INJ PAST YR: ICD-10-PCS | Mod: CPTII,S$GLB,, | Performed by: INTERNAL MEDICINE

## 2023-08-28 PROCEDURE — 1157F ADVNC CARE PLAN IN RCRD: CPT | Mod: CPTII,S$GLB,, | Performed by: INTERNAL MEDICINE

## 2023-08-28 PROCEDURE — 1159F MED LIST DOCD IN RCRD: CPT | Mod: CPTII,S$GLB,, | Performed by: INTERNAL MEDICINE

## 2023-08-28 PROCEDURE — 3008F BODY MASS INDEX DOCD: CPT | Mod: CPTII,S$GLB,, | Performed by: INTERNAL MEDICINE

## 2023-08-28 PROCEDURE — 1101F PT FALLS ASSESS-DOCD LE1/YR: CPT | Mod: CPTII,S$GLB,, | Performed by: INTERNAL MEDICINE

## 2023-08-28 PROCEDURE — 4010F ACE/ARB THERAPY RXD/TAKEN: CPT | Mod: CPTII,S$GLB,, | Performed by: INTERNAL MEDICINE

## 2023-09-01 ENCOUNTER — OFFICE VISIT (OUTPATIENT)
Dept: OPTOMETRY | Facility: CLINIC | Age: 63
End: 2023-09-01
Payer: MEDICARE

## 2023-09-01 DIAGNOSIS — H26.9 CORTICAL CATARACT: ICD-10-CM

## 2023-09-01 DIAGNOSIS — G51.4 EYELID MYOKYMIA: ICD-10-CM

## 2023-09-01 DIAGNOSIS — H04.123 DRY EYE SYNDROME, BILATERAL: ICD-10-CM

## 2023-09-01 DIAGNOSIS — Z46.0 CONTACT LENS/GLASSES FITTING: Primary | ICD-10-CM

## 2023-09-01 DIAGNOSIS — Z01.00 EXAMINATION OF EYES AND VISION: Primary | ICD-10-CM

## 2023-09-01 DIAGNOSIS — H52.7 REFRACTIVE ERROR: ICD-10-CM

## 2023-09-01 DIAGNOSIS — H25.13 NUCLEAR SCLEROSIS, BILATERAL: ICD-10-CM

## 2023-09-01 PROCEDURE — 99499 UNLISTED E&M SERVICE: CPT | Mod: ,,, | Performed by: OPTOMETRIST

## 2023-09-01 PROCEDURE — 92015 PR REFRACTION: ICD-10-PCS | Mod: S$GLB,,, | Performed by: OPTOMETRIST

## 2023-09-01 PROCEDURE — 1160F PR REVIEW ALL MEDS BY PRESCRIBER/CLIN PHARMACIST DOCUMENTED: ICD-10-PCS | Mod: CPTII,S$GLB,, | Performed by: OPTOMETRIST

## 2023-09-01 PROCEDURE — 92310 PR CONTACT LENS FITTING (NO CHANGE): ICD-10-PCS | Mod: CSM,S$GLB,, | Performed by: OPTOMETRIST

## 2023-09-01 PROCEDURE — 92310 CONTACT LENS FITTING OU: CPT | Mod: CSM,S$GLB,, | Performed by: OPTOMETRIST

## 2023-09-01 PROCEDURE — 1101F PR PT FALLS ASSESS DOC 0-1 FALLS W/OUT INJ PAST YR: ICD-10-PCS | Mod: CPTII,S$GLB,, | Performed by: OPTOMETRIST

## 2023-09-01 PROCEDURE — 1157F ADVNC CARE PLAN IN RCRD: CPT | Mod: CPTII,S$GLB,, | Performed by: OPTOMETRIST

## 2023-09-01 PROCEDURE — 1159F PR MEDICATION LIST DOCUMENTED IN MEDICAL RECORD: ICD-10-PCS | Mod: CPTII,S$GLB,, | Performed by: OPTOMETRIST

## 2023-09-01 PROCEDURE — 99999 PR PBB SHADOW E&M-EST. PATIENT-LVL III: CPT | Mod: PBBFAC,,, | Performed by: OPTOMETRIST

## 2023-09-01 PROCEDURE — 4010F PR ACE/ARB THEARPY RXD/TAKEN: ICD-10-PCS | Mod: CPTII,S$GLB,, | Performed by: OPTOMETRIST

## 2023-09-01 PROCEDURE — 92015 DETERMINE REFRACTIVE STATE: CPT | Mod: S$GLB,,, | Performed by: OPTOMETRIST

## 2023-09-01 PROCEDURE — 4010F ACE/ARB THERAPY RXD/TAKEN: CPT | Mod: CPTII,S$GLB,, | Performed by: OPTOMETRIST

## 2023-09-01 PROCEDURE — 99999 PR PBB SHADOW E&M-EST. PATIENT-LVL III: ICD-10-PCS | Mod: PBBFAC,,, | Performed by: OPTOMETRIST

## 2023-09-01 PROCEDURE — 1160F RVW MEDS BY RX/DR IN RCRD: CPT | Mod: CPTII,S$GLB,, | Performed by: OPTOMETRIST

## 2023-09-01 PROCEDURE — 92004 PR EYE EXAM, NEW PATIENT,COMPREHESV: ICD-10-PCS | Mod: S$GLB,,, | Performed by: OPTOMETRIST

## 2023-09-01 PROCEDURE — 1101F PT FALLS ASSESS-DOCD LE1/YR: CPT | Mod: CPTII,S$GLB,, | Performed by: OPTOMETRIST

## 2023-09-01 PROCEDURE — 99499 NO LOS: ICD-10-PCS | Mod: ,,, | Performed by: OPTOMETRIST

## 2023-09-01 PROCEDURE — 99999 PR PBB SHADOW E&M-EST. PATIENT-LVL I: ICD-10-PCS | Mod: PBBFAC,,, | Performed by: OPTOMETRIST

## 2023-09-01 PROCEDURE — 92004 COMPRE OPH EXAM NEW PT 1/>: CPT | Mod: S$GLB,,, | Performed by: OPTOMETRIST

## 2023-09-01 PROCEDURE — 1157F PR ADVANCE CARE PLAN OR EQUIV PRESENT IN MEDICAL RECORD: ICD-10-PCS | Mod: CPTII,S$GLB,, | Performed by: OPTOMETRIST

## 2023-09-01 PROCEDURE — 3288F PR FALLS RISK ASSESSMENT DOCUMENTED: ICD-10-PCS | Mod: CPTII,S$GLB,, | Performed by: OPTOMETRIST

## 2023-09-01 PROCEDURE — 1159F MED LIST DOCD IN RCRD: CPT | Mod: CPTII,S$GLB,, | Performed by: OPTOMETRIST

## 2023-09-01 PROCEDURE — 3288F FALL RISK ASSESSMENT DOCD: CPT | Mod: CPTII,S$GLB,, | Performed by: OPTOMETRIST

## 2023-09-01 PROCEDURE — 99999 PR PBB SHADOW E&M-EST. PATIENT-LVL I: CPT | Mod: PBBFAC,,, | Performed by: OPTOMETRIST

## 2023-09-01 NOTE — PROGRESS NOTES
Assessment /Plan     For exam results, see Encounter Report.    Contact lens/glasses fitting      Patient is here for a comprehensive eye exam and contact lens fit. See other exam visit with same encounter date 09/01/2023 for detailed exam information.

## 2023-09-01 NOTE — DISCHARGE INSTRUCTIONS
Cystoscopy    Cystoscopy is a procedure that lets your doctor look directly inside your urethra and bladder. It can be used to:  Help diagnose a problem with your urethra, bladder, or kidneys.  Take a sample (biopsy) of bladder or urethral tissue.  Treat certain problems (such as removing kidney stones).  Place a stent to bypass an obstruction.  Take special X-rays of the kidneys.  Based on the findings, your doctor may recommend other tests or treatments.  What is a cystoscope?  A cystoscope is a telescope-like instrument that contains lenses and fiberoptics (small glass wires that make bright light). The cystoscope may be straight and rigid, or flexible to bend around curves in the urethra. The doctor may look directly into the cystoscope, or project the image onto a monitor.  Getting ready  Ask your doctor if you should stop taking any medicines before the procedure.  Ask whether you should avoid eating or drinking anything after midnight before the procedure.  Follow any other instructions your doctor gives you.  Tell your doctor before the exam if you:  Take any medicines, such as aspirin or blood thinners  Have allergies to any medicines  Are pregnant   The procedure  Cystoscopy is done in the doctors office, surgery center, or hospital. The doctor and a nurse are present during the procedure. It takes only a few minutes, longer if a biopsy, X-ray, or treatment needs to be done.  During the procedure:  You lie on an exam table on your back, knees bent and legs apart. You are covered with a drape.  Your urethra and the area around it are washed. Anesthetic jelly may be applied to numb the urethra. Other pain medicine is usually not needed. In some cases, you may be offered a mild sedative to help you relax. If a more extensive procedure is to be done, such as a biopsy or kidney stone removal, general anesthesia may be needed.  The cystoscope is inserted. A sterile fluid is put into the bladder to expand it.  You may feel pressure from this fluid.  When the procedure is done, the cystoscope is removed.  After the procedure  If you had a sedative, general anesthesia, or spinal anesthesia, you must have someone drive you home. Once youre home:  Drink plenty of fluids.  You may have burning or light bleeding when you urinate--this is normal.  Medicines may be prescribed to ease any discomfort or prevent infection. Take these as directed.  Call your doctor if you have heavy bleeding or blood clots, burning that lasts more than a day, a fever over 100°F  (38° C), or trouble urinating.    After Surgery:  Always be aware that any surgery can cause these symptoms:    Pain- Medication can be prescribed for pain to decrease your pain but may not completely take your pain away.  Over the Counter pain medicine my be enough and you can always use Ice and rest to help ease pain.    Bleeding- a little bleeding after a surgery is usually within normal.  If there is a lot of blood you need to notify your MD.  Emergency treatments of bleeding are cold application, elevation of the bleeding site and compression.    Infection- Infection after surgery is NOT a normal occurrence.  Signs of infection are fever, swelling, hot to touch the incision.  If this occurs notify your MD immediately.    Nausea- this can be common after a surgery especially if you have had anesthesia medicine or are taking pain medicine.  Staying on clear liquids, bland foods, gingerale, or over the counter anti nausea medicines can help.  If you vomit more than once, notify your MD.  Anti Nausea medicines can be prescribed.   After your prostate biopsy    Avoid sexual activity,lifting, strenuous physical activity or exertion for 3 days     No riding mowers, tractors, bicycles, motorcycles for 2-3 weeks    You may experience blood in your urine or stool for up to 2 weeks and in your semen for up to 6 weeks.  This is a normal side effect of the procedure and will  resolve.    Drink plenty of water    Take antibiotics as prescribed    If you experience any of the following conditions, please return immediately to the clinic (during office hrs) or the Emergency Room if after hours:       Fever       Inabiltiy to urinate       Severe bleeding    You may resume aspirin, anti inflammatory and blood thinners in 3 days    Results take at minimum 10 business days.  A 2 week follow up will be scheduled to review pathology reports in the clinic    During office hours, please call  and ask to speak with the nurse if you have any questions.  If after hours, call the [facility] On Call # to be connectied to the doctor on call    After Surgery:  Always be aware that any surgery can cause these symptoms:    Pain- Medication can be prescribed for pain to decrease your pain but may not completely take your pain away.  Over the Counter pain medicine my be enough and you can always use Ice and rest to help ease pain.    Bleeding- a little bleeding after a surgery is usually within normal.  If there is a lot of blood you need to notify your MD.  Emergency treatments of bleeding are cold application, elevation of the bleeding site and compression.    Infection- Infection after surgery is NOT a normal occurrence.  Signs of infection are fever, swelling, hot to touch the incision.  If this occurs notify your MD immediately.    Nausea- this can be common after a surgery especially if you have had anesthesia medicine or are taking pain medicine.  Staying on clear liquids, bland foods, gingerale, or over the counter anti nausea medicines can help.  If you vomit more than once, notify your MD.  Anti Nausea medicines can be prescribed.

## 2023-09-01 NOTE — PROGRESS NOTES
"HPI    Pt here today for annual exam.  States blurred vision at distance only OU,   near vision good.   Feels like eyes don't focus together.    Denies any headaches or eye pain.    (+) eye strain --- feels like "OD does more work because OS is weaker".  (+) lid twitching OS only x 5 yrs   (-) allergies / dry eyes  (-) gtts  (-) floaters or light flashes  Last edited by Katy Menendez on 9/1/2023  3:27 PM.            Assessment /Plan     For exam results, see Encounter Report.    Examination of eyes and vision    Nuclear sclerosis, bilateral    Cortical cataract    Refractive error    Dry eye syndrome, bilateral    Eyelid myokymia      1. Examination of eyes and vision    2. Nuclear sclerosis, bilateral  3. Cortical cataract  Mild to moderate, OS > OD  Not visually significant  Discussed possible ocular affects of cataracts. Acceptable BCVA OU. Discussed treatment options. Surgery not recommended at this time. Monitor yearly.     4. Refractive error  Dispensed updated spectacle Rx. Discussed various spectacle lens options. Discussed adaptation period to new specs.   Demonstrated new spec Rx vs current specs in phoropter with patient satisfaction    Discussed cls options -- previous wearer  Ordered cls trials for distance only: total30 toric  Return for cls dispense    5. Dry eye syndrome, bilateral  Discussed ocular affects of dry eyes. Recommend OTC artificial tears 2-4 times a day in both eyes. Discussed chronicity of LIZBETH. RTC if symptoms not alleviated by continued use of artificial tears.     6. Eyelid myokymia  Longstanding x 5+ years  Unknown etiology  MRI in past unremarkable  Discussed options - consider MRA brain/orbits  Discussed Botox injection option prn                   "

## 2023-09-05 ENCOUNTER — HOSPITAL ENCOUNTER (OUTPATIENT)
Facility: HOSPITAL | Age: 63
Discharge: HOME | End: 2023-09-05
Attending: UROLOGY | Admitting: UROLOGY
Payer: MEDICARE

## 2023-09-05 DIAGNOSIS — N40.1 ENLARGED PROSTATE WITH URINARY OBSTRUCTION: ICD-10-CM

## 2023-09-05 DIAGNOSIS — N13.8 ENLARGED PROSTATE WITH URINARY OBSTRUCTION: ICD-10-CM

## 2023-09-05 LAB
BILIRUBIN, UA POC OHS: NEGATIVE
BLOOD, UA POC OHS: ABNORMAL
CLARITY, UA POC OHS: CLEAR
COLOR, UA POC OHS: YELLOW
GLUCOSE, UA POC OHS: NEGATIVE
KETONES, UA POC OHS: NEGATIVE
LEUKOCYTES, UA POC OHS: NEGATIVE
NITRITE, UA POC OHS: NEGATIVE
PH, UA POC OHS: 6
PROTEIN, UA POC OHS: NEGATIVE
SPECIFIC GRAVITY, UA POC OHS: 1.02
UROBILINOGEN, UA POC OHS: 0.2

## 2023-09-05 PROCEDURE — 55700 PR BIOPSY OF PROSTATE,NEEDLE/PUNCH: CPT | Mod: ,,, | Performed by: UROLOGY

## 2023-09-05 PROCEDURE — 76872 US TRANSRECTAL: CPT | Performed by: UROLOGY

## 2023-09-05 PROCEDURE — A4217 STERILE WATER/SALINE, 500 ML: HCPCS | Performed by: UROLOGY

## 2023-09-05 PROCEDURE — 88344 IMHCHEM/IMCYTCHM EA MLT ANTB: CPT | Mod: TC,59 | Performed by: PATHOLOGY

## 2023-09-05 PROCEDURE — 52000 PR CYSTOURETHROSCOPY: ICD-10-PCS | Mod: 59,,, | Performed by: UROLOGY

## 2023-09-05 PROCEDURE — 55700 HC PROSTATE NEEDLE BIOPSY: CPT | Performed by: UROLOGY

## 2023-09-05 PROCEDURE — 76872 US TRANSRECTAL: CPT | Mod: 26,,, | Performed by: UROLOGY

## 2023-09-05 PROCEDURE — 76872 PR US TRANSRECTAL: ICD-10-PCS | Mod: 26,,, | Performed by: UROLOGY

## 2023-09-05 PROCEDURE — 25000003 PHARM REV CODE 250: Performed by: UROLOGY

## 2023-09-05 PROCEDURE — 52000 CYSTOURETHROSCOPY: CPT | Performed by: UROLOGY

## 2023-09-05 PROCEDURE — 63600175 PHARM REV CODE 636 W HCPCS: Performed by: UROLOGY

## 2023-09-05 PROCEDURE — 55700 PR BIOPSY OF PROSTATE,NEEDLE/PUNCH: ICD-10-PCS | Mod: ,,, | Performed by: UROLOGY

## 2023-09-05 PROCEDURE — 52000 CYSTOURETHROSCOPY: CPT | Mod: 59,,, | Performed by: UROLOGY

## 2023-09-05 RX ORDER — GENTAMICIN SULFATE 40 MG/ML
80 INJECTION, SOLUTION INTRAMUSCULAR; INTRAVENOUS ONCE
Status: COMPLETED | OUTPATIENT
Start: 2023-09-05 | End: 2023-09-05

## 2023-09-05 RX ORDER — WATER 1 ML/ML
IRRIGANT IRRIGATION
Status: DISCONTINUED | OUTPATIENT
Start: 2023-09-05 | End: 2023-09-05 | Stop reason: HOSPADM

## 2023-09-05 RX ORDER — LIDOCAINE HYDROCHLORIDE 20 MG/ML
JELLY TOPICAL
Status: DISCONTINUED | OUTPATIENT
Start: 2023-09-05 | End: 2023-09-05 | Stop reason: HOSPADM

## 2023-09-05 RX ORDER — LIDOCAINE HYDROCHLORIDE 20 MG/ML
INJECTION, SOLUTION EPIDURAL; INFILTRATION; INTRACAUDAL; PERINEURAL
Status: DISCONTINUED | OUTPATIENT
Start: 2023-09-05 | End: 2023-09-05 | Stop reason: HOSPADM

## 2023-09-05 RX ADMIN — GENTAMICIN SULFATE 80 MG: 40 INJECTION, SOLUTION INTRAMUSCULAR; INTRAVENOUS at 11:09

## 2023-09-05 NOTE — H&P
[facility] Urology New Patient/H&P:      Helder Brand is a 63 y.o. male who presents for evaluation of elevated psa at referral of KEAGAN Atkinson     HTN, hypothyroid, COPD/emphysema (followed by pulm, last exacerbation June 2023), PVD w/ L popliteal artery clot and vasc surgery 2021, L DVT 8/22,  MTHFR mutation on eliqus (Calabresi)  Started new medical COPD treatment regimen at last o/v with NP Robert at last o/v 7/6/23.   HTN/CAD/aortic ectasia and recently est care with Dr Bundy 5/30/23 with negative stress in 2021 and recent neg CT PE protocol  He est primary care with Maxi BOOGIE on 5/29/23 noting   He is experiencing some nocturia. 3-4 times at night.  This is his main concern.  Keeping him from sleeping well at night.  Reports colonoscopy completed 5 years ago at age 58.  Started on flomax, and psa ordered with screening labs  Labs 7/7/23 included PSA of 9.33 (as well as Cr 0.86, eGFR 96, WBC 13.2)  - advised to see urology about elevated psa and recheck labs in a few months before seeing hemonc re his leukocytosis (Calabresi 8/28/23)     He was previously followed by Dr Montague  11/7/2019 [facility] TURP with Dr Montague  Was following with Dr. Montague for quite some time for his BPH/LUTS and did have UroLift approximally 5 years ago, which did not work and was followed by laser TURP as above.  He reports a family history of prostate cancer in his father, diagnosed in his 50s and treated with prostatectomy at that time.  AUA symptom score:  20/5 (4: Urgency, straining; 3: Emptying, nocturia; 2: Frequency, intermittency, weak stream). NTF most bothersome. Similar to preop  Does drink tea at night/before bed.  PVR 83 cc by bladder scan.  Urinalysis with small blood.  No hx UTI or prostatitis. No dysuria, hematuria, denies perineal, perirectal, testicular ejaculatory pain.   Wife passed away 7 yrs ago and not sexually active.  Quit smoking 40 yrs ago.   Was on Flomax prior to BPH intervention.  Has not  restarted any meds. Doesn't snore to his knowledge     On record review from Dr. Montague, in May of 2019, after his UroLift, still complained of urinary urgency and had a uroflow at that time voiding 600 cc with a peak flow of only 12.6 cc/second and an average flow of only 4 cc/second with a PVR of 23 cc after which cystoscopy was discussed and laser prostate was considered, which ultimately happened as above.  AUA symptom score 17 after UroLift  UroLift was on 3/27/19, with a total of 5 implants.  There was additional obstructing tissue on the patient's right for with 5th implant was placed here.  Last visit with Dr. Montague was 12/20/19 approximally 1 month after laser TURP noting urinary problem was improving.  Doing well, no leakage, happy with stream.     Takes his eliqus 5mg only once daily in evening. Had discussed 2.5 bid but elected just take 1             Past Medical History:   Diagnosis Date    Emphysema lung 2021    Enlarged prostate      Hyperlipidemia 2021    Hypertension 2001    Kidney stone 2001     x3    Thyroid disease       benign growth, partial thyroidectomy               Past Surgical History:   Procedure Laterality Date    ANGIOGRAPHY OF LOWER EXTREMITY Left 8/5/2022     Procedure: Angiogram Extremity Unilateral;  Surgeon: Ali Khoobehi, MD;  Location: [facility] CATH/EP LAB;  Service: Peripheral Vascular;  Laterality: Left;    ANGIOGRAPHY OF LOWER EXTREMITY Left 8/6/2022     Procedure: Angioplasty-peripheral;  Surgeon: Ismael Esquivel MD;  Location: [facility] CATH/EP LAB;  Service: General;  Laterality: Left;    ANGIOGRAPHY OF LOWER EXTREMITY Left 8/6/2022     Procedure: Angiogram Extremity Unilateral;  Surgeon: Ismael Esquivel MD;  Location: [facility] CATH/EP LAB;  Service: General;  Laterality: Left;    ANGIOGRAPHY OF LOWER EXTREMITY Left 8/7/2022     Procedure: Angiogram Extremity Unilateral;  Surgeon: Ismael Esquivel MD;  Location: [facility] CATH/EP LAB;  Service: General;  Laterality: Left;    INJECTION  OF TISSUE PLASMINOGEN ACTIVATOR Left 2022     Procedure: INJECTION, TISSUE PLASMINOGEN ACTIVATOR;  Surgeon: Ismael Esquivel MD;  Location: [facility] CATH/EP LAB;  Service: General;  Laterality: Left;    INJECTION OF TISSUE PLASMINOGEN ACTIVATOR Left 2022     Procedure: INJECTION, TISSUE PLASMINOGEN ACTIVATOR;  Surgeon: Ismael Esquivel MD;  Location: [facility] CATH/EP LAB;  Service: General;  Laterality: Left;    PROSTATE SURGERY   2018    REPAIR OF ANEURYSM Left 2021     Procedure: REPAIR POPLITEAL ARTERY ANEURYSM;  Surgeon: Ismael Esquivel MD;  Location: [facility] OR;  Service: Cardiovascular;  Laterality: Left;    THROMBECTOMY   2022     Procedure: THROMBECTOMY;  Surgeon: Ismael Esquivel MD;  Location: [facility] CATH/EP LAB;  Service: General;;    THROMBECTOMY Left 2022     Procedure: THROMBECTOMY;  Surgeon: Ismael Esquivel MD;  Location: [facility] CATH/EP LAB;  Service: General;  Laterality: Left;    THYROIDECTOMY, PARTIAL   2011    TONSILLECTOMY         as a child    urolift   2019     [facility]   Hemorrhoid surgery           Family History   Problem Relation Age of Onset    Hypertension Mother      COPD Mother      Pulmonary embolism Father           Social History               Socioeconomic History    Marital status:    Tobacco Use    Smoking status: Former       Packs/day: 0.25       Years: 5.00       Pack years: 1.25       Types: Cigarettes       Quit date: 1980       Years since quittin.7       Passive exposure: Past    Smokeless tobacco: Never   Substance and Sexual Activity    Alcohol use: Not Currently       Alcohol/week: 2.0 standard drinks       Types: 2 Cans of beer per week    Drug use: Never            Review of patient's allergies indicates:  No Known Allergies     Medications Reviewed: see MAR     Focused Physical Exam         Vitals:     23 0854   BP: (!) 159/106   Pulse: 67      Body mass index is 26.45 kg/m². Weight: 88.5 kg (195 lb) Height: 6' (182.9 cm)          Abdomen: Soft, non-tender, nondistended, no CVA tenderness  :  circ normal phallus without plaques/lesions, orthotopic urethral meatus, bilaterally desc testes in normal scrotum without mass or lesion  SEGUNDO: normal sphincter tone, no masses, small ext hemmorrhoids   PROSTATE: 35-40g, no nodules, non-tender, symmetrical.         LABS:     Recent Results         Recent Results (from the past 336 hour(s))   CBC Auto Differential     Collection Time: 07/07/23  8:35 AM   Result Value Ref Range     WBC 13.2 (H) 3.8 - 10.8 Thousand/uL     RBC 4.86 4.20 - 5.80 Million/uL     Hemoglobin 15.7 13.2 - 17.1 g/dL     Hematocrit 45.3 38.5 - 50.0 %     MCV 93.2 80.0 - 100.0 fL     MCH 32.3 27.0 - 33.0 pg     MCHC 34.7 32.0 - 36.0 g/dL     RDW 13.1 11.0 - 15.0 %     Platelets 233 140 - 400 Thousand/uL     MPV 9.7 7.5 - 12.5 fL     Neutrophils, Abs 10,560 (H) 1,500 - 7,800 cells/uL     Lymph # 1,558 850 - 3,900 cells/uL     Mono # 1,043 (H) 200 - 950 cells/uL     Eos # 13 (L) 15 - 500 cells/uL     Baso # 26 0 - 200 cells/uL     Neutrophils Relative 80 %     Lymph % 11.8 %     Mono % 7.9 %     Eosinophil % 0.1 %     Basophil % 0.2 %   Comprehensive Metabolic Panel     Collection Time: 07/07/23  8:35 AM   Result Value Ref Range     Glucose 109 (H) 65 - 99 mg/dL     BUN 15 7 - 25 mg/dL     Creatinine 0.86 0.70 - 1.35 mg/dL     eGFR 96 > OR = 60 mL/min/1.73m2     BUN/Creatinine Ratio NOT APPLICABLE 6 - 22 (calc)     Sodium 138 135 - 146 mmol/L     Potassium 4.1 3.5 - 5.3 mmol/L     Chloride 106 98 - 110 mmol/L     CO2 26 20 - 32 mmol/L     Calcium 9.2 8.6 - 10.3 mg/dL     Total Protein 6.7 6.1 - 8.1 g/dL     Albumin 4.0 3.6 - 5.1 g/dL     Globulin, Total 2.7 1.9 - 3.7 g/dL (calc)     Albumin/Globulin Ratio 1.5 1.0 - 2.5 (calc)     Total Bilirubin 0.6 0.2 - 1.2 mg/dL     Alkaline Phosphatase 69 35 - 144 U/L     AST 23 10 - 35 U/L     ALT 21 9 - 46 U/L   Lipid Panel     Collection Time: 07/07/23  8:35 AM   Result Value Ref Range      Cholesterol 138 <200 mg/dL     HDL 66 > OR = 40 mg/dL     Triglycerides 37 <150 mg/dL     LDL Cholesterol 61 mg/dL (calc)     HDL/Cholesterol Ratio 2.1 <5.0 (calc)     Non HDL Chol. (LDL+VLDL) 72 <130 mg/dL (calc)   TSH     Collection Time: 07/07/23  8:35 AM   Result Value Ref Range     TSH 0.57 0.40 - 4.50 mIU/L   PSA, Screening     Collection Time: 07/07/23  8:35 AM   Result Value Ref Range     PROSTATE SPECIFIC ANTIGEN, SCR - QUEST 9.33 (H) < OR = 4.00 ng/mL   POCT Urinalysis(Instrument)     Collection Time: 07/18/23  9:02 AM   Result Value Ref Range     Color, POC UA Yellow Yellow, Straw, Colorless     Clarity, POC UA Clear Clear     Glucose, POC UA Negative Negative     Bilirubin, POC UA Negative Negative     Ketones, POC UA Negative Negative     Spec Grav POC UA 1.010 1.005 - 1.030     Blood, POC UA Small (A) Negative     pH, POC UA 6.0 5.0 - 8.0     Protein, POC UA Negative Negative     Urobilinogen, POC UA 0.2 <=1.0     Nitrite, POC UA Negative Negative     WBC, POC UA Negative Negative   POCT Bladder Scan     Collection Time: 07/18/23  9:03 AM   Result Value Ref Range     POC Residual Urine Volume 83 0 - 100 mL               Assessment/Diagnosis:     1. Elevated PSA  Ambulatory referral/consult to Urology     POCT Bladder Scan     POCT Urinalysis(Instrument)     Urinalysis Microscopic     Urine culture     Procedure Order to Urology       2. BPH with obstruction/lower urinary tract symptoms  Procedure Order to Urology             Plans:  Extensive review of medical record including recent primary care workup and referral as above and care with his other subspecialists as summarized.  As well, reviewed all outside medical records available from and provided by his prior urology office noting his BPH interventions etcetera.  No PSA was included in this, and his only PSA on file is the 1 elevated value above.  He does report normal PSA history prior to BPH intervention, and his previous PCP was Dr. Agarwal  Ranjan and will request the PSA history, however focused on his current elevation and further planning.     I had a long discussion with the patient regarding the natural history of cancer in men as well as when diagnostics are indicated. We also discussed differential for elevated psa which also includes benign enlargement and prostatitis.  We did discuss that an elevated PSA is considered a PSA greater than 4 because statistically 20% of people in this value range are found to have prostate cancer, however we also discussed a bit about PSA velocity and trends and age specific psa elevations. Given his true elevation with family history of prostate cancer and prior procedures to decrease prostate volume, I therefore recommended prostate biopsy to evaluate for underlying malignancy.  We discussed biopsy and in detail, including 1% risk of infectious complications including sepsis but that it is an otherwise safe diagnostic procedure with expected hematuria hematospermia after.      I went over the details of a transrectal ultrasound-guided biopsy of the prostate, and described the technique in detail.   The patient will be given local injection anesthetic to block the prostate so as to minimize any pain. 12-14 biopsy specimens will be taken. These will be sent for histopathology analysis.   Complications include bleeding, fever and chills. He was also instructed to watch for any signs of fever. If he does have any fever or chills after, he was advised to come to the emergency room right away for intravenous antibiotics and possible admission to the hospital. He is to refrain from any strenuous activity including sexual activity for the next 72 hours after biopsy. He was also advised that he may have blood while urinating, during bowel movements as well as during ejaculations. He was given a prebiopsy/postbiopsy instruction sheet was reminding him to avoid aspirin and blood thinners for 7 days prior, take the Rxed  antibiotics the day before, day of, day after biopsy, and perform a fleet enema at home morning of biopsy. All questions he had were answered in detail.      Prior to prostate biopsy will repeat the PSA with free and total PSA to recheck it, as well as assess free PSA percentage.  Discuss the utility of free PSA percentage in helping to risk stratify for concern for underlying disease.  Will also get MRI of the prostate.  Reviewed the utility of MRI of the prostate and workup of elevated PSA noting that a negative MRI does not rule out prostate cancer, however any clinically significant index lesion noted on MRI can be targeted at the time of biopsy to increase the sensitivity     As well, reviewed his extensive history of BPH with long-term use of Flomax until UroLift, which failed and then had laser TURP, and now has recurrent severe obstructive lower urinary tract symptoms.  His most bothersome symptom is nocturia and at this time after risk benefit discussion about restarting Flomax versus focusing on conservative recommendations, he would like to focus on conservative recommendations for urgency and frequency namely limiting bladder irritants in the afternoon and evening hours such as tea and stopping fluids 2 hours before bed, and these were provided in writing as well.  Given his severe recurrence of LUTS with this BPH intervention in the past we also discuss cystoscopy at the time of his prostate biopsy and reviewed procedure in detail and he is agreeable to proceed.  As well, his urinalysis dipstick has small blood today and he has some incomplete emptying, and we did note that true micro hematuria would mandate cystoscopy as well as upper tract evaluation.  Will send microscopic analysis of urine and if there is true micro hematuria greater than 5 red blood cells, even despite his chronic anticoagulation, would get upper tract imaging as well prior to his cystoscopy and prostate biopsy.     He does have an   TR mutation leading to hypercoagulable state with history of blood clots in his followed by Hematology of which he has appointment eight hundred twenty-eight for routine follow-up but also did discuss his recent leukocytosis.  Will schedule procedures after this appointment so we can be cleared to hold his Eliquis 3 days prior.  As well, with his repeat labs at time of MRI will go ahead and repeat a CBC so Hematology has this on file.     Cystoscopy and TRUS/bx scheduled at [facility] on 9/5/23  With Hematology clearance to hold Eliquis 3 days prior  MRI prostate with PSA free and total, CBC, and creatinine on arrival in the next 2-4 weeks  Will follow-up urine studies and add on upper tract imaging such as CT urogram if true micro hematuria    PSA Total <=4.5 ng/mL 6.8 High     PSA, Free ng/mL 0.8    Free PSA/PSA Ratio ratio 0.12        PROSTATE SIZE: The prostate measures 4.9 x 4.1 x 4.9cm corresponding to a computed volume of 53cc.  There are postoperative changes of urolift.     PERIPHERAL ZONE:     Lesion (FLYNN) #1     Location: Side: right; Region: apex; Zone: posterior peripheral zone laterally     Greatest dimension: 1.6cm     T2-WI: Noncircumscribed, moderate hypointensity--score 3     DWI/ADC: Indistinct hypointense on ADC---score 2     DCE: Negative     Extraprostatic extension: Absent     PI-RADS assessment category: 2     Lesion (FLYNN) #2     Location: Side: left; Region: apex; Zone: posterior peripheral zone laterally     Greatest dimension: 1.3cm     T2-WI: Wedge-shpaed hypointensity or diffuse mild hypointensity--score 2     DWI/ADC: Indistinct hypointense on ADC---score 2     DCE: Negative     Extraprostatic extension: Absent     PI-RADS assessment category: 2

## 2023-09-05 NOTE — PLAN OF CARE
Discharge instructions given to pt, verbalized understanding.  Tolerating fluids.  No c/o pain.  F/u 9/15 for biopsy results.  Ambulating out to self care in no distress.

## 2023-09-06 ENCOUNTER — HOSPITAL ENCOUNTER (OUTPATIENT)
Dept: CARDIOLOGY | Facility: HOSPITAL | Age: 63
Discharge: HOME | End: 2023-09-06
Attending: INTERNAL MEDICINE
Payer: MEDICARE

## 2023-09-06 VITALS
OXYGEN SATURATION: 95 % | BODY MASS INDEX: 26.37 KG/M2 | TEMPERATURE: 96 F | WEIGHT: 194.69 LBS | HEIGHT: 72 IN | DIASTOLIC BLOOD PRESSURE: 107 MMHG | SYSTOLIC BLOOD PRESSURE: 181 MMHG | HEART RATE: 66 BPM | RESPIRATION RATE: 18 BRPM

## 2023-09-06 DIAGNOSIS — I10 HYPERTENSION, UNSPECIFIED TYPE: ICD-10-CM

## 2023-09-06 PROCEDURE — 93306 TTE W/DOPPLER COMPLETE: CPT | Mod: 26,,, | Performed by: GENERAL PRACTICE

## 2023-09-06 PROCEDURE — 93306 TTE W/DOPPLER COMPLETE: CPT

## 2023-09-06 PROCEDURE — 93306 ECHO (CUPID ONLY): ICD-10-PCS | Mod: 26,,, | Performed by: GENERAL PRACTICE

## 2023-09-06 NOTE — OP NOTE
[facility] Urology Operative/Brief Discharge Note     Date: 9/5/23     Staff Surgeon: Adam Tyler MD     Pre-Op Diagnosis:   1. Elevated psa  2. BPH with LUTS     Post-Op Diagnosis:   same     Procedure(s) Performed:   1. Transrectal ultrasound guided prostate needle biopsy  2. Cystoscopy (flexible)     INDICATION FOR PROCEDURE:   64 yo with hx BPH and s/p urolift and subsequent laser vaporization in 2019 with severe persistent LUTS AUA SS 20/6 yo M with recent screening psa to 9.3, repeated as 6.8 (12% free) and MRI with 53 g gland and RA pirad3 lesion and LA pirad2 lesion.      ANESTHESIA: Local periprostatic block; 10 cc 1% lidocaine, and urojet 2% xylocaine per urethra     SEGUNDO: 40g benign      PSA: 6.8 (12% free)     TRUS VOLUME: 54.39cm3 (W 52.49mm, H 38.91mm, L 50.91mm)     EBL: Minimal     SPECIMEN: 18 core prostate biopsy - right and left base, middle, apex - medial and lateral of each, and bilateral TZ cores - with 2 cores total each at RA med/lat and 2 cores RA post PZ     ULTRASOUND FINDINGS: scattered hypoechoities and calcifications, more distinct at right apex, minimal pvr, no ABRAHAM or median lobe, no US visualized bladder neck defect     CYSTO FINDINGS:   Anterior dimpling from prior UroLift implants seen without significant tissue retraction proximally and distally.  Proximal near bladder neck, dimpling from anterior proximal implants seen with significant obstructing anterior tissue across the top 3rd of the prostatic urethra here with obstruction anterior at the bladder neck, though well-healed laser changes of the posterior bladder neck, but is only open just at the proximal portion of the prostatic urethra as if channel procedure, as there is still significant mid and distal lateral lobe obstruction, of which the dimpling from the distal anterior implants forms a shelf of tissue in otherwise obstructing tissue.  Scattered trabeculations of bladder, with no intravesical extension of median lobe      CONFIRMED PATIENT TOOK ANTIBIOTICS: Yes     CONFIRMED PATIENT NOT TAKING ASPIRIN OR ANTICOAGULANTS: Yes     CONFIRMED PATIENT USED ENEMA: Yes     PROCEDURE IN DETAIL:  After informed consent, the patient was prepped and drapped in standard  cystoscopic fashion and 2% xylocaine jelly was instilled into the urethra. 80mg gentamicin injected IM.     First, a flexible cystoscope was passed into the bladder via the urethra.   Anterior urethra normal. The prostatic urethra demonstrated findings as above with significant enlargement with obstruction.  The bladder was then systematically inspected. The ureteral orifices were  in the orthotopic position bilaterally on the trigone. The bladder mucosa, including the lateral walls, posterior wall, and dome were free of any lesions or tumors. Signs of obstruction as noted above. Flexible cystoscope then removed.      Patient voided into urinal, and was then turned to the left lateral position and TRUS probe inserted into rectum. Ultrasound measurements taken as above and ultrasound of prostate performed with findings as above. Noted to empty bladder well based on US. Approximately 10cc of 1% lidocaine injected bilaterally in heath-prostatic block fashion, as well as at the apex.   12 total core biopies taken were taken in a sextant fashion.  Additional cores to target prior area of disease as noted. Patient tolerated well without complication.     CONDITION: Stable     DISHARGE:  Status post uncomplicated outpatient procedure as above.   Disposition: Home.  He will follow up in 2 weeks for biopsy results.   Resume regular diet  FU 2 weeks for biopsy results and further management discussion  Return to ER if temp >101, uncontrollable urethral or rectal bleeding, or inability to urinate/urinary retention  No sex/ejaculation x3 days, no riding mowers/tractors/bikes x2-4 weeks  Drink plenty of water may see blood in urine

## 2023-09-15 ENCOUNTER — OFFICE VISIT (OUTPATIENT)
Dept: UROLOGY | Facility: CLINIC | Age: 63
End: 2023-09-15
Payer: MEDICARE

## 2023-09-15 VITALS
HEIGHT: 72 IN | HEART RATE: 61 BPM | DIASTOLIC BLOOD PRESSURE: 84 MMHG | WEIGHT: 194 LBS | BODY MASS INDEX: 26.28 KG/M2 | SYSTOLIC BLOOD PRESSURE: 141 MMHG

## 2023-09-15 DIAGNOSIS — N13.8 BPH WITH OBSTRUCTION/LOWER URINARY TRACT SYMPTOMS: Primary | ICD-10-CM

## 2023-09-15 DIAGNOSIS — N42.32 ATYPICAL SMALL ACINAR PROLIFERATION OF PROSTATE: ICD-10-CM

## 2023-09-15 DIAGNOSIS — N40.1 BPH WITH OBSTRUCTION/LOWER URINARY TRACT SYMPTOMS: Primary | ICD-10-CM

## 2023-09-15 LAB
AORTIC ROOT ANNULUS: 4.2 CM
AORTIC VALVE CUSP SEPERATION: 2 CM
AV INDEX (PROSTH): 0.89
AV MEAN GRADIENT: 2 MMHG
AV PEAK GRADIENT: 4 MMHG
AV VALVE AREA BY VELOCITY RATIO: 4.16 CM²
AV VALVE AREA: 4.03 CM²
AV VELOCITY RATIO: 0.92
CV ECHO LV RWT: 0.56 CM
DOP CALC AO PEAK VEL: 1.01 M/S
DOP CALC AO VTI: 22.2 CM
DOP CALC LVOT AREA: 4.5 CM2
DOP CALC LVOT DIAMETER: 2.4 CM
DOP CALC LVOT PEAK VEL: 0.93 M/S
DOP CALC LVOT STROKE VOLUME: 89.53 CM3
DOP CALCLVOT PEAK VEL VTI: 19.8 CM
E WAVE DECELERATION TIME: 290 MSEC
E/A RATIO: 0.93
E/E' RATIO: 6 M/S
ECHO LV POSTERIOR WALL: 1.12 CM (ref 0.6–1.1)
FRACTIONAL SHORTENING: 34 % (ref 28–44)
INTERVENTRICULAR SEPTUM: 1.36 CM (ref 0.6–1.1)
IVRT: 90 MSEC
LEFT ATRIUM SIZE: 3.9 CM
LEFT INTERNAL DIMENSION IN SYSTOLE: 2.66 CM (ref 2.1–4)
LEFT VENTRICLE DIASTOLIC VOLUME: 70.8 ML
LEFT VENTRICLE SYSTOLIC VOLUME: 26 ML
LEFT VENTRICULAR INTERNAL DIMENSION IN DIASTOLE: 4.02 CM (ref 3.5–6)
LEFT VENTRICULAR MASS: 175.04 G
LV LATERAL E/E' RATIO: 4.91 M/S
LV SEPTAL E/E' RATIO: 7.71 M/S
LVOT MG: 2 MMHG
LVOT MV: 0.6 CM/S
MV PEAK A VEL: 0.58 M/S
MV PEAK E VEL: 0.54 M/S
MV STENOSIS PRESSURE HALF TIME: 68 MS
MV VALVE AREA P 1/2 METHOD: 3.24 CM2
PISA TR MAX VEL: 1.95 M/S
RA PRESSURE ESTIMATED: 3 MMHG
RIGHT VENTRICULAR END-DIASTOLIC DIMENSION: 2.46 CM
RV TB RVSP: 5 MMHG
TDI LATERAL: 0.11 M/S
TDI SEPTAL: 0.07 M/S
TDI: 0.09 M/S
TR MAX PG: 15 MMHG
TV REST PULMONARY ARTERY PRESSURE: 18 MMHG

## 2023-09-15 PROCEDURE — 1157F ADVNC CARE PLAN IN RCRD: CPT | Mod: CPTII,S$GLB,, | Performed by: UROLOGY

## 2023-09-15 PROCEDURE — 1159F MED LIST DOCD IN RCRD: CPT | Mod: CPTII,S$GLB,, | Performed by: UROLOGY

## 2023-09-15 PROCEDURE — 3077F SYST BP >= 140 MM HG: CPT | Mod: CPTII,S$GLB,, | Performed by: UROLOGY

## 2023-09-15 PROCEDURE — 3077F PR MOST RECENT SYSTOLIC BLOOD PRESSURE >= 140 MM HG: ICD-10-PCS | Mod: CPTII,S$GLB,, | Performed by: UROLOGY

## 2023-09-15 PROCEDURE — 3008F BODY MASS INDEX DOCD: CPT | Mod: CPTII,S$GLB,, | Performed by: UROLOGY

## 2023-09-15 PROCEDURE — 4010F ACE/ARB THERAPY RXD/TAKEN: CPT | Mod: CPTII,S$GLB,, | Performed by: UROLOGY

## 2023-09-15 PROCEDURE — 3079F DIAST BP 80-89 MM HG: CPT | Mod: CPTII,S$GLB,, | Performed by: UROLOGY

## 2023-09-15 PROCEDURE — 1160F PR REVIEW ALL MEDS BY PRESCRIBER/CLIN PHARMACIST DOCUMENTED: ICD-10-PCS | Mod: CPTII,S$GLB,, | Performed by: UROLOGY

## 2023-09-15 PROCEDURE — 3008F PR BODY MASS INDEX (BMI) DOCUMENTED: ICD-10-PCS | Mod: CPTII,S$GLB,, | Performed by: UROLOGY

## 2023-09-15 PROCEDURE — 1159F PR MEDICATION LIST DOCUMENTED IN MEDICAL RECORD: ICD-10-PCS | Mod: CPTII,S$GLB,, | Performed by: UROLOGY

## 2023-09-15 PROCEDURE — 3288F FALL RISK ASSESSMENT DOCD: CPT | Mod: CPTII,S$GLB,, | Performed by: UROLOGY

## 2023-09-15 PROCEDURE — 3079F PR MOST RECENT DIASTOLIC BLOOD PRESSURE 80-89 MM HG: ICD-10-PCS | Mod: CPTII,S$GLB,, | Performed by: UROLOGY

## 2023-09-15 PROCEDURE — 1160F RVW MEDS BY RX/DR IN RCRD: CPT | Mod: CPTII,S$GLB,, | Performed by: UROLOGY

## 2023-09-15 PROCEDURE — 1157F PR ADVANCE CARE PLAN OR EQUIV PRESENT IN MEDICAL RECORD: ICD-10-PCS | Mod: CPTII,S$GLB,, | Performed by: UROLOGY

## 2023-09-15 PROCEDURE — 99215 OFFICE O/P EST HI 40 MIN: CPT | Mod: S$GLB,,, | Performed by: UROLOGY

## 2023-09-15 PROCEDURE — 3288F PR FALLS RISK ASSESSMENT DOCUMENTED: ICD-10-PCS | Mod: CPTII,S$GLB,, | Performed by: UROLOGY

## 2023-09-15 PROCEDURE — 99215 PR OFFICE/OUTPT VISIT, EST, LEVL V, 40-54 MIN: ICD-10-PCS | Mod: S$GLB,,, | Performed by: UROLOGY

## 2023-09-15 PROCEDURE — 1101F PT FALLS ASSESS-DOCD LE1/YR: CPT | Mod: CPTII,S$GLB,, | Performed by: UROLOGY

## 2023-09-15 PROCEDURE — 4010F PR ACE/ARB THEARPY RXD/TAKEN: ICD-10-PCS | Mod: CPTII,S$GLB,, | Performed by: UROLOGY

## 2023-09-15 PROCEDURE — 99999 PR PBB SHADOW E&M-EST. PATIENT-LVL III: CPT | Mod: PBBFAC,,, | Performed by: UROLOGY

## 2023-09-15 PROCEDURE — 1101F PR PT FALLS ASSESS DOC 0-1 FALLS W/OUT INJ PAST YR: ICD-10-PCS | Mod: CPTII,S$GLB,, | Performed by: UROLOGY

## 2023-09-15 PROCEDURE — 99999 PR PBB SHADOW E&M-EST. PATIENT-LVL III: ICD-10-PCS | Mod: PBBFAC,,, | Performed by: UROLOGY

## 2023-09-15 RX ORDER — TAMSULOSIN HYDROCHLORIDE 0.4 MG/1
0.4 CAPSULE ORAL NIGHTLY
Qty: 30 CAPSULE | Refills: 11 | Status: SHIPPED | OUTPATIENT
Start: 2023-09-15 | End: 2023-10-16

## 2023-09-15 NOTE — PROGRESS NOTES
Procedure Order to Urology [[id number]]    Electronically signed by: Adam Tyler MD on 09/15/23 1033 Status: Active   Ordering user: Adam Tyler MD 09/15/23 1033 Authorized by: Adam Tyler MD   Ordering mode: Standard   Frequency:  09/15/23 -     Diagnoses   BPH with obstruction/lower urinary tract symptoms [N40.1, N13.8]   Questionnaire    Question Answer   Procedure TURP Comment - 11/16/no ekg    Facility Name: [facility]     Please order,outpatient hospital,  CBC, BMP A/A and culture , Ekg if over 40 , IV start, NPO , General anesthesia, Ancef 2 grams, ( alternative for pcn allergy is Cipro 400mg IV ) [id number]

## 2023-09-15 NOTE — PROGRESS NOTES
[facility] Urology Progress Note    Helder Brand is a 63 y.o. 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   HTN/CAD/aortic ectasia and recently est care with Dr Bundy 5/30/23 with negative stress in 2021 and recent neg CT PE protocol  He est primary care with Maxi BOOGIE on 5/29/23 noting   He is experiencing some nocturia. 3-4 times at night.  This is his main concern.  Keeping him from sleeping well at night.  Reports colonoscopy completed 5 years ago at age 58.  Started on flomax, and psa ordered with screening labs  Labs 7/7/23 included PSA of 9.33 (as well as Cr 0.86, eGFR 96, WBC 13.2)  - advised to see urology about elevated psa and recheck labs in a few months before seeing hemonc re his leukocytosis (Calabresi 8/28/23)     He was previously followed by Dr Montague: 11/7/2019 [place] Laser vap with Dr Montague  Was following with Dr. Montague for quite some time for his BPH/LUTS and did have UroLift approximally 5 years ago, which did not work and was followed by laser vap as above.  He reports a family history of prostate cancer in his father, diagnosed in his 50s and treated with prostatectomy at that time.  AUA symptom score:  20/5 (4: Urgency, straining; 3: Emptying, nocturia; 2: Frequency, intermittency, weak stream). NTF most bothersome. Similar to preop  Does drink tea at night/before bed.  PVR 83 cc by bladder scan.  Urinalysis with small blood.  No hx UTI or prostatitis. No dysuria, hematuria, denies perineal, perirectal, testicular ejaculatory pain.   Wife passed away 7 yrs ago and not sexually active. Quit smoking 40 yrs ago.   Was on Flomax prior to BPH intervention.  Has not restarted any meds. Doesn't  snore to his  knowledge     On record review from Dr. Montague, in May of 2019, after his UroLift, still complained of urinary urgency and had a uroflow at that time voiding 600 cc with a peak flow of only 12.6 cc/second and an average flow of only 4 cc/second with a PVR of 23 cc after which cystoscopy was discussed and laser vap of prostate was considered, which ultimately happened as above.  AUA symptom score 17 after UroLift. UroLift was on 3/27/19, with a total of 5 implants.  There was additional obstructing tissue on the patient's right for with 5th implant was placed here.  Last visit with Dr. Montague was 12/20/19 approximally 1 month after laser vap noting urinary problem was improving.  Doing well, no leakage, happy with stream.     He reports never felt relief after laser vap  Takes his eliqus 5mg only once daily in evening. Had discussed 2.5 bid but elected just take 1    Recent screening psa to 9.3, repeated as 6.8 (12% free) and MRI with 53 g gland and RA pirad3 lesion and LA pirad2 lesion.   Cysto/Prostate biopsy 9/5/23:  SEGUNDO: 40g benign; PSA: 6.8 (12% free); TRUS VOLUME: 54.39cm3 (W 52.49mm, H 38.91mm, L 50.91mm)  SPECIMEN: 18 core prostate biopsy - right and left base, middle, apex - medial and lateral of each, and bilateral TZ cores - with 2 cores total each at RA med/lat and 2 cores RA post PZ  CYSTO: Anterior dimpling from prior UroLift implants seen without significant tissue retraction proximally and distally.  Proximal near bladder neck, dimpling from anterior proximal implants seen with significant obstructing anterior tissue across the top 3rd of the prostatic urethra here with obstruction anterior at the bladder neck, though well-healed laser changes of the posterior bladder neck, but is only open just at the proximal portion of the prostatic urethra as if channel procedure, as there is still significant mid and distal lateral lobe obstruction, of which the dimpling from the distal anterior implants forms  a shelf of tissue in otherwise obstructing tissue.  Scattered trabeculations of bladder, with no intravesical extension of median lobe  PATH: ASAP left base lateral, focal chronic prostatitis LTZ, and focal chronic inflammation RA post PZ    He returns today noting  Did well after.Still some spotting bleeding. On his bloodthinners again  Has colonoscopy pending - sees Dr Call later this month to plan  AUA symptom score 22/4 (4: Emptying; 3: all other symptoms)  Not taking any Flomax.  Never restarted it after initial conversation.  Took it a long time prior to initial intervention and did not perceive benefit.  Sees Dr Bundy, cardiology again on 10/30 and had recent ekg  Does report prior biopsy negative as well before initial bph intervention    Focused Physical Exam:    Vitals:    09/15/23 0958   BP: (!) 141/84   Pulse: 61     Body mass index is 26.31 kg/m². Weight: 88 kg (194 lb) Height: 6' (182.9 cm)     Abdomen: Soft, non-tender, nondistended, no CVA tenderness    Recent Results (from the past 336 hour(s))   POCT Urinalysis (Instrument)    Collection Time: 09/05/23 11:54 AM   Result Value Ref Range    Color, POC UA Yellow Yellow, Straw, Colorless    Clarity, POC UA Clear Clear    Glucose, POC UA Negative Negative    Bilirubin, POC UA Negative Negative    Ketones, POC UA Negative Negative    Spec Grav POC UA 1.020 1.005 - 1.030    Blood, POC UA Small (A) Negative    pH, POC UA 6.0 5.0 - 8.0    Protein, POC UA Negative Negative    Urobilinogen, POC UA 0.2 <=1.0    Nitrite, POC UA Negative Negative    WBC, POC UA Negative Negative   Echo Saline Bubble? No    Collection Time: 09/06/23 11:02 AM   Result Value Ref Range    LVOT stroke volume 89.53 cm3    LVIDd 4.02 3.5 - 6.0 cm    LV Systolic Volume 26.00 mL    LVIDs 2.66 2.1 - 4.0 cm    LV Diastolic Volume 70.80 mL    IVS 1.36 (A) 0.6 - 1.1 cm    LVOT diameter 2.40 cm    LVOT area 4.5 cm2    FS 34 28 - 44 %    Left Ventricle Relative Wall Thickness 0.56 cm     Posterior Wall 1.12 (A) 0.6 - 1.1 cm    LV mass 175.04 g    MV Peak E Santos 0.54 m/s    TDI LATERAL 0.11 m/s    TDI SEPTAL 0.07 m/s    E/E' ratio 6.00 m/s    MV Peak A Santos 0.58 m/s    TR Max Santos 1.95 m/s    E/A ratio 0.93     IVRT 90.00 msec    E wave deceleration time 290.00 msec    LV SEPTAL E/E' RATIO 7.71 m/s    LV LATERAL E/E' RATIO 4.91 m/s    LVOT peak santos 0.93 m/s    Left Ventricular Outflow Tract Mean Velocity 0.60 cm/s    Left Ventricular Outflow Tract Mean Gradient 2.00 mmHg    LA size 3.90 cm    RVDD 2.46 cm    AV mean gradient 2 mmHg    AV peak gradient 4 mmHg    Ao peak santos 1.01 m/s    Ao VTI 22.20 cm    LVOT peak VTI 19.80 cm    AV valve area 4.03 cm²    AV Velocity Ratio 0.92     AV index (prosthetic) 0.89     MICHELLE by Velocity Ratio 4.16 cm²    MV stenosis pressure 1/2 time 68.00 ms    MV valve area p 1/2 method 3.24 cm2    Triscuspid Valve Regurgitation Peak Gradient 15 mmHg    Ao root annulus 4.20 cm    Mean e' 0.09 m/s    AORTIC VALVE CUSP SEPERATION 2.00 cm       ASSESSMENT   1. BPH with obstruction/lower urinary tract symptoms  Procedure Order to Urology      2. Atypical small acinar proliferation of prostate            Plan    Reviewed his pathology noting still only 1 core of atypical small acinar proliferation, and a few other cores of chronic inflammation and chronic prostatitis, especially targeting the areas of concern on MRI and everywhere 2 cores were taken these chronic inflammation changes were found.  We did discuss that asap can yield a future finding of up to 40% of prostate cancer in men with this isolated pathologic finding and recommendations are to continue to follow PSA every 6 months and rebiopsy in 6-12 months.  He has however had 2- biopsies and an equivocal MRI, and has severe obstructive lower urinary tract symptoms with obstructing prostate despite 2 previous minimally invasive BPH interventions and we did discuss our focus right now on relieving his obstruction.  Would not  preclude any future diagnostics or management of prostate cancer if diagnosed, and again reviewed the natural history of prostate cancer with aging.  He is more at risk from the complications and symptoms of his prostate obstruction then of his elevated PSA and mild atypia at this time.  We did review that with BPH interventions such as Transurethral resection of the prostate which we did discuss, with relief of prostate volume, PSA would drop to a new baseline which we can continue to follow and monitor in the future, and with 2- biopsies, and equivocal MRI, would not necessarily need rebiopsy at 1 year, but can follow PSA trends and re-evaluate if PSA was rising in the future.      The focus was therefore on management of his BPH/LUTS component.  LUTS are severe, and previously did not respond to medical management and therefore underwent to minimally invasive BPH interventions but still has significant lateral lobe obstructing prostate tissue, as well as moderate anterior obstructing tissue coming down from the anterior channel obstructing proximally and distally, and given this anatomic consideration as well as his 2 failed prior minimally invasive interventions, recommended definitive management with Transurethral resection of the prostate.     Reviewed Transurethral resection of prostate today with moderate bipolar energy source in the ability to not only resect, but also vaporized with button electrode, done as an outpatient procedure and reviewed procedure in detail and all risks and benefits. Procedure in detail and all risks and benefits (including but not limited to bleeding, stricture, ed, incontinence) described, as well as transient increases in urgency frequency, as well as postoperative Howell catheterization length and timeframe for symptomatic resolution.  We did discuss that with his prior UroLift implants, this is no problem for Transurethral resection, and any urethral end plate that are unroof  will be removed at that time.  All questions answered and appropriate informed consent obtained.  TURP planned in operating room on 11/16/23 per patient request, as he does have upcoming visit to discuss and plan colonoscopy which should be done prior, as well as upcoming cardiology follow-up in late October.  He did have recent cardiac testing, so I will CC Dr Bundy, to review and make cardiac clearance part of his visit on 10/30/23 and review updated cardiac testing to advise if any further testing is needed prior to this visit so he can be cleared to proceed on time.    In the interim I did make distinct recommendation to restart Flomax 0.4 mg nightly and take it up until 1 week after the procedure to help bridge the symptomatic gap.  Prescription sent to pharmacy.    Total time spent in/on encounter today, including face to face time with patient, counseling, medical record review, interpretation of tests/results, , and treatment plan coordination: 45 minutes

## 2023-09-15 NOTE — Clinical Note
ELKE planned in operating room on 11/16/23 per patient request, as he does have upcoming visit to discuss and plan colonoscopy which should be done prior, as well as upcoming cardiology follow-up in late October.  He did have recent cardiac testing, so I will CC Dr Bundy, to review and make cardiac clearance part of his visit on 10/30/23 and review updated cardiac testing to advise if any further testing is needed prior to this visit so he can be cleared to proceed on time.

## 2023-09-21 ENCOUNTER — OFFICE VISIT (OUTPATIENT)
Dept: OPTOMETRY | Facility: CLINIC | Age: 63
End: 2023-09-21
Payer: MEDICARE

## 2023-09-21 DIAGNOSIS — G51.4 EYELID MYOKYMIA: ICD-10-CM

## 2023-09-21 DIAGNOSIS — Z97.3 WEARS CONTACT LENSES: Primary | ICD-10-CM

## 2023-09-21 PROCEDURE — 99499 UNLISTED E&M SERVICE: CPT | Mod: S$GLB,,, | Performed by: OPTOMETRIST

## 2023-09-21 PROCEDURE — 99499 NO LOS: ICD-10-PCS | Mod: S$GLB,,, | Performed by: OPTOMETRIST

## 2023-09-21 NOTE — PROGRESS NOTES
HPI    Pt here for CL f/u. Pt has inserted trial lens ordered. States feels   comfortable. OS vision is not as clear. States feels like he is straining   to see distance.   Last edited by Justine Unger on 9/21/2023 10:46 AM.            Assessment /Plan     For exam results, see Encounter Report.    Wears contact lenses    Eyelid myokymia      1. Wears contact lenses  Good initial clfu -- reports good comfort, still adjusting to vision  Dispensed CLs trials for distance only: total30 for astigmatism. Daily wear only, dispose of monthly.   Discussed proper hand hygiene and wear/care of lenses. Do not sleep/swim/shower in lenses.   Discontinue CL wear ASAP and RTC if any redness or discomfort occurs.   Return in 1-2 weeks for clfu, ok to finalize if happy    2. Eyelid myokymia  Discussed possible hemifacial spam -- pt defers further testing at this time

## 2023-09-26 ENCOUNTER — TELEPHONE (OUTPATIENT)
Dept: OPTOMETRY | Facility: CLINIC | Age: 63
End: 2023-09-26
Payer: MEDICARE

## 2023-09-26 NOTE — TELEPHONE ENCOUNTER
----- Message from Monica Strong sent at 9/26/2023 10:31 AM CDT -----  Contact: Patient  Type: Needs Medical Advice    Who Called:  Patient  What is this regarding?:  He ordered some contacts and he is having trouble with them.  Best Call Back Number:  [phone]  Additional Information:  Please call the patient back at the phone number listed above to advise. Thank you!

## 2023-09-26 NOTE — TELEPHONE ENCOUNTER
Spoke to patient having trouble with inserting and taking out lenses. Lost OD lens will reorder and do I&R at next visit on 10/6.

## 2023-09-27 ENCOUNTER — TELEPHONE (OUTPATIENT)
Dept: UROLOGY | Facility: CLINIC | Age: 63
End: 2023-09-27
Payer: MEDICARE

## 2023-09-27 NOTE — TELEPHONE ENCOUNTER
----- Message from Char Rangel sent at 9/27/2023  1:20 PM CDT -----  Contact: pt  Type: Needs Medical Advice  Who Called:  pt  Best Call Back Number: [phone]      Additional Information:     Pt is calling the office had procedure to be done 11/16 wants to know if procedure can be pushed to earlier date.Please call back and advise.

## 2023-09-27 NOTE — TELEPHONE ENCOUNTER
Spoke w pt he called to inquire if there is sooner date for his turp as he has decided does not want to wait until 11/2023.  Pt informed will call back if sooner date available

## 2023-09-28 NOTE — TELEPHONE ENCOUNTER
Pt offered sooner and elected to wait. Booked 11/16 per his request  Nothing sooner at this time  Can keep on cancellation list to call if avail, and otherwise proceed as planned

## 2023-09-29 NOTE — TELEPHONE ENCOUNTER
Spoke w pt he agreed but has a colonoscopy scheduled for 10/20  Is this ok to proceed -pt informed will need a 4 wk turn around between the 2 procedures.  Pt agreed to keep scheduled surgery

## 2023-10-02 ENCOUNTER — TELEPHONE (OUTPATIENT)
Dept: UROLOGY | Facility: CLINIC | Age: 63
End: 2023-10-02
Payer: MEDICARE

## 2023-10-02 NOTE — TELEPHONE ENCOUNTER
Spoke w pt called voicing he is reconsidered for 10/12  Pt was informed no date no longer available   Pt was informed to perform colonoscopy first as scheduled   Pt vu

## 2023-10-02 NOTE — TELEPHONE ENCOUNTER
----- Message from Natalia Riley sent at 10/2/2023  8:13 AM CDT -----  Contact: pt  Type:  Needs Medical Advice    Who Called: pt  Would the patient rather a call back or a response via [facility]? call  Best Call Back Number: [phone]  Additional Information: Pt states that he need a callback as soon as possible. States that he wants to know if the 10/12 date for his procedure is still available. States that he is able to move the appt up. Please advise thank you

## 2023-10-04 ENCOUNTER — TELEPHONE (OUTPATIENT)
Dept: OPTOMETRY | Facility: CLINIC | Age: 63
End: 2023-10-04
Payer: MEDICARE

## 2023-10-04 NOTE — TELEPHONE ENCOUNTER
----- Message from Char Rangel sent at 10/4/2023  1:52 PM CDT -----  Contact: pt  Type: Needs Medical Advice  Who Called:  pt  Best Call Back Number: [phone]    Additional Information: Pt is calling the office inquiring that is the right contact lens for his appt Friday.Please call back and advise.

## 2023-10-06 ENCOUNTER — OFFICE VISIT (OUTPATIENT)
Dept: OPTOMETRY | Facility: CLINIC | Age: 63
End: 2023-10-06
Payer: MEDICARE

## 2023-10-06 DIAGNOSIS — Z97.3 WEARS CONTACT LENSES: Primary | ICD-10-CM

## 2023-10-06 PROCEDURE — 99499 UNLISTED E&M SERVICE: CPT | Mod: S$GLB,,, | Performed by: OPTOMETRIST

## 2023-10-06 PROCEDURE — 99499 NO LOS: ICD-10-PCS | Mod: S$GLB,,, | Performed by: OPTOMETRIST

## 2023-10-06 NOTE — PROGRESS NOTES
HPI    Pt here today for cls follow up.   Had pt insert trial cls.    States good   fit & comfort physically but OS vision is slightly blurred.     Will need I&R due to has trouble getting contacts out and has ripped   previous trials.  Last edited by Brayden Castro, OD on 10/6/2023  9:59 AM.            Assessment /Plan     For exam results, see Encounter Report.    Wears contact lenses      Pt happy with comfort and vision OU  Successfully completed I&R in office w/o complication  Dispensed CLs Rx: Total30 for Astigmatism. Daily wear only, dispose of monthly.   Discussed proper hand hygiene and wear/care of lenses. Do not sleep/swim/shower in lenses.   Discontinue CL wear ASAP and RTC if any redness or discomfort occurs.

## 2023-10-16 ENCOUNTER — HOSPITAL ENCOUNTER (OUTPATIENT)
Dept: PREADMISSION TESTING | Facility: HOSPITAL | Age: 63
Discharge: HOME | End: 2023-10-16
Attending: INTERNAL MEDICINE
Payer: MEDICARE

## 2023-10-16 VITALS
WEIGHT: 194 LBS | HEART RATE: 60 BPM | SYSTOLIC BLOOD PRESSURE: 134 MMHG | RESPIRATION RATE: 18 BRPM | BODY MASS INDEX: 26.28 KG/M2 | DIASTOLIC BLOOD PRESSURE: 91 MMHG | OXYGEN SATURATION: 97 % | HEIGHT: 72 IN | TEMPERATURE: 98 F

## 2023-10-16 DIAGNOSIS — Z01.818 PREOP TESTING: Primary | ICD-10-CM

## 2023-10-16 NOTE — DISCHARGE INSTRUCTIONS
To confirm, Your doctor has instructed you that surgery is scheduled for: Friday 10/20/23    Endoscopy  will call the afternoon prior to surgery with the final arrival time.  Thursday     Please report to Outpatient Registration the morning of surgery.     Do not eat or drink anything after midnight the night before your surgery - THIS INCLUDES  WATER, GUM, MINTS AND CANDY. Follow the preop given by the Doctor    YOU MAY BRUSH YOUR TEETH BUT DO NOT SWALLOW     TAKE ONLY THESE MEDICATIONS WITH A SMALL SIP OF WATER THE MORNING OF YOUR PROCEDURE: see medication list    PLEASE NOTE:  The surgery schedule has many variables which may affect the time of your surgery case.  Family members should be available if your surgery time changes.  Plan to be here the day of your procedure between 2-3 hours.    DO NOT TAKE THESE MEDICATIONS 5-7 DAYS PRIOR to your procedure or per your surgeon's request: ASPIRIN, ALEVE, ADVIL, IBUPROFEN,  THERESA SELTZER, BC , FISH OIL , VITAMIN E, HERBALS  (May take Tylenol)    ONLY if you are prescribed any types of blood thinners such as:  Aspirin, Coumadin, Plavix, Pradaxa, Xarelto, Aggrenox, Effient, Eliquis, Savasya, Brilinta, or any other, ask your surgeon whether you should stop taking them and how long before surgery you should stop.  You may also need to verify with the prescribing physician if it is ok to stop your medication.                                                       IMPORTANT INSTRUCTIONS    Do not smoke, vape or drink alcoholic beverages 24 hours prior to your procedure.  Shower the night before with  Dial antibacterial soap from the neck down.   You may use your own shampoo and face wash. This helps your skin to be as bacteria free as possible.    If you wear contact lenses, dentures, hearing aids or glasses, bring a container to put them in during surgery and give to a family member for safe keeping.    Please leave all jewelry, piercing's and valuables at home.   ONLY if you  wear home oxygen please bring your portable oxygen tank the day of your procedure.   ONLY for patients requiring bowel prep, written instructions will be given by your doctor's office.  Make arrangements in advance for transportation home by a responsible adult.  You must make arrangements for transportation, TAXI'S, UBER'S OR LYFTS ARE NOT ALLOWED.        If you have any questions about these instructions, call Pre-Op Admit  Nursing at [phone] or the Endoscopy Department at [phone]

## 2023-10-20 ENCOUNTER — ANESTHESIA (OUTPATIENT)
Dept: SURGERY | Facility: HOSPITAL | Age: 63
End: 2023-10-20
Payer: MEDICARE

## 2023-10-20 ENCOUNTER — HOSPITAL ENCOUNTER (OUTPATIENT)
Facility: HOSPITAL | Age: 63
Discharge: HOME | End: 2023-10-20
Attending: INTERNAL MEDICINE | Admitting: INTERNAL MEDICINE
Payer: MEDICARE

## 2023-10-20 ENCOUNTER — ANESTHESIA EVENT (OUTPATIENT)
Dept: SURGERY | Facility: HOSPITAL | Age: 63
End: 2023-10-20
Payer: MEDICARE

## 2023-10-20 VITALS
OXYGEN SATURATION: 94 % | SYSTOLIC BLOOD PRESSURE: 118 MMHG | RESPIRATION RATE: 14 BRPM | BODY MASS INDEX: 26.28 KG/M2 | DIASTOLIC BLOOD PRESSURE: 76 MMHG | HEIGHT: 72 IN | HEART RATE: 85 BPM | WEIGHT: 194 LBS | TEMPERATURE: 100 F

## 2023-10-20 DIAGNOSIS — Z86.010 PERSONAL HISTORY OF COLONIC POLYPS: ICD-10-CM

## 2023-10-20 PROCEDURE — D9220A PRA ANESTHESIA: Mod: PT,CRNA,, | Performed by: NURSE ANESTHETIST, CERTIFIED REGISTERED

## 2023-10-20 PROCEDURE — 63600175 PHARM REV CODE 636 W HCPCS: Performed by: NURSE ANESTHETIST, CERTIFIED REGISTERED

## 2023-10-20 PROCEDURE — 25000003 PHARM REV CODE 250: Performed by: INTERNAL MEDICINE

## 2023-10-20 PROCEDURE — D9220A PRA ANESTHESIA: ICD-10-PCS | Mod: PT,ANES,, | Performed by: ANESTHESIOLOGY

## 2023-10-20 PROCEDURE — 27201114 HC TRAP (ANY): Performed by: INTERNAL MEDICINE

## 2023-10-20 PROCEDURE — 88305 TISSUE EXAM BY PATHOLOGIST: CPT | Mod: TC,59 | Performed by: PATHOLOGY

## 2023-10-20 PROCEDURE — D9220A PRA ANESTHESIA: Mod: PT,ANES,, | Performed by: ANESTHESIOLOGY

## 2023-10-20 PROCEDURE — 45385 COLONOSCOPY W/LESION REMOVAL: CPT | Performed by: INTERNAL MEDICINE

## 2023-10-20 PROCEDURE — 37000009 HC ANESTHESIA EA ADD 15 MINS: Performed by: INTERNAL MEDICINE

## 2023-10-20 PROCEDURE — D9220A PRA ANESTHESIA: ICD-10-PCS | Mod: PT,CRNA,, | Performed by: NURSE ANESTHETIST, CERTIFIED REGISTERED

## 2023-10-20 PROCEDURE — 37000008 HC ANESTHESIA 1ST 15 MINUTES: Performed by: INTERNAL MEDICINE

## 2023-10-20 PROCEDURE — 27201089 HC SNARE, DISP (ANY): Performed by: INTERNAL MEDICINE

## 2023-10-20 RX ORDER — PROPOFOL 10 MG/ML
VIAL (ML) INTRAVENOUS
Status: DISCONTINUED | OUTPATIENT
Start: 2023-10-20 | End: 2023-10-20

## 2023-10-20 RX ADMIN — SODIUM CHLORIDE, SODIUM LACTATE, POTASSIUM CHLORIDE, AND CALCIUM CHLORIDE: .6; .31; .03; .02 INJECTION, SOLUTION INTRAVENOUS at 10:10

## 2023-10-20 RX ADMIN — PROPOFOL 100 MG: 10 INJECTION, EMULSION INTRAVENOUS at 10:10

## 2023-10-20 RX ADMIN — PROPOFOL 50 MG: 10 INJECTION, EMULSION INTRAVENOUS at 10:10

## 2023-10-20 RX ADMIN — PROPOFOL 50 MG: 10 INJECTION, EMULSION INTRAVENOUS at 11:10

## 2023-10-20 NOTE — ANESTHESIA POSTPROCEDURE EVALUATION
Anesthesia Post Evaluation    Patient: Helder Brand    Procedure(s) Performed: Procedure(s) (LRB):  COLONOSCOPY (N/A)    Final Anesthesia Type: general      Patient location during evaluation: GI PACU  Patient participation: Yes- Able to Participate  Level of consciousness: awake and alert and oriented  Post-procedure vital signs: reviewed and stable  Pain management: adequate  Airway patency: patent    PONV status at discharge: No PONV  Anesthetic complications: no      Cardiovascular status: blood pressure returned to baseline  Respiratory status: unassisted, spontaneous ventilation and room air  Hydration status: euvolemic  Follow-up not needed.          Vitals Value Taken Time   /64 10/20/23 1120   Temp 37.5  10/20/23 1122   Pulse 84 10/20/23 1123   Resp 14 10/20/23 1122   SpO2 96 % 10/20/23 1123   Vitals shown include unvalidated device data.      No case tracking events are documented in the log.      Pain/Diego Score: Diego Score: 9 (10/20/2023 11:24 AM)

## 2023-10-20 NOTE — H&P
GASTROENTEROLOGY PRE-PROCEDURE H&P NOTE  Patient Name: Helder Brand  Patient MRN: [id number]  Patient : 1960    Service date: 10/20/2023    PCP: Riley Atkinson PA-C    No chief complaint on file.      HPI: Patient is a 63 y.o. male with PMHx as below here for evaluation of personal history of colon polyps who is brought in today for colonoscopy.     Past Medical History:  Past Medical History:   Diagnosis Date    Blood clot in vein     left leg    Emphysema lung     Enlarged prostate     Heterozygous MTHFR mutation C677T 2023    Hyperlipidemia     Hypertension     Kidney stone 2001    x3    Kidney stones     x3    Thyroid disease     benign growth, partial thyroidectomy        Past Surgical History:  Past Surgical History:   Procedure Laterality Date    ANGIOGRAPHY OF LOWER EXTREMITY Left 2022    Procedure: Angiogram Extremity Unilateral;  Surgeon: Ali Khoobehi, MD;  Location: [facility] CATH/EP LAB;  Service: Peripheral Vascular;  Laterality: Left;    ANGIOGRAPHY OF LOWER EXTREMITY Left 2022    Procedure: Angioplasty-peripheral;  Surgeon: Ismael Esquivel MD;  Location: [facility] CATH/EP LAB;  Service: General;  Laterality: Left;    ANGIOGRAPHY OF LOWER EXTREMITY Left 2022    Procedure: Angiogram Extremity Unilateral;  Surgeon: Ismael Esquivel MD;  Location: [facility] CATH/EP LAB;  Service: General;  Laterality: Left;    ANGIOGRAPHY OF LOWER EXTREMITY Left 2022    Procedure: Angiogram Extremity Unilateral;  Surgeon: Ismael Esquivel MD;  Location: [facility] CATH/EP LAB;  Service: General;  Laterality: Left;    CYSTOSCOPY N/A 2023    Procedure: CYSTOSCOPY;  Surgeon: Adam Tyler MD;  Location: [facility] OR;  Service: Urology;  Laterality: N/A;    INJECTION OF TISSUE PLASMINOGEN ACTIVATOR Left 2022    Procedure: INJECTION, TISSUE PLASMINOGEN ACTIVATOR;  Surgeon: Ismael Esquivel MD;  Location: [facility] CATH/EP LAB;  Service: General;  Laterality: Left;    INJECTION OF TISSUE  PLASMINOGEN ACTIVATOR Left 8/6/2022    Procedure: INJECTION, TISSUE PLASMINOGEN ACTIVATOR;  Surgeon: Ismael Esquivel MD;  Location: [facility] CATH/EP LAB;  Service: General;  Laterality: Left;    PROSTATE SURGERY  2018    REPAIR OF ANEURYSM Left 7/21/2021    Procedure: REPAIR POPLITEAL ARTERY ANEURYSM;  Surgeon: Ismael Esquivel MD;  Location: [facility] OR;  Service: Cardiovascular;  Laterality: Left;    THROMBECTOMY  8/6/2022    Procedure: THROMBECTOMY;  Surgeon: Ismael Esquivel MD;  Location: [facility] CATH/EP LAB;  Service: General;;    THROMBECTOMY Left 8/6/2022    Procedure: THROMBECTOMY;  Surgeon: Ismael Esquivel MD;  Location: [facility] CATH/EP LAB;  Service: General;  Laterality: Left;    THYROIDECTOMY, PARTIAL  2011    TONSILLECTOMY      as a child    TRANSRECTAL BIOPSY OF PROSTATE WITH ULTRASOUND GUIDANCE N/A 9/5/2023    Procedure: BIOPSY, PROSTATE, RECTAL APPROACH, WITH US GUIDANCE;  Surgeon: Adam Tyler MD;  Location: [facility] AS OR;  Service: Urology;  Laterality: N/A;    urolift  04/2019    [facility]        Home Medications:  Medications Prior to Admission   Medication Sig Dispense Refill Last Dose    albuterol (PROVENTIL/VENTOLIN HFA) 90 mcg/actuation inhaler Inhale 2 puffs into the lungs every 6 (six) hours as needed.        albuterol-ipratropium (DUO-NEB) 2.5 mg-0.5 mg/3 mL nebulizer solution Take 3 mLs by nebulization every 6 (six) hours as needed for Wheezing or Shortness of Breath. Rescue 240 mL 5     budesonide-glycopyr-formoterol (BREZTRI AEROSPHERE) 160-9-4.8 mcg/actuation HFAA Inhale 2 puffs into the lungs 2 (two) times a day. (Patient taking differently: Inhale 2 puffs into the lungs 2 (two) times daily as needed.) 10.7 g 0     cyanocobalamin/folic ac/vit B6 (FOLBIC ORAL) Take 1 tablet by mouth once daily.       ELIQUIS 5 mg Tab Take 1 tablet (5 mg total) by mouth 2 (two) times daily. 180 tablet 1     fluticasone propionate (FLONASE) 50 mcg/actuation nasal spray 2 sprays by Each Nostril route daily as  needed.        labetaloL (NORMODYNE) 300 MG tablet Take 1 tablet (300 mg total) by mouth 2 (two) times a day. 180 tablet 1     olmesartan (BENICAR) 40 MG tablet Take 1 tablet (40 mg total) by mouth every morning. 90 tablet 1     predniSONE (DELTASONE) 20 MG tablet 3 pills for 3 days, 2 for 3 days, 1 for 3 days. Repeat for cough 36 tablet 0     rosuvastatin (CRESTOR) 10 MG tablet Take 1 tablet (10 mg total) by mouth once daily. 90 tablet 1                Review of patient's allergies indicates:  No Known Allergies    Social History:   Social History     Occupational History    Not on file   Tobacco Use    Smoking status: Former     Types: Cigarettes     Passive exposure: Past    Smokeless tobacco: Never   Substance and Sexual Activity    Alcohol use: Not Currently     Alcohol/week: 1.0 standard drink of alcohol     Types: 1 Cans of beer per week     Comment: rare    Drug use: Never    Sexual activity: Not on file       Family History:   Family History   Problem Relation Age of Onset    Hypertension Mother     COPD Mother     Pulmonary embolism Father        Review of Systems:  A 10 point review of systems was performed and was normal, except as mentioned in the HPI, including constitutional, HEENT, heme, lymph, cardiovascular, respiratory, gastrointestinal, genitourinary, neurologic, endocrine, psychiatric and musculoskeletal.      OBJECTIVE:    Physical Exam:  24 Hour Vital Sign Ranges: Temp:  [100.2 °F (37.9 °C)] 100.2 °F (37.9 °C)  Pulse:  [85] 85  Resp:  [14] 14  SpO2:  [96 %] 96 %  BP: (132)/(93) 132/93  Most recent vitals: BP (!) 132/93 (BP Location: Right arm, Patient Position: Lying)   Pulse 85   Temp 100.2 °F (37.9 °C) (Oral)   Resp 14   Ht 6' (1.829 m)   Wt 88 kg (194 lb)   SpO2 96%   BMI 26.31 kg/m²    GEN: well-developed, well-nourished, awake and alert, non-toxic appearing adult  HEENT: PERRL, sclera anicteric, oral mucosa pink and moist without lesion  NECK: trachea midline; Good ROM  CV: regular  "rate and rhythm, no murmurs or gallops  RESP: clear to auscultation bilaterally, no wheezes, rhonci or rales  ABD: soft, non-tender, non-distended, normal bowel sounds  EXT: no swelling or edema, 2+ pulses distally  SKIN: no rashes or jaundice  PSYCH: normal affect    Labs:   No results for input(s): "WBC", "MCV", "PLT" in the last 72 hours.    Invalid input(s): "HGBAU"  No results for input(s): "NA", "K", "CL", "CO2", "BUN", "GLU" in the last 72 hours.    Invalid input(s): "CREA"  No results for input(s): "ALB" in the last 72 hours.    Invalid input(s): "ALKP", "SGOT", "SGPT", "TBIL", "DBIL", "TPRO"  No results for input(s): "PT", "INR", "PTT" in the last 72 hours.      IMPRESSION / RECOMMENDATIONS:  Personal history of colon polyps he is brought in today for colonoscopy  with interventions as warranted.   RIsks, benefits, alternatives discussed in detail regarding upcoming procedures and sedation. Some of the more common endoscopic complications include but not limited to immediate or delayed perforation, bleeding, infections, pain, inadvertent injury to surrounding tissue / organs and possible need for surgical evaluation. Patient expressed understanding, all questions answered and will proceed with procedure as planned.     Jeff Call  10/20/2023  10:41 AM     "

## 2023-10-20 NOTE — TRANSFER OF CARE
Anesthesia Transfer of Care Note    Patient: Helder Brand    Procedure(s) Performed: Procedure(s) (LRB):  COLONOSCOPY (N/A)    Patient location: PACU    Anesthesia Type: general    Transport from OR: Transported from OR on room air with adequate spontaneous ventilation    Post pain: adequate analgesia    Post assessment: no apparent anesthetic complications    Post vital signs: stable    Level of consciousness: awake    Nausea/Vomiting: no nausea/vomiting    Complications: none    Transfer of care protocol was followed      Last vitals:   Visit Vitals  BP (!) 132/93 (BP Location: Right arm, Patient Position: Lying)   Pulse 85   Temp 37.9 °C (100.2 °F) (Oral)   Resp 14   Ht 6' (1.829 m)   Wt 88 kg (194 lb)   SpO2 96%   BMI 26.31 kg/m²

## 2023-10-20 NOTE — [employer] PATIENT INSTRUCTIONS
Discharge Summary/Instructions after an Endoscopic Procedure  Patient Name: Helder Brand  Patient MRN: [id number]  Patient YOB: 1960  Friday, October 20, 2023  Jeff Call MD  RESTRICTIONS:  During your procedure today, you received medications for sedation.  These   medications may affect your judgment, balance and coordination.  Therefore,   for 24 hours, you have the following restrictions:   - DO NOT drive a car, operate machinery, make legal/financial decisions,   sign important papers or drink alcohol.    ACTIVITY:  Today: no heavy lifting, straining or running due to procedural   sedation/anesthesia.  The following day: return to full activity including work.  DIET:  Eat and drink normally unless instructed otherwise.     TREATMENT FOR COMMON SIDE EFFECTS:  - Mild abdominal pain, nausea, belching, bloating or excessive gas:  rest,   eat lightly and use a heating pad.  - Sore Throat: treat with throat lozenges and/or gargle with warm salt   water.  - Because air was used during the procedure, expelling large amounts of air   from your rectum or belching is normal.  - If a bowel prep was taken, you may not have a bowel movement for 1-3 days.    This is normal.  SYMPTOMS TO WATCH FOR AND REPORT TO YOUR PHYSICIAN:  1. Abdominal pain or bloating, other than gas cramps.  2. Chest pain.  3. Back pain.  4. Signs of infection such as: chills or fever occurring within 24 hours   after the procedure.  5. Rectal bleeding, which would show as bright red, maroon, or black stools.   (A tablespoon of blood from the rectum is not serious, especially if   hemorrhoids are present.)  6. Vomiting.  7. Weakness or dizziness.  GO DIRECTLY TO THE NEAREST EMERGENCY ROOM IF YOU HAVE ANY OF THE FOLLOWING:      Difficulty breathing              Chills and/or fever over 101 F   Persistent vomiting and/or vomiting blood   Severe abdominal pain   Severe chest pain   Black, tarry stools   Bleeding- more than one  tablespoon   Any other symptom or condition that you feel may need urgent attention  Your doctor recommends these additional instructions:  If any biopsies were taken, your doctors clinic will contact you in 1 to 2   weeks with any results.  - Discharge patient to home (ambulatory).   - Await pathology results.   - Repeat colonoscopy in 5 years for surveillance.  For questions, problems or results please call your physician - Jeff Call MD at Work:  [phone].  [facility], EMERGENCY ROOM PHONE NUMBER: [phone]  IF A COMPLICATION OR EMERGENCY SITUATION ARISES AND YOU ARE UNABLE TO REACH   YOUR PHYSICIAN - GO DIRECTLY TO THE EMERGENCY ROOM.  Jeff Call MD  10/20/2023 11:19:04 AM  This report has been verified and signed electronically.  Dear patient,  As a result of recent federal legislation (The Federal Cures Act), you may   receive lab or pathology results from your procedure in your [facility]   account before your physician is able to contact you. Your physician or   their representative will relay the results to you with their   recommendations at their soonest availability.  Thank you,  [employer]

## 2023-10-20 NOTE — ANESTHESIA PREPROCEDURE EVALUATION
10/20/2023  Helder Brand is a 63 y.o., male.      Patient Active Problem List   Diagnosis    BPH with urinary obstruction    Lung nodule    COPD (chronic obstructive pulmonary disease)    Acute embolism and thrombosis of left popliteal vein    Ascending aortic aneurysm    Descending aortic aneurysm    Popliteal aneurysm    Pre-op testing    HTN (hypertension)    Femoral popliteal artery thrombus    Chest pain    Heterozygous MTHFR mutation C677T       Past Surgical History:   Procedure Laterality Date    ANGIOGRAPHY OF LOWER EXTREMITY Left 8/5/2022    Procedure: Angiogram Extremity Unilateral;  Surgeon: Ali Khoobehi, MD;  Location: [facility] CATH/EP LAB;  Service: Peripheral Vascular;  Laterality: Left;    ANGIOGRAPHY OF LOWER EXTREMITY Left 8/6/2022    Procedure: Angioplasty-peripheral;  Surgeon: Ismael Esquivel MD;  Location: [facility] CATH/EP LAB;  Service: General;  Laterality: Left;    ANGIOGRAPHY OF LOWER EXTREMITY Left 8/6/2022    Procedure: Angiogram Extremity Unilateral;  Surgeon: Ismael Esquivel MD;  Location: [facility] CATH/EP LAB;  Service: General;  Laterality: Left;    ANGIOGRAPHY OF LOWER EXTREMITY Left 8/7/2022    Procedure: Angiogram Extremity Unilateral;  Surgeon: Ismael Esquivel MD;  Location: [facility] CATH/EP LAB;  Service: General;  Laterality: Left;    CYSTOSCOPY N/A 9/5/2023    Procedure: CYSTOSCOPY;  Surgeon: Adam Tyler MD;  Location: [facility] AS OR;  Service: Urology;  Laterality: N/A;    INJECTION OF TISSUE PLASMINOGEN ACTIVATOR Left 8/6/2022    Procedure: INJECTION, TISSUE PLASMINOGEN ACTIVATOR;  Surgeon: Ismael Esquivel MD;  Location: [facility] CATH/EP LAB;  Service: General;  Laterality: Left;    INJECTION OF TISSUE PLASMINOGEN ACTIVATOR Left 8/6/2022    Procedure: INJECTION, TISSUE PLASMINOGEN ACTIVATOR;  Surgeon: Ismael Esquivel MD;  Location: [facility] CATH/EP LAB;   Service: General;  Laterality: Left;    PROSTATE SURGERY  2018    REPAIR OF ANEURYSM Left 7/21/2021    Procedure: REPAIR POPLITEAL ARTERY ANEURYSM;  Surgeon: Ismael Esquivel MD;  Location: [facility] OR;  Service: Cardiovascular;  Laterality: Left;    THROMBECTOMY  8/6/2022    Procedure: THROMBECTOMY;  Surgeon: Ismael Esquivel MD;  Location: [facility] CATH/EP LAB;  Service: General;;    THROMBECTOMY Left 8/6/2022    Procedure: THROMBECTOMY;  Surgeon: Ismael Esquivel MD;  Location: [facility] CATH/EP LAB;  Service: General;  Laterality: Left;    THYROIDECTOMY, PARTIAL  2011    TONSILLECTOMY      as a child    TRANSRECTAL BIOPSY OF PROSTATE WITH ULTRASOUND GUIDANCE N/A 9/5/2023    Procedure: BIOPSY, PROSTATE, RECTAL APPROACH, WITH US GUIDANCE;  Surgeon: Adam Tyler MD;  Location: [facility] OR;  Service: Urology;  Laterality: N/A;    urolift  04/2019    [facility]        Tobacco Use:  The patient  reports that he has quit smoking. His smoking use included cigarettes. He has been exposed to tobacco smoke. He has never used smokeless tobacco.     Results for orders placed or performed in visit on 05/30/23   IN OFFICE EKG 12-LEAD (to [place])    Collection Time: 05/30/23  3:14 PM    Narrative    Test Reason : I10,    Vent. Rate : 066 BPM     Atrial Rate : 066 BPM     P-R Int : 196 ms          QRS Dur : 088 ms      QT Int : 398 ms       P-R-T Axes : 059 044 064 degrees     QTc Int : 417 ms    Normal sinus rhythm  Cannot rule out Anterior infarct ,age undetermined  Abnormal ECG  When compared with ECG of 15-NOV-2022 23:17,  No significant change was found  Confirmed by Joseph Reyes MD ([id number]) on 7/16/2023 1:34:21 PM    Referred By: CHARISSE MACHUCA           Confirmed By:Joseph Reyes MD             Lab Results   Component Value Date    WBC 6.83 10/16/2023    HGB 16.1 10/16/2023    HCT 46.5 10/16/2023    MCV 95 10/16/2023     10/16/2023     BMP  Lab Results   Component Value Date     10/16/2023    K 4.1  10/16/2023     10/16/2023    CO2 29 10/16/2023    BUN 11 10/16/2023    CREATININE 1.0 10/16/2023    CALCIUM 9.3 10/16/2023    ANIONGAP 5 (L) 10/16/2023    GLU 91 10/16/2023     (H) 07/07/2023    GLU 89 11/15/2022       Results for orders placed during the hospital encounter of 09/06/23    Echo Saline Bubble? No    Interpretation Summary    Left Ventricle: The left ventricle is normal in size. Ventricular mass is normal. Normal wall thickness. Normal wall motion. There is normal systolic function with a visually estimated ejection fraction of 55 - 60%. There is normal diastolic function.    Right Ventricle: Normal right ventricular cavity size. Wall thickness is normal. Right ventricle wall motion  is normal. Systolic function is normal.    Mitral Valve: There is mild regurgitation.    Tricuspid Valve: There is no mass/vegetation present. There is mild regurgitation.    IVC/SVC: Normal venous pressure at 3 mmHg.            Pre-op Assessment    I have reviewed the Patient Summary Reports.     I have reviewed the Nursing Notes. I have reviewed the NPO Status.   I have reviewed the Medications.     Review of Systems  Anesthesia Hx:  No problems with previous Anesthesia  Denies Family Hx of Anesthesia complications.   Denies Personal Hx of Anesthesia complications.   Social:  Former Smoker    Hematology/Oncology:  Hematology Normal        Cardiovascular:   Hypertension, well controlled PVD    Pulmonary:   COPD, mild    Renal/:   renal calculi BPH    Hepatic/GI:  Hepatic/GI Normal    Musculoskeletal:  Musculoskeletal Normal    Neurological:  Neurology Normal    Endocrine:  Endocrine Normal        Physical Exam  General: Well nourished, Cooperative, Alert and Oriented    Airway:  Mallampati: III / II  Mouth Opening: Normal  TM Distance: Normal  Tongue: Normal  Neck ROM: Normal ROM    Dental:  Periodontal disease    Chest/Lungs:  Clear to auscultation    Heart:  Rate: Normal  Rhythm: Regular  Rhythm  Sounds: Normal    Abdomen:  Normal, Soft, Nontender        Anesthesia Plan  Type of Anesthesia, risks & benefits discussed:    Anesthesia Type: Gen Natural Airway  Intra-op Monitoring Plan: Standard ASA Monitors  Post Op Pain Control Plan:   (medical reason for not using multimodal pain management)  Induction:  IV  Informed Consent: Informed consent signed with the Patient and all parties understand the risks and agree with anesthesia plan.  All questions answered. Patient consented to blood products? No  ASA Score: 3  Anesthesia Plan Notes:   General Natural Airway  Propofol  Zofran    Ready For Surgery From Anesthesia Perspective.     .

## 2023-10-26 ENCOUNTER — PATIENT MESSAGE (OUTPATIENT)
Dept: OPTOMETRY | Facility: CLINIC | Age: 63
End: 2023-10-26
Payer: MEDICARE

## 2023-10-31 ENCOUNTER — TELEPHONE (OUTPATIENT)
Dept: OPTOMETRY | Facility: CLINIC | Age: 63
End: 2023-10-31
Payer: MEDICARE

## 2023-10-31 NOTE — TELEPHONE ENCOUNTER
----- Message from Brayden Castro OD sent at 10/31/2023  7:25 AM CDT -----  Contact: Patient    ----- Message -----  From: Nicolle Osborn  Sent: 10/30/2023  11:40 AM CDT  To: Brayden Castro OD    Type:  Needs Medical Advice    Who Called:   Patient    Would the patient rather a call back or a response via [facility]?  Call back  Best Call Back Number:   [phone]    Additional Information:   States he would like to speak with someone regarding the status of his contacts - please call - thank you

## 2023-10-31 NOTE — TELEPHONE ENCOUNTER
----- Message from Brayden Castro OD sent at 10/31/2023  7:25 AM CDT -----  Contact: Patient    ----- Message -----  From: Nicolle Osborn  Sent: 10/30/2023  11:40 AM CDT  To: Brayden Castro OD    Type:  Needs Medical Advice    Who Called:   Patient    Would the patient rather a call back or a response via [facility]?  Call back  Best Call Back Number:   [phone]    Additional Information:   States he would like to speak with someone regarding the status of his contacts - please call - thank you

## 2023-11-02 ENCOUNTER — PATIENT MESSAGE (OUTPATIENT)
Dept: OPTOMETRY | Facility: CLINIC | Age: 63
End: 2023-11-02
Payer: MEDICARE

## 2023-11-06 ENCOUNTER — PATIENT MESSAGE (OUTPATIENT)
Dept: SURGERY | Facility: HOSPITAL | Age: 63
End: 2023-11-06

## 2023-11-06 ENCOUNTER — HOSPITAL ENCOUNTER (OUTPATIENT)
Dept: PREADMISSION TESTING | Facility: HOSPITAL | Age: 63
Discharge: HOME | End: 2023-11-06
Attending: UROLOGY
Payer: MEDICARE

## 2023-11-06 DIAGNOSIS — Z01.818 PREOP TESTING: Primary | ICD-10-CM

## 2023-11-06 NOTE — DISCHARGE INSTRUCTIONS
To confirm, Your doctor has instructed you that surgery is scheduled for: 11/16/23 with Dr. Tyler    Please report to [facility], Registration the morning of surgery. You must check-in and receive a wristband before going to your procedure.  [address] [place] [address] [place] [address]    Pre-Op will call the afternoon prior to surgery between 1:00 and 6:00 PM with the final arrival time.  Phone number: [phone]    PLEASE NOTE:  The surgery schedule has many variables which may affect the time of your surgery case.  Family members should be available if your surgery time changes.  Plan to be here the day of your procedure between 4-6 hours.    MEDICATIONS:  TAKE ONLY THESE MEDICATIONS WITH A SMALL SIP OF WATER THE MORNING OF YOUR PROCEDURE:  SEE LIST      DO NOT TAKE THESE MEDICATIONS 5-7 DAYS PRIOR to your procedure or per your surgeon's request:   ASPIRIN, ALEVE, ADVIL, IBUPROFEN, FISH OIL VITAMIN E, HERBALS  (May take Tylenol)    ONLY if you are prescribed any types of blood thinners such as:  Aspirin, Coumadin, Plavix, Pradaxa, Xarelto, Aggrenox, Effient, Eliquis, Savasya, Brilinta, or any other, ask your surgeon whether you should stop taking them and how long before surgery you should stop.  You may also need to verify with the prescribing physician if it is ok to stop your medication.      INSTRUCTIONS IMPORTANT!!  Do not eat or drink anything between midnight and the time of your procedure- this includes gum, mints, and candy.  Do not smoke or drink alcoholic beverages 24 hours prior to your procedure.  Shower the night before AND the morning of your procedure with a Chlorhexidine wash such as Hibiclens or Dial antibacterial soap from the neck down.  Do not get it on your face or in your eyes.  You may use your own shampoo and face wash. This helps your skin to be as bacteria free as possible.    If you wear contact lenses, dentures, hearing aids or glasses, bring a container to put them  in during surgery and give to a family member for safe keeping.  Please leave all jewelry, piercing's and valuables at home. You must remove your false eyelashes prior to surgery.    DO NOT remove hair from the surgery site.  Do not shave the incision site unless you are given specific instructions to do so.    ONLY if you have been diagnosed with sleep apnea please bring your C-PAP machine.  ONLY if you wear home oxygen please bring your portable oxygen tank the day of your procedure.  ONLY if you have a history of OPEN HEART SURGERY you will need a clearance from your Cardiologist per Anesthesia.      ONLY for patients requiring bowel prep, written instructions will be given by your doctor's office.  ONLY if you have a neuro stimulator, please bring the controller with you the morning of surgery  ONLY if a type and screen test is needed before surgery, please return:  If your doctor has scheduled you for an overnight stay, bring a small overnight bag with any personal items you need.  Make arrangements in advance for transportation home by a responsible adult.  It is not safe to drive a vehicle during the 24 hours after anesthesia.          All  facilities and properties are tobacco free.  Smoking is NOT allowed.   If you have any questions about these instructions, call Pre-Op Admit  Nursing at [phone] or the Pre-Op Day Surgery Unit at [phone].

## 2023-11-06 NOTE — TELEPHONE ENCOUNTER
Cardio notified need sooner appt   Pt informed booked sooner with Dr galaviz on 11/8 @ 830   Pt vu

## 2023-11-08 ENCOUNTER — OFFICE VISIT (OUTPATIENT)
Dept: CARDIOLOGY | Facility: CLINIC | Age: 63
End: 2023-11-08
Payer: MEDICARE

## 2023-11-08 ENCOUNTER — TELEPHONE (OUTPATIENT)
Dept: UROLOGY | Facility: CLINIC | Age: 63
End: 2023-11-08
Payer: MEDICARE

## 2023-11-08 VITALS
HEART RATE: 63 BPM | WEIGHT: 191.5 LBS | DIASTOLIC BLOOD PRESSURE: 78 MMHG | SYSTOLIC BLOOD PRESSURE: 124 MMHG | BODY MASS INDEX: 25.97 KG/M2 | OXYGEN SATURATION: 98 %

## 2023-11-08 DIAGNOSIS — E78.2 MIXED HYPERLIPIDEMIA: ICD-10-CM

## 2023-11-08 DIAGNOSIS — I10 PRIMARY HYPERTENSION: ICD-10-CM

## 2023-11-08 DIAGNOSIS — Z01.818 PRE-OP TESTING: Primary | ICD-10-CM

## 2023-11-08 PROCEDURE — 4010F PR ACE/ARB THEARPY RXD/TAKEN: ICD-10-PCS | Mod: CPTII,S$GLB,, | Performed by: STUDENT IN AN ORGANIZED HEALTH CARE EDUCATION/TRAINING PROGRAM

## 2023-11-08 PROCEDURE — 99214 OFFICE O/P EST MOD 30 MIN: CPT | Mod: S$GLB,,, | Performed by: STUDENT IN AN ORGANIZED HEALTH CARE EDUCATION/TRAINING PROGRAM

## 2023-11-08 PROCEDURE — 93010 ELECTROCARDIOGRAM REPORT: CPT | Mod: S$GLB,,, | Performed by: GENERAL PRACTICE

## 2023-11-08 PROCEDURE — 3074F SYST BP LT 130 MM HG: CPT | Mod: CPTII,S$GLB,, | Performed by: STUDENT IN AN ORGANIZED HEALTH CARE EDUCATION/TRAINING PROGRAM

## 2023-11-08 PROCEDURE — 3288F PR FALLS RISK ASSESSMENT DOCUMENTED: ICD-10-PCS | Mod: CPTII,S$GLB,, | Performed by: STUDENT IN AN ORGANIZED HEALTH CARE EDUCATION/TRAINING PROGRAM

## 2023-11-08 PROCEDURE — 93005 ELECTROCARDIOGRAM TRACING: CPT | Mod: S$GLB,,, | Performed by: STUDENT IN AN ORGANIZED HEALTH CARE EDUCATION/TRAINING PROGRAM

## 2023-11-08 PROCEDURE — 3008F PR BODY MASS INDEX (BMI) DOCUMENTED: ICD-10-PCS | Mod: CPTII,S$GLB,, | Performed by: STUDENT IN AN ORGANIZED HEALTH CARE EDUCATION/TRAINING PROGRAM

## 2023-11-08 PROCEDURE — 1157F ADVNC CARE PLAN IN RCRD: CPT | Mod: CPTII,S$GLB,, | Performed by: STUDENT IN AN ORGANIZED HEALTH CARE EDUCATION/TRAINING PROGRAM

## 2023-11-08 PROCEDURE — 1101F PR PT FALLS ASSESS DOC 0-1 FALLS W/OUT INJ PAST YR: ICD-10-PCS | Mod: CPTII,S$GLB,, | Performed by: STUDENT IN AN ORGANIZED HEALTH CARE EDUCATION/TRAINING PROGRAM

## 2023-11-08 PROCEDURE — 3078F DIAST BP <80 MM HG: CPT | Mod: CPTII,S$GLB,, | Performed by: STUDENT IN AN ORGANIZED HEALTH CARE EDUCATION/TRAINING PROGRAM

## 2023-11-08 PROCEDURE — 1160F RVW MEDS BY RX/DR IN RCRD: CPT | Mod: CPTII,S$GLB,, | Performed by: STUDENT IN AN ORGANIZED HEALTH CARE EDUCATION/TRAINING PROGRAM

## 2023-11-08 PROCEDURE — 4010F ACE/ARB THERAPY RXD/TAKEN: CPT | Mod: CPTII,S$GLB,, | Performed by: STUDENT IN AN ORGANIZED HEALTH CARE EDUCATION/TRAINING PROGRAM

## 2023-11-08 PROCEDURE — 3078F PR MOST RECENT DIASTOLIC BLOOD PRESSURE < 80 MM HG: ICD-10-PCS | Mod: CPTII,S$GLB,, | Performed by: STUDENT IN AN ORGANIZED HEALTH CARE EDUCATION/TRAINING PROGRAM

## 2023-11-08 PROCEDURE — 1160F PR REVIEW ALL MEDS BY PRESCRIBER/CLIN PHARMACIST DOCUMENTED: ICD-10-PCS | Mod: CPTII,S$GLB,, | Performed by: STUDENT IN AN ORGANIZED HEALTH CARE EDUCATION/TRAINING PROGRAM

## 2023-11-08 PROCEDURE — 1159F MED LIST DOCD IN RCRD: CPT | Mod: CPTII,S$GLB,, | Performed by: STUDENT IN AN ORGANIZED HEALTH CARE EDUCATION/TRAINING PROGRAM

## 2023-11-08 PROCEDURE — 3288F FALL RISK ASSESSMENT DOCD: CPT | Mod: CPTII,S$GLB,, | Performed by: STUDENT IN AN ORGANIZED HEALTH CARE EDUCATION/TRAINING PROGRAM

## 2023-11-08 PROCEDURE — 99214 PR OFFICE/OUTPT VISIT, EST, LEVL IV, 30-39 MIN: ICD-10-PCS | Mod: S$GLB,,, | Performed by: STUDENT IN AN ORGANIZED HEALTH CARE EDUCATION/TRAINING PROGRAM

## 2023-11-08 PROCEDURE — 1157F PR ADVANCE CARE PLAN OR EQUIV PRESENT IN MEDICAL RECORD: ICD-10-PCS | Mod: CPTII,S$GLB,, | Performed by: STUDENT IN AN ORGANIZED HEALTH CARE EDUCATION/TRAINING PROGRAM

## 2023-11-08 PROCEDURE — 1101F PT FALLS ASSESS-DOCD LE1/YR: CPT | Mod: CPTII,S$GLB,, | Performed by: STUDENT IN AN ORGANIZED HEALTH CARE EDUCATION/TRAINING PROGRAM

## 2023-11-08 PROCEDURE — 99999 PR PBB SHADOW E&M-EST. PATIENT-LVL III: ICD-10-PCS | Mod: PBBFAC,,, | Performed by: STUDENT IN AN ORGANIZED HEALTH CARE EDUCATION/TRAINING PROGRAM

## 2023-11-08 PROCEDURE — 99999 PR PBB SHADOW E&M-EST. PATIENT-LVL III: CPT | Mod: PBBFAC,,, | Performed by: STUDENT IN AN ORGANIZED HEALTH CARE EDUCATION/TRAINING PROGRAM

## 2023-11-08 PROCEDURE — 1159F PR MEDICATION LIST DOCUMENTED IN MEDICAL RECORD: ICD-10-PCS | Mod: CPTII,S$GLB,, | Performed by: STUDENT IN AN ORGANIZED HEALTH CARE EDUCATION/TRAINING PROGRAM

## 2023-11-08 PROCEDURE — 93010 EKG 12-LEAD: ICD-10-PCS | Mod: S$GLB,,, | Performed by: GENERAL PRACTICE

## 2023-11-08 PROCEDURE — 93005 EKG 12-LEAD: ICD-10-PCS | Mod: S$GLB,,, | Performed by: STUDENT IN AN ORGANIZED HEALTH CARE EDUCATION/TRAINING PROGRAM

## 2023-11-08 PROCEDURE — 3008F BODY MASS INDEX DOCD: CPT | Mod: CPTII,S$GLB,, | Performed by: STUDENT IN AN ORGANIZED HEALTH CARE EDUCATION/TRAINING PROGRAM

## 2023-11-08 PROCEDURE — 3074F PR MOST RECENT SYSTOLIC BLOOD PRESSURE < 130 MM HG: ICD-10-PCS | Mod: CPTII,S$GLB,, | Performed by: STUDENT IN AN ORGANIZED HEALTH CARE EDUCATION/TRAINING PROGRAM

## 2023-11-08 NOTE — PROGRESS NOTES
Patient ID:  Helder Brand  63 y.o.  male      Assessment/Plan:    Pre-op evaluation (TURP)  Hypertension, controlled  Hyperlipidemia, at goal  History of lower extremity thrombosis and MTHFR mutation, on Eliquis    The patient has excellent functional capacity and no cardiac symptoms.   His estimated perioperative risk of MACE is moderate and he is clinically optimized from cardiac standpoint at this time. No further cardiac testing/therapeutic measures needed at this time.   Hold Eliquis 3 days prior to the procedure and restart as soon possible post-op. Consider hem/onc assessment regarding the need for bridging with Lovenox while the Eliquis is on hold given the history of MTHFR mutation. The patient did have colonoscopy a few weeks ago where he held the Eliquis for 3 days without bridging and did not have any issues.        Subjective:     Chief Complaint   Patient presents with    Pre-op Exam     Pre-op risk assessment for Dr. Tyler office 11/16/2023       HPI:  Helder Brand is a 63 y.o. male who presents today for pre-op evaluation. He is scheduled to undergo TURP with Dr. Tyler on 11/16/2023.   He has history of hypertension, hyperlipidemia, emphysema, former smoker, lower extremity arterial and venous thrombosis due to MTHFR mutation on Eliquis.  He had a negative stress test in 2021. CT scan in 3/2023 was negative for PE and showed mild aortic ectasia and coronary artery calcifications.  He has had abdominal aortic ultrasound with vascular surgery office.     He is functionally very active at baseline. He performs vigorous physical activity daily  and denies any anginal/anginal equivalent symptoms. He can climb 4 flights of stairs up and down multiple times at once without any symptoms. Compliant with medications. Denies any bleeding issues. Checks BP at home and reports it being within normal range. 12-lead EKG today shows NSR and no significant abnormalities.       PREVIOUS CARDIAC  TESTING/PROCEDURES HISTORY:    ECHOCARDIOGRAM: 23  Summary    Left Ventricle: The left ventricle is normal in size. Ventricular mass is normal. Normal wall thickness. Normal wall motion. There is normal systolic function with a visually estimated ejection fraction of 55 - 60%. There is normal diastolic function.    Right Ventricle: Normal right ventricular cavity size. Wall thickness is normal. Right ventricle wall motion  is normal. Systolic function is normal.    Mitral Valve: There is mild regurgitation.    Tricuspid Valve: There is no mass/vegetation present. There is mild regurgitation.    IVC/SVC: Normal venous pressure at 3 mmHg.    STRESS TEST: 2021  Impression: There is no scintigraphic evidence of reversible ischemia or prior infarction.    EK2023: NSR, 63 BPM, normal intervals and narrow QRS complex, no significant ST-T abnormalities.    Review of patient's allergies indicates:  No Known Allergies    Past Medical History:   Diagnosis Date    Blood clot in vein     left leg    Emphysema lung     Enlarged prostate     Heterozygous MTHFR mutation C677T 2023    Hyperlipidemia     Hypertension     Kidney stone 2001    x3    Kidney stones     x3    Personal history of colonic polyps 10/20/2023    Thyroid disease     benign growth, partial thyroidectomy     Past Surgical History:   Procedure Laterality Date    ANGIOGRAPHY OF LOWER EXTREMITY Left 2022    Procedure: Angiogram Extremity Unilateral;  Surgeon: Ali Khoobehi, MD;  Location: [facility] CATH/EP LAB;  Service: Peripheral Vascular;  Laterality: Left;    ANGIOGRAPHY OF LOWER EXTREMITY Left 2022    Procedure: Angioplasty-peripheral;  Surgeon: Ismael Esquivel MD;  Location: [facility] CATH/EP LAB;  Service: General;  Laterality: Left;    ANGIOGRAPHY OF LOWER EXTREMITY Left 2022    Procedure: Angiogram Extremity Unilateral;  Surgeon: Ismael Esquivel MD;  Location: [facility] CATH/EP LAB;  Service: General;  Laterality:  Left;    ANGIOGRAPHY OF LOWER EXTREMITY Left 08/07/2022    Procedure: Angiogram Extremity Unilateral;  Surgeon: Ismael Esquivel MD;  Location: [facility] CATH/EP LAB;  Service: General;  Laterality: Left;    COLONOSCOPY N/A 10/20/2023    Procedure: COLONOSCOPY;  Surgeon: Jeff Call MD;  Location: [facility] ENDO;  Service: Endoscopy;  Laterality: N/A;    COLONOSCOPY  10/20/2023    5 YR RECALL    CYSTOSCOPY N/A 09/05/2023    Procedure: CYSTOSCOPY;  Surgeon: Adam Tyler MD;  Location: [facility] OR;  Service: Urology;  Laterality: N/A;    INJECTION OF TISSUE PLASMINOGEN ACTIVATOR Left 08/06/2022    Procedure: INJECTION, TISSUE PLASMINOGEN ACTIVATOR;  Surgeon: Ismael Esquivel MD;  Location: [facility] CATH/EP LAB;  Service: General;  Laterality: Left;    INJECTION OF TISSUE PLASMINOGEN ACTIVATOR Left 08/06/2022    Procedure: INJECTION, TISSUE PLASMINOGEN ACTIVATOR;  Surgeon: Ismael Esquivel MD;  Location: [facility] CATH/EP LAB;  Service: General;  Laterality: Left;    PROSTATE SURGERY  2018    REPAIR OF ANEURYSM Left 07/21/2021    Procedure: REPAIR POPLITEAL ARTERY ANEURYSM;  Surgeon: Ismael Esquivel MD;  Location: [facility] OR;  Service: Cardiovascular;  Laterality: Left;    THROMBECTOMY  08/06/2022    Procedure: THROMBECTOMY;  Surgeon: Ismael Esquivel MD;  Location: [facility] CATH/EP LAB;  Service: General;;    THROMBECTOMY Left 08/06/2022    Procedure: THROMBECTOMY;  Surgeon: Ismael Esquivel MD;  Location: [facility] CATH/EP LAB;  Service: General;  Laterality: Left;    THYROIDECTOMY, PARTIAL  2011    TONSILLECTOMY      as a child    TRANSRECTAL BIOPSY OF PROSTATE WITH ULTRASOUND GUIDANCE N/A 09/05/2023    Procedure: BIOPSY, PROSTATE, RECTAL APPROACH, WITH US GUIDANCE;  Surgeon: Adam Tyler MD;  Location: [facility] OR;  Service: Urology;  Laterality: N/A;    urolift  04/2019    [facility]     Social History     Tobacco Use    Smoking status: Former     Types: Cigarettes     Passive exposure: Past    Smokeless tobacco: Never  "  Substance Use Topics    Alcohol use: Not Currently     Alcohol/week: 1.0 standard drink of alcohol     Types: 1 Cans of beer per week     Comment: rare    Drug use: Never          REVIEW OF SYSTEMS  CONSTITUTIONAL: No chills, no fatigue, no fever.   EYES: No double vision, no blurred vision  NEURO: No headaches, no dizziness  RESPIRATORY: No cough, no shortness of breath, no wheezing.    CARDIOVASCULAR: See HPI   GI: No abdominal pain, no melena, no diarrhea, no nausea or vomiting.   : +urinary hesitancy/frequency/urgency, no hematuria  SKIN: no bruising, no discoloration  ENDOCRINE: no polyphagia, no heat intolerance, no cold intolerance  PSYCHIATRIC: no depression, no anxiety, no memory loss  MUSCULOSKELETAL: no  neck pain, no muscle weakness,no back pain          Objective:        Vitals:    11/08/23 0934   BP: 124/78   Pulse: 63     PHYSICAL EXAM  CONSTITUTIONAL: Well built, well nourished in no apparent distress  HEENT: No pallor  NECK: no JVD  LUNGS: CTA b/l  HEART: regular rate and normal rhythm, S1, S2 normal, no murmur   ABDOMEN: soft, non-tender; bowel sounds normal  EXTREMITIES: No edema  NEURO: AAO X 3   SKIN:  No rash  Psych:  Normal affect    Lab Results   Component Value Date    WBC 7.85 11/06/2023    HGB 15.3 11/06/2023    HCT 45.1 11/06/2023     11/06/2023    CHOL 138 07/07/2023    TRIG 37 07/07/2023    HDL 66 07/07/2023    ALT 21 07/07/2023    AST 23 07/07/2023     11/06/2023    K 3.8 11/06/2023     11/06/2023    CREATININE 0.9 11/06/2023    BUN 16 11/06/2023    CO2 25 11/06/2023    TSH 0.57 07/07/2023    INR 1.1 11/06/2023    HGBA1C 5.2 03/09/2022        @  Lab Results   Component Value Date    CHOL 138 07/07/2023    CHOL 197 03/09/2022     Lab Results   Component Value Date    HDL 66 07/07/2023    HDL 57 03/09/2022     Lab Results   Component Value Date    LDLCALC 61 07/07/2023    LDLCALC 130.2 03/09/2022     No results found for: "DLDL"  Lab Results   Component Value Date "    TRIG 37 07/07/2023    TRIG 49 03/09/2022       f1   Lab Results   Component Value Date    CHOLHDL 2.1 07/07/2023    CHOLHDL 28.9 03/09/2022        Medication List with Changes/Refills   Current Medications    ALBUTEROL (PROVENTIL/VENTOLIN HFA) 90 MCG/ACTUATION INHALER    Inhale 2 puffs into the lungs every 6 (six) hours as needed.     ALBUTEROL-IPRATROPIUM (DUO-NEB) 2.5 MG-0.5 MG/3 ML NEBULIZER SOLUTION    Take 3 mLs by nebulization every 6 (six) hours as needed for Wheezing or Shortness of Breath. Rescue    BUDESONIDE-GLYCOPYR-FORMOTEROL (BREZTRI AEROSPHERE) 160-9-4.8 MCG/ACTUATION HFAA    Inhale 2 puffs into the lungs 2 (two) times a day.    CYANOCOBALAMIN/FOLIC AC/VIT B6 (FOLBIC ORAL)    Take 1 tablet by mouth once daily.    ELIQUIS 5 MG TAB    Take 1 tablet (5 mg total) by mouth 2 (two) times daily.    FLUTICASONE PROPIONATE (FLONASE) 50 MCG/ACTUATION NASAL SPRAY    2 sprays by Each Nostril route daily as needed.     LABETALOL (NORMODYNE) 300 MG TABLET    Take 1 tablet (300 mg total) by mouth 2 (two) times a day.    OLMESARTAN (BENICAR) 40 MG TABLET    Take 1 tablet (40 mg total) by mouth every morning.    PREDNISONE (DELTASONE) 20 MG TABLET    3 pills for 3 days, 2 for 3 days, 1 for 3 days. Repeat for cough    ROSUVASTATIN (CRESTOR) 10 MG TABLET    Take 1 tablet (10 mg total) by mouth once daily.     Follow up as scheduled.

## 2023-11-08 NOTE — LETTER
2023    Helder Brand  [address]  [place] [address]           [facility] [place] [facility] [place]  [address]  [address]  [place] [address]  Phone: [phone]  Fax: [phone] Patient: Helder Brand  : 1960  Referring Doctor: Dr. Tyler  Procedure: TURP  Date of procedure: 2023  Consulting Provider: Dr. Bridgett Manrique   Telephone: [phone]  Date of Last Office Visit:2023  Current Outpatient Medications   Medication Sig    albuterol (PROVENTIL/VENTOLIN HFA) 90 mcg/actuation inhaler Inhale 2 puffs into the lungs every 6 (six) hours as needed.     albuterol-ipratropium (DUO-NEB) 2.5 mg-0.5 mg/3 mL nebulizer solution Take 3 mLs by nebulization every 6 (six) hours as needed for Wheezing or Shortness of Breath. Rescue    budesonide-glycopyr-formoterol (BREZTRI AEROSPHERE) 160-9-4.8 mcg/actuation HFAA Inhale 2 puffs into the lungs 2 (two) times a day. (Patient taking differently: Inhale 2 puffs into the lungs 2 (two) times daily as needed.)    cyanocobalamin/folic ac/vit B6 (FOLBIC ORAL) Take 1 tablet by mouth once daily.    ELIQUIS 5 mg Tab Take 1 tablet (5 mg total) by mouth 2 (two) times daily.    fluticasone propionate (FLONASE) 50 mcg/actuation nasal spray 2 sprays by Each Nostril route daily as needed.     labetaloL (NORMODYNE) 300 MG tablet Take 1 tablet (300 mg total) by mouth 2 (two) times a day.    olmesartan (BENICAR) 40 MG tablet Take 1 tablet (40 mg total) by mouth every morning.    predniSONE (DELTASONE) 20 MG tablet 3 pills for 3 days, 2 for 3 days, 1 for 3 days. Repeat for cough    rosuvastatin (CRESTOR) 10 MG tablet Take 1 tablet (10 mg total) by mouth once daily.     No current facility-administered medications for this visit.     Facility-Administered Medications Ordered in Other Visits   Medication    lactated ringers infusion     This patient has been evaluated for perioperative cardiac risk  assessment.     The patient is at moderate risk for heath-operative major adverse cardiac events. Medically optimized from cardiac standpoint. No further cardiac testing/therapeutic measures needed at this time.    Recommendations for antiplatelet/anticoagulant medications: HOLD ELIQUIS for __3__ days prior to procedure. Consider heme/onc evaluation regarding need for bridging with Lovenox while Eliquis on hold given the history of MTHFR mutation.      If you have any questions regarding the above, please contact my office at [phone].    Sincerely,

## 2023-11-08 NOTE — TELEPHONE ENCOUNTER
Cards clearance received - can notify pt  though with note below    HOLD ELIQUIS for __3__ days prior to procedure. Consider heme/onc evaluation regarding need for bridging with Lovenox while Eliquis on hold given the history of MTHFR mutation     Needs full 3 days off - so for Thursday surgery, last dose should be SUNDAY    2. Followed by Dr Marino Corrales md and staff to see if lovenox bridge needs to be set up or not for 2 days or if ok to hold

## 2023-11-08 NOTE — LETTER
2023    Helder Brand  [address]  [place] [address]             [place] [facility] [place]  [address]  [address]  [place] [address]  Phone: [phone]  Fax: [phone] Patient: Helder Brand  : 1960  Referring Doctor: Dr. Bridgett Manrique   Telephone:[phone]  Date of Last Office Visit:2023  Consulting Provider: Dr. Tyler  Procedure: TURP  Procedure date: 2023  Current Outpatient Medications   Medication Sig    albuterol (PROVENTIL/VENTOLIN HFA) 90 mcg/actuation inhaler Inhale 2 puffs into the lungs every 6 (six) hours as needed.     albuterol-ipratropium (DUO-NEB) 2.5 mg-0.5 mg/3 mL nebulizer solution Take 3 mLs by nebulization every 6 (six) hours as needed for Wheezing or Shortness of Breath. Rescue    budesonide-glycopyr-formoterol (BREZTRI AEROSPHERE) 160-9-4.8 mcg/actuation HFAA Inhale 2 puffs into the lungs 2 (two) times a day. (Patient taking differently: Inhale 2 puffs into the lungs 2 (two) times daily as needed.)    cyanocobalamin/folic ac/vit B6 (FOLBIC ORAL) Take 1 tablet by mouth once daily.    ELIQUIS 5 mg Tab Take 1 tablet (5 mg total) by mouth 2 (two) times daily.    fluticasone propionate (FLONASE) 50 mcg/actuation nasal spray 2 sprays by Each Nostril route daily as needed.     labetaloL (NORMODYNE) 300 MG tablet Take 1 tablet (300 mg total) by mouth 2 (two) times a day.    olmesartan (BENICAR) 40 MG tablet Take 1 tablet (40 mg total) by mouth every morning.    predniSONE (DELTASONE) 20 MG tablet 3 pills for 3 days, 2 for 3 days, 1 for 3 days. Repeat for cough    rosuvastatin (CRESTOR) 10 MG tablet Take 1 tablet (10 mg total) by mouth once daily.     No current facility-administered medications for this visit.     Facility-Administered Medications Ordered in Other Visits   Medication    lactated ringers infusion   This patient has been evaluated for perioperative cardiac risk assessment.      The patient is at low risk for heath-operative major adverse cardiac events. Medically optimized from cardiac standpoint. No further cardiac testing/therapeutic measures needed at this time.    Recommendations for antiplatelet/anticoagulant medications: HOLD ELIQUIS for __3__ days prior to procedure     If you have any questions regarding the above, please contact my office at [phone]    Sincerely,

## 2023-11-15 ENCOUNTER — ANESTHESIA EVENT (OUTPATIENT)
Dept: SURGERY | Facility: HOSPITAL | Age: 63
End: 2023-11-15
Payer: MEDICARE

## 2023-11-16 ENCOUNTER — ANESTHESIA (OUTPATIENT)
Dept: SURGERY | Facility: HOSPITAL | Age: 63
End: 2023-11-16
Payer: MEDICARE

## 2023-11-16 ENCOUNTER — HOSPITAL ENCOUNTER (OUTPATIENT)
Facility: HOSPITAL | Age: 63
Discharge: HOME | End: 2023-11-16
Attending: UROLOGY | Admitting: UROLOGY
Payer: MEDICARE

## 2023-11-16 VITALS
OXYGEN SATURATION: 97 % | HEART RATE: 68 BPM | TEMPERATURE: 97 F | DIASTOLIC BLOOD PRESSURE: 104 MMHG | HEIGHT: 72 IN | BODY MASS INDEX: 25.87 KG/M2 | SYSTOLIC BLOOD PRESSURE: 156 MMHG | RESPIRATION RATE: 20 BRPM | WEIGHT: 191 LBS

## 2023-11-16 DIAGNOSIS — N13.8 BPH WITH OBSTRUCTION/LOWER URINARY TRACT SYMPTOMS: ICD-10-CM

## 2023-11-16 DIAGNOSIS — J44.1 COPD WITH ACUTE EXACERBATION: ICD-10-CM

## 2023-11-16 DIAGNOSIS — N40.1 BPH WITH OBSTRUCTION/LOWER URINARY TRACT SYMPTOMS: ICD-10-CM

## 2023-11-16 PROCEDURE — 36000707: Performed by: UROLOGY

## 2023-11-16 PROCEDURE — 63600175 PHARM REV CODE 636 W HCPCS: Performed by: NURSE ANESTHETIST, CERTIFIED REGISTERED

## 2023-11-16 PROCEDURE — 63600175 PHARM REV CODE 636 W HCPCS: Performed by: UROLOGY

## 2023-11-16 PROCEDURE — 25000003 PHARM REV CODE 250: Performed by: NURSE ANESTHETIST, CERTIFIED REGISTERED

## 2023-11-16 PROCEDURE — D9220A PRA ANESTHESIA: Mod: ANES,,, | Performed by: ANESTHESIOLOGY

## 2023-11-16 PROCEDURE — D9220A PRA ANESTHESIA: ICD-10-PCS | Mod: CRNA,,, | Performed by: NURSE ANESTHETIST, CERTIFIED REGISTERED

## 2023-11-16 PROCEDURE — 71000015 HC POSTOP RECOV 1ST HR: Performed by: UROLOGY

## 2023-11-16 PROCEDURE — 71000033 HC RECOVERY, INTIAL HOUR: Performed by: UROLOGY

## 2023-11-16 PROCEDURE — 36000706: Performed by: UROLOGY

## 2023-11-16 PROCEDURE — 88305 TISSUE EXAM BY PATHOLOGIST: CPT | Mod: TC | Performed by: PATHOLOGY

## 2023-11-16 PROCEDURE — D9220A PRA ANESTHESIA: ICD-10-PCS | Mod: ANES,,, | Performed by: ANESTHESIOLOGY

## 2023-11-16 PROCEDURE — 52601 PROSTATECTOMY (TURP): CPT | Mod: ,,, | Performed by: UROLOGY

## 2023-11-16 PROCEDURE — 37000009 HC ANESTHESIA EA ADD 15 MINS: Performed by: UROLOGY

## 2023-11-16 PROCEDURE — 25000003 PHARM REV CODE 250: Performed by: ANESTHESIOLOGY

## 2023-11-16 PROCEDURE — 27201423 OPTIME MED/SURG SUP & DEVICES STERILE SUPPLY: Performed by: UROLOGY

## 2023-11-16 PROCEDURE — 37000008 HC ANESTHESIA 1ST 15 MINUTES: Performed by: UROLOGY

## 2023-11-16 PROCEDURE — 94799 UNLISTED PULMONARY SVC/PX: CPT

## 2023-11-16 PROCEDURE — 52601 PR TRANSURETHRAL ELEC-SURG PROSTATECTOM: ICD-10-PCS | Mod: ,,, | Performed by: UROLOGY

## 2023-11-16 PROCEDURE — 71000039 HC RECOVERY, EACH ADD'L HOUR: Performed by: UROLOGY

## 2023-11-16 PROCEDURE — D9220A PRA ANESTHESIA: Mod: CRNA,,, | Performed by: NURSE ANESTHETIST, CERTIFIED REGISTERED

## 2023-11-16 RX ORDER — LIDOCAINE HYDROCHLORIDE 20 MG/ML
INJECTION INTRAVENOUS
Status: DISCONTINUED | OUTPATIENT
Start: 2023-11-16 | End: 2023-11-16

## 2023-11-16 RX ORDER — DIPHENHYDRAMINE HYDROCHLORIDE 50 MG/ML
25 INJECTION INTRAMUSCULAR; INTRAVENOUS EVERY 6 HOURS PRN
Status: DISCONTINUED | OUTPATIENT
Start: 2023-11-16 | End: 2023-11-16 | Stop reason: HOSPADM

## 2023-11-16 RX ORDER — DEXMEDETOMIDINE HYDROCHLORIDE 100 UG/ML
INJECTION, SOLUTION INTRAVENOUS
Status: DISCONTINUED | OUTPATIENT
Start: 2023-11-16 | End: 2023-11-16

## 2023-11-16 RX ORDER — CEFAZOLIN SODIUM 2 G/50ML
2 SOLUTION INTRAVENOUS
Status: COMPLETED | OUTPATIENT
Start: 2023-11-16 | End: 2023-11-16

## 2023-11-16 RX ORDER — SODIUM CHLORIDE, SODIUM LACTATE, POTASSIUM CHLORIDE, CALCIUM CHLORIDE 600; 310; 30; 20 MG/100ML; MG/100ML; MG/100ML; MG/100ML
INJECTION, SOLUTION INTRAVENOUS CONTINUOUS
Status: DISCONTINUED | OUTPATIENT
Start: 2023-11-16 | End: 2023-11-16 | Stop reason: HOSPADM

## 2023-11-16 RX ORDER — MIDAZOLAM HYDROCHLORIDE 1 MG/ML
INJECTION INTRAMUSCULAR; INTRAVENOUS
Status: DISCONTINUED | OUTPATIENT
Start: 2023-11-16 | End: 2023-11-16

## 2023-11-16 RX ORDER — KETOROLAC TROMETHAMINE 30 MG/ML
INJECTION, SOLUTION INTRAMUSCULAR; INTRAVENOUS
Status: DISCONTINUED | OUTPATIENT
Start: 2023-11-16 | End: 2023-11-16

## 2023-11-16 RX ORDER — PROCHLORPERAZINE EDISYLATE 5 MG/ML
5 INJECTION INTRAMUSCULAR; INTRAVENOUS EVERY 4 HOURS PRN
Status: DISCONTINUED | OUTPATIENT
Start: 2023-11-16 | End: 2023-11-16 | Stop reason: HOSPADM

## 2023-11-16 RX ORDER — ONDANSETRON 2 MG/ML
4 INJECTION INTRAMUSCULAR; INTRAVENOUS ONCE
Status: DISCONTINUED | OUTPATIENT
Start: 2023-11-16 | End: 2023-11-16 | Stop reason: HOSPADM

## 2023-11-16 RX ORDER — FENTANYL CITRATE 50 UG/ML
INJECTION, SOLUTION INTRAMUSCULAR; INTRAVENOUS
Status: DISCONTINUED | OUTPATIENT
Start: 2023-11-16 | End: 2023-11-16

## 2023-11-16 RX ORDER — OXYCODONE HYDROCHLORIDE 5 MG/1
5 TABLET ORAL
Status: DISCONTINUED | OUTPATIENT
Start: 2023-11-16 | End: 2023-11-16 | Stop reason: HOSPADM

## 2023-11-16 RX ORDER — ROCURONIUM BROMIDE 10 MG/ML
INJECTION, SOLUTION INTRAVENOUS
Status: DISCONTINUED | OUTPATIENT
Start: 2023-11-16 | End: 2023-11-16

## 2023-11-16 RX ORDER — LIDOCAINE HYDROCHLORIDE 10 MG/ML
0.5 INJECTION, SOLUTION EPIDURAL; INFILTRATION; INTRACAUDAL; PERINEURAL ONCE
Status: DISCONTINUED | OUTPATIENT
Start: 2023-11-16 | End: 2023-11-16 | Stop reason: HOSPADM

## 2023-11-16 RX ORDER — ACETAMINOPHEN 10 MG/ML
INJECTION, SOLUTION INTRAVENOUS
Status: DISCONTINUED | OUTPATIENT
Start: 2023-11-16 | End: 2023-11-16

## 2023-11-16 RX ORDER — PROPOFOL 10 MG/ML
VIAL (ML) INTRAVENOUS
Status: DISCONTINUED | OUTPATIENT
Start: 2023-11-16 | End: 2023-11-16

## 2023-11-16 RX ORDER — EPHEDRINE SULFATE 50 MG/ML
INJECTION, SOLUTION INTRAVENOUS
Status: DISCONTINUED | OUTPATIENT
Start: 2023-11-16 | End: 2023-11-16

## 2023-11-16 RX ORDER — DEXAMETHASONE SODIUM PHOSPHATE 4 MG/ML
INJECTION, SOLUTION INTRA-ARTICULAR; INTRALESIONAL; INTRAMUSCULAR; INTRAVENOUS; SOFT TISSUE
Status: DISCONTINUED | OUTPATIENT
Start: 2023-11-16 | End: 2023-11-16

## 2023-11-16 RX ORDER — HYDROMORPHONE HYDROCHLORIDE 2 MG/ML
0.2 INJECTION, SOLUTION INTRAMUSCULAR; INTRAVENOUS; SUBCUTANEOUS EVERY 5 MIN PRN
Status: DISCONTINUED | OUTPATIENT
Start: 2023-11-16 | End: 2023-11-16 | Stop reason: HOSPADM

## 2023-11-16 RX ORDER — FENTANYL CITRATE 50 UG/ML
25 INJECTION, SOLUTION INTRAMUSCULAR; INTRAVENOUS EVERY 5 MIN PRN
Status: DISCONTINUED | OUTPATIENT
Start: 2023-11-16 | End: 2023-11-16 | Stop reason: HOSPADM

## 2023-11-16 RX ORDER — SULFAMETHOXAZOLE AND TRIMETHOPRIM 800; 160 MG/1; MG/1
1 TABLET ORAL 2 TIMES DAILY
Qty: 10 TABLET | Refills: 0 | Status: SHIPPED | OUTPATIENT
Start: 2023-11-16 | End: 2023-11-21

## 2023-11-16 RX ORDER — ONDANSETRON HYDROCHLORIDE 2 MG/ML
INJECTION, SOLUTION INTRAMUSCULAR; INTRAVENOUS
Status: DISCONTINUED | OUTPATIENT
Start: 2023-11-16 | End: 2023-11-16

## 2023-11-16 RX ORDER — MEPERIDINE HYDROCHLORIDE 50 MG/ML
12.5 INJECTION INTRAMUSCULAR; INTRAVENOUS; SUBCUTANEOUS ONCE
Status: DISCONTINUED | OUTPATIENT
Start: 2023-11-16 | End: 2023-11-16 | Stop reason: HOSPADM

## 2023-11-16 RX ADMIN — DEXMEDETOMIDINE HYDROCHLORIDE 2 MCG: 100 INJECTION, SOLUTION INTRAVENOUS at 10:11

## 2023-11-16 RX ADMIN — Medication 160 MG: at 09:11

## 2023-11-16 RX ADMIN — Medication 40 MG: at 09:11

## 2023-11-16 RX ADMIN — DEXMEDETOMIDINE HYDROCHLORIDE 6 MCG: 100 INJECTION, SOLUTION INTRAVENOUS at 09:11

## 2023-11-16 RX ADMIN — FENTANYL CITRATE 50 MCG: 0.05 INJECTION, SOLUTION INTRAMUSCULAR; INTRAVENOUS at 10:11

## 2023-11-16 RX ADMIN — LIDOCAINE HYDROCHLORIDE 25 MG: 20 INJECTION, SOLUTION INTRAVENOUS at 09:11

## 2023-11-16 RX ADMIN — MIDAZOLAM HYDROCHLORIDE 2 MG: 1 INJECTION, SOLUTION INTRAMUSCULAR; INTRAVENOUS at 09:11

## 2023-11-16 RX ADMIN — EPHEDRINE SULFATE 10 MG: 50 INJECTION, SOLUTION INTRAMUSCULAR; INTRAVENOUS; SUBCUTANEOUS at 10:11

## 2023-11-16 RX ADMIN — SODIUM CHLORIDE, SODIUM GLUCONATE, SODIUM ACETATE, POTASSIUM CHLORIDE AND MAGNESIUM CHLORIDE: 526; 502; 368; 37; 30 INJECTION, SOLUTION INTRAVENOUS at 08:11

## 2023-11-16 RX ADMIN — LIDOCAINE HYDROCHLORIDE 75 MG: 20 INJECTION, SOLUTION INTRAVENOUS at 09:11

## 2023-11-16 RX ADMIN — ROCURONIUM BROMIDE 30 MG: 10 INJECTION, SOLUTION INTRAVENOUS at 10:11

## 2023-11-16 RX ADMIN — SUGAMMADEX 50 MG: 100 INJECTION, SOLUTION INTRAVENOUS at 11:11

## 2023-11-16 RX ADMIN — ACETAMINOPHEN 1000 MG: 10 INJECTION, SOLUTION INTRAVENOUS at 10:11

## 2023-11-16 RX ADMIN — ONDANSETRON 4 MG: 2 INJECTION INTRAMUSCULAR; INTRAVENOUS at 11:11

## 2023-11-16 RX ADMIN — KETOROLAC TROMETHAMINE 15 MG: 30 INJECTION, SOLUTION INTRAMUSCULAR; INTRAVENOUS at 11:11

## 2023-11-16 RX ADMIN — FENTANYL CITRATE 50 MCG: 0.05 INJECTION, SOLUTION INTRAMUSCULAR; INTRAVENOUS at 09:11

## 2023-11-16 RX ADMIN — ONDANSETRON 4 MG: 2 INJECTION INTRAMUSCULAR; INTRAVENOUS at 09:11

## 2023-11-16 RX ADMIN — DEXAMETHASONE SODIUM PHOSPHATE 8 MG: 4 INJECTION, SOLUTION INTRA-ARTICULAR; INTRALESIONAL; INTRAMUSCULAR; INTRAVENOUS; SOFT TISSUE at 09:11

## 2023-11-16 RX ADMIN — CEFAZOLIN SODIUM 2 G: 2 SOLUTION INTRAVENOUS at 09:11

## 2023-11-16 NOTE — CARE UPDATE
11/16/23 0831   Incentive Spirometer   $ Incentive Spirometer Charges preop instruction   Incentive Spirometer Predicted Level (mL) 2240   Administration (IS) instruction provided, initial   Number of Repetitions (IS) 6   Level Incentive Spirometer (mL) 4000   Patient Tolerance (IS) good;no adverse signs/symptoms present

## 2023-11-16 NOTE — ANESTHESIA PREPROCEDURE EVALUATION
11/16/2023  Helder Brand is a 63 y.o., male.      Patient Active Problem List   Diagnosis    BPH with urinary obstruction    Lung nodule    COPD (chronic obstructive pulmonary disease)    Acute embolism and thrombosis of left popliteal vein    Ascending aortic aneurysm    Descending aortic aneurysm    Popliteal aneurysm    Pre-op testing    HTN (hypertension)    Femoral popliteal artery thrombus    Chest pain    Heterozygous MTHFR mutation C677T       Past Surgical History:   Procedure Laterality Date    ANGIOGRAPHY OF LOWER EXTREMITY Left 08/05/2022    Procedure: Angiogram Extremity Unilateral;  Surgeon: Ali Khoobehi, MD;  Location: [facility] CATH/EP LAB;  Service: Peripheral Vascular;  Laterality: Left;    ANGIOGRAPHY OF LOWER EXTREMITY Left 08/06/2022    Procedure: Angioplasty-peripheral;  Surgeon: Ismael Esquivel MD;  Location: [facility] CATH/EP LAB;  Service: General;  Laterality: Left;    ANGIOGRAPHY OF LOWER EXTREMITY Left 08/06/2022    Procedure: Angiogram Extremity Unilateral;  Surgeon: Ismael Esquivel MD;  Location: [facility] CATH/EP LAB;  Service: General;  Laterality: Left;    ANGIOGRAPHY OF LOWER EXTREMITY Left 08/07/2022    Procedure: Angiogram Extremity Unilateral;  Surgeon: Ismael Esquivel MD;  Location: [facility] CATH/EP LAB;  Service: General;  Laterality: Left;    COLONOSCOPY N/A 10/20/2023    Procedure: COLONOSCOPY;  Surgeon: Jeff Call MD;  Location: [facility] ENDO;  Service: Endoscopy;  Laterality: N/A;    COLONOSCOPY  10/20/2023    5 YR RECALL    CYSTOSCOPY N/A 09/05/2023    Procedure: CYSTOSCOPY;  Surgeon: Adam Tyler MD;  Location: [facility] OR;  Service: Urology;  Laterality: N/A;    INJECTION OF TISSUE PLASMINOGEN ACTIVATOR Left 08/06/2022    Procedure: INJECTION, TISSUE PLASMINOGEN ACTIVATOR;  Surgeon: Ismael Esquivel MD;  Location: [facility] CATH/EP LAB;  Service: General;   Laterality: Left;    INJECTION OF TISSUE PLASMINOGEN ACTIVATOR Left 08/06/2022    Procedure: INJECTION, TISSUE PLASMINOGEN ACTIVATOR;  Surgeon: Ismael Esquivel MD;  Location: [facility] CATH/EP LAB;  Service: General;  Laterality: Left;    PROSTATE SURGERY  2018    REPAIR OF ANEURYSM Left 07/21/2021    Procedure: REPAIR POPLITEAL ARTERY ANEURYSM;  Surgeon: Ismael Esquivel MD;  Location: [facility] OR;  Service: Cardiovascular;  Laterality: Left;    THROMBECTOMY  08/06/2022    Procedure: THROMBECTOMY;  Surgeon: Ismael Esquivel MD;  Location: [facility] CATH/EP LAB;  Service: General;;    THROMBECTOMY Left 08/06/2022    Procedure: THROMBECTOMY;  Surgeon: Ismael Esquivel MD;  Location: [facility] CATH/EP LAB;  Service: General;  Laterality: Left;    THYROIDECTOMY, PARTIAL  2011    TONSILLECTOMY      as a child    TRANSRECTAL BIOPSY OF PROSTATE WITH ULTRASOUND GUIDANCE N/A 09/05/2023    Procedure: BIOPSY, PROSTATE, RECTAL APPROACH, WITH US GUIDANCE;  Surgeon: Adam Tyler MD;  Location: [facility] OR;  Service: Urology;  Laterality: N/A;    urolift  04/2019    [facility]        Tobacco Use:  The patient  reports that he has quit smoking. His smoking use included cigarettes. He has been exposed to tobacco smoke. He has never used smokeless tobacco.     Results for orders placed or performed in visit on 11/08/23   IN OFFICE EKG 12-LEAD (to [place])    Collection Time: 11/08/23  9:34 AM    Narrative    Test Reason : Z01.818,    Vent. Rate : 063 BPM     Atrial Rate : 063 BPM     P-R Int : 186 ms          QRS Dur : 096 ms      QT Int : 404 ms       P-R-T Axes : 056 063 045 degrees     QTc Int : 413 ms    Normal sinus rhythm  Normal ECG  When compared with ECG of 30-MAY-2023 15:14,  Nonspecific T wave abnormality no longer evident in Anterior leads    Referred By:             Confirmed By:              Lab Results   Component Value Date    WBC 7.85 11/06/2023    HGB 15.3 11/06/2023    HCT 45.1 11/06/2023    MCV 95 11/06/2023      11/06/2023     BMP  Lab Results   Component Value Date     11/06/2023    K 3.8 11/06/2023     11/06/2023    CO2 25 11/06/2023    BUN 16 11/06/2023    CREATININE 0.9 11/06/2023    CALCIUM 9.3 11/06/2023    ANIONGAP 8 11/06/2023    GLU 79 11/06/2023    GLU 91 10/16/2023     (H) 07/07/2023       Results for orders placed during the hospital encounter of 09/06/23    Echo Saline Bubble? No    Interpretation Summary    Left Ventricle: The left ventricle is normal in size. Ventricular mass is normal. Normal wall thickness. Normal wall motion. There is normal systolic function with a visually estimated ejection fraction of 55 - 60%. There is normal diastolic function.    Right Ventricle: Normal right ventricular cavity size. Wall thickness is normal. Right ventricle wall motion  is normal. Systolic function is normal.    Mitral Valve: There is mild regurgitation.    Tricuspid Valve: There is no mass/vegetation present. There is mild regurgitation.    IVC/SVC: Normal venous pressure at 3 mmHg.            Pre-op Assessment    I have reviewed the Patient Summary Reports.     I have reviewed the Nursing Notes. I have reviewed the NPO Status.   I have reviewed the Medications.     Review of Systems  Anesthesia Hx:  No problems with previous Anesthesia             Denies Family Hx of Anesthesia complications.    Denies Personal Hx of Anesthesia complications.                    Social:  Former Smoker       Hematology/Oncology:  Hematology Normal                                     Cardiovascular:  Exercise tolerance: good   Hypertension, well controlled          PVD    ECG has been reviewed.                          Pulmonary:   COPD, mild                     Renal/:  Chronic Renal Disease renal calculi BPH              Hepatic/GI:  Hepatic/GI Normal                 Musculoskeletal:  Musculoskeletal Normal                Neurological:  Neurology Normal                                       Endocrine:  Endocrine Normal                Physical Exam  General: Well nourished, Cooperative, Alert and Oriented    Airway:  Mallampati: III / II  Mouth Opening: Normal  TM Distance: Normal  Tongue: Normal  Neck ROM: Normal ROM    Dental:  Periodontal disease    Chest/Lungs:  Clear to auscultation    Heart:  Rate: Normal  Rhythm: Regular Rhythm  Sounds: Normal    Abdomen:  Normal, Soft, Nontender        Anesthesia Plan  Type of Anesthesia, risks & benefits discussed:    Anesthesia Type: Gen Supraglottic Airway  Intra-op Monitoring Plan: Standard ASA Monitors  Post Op Pain Control Plan: multimodal analgesia and IV/PO Opioids PRN  Induction:  IV  Airway Plan: Direct, Post-Induction  Informed Consent: Informed consent signed with the Patient and all parties understand the risks and agree with anesthesia plan.  All questions answered. Patient consented to blood products? No  ASA Score: 3  Anesthesia Plan Notes:   General Natural Airway  Propofol  Zofran    Ready For Surgery From Anesthesia Perspective.     .

## 2023-11-16 NOTE — PLAN OF CARE
Patient tolerated procedure and anesthesia well.  No complaints.  No apparent distress noted or reported. Vitals stable.  Denies pain denies nausea.  Tolerates po.  Discharge instructions reviewed with patient and family members.  Verbalized understanding.  Consents with chart.  Patient tolerated procedure and anesthesia well.  No complaints.  No apparent distress noted or reported. Vitals stable.  Denies pain denies nausea.  Tolerates po.  Discharge instructions reviewed with patient and family members.  Verbalized understanding.  Consents with chart.

## 2023-11-16 NOTE — ANESTHESIA PROCEDURE NOTES
Intubation    Date/Time: 11/16/2023 9:52 AM    Performed by: Piotr Cotton CRNA  Authorized by: Denton Forrest MD    Intubation:     Induction:  Intravenous    Intubated:  Postinduction    Mask Ventilation:  Easy mask    Attempts:  1    Attempted By:  CRNA    Difficult Airway Encountered?: No      Complications:  None    Airway Device:  Supraglottic airway/LMA    Airway Device Size:  4.0    Style/Cuff Inflation:  Cuffed (inflated to minimal occlusive pressure)    Placement Verified By:  Capnometry    Complicating Factors:  None    Findings Post-Intubation:  BS equal bilateral

## 2023-11-16 NOTE — ANESTHESIA POSTPROCEDURE EVALUATION
Anesthesia Post Evaluation    Patient: Helder Brand    Procedure(s) Performed: Procedure(s) (LRB):  TURP (TRANSURETHRAL RESECTION OF PROSTATE) (N/A)    Final Anesthesia Type: general      Patient location during evaluation: PACU  Patient participation: Yes- Able to Participate  Level of consciousness: awake and alert and oriented  Post-procedure vital signs: reviewed and stable  Pain management: adequate  Airway patency: patent  VICTORIA mitigation strategies: Multimodal analgesia and Extubation while patient is awake  PONV status at discharge: No PONV  Anesthetic complications: no      Cardiovascular status: blood pressure returned to baseline  Respiratory status: unassisted, spontaneous ventilation and room air  Hydration status: euvolemic  Follow-up not needed.          Vitals Value Taken Time   /99 11/16/23 1255   Temp 36.2 °C (97.1 °F) 11/16/23 1138   Pulse 66 11/16/23 1259   Resp 19 11/16/23 1259   SpO2 96 % 11/16/23 1259   Vitals shown include unvalidated device data.      No case tracking events are documented in the log.      Pain/Diego Score: Diego Score: 10 (11/16/2023 12:30 PM)

## 2023-11-16 NOTE — TRANSFER OF CARE
Anesthesia Transfer of Care Note    Patient: Helder Brand    Procedure(s) Performed: Procedure(s) (LRB):  TURP (TRANSURETHRAL RESECTION OF PROSTATE) (N/A)    Patient location: PACU    Anesthesia Type: general    Transport from OR: Transported from OR on 2-3 L/min O2 by NC with adequate spontaneous ventilation    Post pain: adequate analgesia    Post assessment: no apparent anesthetic complications and tolerated procedure well    Post vital signs: stable    Level of consciousness: sedated    Nausea/Vomiting: no nausea/vomiting    Complications: none    Transfer of care protocol was followed      Last vitals: Visit Vitals  BP (!) 142/94 (BP Location: Right arm, Patient Position: Lying)   Pulse (!) 59   Temp 36.5 °C (97.7 °F) (Temporal)   Resp 16   Ht 6' (1.829 m)   Wt 86.6 kg (191 lb)   SpO2 97%   BMI 25.90 kg/m²

## 2023-11-16 NOTE — BRIEF OP NOTE
[facility]  Brief Operative/Discharge Note    Date: 11/16/2023    Staff Surgeon: Adam Tyler MD    Pre-Op Diagnosis: bph    Post-Op Diagnosis: same    Procedure(s) Performed:   turp    Specimen(s): prostate chips    Anesthesia: General LMA anesthesia    Findings: obstruction, and 4 implants    Estimated Blood Loss: minimal    Drains: 24 dunbar    Complications: none    Disposition: pacu to home    Discharge home today status post uncomplicated procedure as above  Diet - resume home diet  Follow up: 11/20 NV dunbar removal, NV 3 weeks later, likely MD 6 mos later with psa  Instructions:  Avoid strenuous activity until 1 week after dunbar removed  No riding mowers, bicycles, motorcycles, tractors for 2-3 weeks after dunbar removed  No aspirin fish oil vit e turmeric red yeast oil or other supplements  x3-5 days  HOLD ELIQUS 72 HRS - ok to restart SUN PM dose  Pink/clear red (koolaid) urine ok as long as clear/translucent without dark blood or clots, and urinating well/draining well without difficulty - drink lots of water.   May see blood drip around dunbar in first few days, this is ok  Continue flomax for ONE week after dunbar is removed at ONE nightly, then STOP  Avoid constipation, pushing/straining with BM. Use stool softener if needed  Will have increased urgency and frequency after removing catheter so avoid/minimize bladder irritants (coffee, soda, tea, alcohol), as well may intermittently see blood  *INCREASE WATER  KY or vaseline at tip of penis for dunbar discomfort - would recommend intermittent use through day to keep lubricated around dunbar  Tylenol or advil for any other discomfort  Use large bag at night  Ok to shower - towel dry bags. No baths.soaks until dunbar out  Meds:     Medication List        START taking these medications      sulfamethoxazole-trimethoprim 800-160mg 800-160 mg Tab  Commonly known as: BACTRIM DS  Take 1 tablet by mouth 2 (two) times daily. for 5 days            CONTINUE  taking these medications      albuterol 90 mcg/actuation inhaler  Commonly known as: PROVENTIL/VENTOLIN HFA     albuterol-ipratropium 2.5 mg-0.5 mg/3 mL nebulizer solution  Commonly known as: DUO-NEB  Take 3 mLs by nebulization every 6 (six) hours as needed for Wheezing or Shortness of Breath. Rescue     ELIQUIS 5 mg Tab  Generic drug: apixaban  Take 1 tablet (5 mg total) by mouth 2 (two) times daily.     fluticasone propionate 50 mcg/actuation nasal spray  Commonly known as: FLONASE     FOLBIC ORAL     labetaloL 300 MG tablet  Commonly known as: NORMODYNE  Take 1 tablet (300 mg total) by mouth 2 (two) times a day.     olmesartan 40 MG tablet  Commonly known as: BENICAR  Take 1 tablet (40 mg total) by mouth every morning.     predniSONE 20 MG tablet  Commonly known as: DELTASONE  3 pills for 3 days, 2 for 3 days, 1 for 3 days. Repeat for cough     rosuvastatin 10 MG tablet  Commonly known as: CRESTOR  Take 1 tablet (10 mg total) by mouth once daily.            ASK your doctor about these medications      JAISON AEROSPHERE 160-9-4.8 mcg/actuation Hfaa  Generic drug: budesonide-glycopyr-formoterol  Inhale 2 puffs into the lungs 2 (two) times a day.               Where to Get Your Medications        These medications were sent to [id number] [place] [address]      Phone: [phone]   sulfamethoxazole-trimethoprim 800-160mg 800-160 mg Tab

## 2023-11-16 NOTE — PLAN OF CARE
Pt prepped for surgery. Consents at bedside. Incentive spirometry taught by respiratory. Pt belongings placed in postop cabinet. Son set up with text alerts.

## 2023-11-16 NOTE — H&P
Expand All Collapse All    Show:Clear all  [x]Written[x]Templated[x]Copied    Added by:  [x]Adam Tyler MD    []Thai for details  [facility] Urology Progress Note     Helder Brand is a 63 y.o. 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   HTN/CAD/aortic ectasia and recently est care with Dr Bundy 5/30/23 with negative stress in 2021 and recent neg CT PE protocol  He est primary care with Maxi BOOGIE on 5/29/23 noting   He is experiencing some nocturia. 3-4 times at night.  This is his main concern.  Keeping him from sleeping well at night.  Reports colonoscopy completed 5 years ago at age 58.  Started on flomax, and psa ordered with screening labs  Labs 7/7/23 included PSA of 9.33 (as well as Cr 0.86, eGFR 96, WBC 13.2)  - advised to see urology about elevated psa and recheck labs in a few months before seeing hemonc re his leukocytosis (Calabresi 8/28/23)     He was previously followed by Dr Montague: 11/7/2019 [place] Laser vap with Dr Montague  Was following with Dr. Montague for quite some time for his BPH/LUTS and did have UroLift approximally 5 years ago, which did not work and was followed by laser vap as above.  He reports a family history of prostate cancer in his father, diagnosed in his 50s and treated with prostatectomy at that time.  AUA symptom score:  20/5 (4: Urgency, straining; 3: Emptying, nocturia; 2: Frequency, intermittency, weak stream). NTF most bothersome. Similar to preop  Does drink tea at night/before bed.  PVR 83 cc by bladder scan.  Urinalysis with small blood.  No hx UTI or prostatitis. No dysuria, hematuria, denies perineal, perirectal, testicular ejaculatory pain.   Wife passed away 7 yrs ago and  not sexually active. Quit smoking 40 yrs ago.   Was on Flomax prior to BPH intervention.  Has not restarted any meds. Doesn't  snore to his knowledge     On record review from Dr. Montague, in May of 2019, after his UroLift, still complained of urinary urgency and had a uroflow at that time voiding 600 cc with a peak flow of only 12.6 cc/second and an average flow of only 4 cc/second with a PVR of 23 cc after which cystoscopy was discussed and laser vap of prostate was considered, which ultimately happened as above.  AUA symptom score 17 after UroLift. UroLift was on 3/27/19, with a total of 5 implants.  There was additional obstructing tissue on the patient's right for with 5th implant was placed here.  Last visit with Dr. Montague was 12/20/19 approximally 1 month after laser vap noting urinary problem was improving.  Doing well, no leakage, happy with stream.     He reports never felt relief after laser vap  Takes his eliqus 5mg only once daily in evening. Had discussed 2.5 bid but elected just take 1     Recent screening psa to 9.3, repeated as 6.8 (12% free) and MRI with 53 g gland and RA pirad3 lesion and LA pirad2 lesion.   Cysto/Prostate biopsy 9/5/23:  SEGUNDO: 40g benign; PSA: 6.8 (12% free); TRUS VOLUME: 54.39cm3 (W 52.49mm, H 38.91mm, L 50.91mm)  SPECIMEN: 18 core prostate biopsy - right and left base, middle, apex - medial and lateral of each, and bilateral TZ cores - with 2 cores total each at RA med/lat and 2 cores RA post PZ  CYSTO: Anterior dimpling from prior UroLift implants seen without significant tissue retraction proximally and distally.  Proximal near bladder neck, dimpling from anterior proximal implants seen with significant obstructing anterior tissue across the top 3rd of the prostatic urethra here with obstruction anterior at the bladder neck, though well-healed laser changes of the posterior bladder neck, but is only open just at the proximal portion of the prostatic urethra as if channel  procedure, as there is still significant mid and distal lateral lobe obstruction, of which the dimpling from the distal anterior implants forms a shelf of tissue in otherwise obstructing tissue.  Scattered trabeculations of bladder, with no intravesical extension of median lobe  PATH: ASAP left base lateral, focal chronic prostatitis LTZ, and focal chronic inflammation RA post PZ     He returns today noting  Did well after.Still some spotting bleeding. On his bloodthinners again  Has colonoscopy pending - sees Dr Call later this month to plan  AUA symptom score 22/4 (4: Emptying; 3: all other symptoms)  Not taking any Flomax.  Never restarted it after initial conversation.  Took it a long time prior to initial intervention and did not perceive benefit.  Sees Dr Bundy, cardiology again on 10/30 and had recent ekg  Does report prior biopsy negative as well before initial bph intervention     Focused Physical Exam:         Vitals:     09/15/23 0958   BP: (!) 141/84   Pulse: 61      Body mass index is 26.31 kg/m². Weight: 88 kg (194 lb) Height: 6' (182.9 cm)      Abdomen: Soft, non-tender, nondistended, no CVA tenderness  RRR  CTAB    10 pt ros neg except as noted     Recent Results         Recent Results (from the past 336 hour(s))   POCT Urinalysis (Instrument)     Collection Time: 09/05/23 11:54 AM   Result Value Ref Range     Color, POC UA Yellow Yellow, Straw, Colorless     Clarity, POC UA Clear Clear     Glucose, POC UA Negative Negative     Bilirubin, POC UA Negative Negative     Ketones, POC UA Negative Negative     Spec Grav POC UA 1.020 1.005 - 1.030     Blood, POC UA Small (A) Negative     pH, POC UA 6.0 5.0 - 8.0     Protein, POC UA Negative Negative     Urobilinogen, POC UA 0.2 <=1.0     Nitrite, POC UA Negative Negative     WBC, POC UA Negative Negative   Echo Saline Bubble? No     Collection Time: 09/06/23 11:02 AM   Result Value Ref Range     LVOT stroke volume 89.53 cm3     LVIDd 4.02 3.5 - 6.0 cm      LV Systolic Volume 26.00 mL     LVIDs 2.66 2.1 - 4.0 cm     LV Diastolic Volume 70.80 mL     IVS 1.36 (A) 0.6 - 1.1 cm     LVOT diameter 2.40 cm     LVOT area 4.5 cm2     FS 34 28 - 44 %     Left Ventricle Relative Wall Thickness 0.56 cm     Posterior Wall 1.12 (A) 0.6 - 1.1 cm     LV mass 175.04 g     MV Peak E Santos 0.54 m/s     TDI LATERAL 0.11 m/s     TDI SEPTAL 0.07 m/s     E/E' ratio 6.00 m/s     MV Peak A Santos 0.58 m/s     TR Max Santos 1.95 m/s     E/A ratio 0.93       IVRT 90.00 msec     E wave deceleration time 290.00 msec     LV SEPTAL E/E' RATIO 7.71 m/s     LV LATERAL E/E' RATIO 4.91 m/s     LVOT peak santos 0.93 m/s     Left Ventricular Outflow Tract Mean Velocity 0.60 cm/s     Left Ventricular Outflow Tract Mean Gradient 2.00 mmHg     LA size 3.90 cm     RVDD 2.46 cm     AV mean gradient 2 mmHg     AV peak gradient 4 mmHg     Ao peak santos 1.01 m/s     Ao VTI 22.20 cm     LVOT peak VTI 19.80 cm     AV valve area 4.03 cm²     AV Velocity Ratio 0.92       AV index (prosthetic) 0.89       MICHELLE by Velocity Ratio 4.16 cm²     MV stenosis pressure 1/2 time 68.00 ms     MV valve area p 1/2 method 3.24 cm2     Triscuspid Valve Regurgitation Peak Gradient 15 mmHg     Ao root annulus 4.20 cm     Mean e' 0.09 m/s     AORTIC VALVE CUSP SEPERATION 2.00 cm            ASSESSMENT   1. BPH with obstruction/lower urinary tract symptoms  Procedure Order to Urology       2. Atypical small acinar proliferation of prostate                   Plan   Reviewed his pathology noting still only 1 core of atypical small acinar proliferation, and a few other cores of chronic inflammation and chronic prostatitis, especially targeting the areas of concern on MRI and everywhere 2 cores were taken these chronic inflammation changes were found.  We did discuss that asap can yield a future finding of up to 40% of prostate cancer in men with this isolated pathologic finding and recommendations are to continue to follow PSA every 6 months and  rebiopsy in 6-12 months.  He has however had 2- biopsies and an equivocal MRI, and has severe obstructive lower urinary tract symptoms with obstructing prostate despite 2 previous minimally invasive BPH interventions and we did discuss our focus right now on relieving his obstruction.  Would not preclude any future diagnostics or management of prostate cancer if diagnosed, and again reviewed the natural history of prostate cancer with aging.  He is more at risk from the complications and symptoms of his prostate obstruction then of his elevated PSA and mild atypia at this time.  We did review that with BPH interventions such as Transurethral resection of the prostate which we did discuss, with relief of prostate volume, PSA would drop to a new baseline which we can continue to follow and monitor in the future, and with 2- biopsies, and equivocal MRI, would not necessarily need rebiopsy at 1 year, but can follow PSA trends and re-evaluate if PSA was rising in the future.      The focus was therefore on management of his BPH/LUTS component.  LUTS are severe, and previously did not respond to medical management and therefore underwent to minimally invasive BPH interventions but still has significant lateral lobe obstructing prostate tissue, as well as moderate anterior obstructing tissue coming down from the anterior channel obstructing proximally and distally, and given this anatomic consideration as well as his 2 failed prior minimally invasive interventions, recommended definitive management with Transurethral resection of the prostate.     Reviewed Transurethral resection of prostate today with moderate bipolar energy source in the ability to not only resect, but also vaporized with button electrode, done as an outpatient procedure and reviewed procedure in detail and all risks and benefits. Procedure in detail and all risks and benefits (including but not limited to bleeding, stricture, ed, incontinence)  described, as well as transient increases in urgency frequency, as well as postoperative Howell catheterization length and timeframe for symptomatic resolution.  We did discuss that with his prior UroLift implants, this is no problem for Transurethral resection, and any urethral end plate that are unroof will be removed at that time.  All questions answered and appropriate informed consent obtained.  TURP planned in operating room on 11/16/23 per patient request, as he does have upcoming visit to discuss and plan colonoscopy which should be done prior, as well as upcoming cardiology follow-up in late October.  He did have recent cardiac testing, so I will CC Dr Bundy, to review and make cardiac clearance part of his visit on 10/30/23 and review updated cardiac testing to advise if any further testing is needed prior to this visit so he can be cleared to proceed on time.     In the interim I did make distinct recommendation to restart Flomax 0.4 mg nightly and take it up until 1 week after the procedure to help bridge the symptomatic gap.  Prescription sent to pharmacy.     - cards cleared, had gi eval, off Jibe, proceed with turp

## 2023-11-17 NOTE — OP NOTE
[facility] Urology Operative Report     Date: 11/16/2023     Staff Surgeon: Adam Tyler MD     Pre-Op Diagnosis: bph with obstruction/luts     Post-Op Diagnosis: same     Procedure(s) Performed:   Transurethral resection of prostate, bipolar     Specimen(s): prostate chips     Anesthesia: General LMA anesthesia     Findings:   - Obstructing prostate from lateral lobe obstruction with high bladder neck   - Four UroLift implants, 2 per lateral lobe, unroofed and removed (urethral endplates), and suture from previously removed endplate right proximal near bladder further resected.  - right mid lateral lobe suture lines/implants directly adjacent to each other, under area of visualized suture tract    Estimated Blood Loss: minimal     Drains: 24 dunbar     Complications: none     Indications for procdure:  63-year-old male with long history of BPH/LUTS who is previously undergone a UroLift followed by a laser vaporization procedure by an outside physician and presented with severe obstructive lower urinary tract symptoms despite alpha blockers noting minimal relief from his prior minimally invasive interventions.  Cystoscopic evaluation found a 55 g prostate with significant lateral lobe obstruction and high bladder neck and no significant evidence of tissue retraction or prior laser vaporization, and recommended definitive formal surgical resection with TURP, which he presents for today after extensive risk benefit discussion.     Procedure in detail:  After appropriate informed consent was obtained, the patient was taken back to the cystoscopy suite and placed in the lithotomy position. A WHO-approved time out was performed and preoperative antibiotics were given. He was then prepped and draped in the usual sterile fashion     24french resectoscope which was passed into the patient's bladder using a visual obturator of which sheath was lubricated with vaseline guaze, His bladder was again drained and the visual  obturator was exchanged for the bipolar resectoscope  handpiece with loop electrode.  Cystoscopic findings confirmed significant lateral lobe obstruction and hypervascularity. Using normal saline irrigation fluid, I first identified his verumontanum to ensure my resection was proximal to it. I  first resected a central channel from 5 to 7 o clock back to verumontanum, including taking down bladder neck a bit and then using button electrode for hemostatis and smooth resction. This was done after identifying ureteral orifices which were visualized a safe distance from bladder neck, and further resection and tissue vaporization of central channel completed with loop/button.     After this, I systematically resected his lateral lobe tissue, taking caution with resectoscope until urethral endplates visualized, at which time switched electrodes to button electrode to vaporize tissue around each endplate to circumferentially expose it before lysing suture with cutting current. He did have moderate anterior obstructing tissue which was carefully recected and vaporized as well with loop and button electrodes.    At right lateral lobe, midgland were to urethral end plates, adjacent to/interlocking, underneath an area of suture tract noted, and the 2 sutures from these implants were noted to be directly adjacent once the urethral implants were removed.  As well, right proximally near bladder neck was an area of suture of previously removed implant, which was taken down as part of the bladder neck.  There were 2 urethral end plates that were unroofed and resected and removed from the left lateral lobe as well.     After adequate resection of most central obstructing tissue with loop from central channel and bilateral lateral lobes, button electrode was also used for vaporization of tissue to further hemostatically take down lateral lobes, smooth out base of resection, and carefully remove anterior tissue, as well as for  additional hemostasis and to avoid loop contact of urethral endplates. Ultimately 4 implants were unroofed and were visualized and systematically resected and removed 1 at a time as noted above making sure to collect either with the loop electrode or Ellik evacuation.  Approximately 70% loop resection, and 30% button electrode vaporization.     Once he had a completely unobstructed channel resection and vaporization were complete, confirmed that all prostate chips were irrigated free from the bladder with the Ellik evacuator on inspection of the bladder lumen.      Next, I reinserted the resectoscope into the patient's bladder and filled the bladder. The area of resection was then examined and the peripheral margins were fulgurated.  With water and suction off, the bed of resection appeared hemostatic.  Scope remove and a 24 Nigerian Dunbar catheter was passed into the bladder. 50cc placed in 30cc  balllon, and 60cc cath tip syringe used to irrigate the bladder all of which was clear. Urine in the dunbar bag was clear. His dunbar was taped to  traction on his thigh with silk tape.     The patient tolerated the procedure well with no complications and was awakened and transferred to the recovery room in stable condition.       Disposition: Home today status post uncomplicated procedure as above  The dunbar will be untaped from traction in 60 mins and placed to leg bag, with balloon taken down to 30cc.  Patient will be discharged home with dunbar in place and will return for dunbar removal nurse visit on Monday 11/20 at 0800am,  and then for reeval of symptoms and emptying 3-4 weeks nurse visit, and will review to set further MD follow up

## 2023-11-20 ENCOUNTER — CLINICAL SUPPORT (OUTPATIENT)
Dept: UROLOGY | Facility: CLINIC | Age: 63
End: 2023-11-20
Payer: MEDICARE

## 2023-11-20 DIAGNOSIS — N13.8 ENLARGED PROSTATE WITH URINARY OBSTRUCTION: Primary | ICD-10-CM

## 2023-11-20 DIAGNOSIS — N40.1 ENLARGED PROSTATE WITH URINARY OBSTRUCTION: Primary | ICD-10-CM

## 2023-11-20 PROCEDURE — 99499 UNLISTED E&M SERVICE: CPT | Mod: S$GLB,,, | Performed by: UROLOGY

## 2023-11-20 PROCEDURE — 99499 NO LOS: ICD-10-PCS | Mod: S$GLB,,, | Performed by: UROLOGY

## 2023-11-20 NOTE — PROGRESS NOTES
Patient here today to have his catheter removal.?  2 patient identifiers verified  30ml saline removed from catheter balloon.   Catheter removed.   Catheter bag contains?25?ml?yellow?urine.  ?  Patient left the office in satisfactory condition.

## 2023-12-01 ENCOUNTER — HOSPITAL ENCOUNTER (INPATIENT)
Facility: HOSPITAL | Age: 63
LOS: 2 days | Discharge: HOME | DRG: 271 | End: 2023-12-03
Attending: EMERGENCY MEDICINE | Admitting: STUDENT IN AN ORGANIZED HEALTH CARE EDUCATION/TRAINING PROGRAM
Payer: MEDICARE

## 2023-12-01 ENCOUNTER — ANESTHESIA (OUTPATIENT)
Dept: INTENSIVE CARE | Facility: HOSPITAL | Age: 63
End: 2023-12-01

## 2023-12-01 ENCOUNTER — ANESTHESIA EVENT (OUTPATIENT)
Dept: INTENSIVE CARE | Facility: HOSPITAL | Age: 63
End: 2023-12-01

## 2023-12-01 DIAGNOSIS — I74.3 FEMORAL POPLITEAL ARTERY THROMBUS: ICD-10-CM

## 2023-12-01 DIAGNOSIS — Z15.89 MTHFR GENE MUTATION: ICD-10-CM

## 2023-12-01 DIAGNOSIS — I99.8 ISCHEMIC FOOT: ICD-10-CM

## 2023-12-01 LAB
ANION GAP SERPL CALC-SCNC: 11 MMOL/L (ref 8–16)
APTT PPP: 110.5 SEC (ref 21–32)
APTT PPP: 32.5 SEC (ref 21–32)
BASOPHILS # BLD AUTO: 0.07 K/UL (ref 0–0.2)
BASOPHILS # BLD AUTO: 0.07 K/UL (ref 0–0.2)
BASOPHILS NFR BLD: 0.6 % (ref 0–1.9)
BASOPHILS NFR BLD: 0.6 % (ref 0–1.9)
BUN SERPL-MCNC: 11 MG/DL (ref 8–23)
CALCIUM SERPL-MCNC: 9.2 MG/DL (ref 8.7–10.5)
CHLORIDE SERPL-SCNC: 105 MMOL/L (ref 95–110)
CO2 SERPL-SCNC: 21 MMOL/L (ref 23–29)
CREAT SERPL-MCNC: 0.9 MG/DL (ref 0.5–1.4)
DIFFERENTIAL METHOD: ABNORMAL
DIFFERENTIAL METHOD: ABNORMAL
EOSINOPHIL # BLD AUTO: 0.2 K/UL (ref 0–0.5)
EOSINOPHIL # BLD AUTO: 0.5 K/UL (ref 0–0.5)
EOSINOPHIL NFR BLD: 2 % (ref 0–8)
EOSINOPHIL NFR BLD: 4.4 % (ref 0–8)
ERYTHROCYTE [DISTWIDTH] IN BLOOD BY AUTOMATED COUNT: 13.2 % (ref 11.5–14.5)
ERYTHROCYTE [DISTWIDTH] IN BLOOD BY AUTOMATED COUNT: 13.3 % (ref 11.5–14.5)
EST. GFR  (NO RACE VARIABLE): >60 ML/MIN/1.73 M^2
FIBRINOGEN PPP-MCNC: 312 MG/DL (ref 182–400)
GLUCOSE SERPL-MCNC: 97 MG/DL (ref 70–110)
HCT VFR BLD AUTO: 42.7 % (ref 40–54)
HCT VFR BLD AUTO: 45.3 % (ref 40–54)
HGB BLD-MCNC: 15 G/DL (ref 14–18)
HGB BLD-MCNC: 15.6 G/DL (ref 14–18)
IMM GRANULOCYTES # BLD AUTO: 0.01 K/UL (ref 0–0.04)
IMM GRANULOCYTES # BLD AUTO: 0.04 K/UL (ref 0–0.04)
IMM GRANULOCYTES NFR BLD AUTO: 0.1 % (ref 0–0.5)
IMM GRANULOCYTES NFR BLD AUTO: 0.4 % (ref 0–0.5)
INR PPP: 1 (ref 0.8–1.2)
INR PPP: 1.1 (ref 0.8–1.2)
LYMPHOCYTES # BLD AUTO: 1.7 K/UL (ref 1–4.8)
LYMPHOCYTES # BLD AUTO: 1.9 K/UL (ref 1–4.8)
LYMPHOCYTES NFR BLD: 15.2 % (ref 18–48)
LYMPHOCYTES NFR BLD: 16.9 % (ref 18–48)
MCH RBC QN AUTO: 32.2 PG (ref 27–31)
MCH RBC QN AUTO: 32.3 PG (ref 27–31)
MCHC RBC AUTO-ENTMCNC: 34.4 G/DL (ref 32–36)
MCHC RBC AUTO-ENTMCNC: 35.1 G/DL (ref 32–36)
MCV RBC AUTO: 92 FL (ref 82–98)
MCV RBC AUTO: 94 FL (ref 82–98)
MONOCYTES # BLD AUTO: 1.3 K/UL (ref 0.3–1)
MONOCYTES # BLD AUTO: 1.3 K/UL (ref 0.3–1)
MONOCYTES NFR BLD: 11.6 % (ref 4–15)
MONOCYTES NFR BLD: 12.3 % (ref 4–15)
NEUTROPHILS # BLD AUTO: 7.2 K/UL (ref 1.8–7.7)
NEUTROPHILS # BLD AUTO: 7.9 K/UL (ref 1.8–7.7)
NEUTROPHILS NFR BLD: 65.7 % (ref 38–73)
NEUTROPHILS NFR BLD: 70.2 % (ref 38–73)
NRBC BLD-RTO: 0 /100 WBC
NRBC BLD-RTO: 0 /100 WBC
PLATELET # BLD AUTO: 229 K/UL (ref 150–450)
PLATELET # BLD AUTO: 230 K/UL (ref 150–450)
PMV BLD AUTO: 8.9 FL (ref 9.2–12.9)
PMV BLD AUTO: 9.2 FL (ref 9.2–12.9)
POTASSIUM SERPL-SCNC: 4 MMOL/L (ref 3.5–5.1)
PROTHROMBIN TIME: 11.5 SEC (ref 9–12.5)
PROTHROMBIN TIME: 11.9 SEC (ref 9–12.5)
RBC # BLD AUTO: 4.65 M/UL (ref 4.6–6.2)
RBC # BLD AUTO: 4.84 M/UL (ref 4.6–6.2)
SODIUM SERPL-SCNC: 137 MMOL/L (ref 136–145)
WBC # BLD AUTO: 10.93 K/UL (ref 3.9–12.7)
WBC # BLD AUTO: 11.31 K/UL (ref 3.9–12.7)

## 2023-12-01 PROCEDURE — 85610 PROTHROMBIN TIME: CPT | Mod: 91 | Performed by: STUDENT IN AN ORGANIZED HEALTH CARE EDUCATION/TRAINING PROGRAM

## 2023-12-01 PROCEDURE — 63600175 PHARM REV CODE 636 W HCPCS: Performed by: SURGERY

## 2023-12-01 PROCEDURE — 94761 N-INVAS EAR/PLS OXIMETRY MLT: CPT

## 2023-12-01 PROCEDURE — 27000221 HC OXYGEN, UP TO 24 HOURS

## 2023-12-01 PROCEDURE — 99153 MOD SED SAME PHYS/QHP EA: CPT | Performed by: SURGERY

## 2023-12-01 PROCEDURE — 99292 CRITICAL CARE ADDL 30 MIN: CPT

## 2023-12-01 PROCEDURE — C1887 CATHETER, GUIDING: HCPCS | Performed by: SURGERY

## 2023-12-01 PROCEDURE — 36415 COLL VENOUS BLD VENIPUNCTURE: CPT | Performed by: EMERGENCY MEDICINE

## 2023-12-01 PROCEDURE — 96365 THER/PROPH/DIAG IV INF INIT: CPT

## 2023-12-01 PROCEDURE — 85384 FIBRINOGEN ACTIVITY: CPT | Performed by: SURGERY

## 2023-12-01 PROCEDURE — 36247 INS CATH ABD/L-EXT ART 3RD: CPT | Performed by: SURGERY

## 2023-12-01 PROCEDURE — 96366 THER/PROPH/DIAG IV INF ADDON: CPT

## 2023-12-01 PROCEDURE — 25000003 PHARM REV CODE 250: Performed by: SURGERY

## 2023-12-01 PROCEDURE — 20000000 HC ICU ROOM

## 2023-12-01 PROCEDURE — 85610 PROTHROMBIN TIME: CPT | Performed by: EMERGENCY MEDICINE

## 2023-12-01 PROCEDURE — 75625 CONTRAST EXAM ABDOMINL AORTA: CPT | Mod: 59 | Performed by: SURGERY

## 2023-12-01 PROCEDURE — 25000242 PHARM REV CODE 250 ALT 637 W/ HCPCS: Performed by: SURGERY

## 2023-12-01 PROCEDURE — 85730 THROMBOPLASTIN TIME PARTIAL: CPT | Mod: 91 | Performed by: STUDENT IN AN ORGANIZED HEALTH CARE EDUCATION/TRAINING PROGRAM

## 2023-12-01 PROCEDURE — 85025 COMPLETE CBC W/AUTO DIFF WBC: CPT | Performed by: EMERGENCY MEDICINE

## 2023-12-01 PROCEDURE — 37184 PRIM ART M-THRMBC 1ST VSL: CPT | Performed by: SURGERY

## 2023-12-01 PROCEDURE — 36415 COLL VENOUS BLD VENIPUNCTURE: CPT | Performed by: STUDENT IN AN ORGANIZED HEALTH CARE EDUCATION/TRAINING PROGRAM

## 2023-12-01 PROCEDURE — 85730 THROMBOPLASTIN TIME PARTIAL: CPT | Performed by: EMERGENCY MEDICINE

## 2023-12-01 PROCEDURE — 27201423 OPTIME MED/SURG SUP & DEVICES STERILE SUPPLY: Performed by: SURGERY

## 2023-12-01 PROCEDURE — 63600175 PHARM REV CODE 636 W HCPCS: Performed by: ANESTHESIOLOGY

## 2023-12-01 PROCEDURE — 99291 CRITICAL CARE FIRST HOUR: CPT

## 2023-12-01 PROCEDURE — 99900035 HC TECH TIME PER 15 MIN (STAT)

## 2023-12-01 PROCEDURE — 94799 UNLISTED PULMONARY SVC/PX: CPT

## 2023-12-01 PROCEDURE — 85025 COMPLETE CBC W/AUTO DIFF WBC: CPT | Mod: 91 | Performed by: SURGERY

## 2023-12-01 PROCEDURE — 80048 BASIC METABOLIC PNL TOTAL CA: CPT | Performed by: EMERGENCY MEDICINE

## 2023-12-01 PROCEDURE — C1757 CATH, THROMBECTOMY/EMBOLECT: HCPCS | Performed by: SURGERY

## 2023-12-01 PROCEDURE — C1769 GUIDE WIRE: HCPCS | Performed by: SURGERY

## 2023-12-01 PROCEDURE — 37211 THROMBOLYTIC ART THERAPY: CPT | Mod: 59 | Performed by: SURGERY

## 2023-12-01 PROCEDURE — C1894 INTRO/SHEATH, NON-LASER: HCPCS | Performed by: SURGERY

## 2023-12-01 PROCEDURE — 99152 MOD SED SAME PHYS/QHP 5/>YRS: CPT | Performed by: SURGERY

## 2023-12-01 PROCEDURE — 94640 AIRWAY INHALATION TREATMENT: CPT

## 2023-12-01 PROCEDURE — 63600175 PHARM REV CODE 636 W HCPCS: Performed by: EMERGENCY MEDICINE

## 2023-12-01 PROCEDURE — 75710 ARTERY X-RAYS ARM/LEG: CPT | Mod: 59 | Performed by: SURGERY

## 2023-12-01 PROCEDURE — A4216 STERILE WATER/SALINE, 10 ML: HCPCS | Performed by: SURGERY

## 2023-12-01 PROCEDURE — 99900031 HC PATIENT EDUCATION (STAT)

## 2023-12-01 RX ORDER — DIPHENHYDRAMINE HYDROCHLORIDE 50 MG/ML
12.5 INJECTION INTRAMUSCULAR; INTRAVENOUS EVERY 4 HOURS PRN
Status: DISCONTINUED | OUTPATIENT
Start: 2023-12-01 | End: 2023-12-03 | Stop reason: HOSPADM

## 2023-12-01 RX ORDER — DEXMEDETOMIDINE HYDROCHLORIDE 4 UG/ML
0-1.4 INJECTION, SOLUTION INTRAVENOUS CONTINUOUS
Status: DISCONTINUED | OUTPATIENT
Start: 2023-12-01 | End: 2023-12-02

## 2023-12-01 RX ORDER — FENTANYL CITRATE 50 UG/ML
INJECTION, SOLUTION INTRAMUSCULAR; INTRAVENOUS
Status: DISCONTINUED | OUTPATIENT
Start: 2023-12-01 | End: 2023-12-01 | Stop reason: HOSPADM

## 2023-12-01 RX ORDER — MUPIROCIN 20 MG/G
OINTMENT TOPICAL 2 TIMES DAILY
Status: DISCONTINUED | OUTPATIENT
Start: 2023-12-01 | End: 2023-12-03 | Stop reason: HOSPADM

## 2023-12-01 RX ORDER — LORAZEPAM 2 MG/ML
2 INJECTION INTRAMUSCULAR
Status: DISCONTINUED | OUTPATIENT
Start: 2023-12-01 | End: 2023-12-03 | Stop reason: HOSPADM

## 2023-12-01 RX ORDER — ACETAMINOPHEN 325 MG/1
650 TABLET ORAL EVERY 8 HOURS PRN
Status: DISCONTINUED | OUTPATIENT
Start: 2023-12-01 | End: 2023-12-03 | Stop reason: HOSPADM

## 2023-12-01 RX ORDER — NALOXONE HCL 0.4 MG/ML
0.4 VIAL (ML) INJECTION ONCE
Status: DISCONTINUED | OUTPATIENT
Start: 2023-12-01 | End: 2023-12-03 | Stop reason: HOSPADM

## 2023-12-01 RX ORDER — LIDOCAINE HYDROCHLORIDE 10 MG/ML
INJECTION, SOLUTION EPIDURAL; INFILTRATION; INTRACAUDAL; PERINEURAL
Status: DISCONTINUED | OUTPATIENT
Start: 2023-12-01 | End: 2023-12-01 | Stop reason: HOSPADM

## 2023-12-01 RX ORDER — LEVALBUTEROL INHALATION SOLUTION 1.25 MG/3ML
1.25 SOLUTION RESPIRATORY (INHALATION) EVERY 12 HOURS
Status: DISCONTINUED | OUTPATIENT
Start: 2023-12-01 | End: 2023-12-03 | Stop reason: HOSPADM

## 2023-12-01 RX ORDER — HEPARIN SODIUM,PORCINE/D5W 25000/250
18 INTRAVENOUS SOLUTION INTRAVENOUS CONTINUOUS
Status: DISCONTINUED | OUTPATIENT
Start: 2023-12-01 | End: 2023-12-01

## 2023-12-01 RX ORDER — HYDRALAZINE HYDROCHLORIDE 20 MG/ML
INJECTION INTRAMUSCULAR; INTRAVENOUS
Status: DISCONTINUED | OUTPATIENT
Start: 2023-12-01 | End: 2023-12-01 | Stop reason: HOSPADM

## 2023-12-01 RX ORDER — IPRATROPIUM BROMIDE AND ALBUTEROL SULFATE 2.5; .5 MG/3ML; MG/3ML
3 SOLUTION RESPIRATORY (INHALATION) EVERY 6 HOURS PRN
Status: DISCONTINUED | OUTPATIENT
Start: 2023-12-01 | End: 2023-12-03 | Stop reason: HOSPADM

## 2023-12-01 RX ORDER — IPRATROPIUM BROMIDE 0.5 MG/2.5ML
0.5 SOLUTION RESPIRATORY (INHALATION) EVERY 6 HOURS
Status: DISCONTINUED | OUTPATIENT
Start: 2023-12-01 | End: 2023-12-03 | Stop reason: HOSPADM

## 2023-12-01 RX ORDER — LOSARTAN POTASSIUM 25 MG/1
25 TABLET ORAL DAILY
Status: DISCONTINUED | OUTPATIENT
Start: 2023-12-02 | End: 2023-12-03 | Stop reason: HOSPADM

## 2023-12-01 RX ORDER — HYDROMORPHONE HYDROCHLORIDE 1 MG/ML
INJECTION, SOLUTION INTRAMUSCULAR; INTRAVENOUS; SUBCUTANEOUS
Status: DISCONTINUED | OUTPATIENT
Start: 2023-12-01 | End: 2023-12-01 | Stop reason: HOSPADM

## 2023-12-01 RX ORDER — FLUTICASONE PROPIONATE 50 MCG
2 SPRAY, SUSPENSION (ML) NASAL DAILY PRN
Status: DISCONTINUED | OUTPATIENT
Start: 2023-12-01 | End: 2023-12-03 | Stop reason: HOSPADM

## 2023-12-01 RX ORDER — MIDAZOLAM HYDROCHLORIDE 1 MG/ML
INJECTION INTRAMUSCULAR; INTRAVENOUS
Status: DISCONTINUED | OUTPATIENT
Start: 2023-12-01 | End: 2023-12-01 | Stop reason: HOSPADM

## 2023-12-01 RX ORDER — OXYCODONE AND ACETAMINOPHEN 5; 325 MG/1; MG/1
1 TABLET ORAL EVERY 4 HOURS PRN
Status: DISCONTINUED | OUTPATIENT
Start: 2023-12-01 | End: 2023-12-02

## 2023-12-01 RX ORDER — ATORVASTATIN CALCIUM 10 MG/1
10 TABLET, FILM COATED ORAL NIGHTLY
Status: DISCONTINUED | OUTPATIENT
Start: 2023-12-01 | End: 2023-12-03 | Stop reason: HOSPADM

## 2023-12-01 RX ORDER — HYDROMORPHONE HYDROCHLORIDE 1 MG/ML
1 INJECTION, SOLUTION INTRAMUSCULAR; INTRAVENOUS; SUBCUTANEOUS
Status: DISCONTINUED | OUTPATIENT
Start: 2023-12-01 | End: 2023-12-02

## 2023-12-01 RX ORDER — HYDROMORPHONE HCL IN 0.9% NACL 6 MG/30 ML
PATIENT CONTROLLED ANALGESIA SYRINGE INTRAVENOUS CONTINUOUS
Status: DISCONTINUED | OUTPATIENT
Start: 2023-12-01 | End: 2023-12-02

## 2023-12-01 RX ORDER — SODIUM CHLORIDE 9 MG/ML
INJECTION, SOLUTION INTRAVENOUS CONTINUOUS
Status: DISCONTINUED | OUTPATIENT
Start: 2023-12-01 | End: 2023-12-02

## 2023-12-01 RX ORDER — ONDANSETRON 2 MG/ML
4 INJECTION INTRAMUSCULAR; INTRAVENOUS EVERY 6 HOURS PRN
Status: DISCONTINUED | OUTPATIENT
Start: 2023-12-01 | End: 2023-12-03 | Stop reason: HOSPADM

## 2023-12-01 RX ORDER — HYDRALAZINE HYDROCHLORIDE 20 MG/ML
10 INJECTION INTRAMUSCULAR; INTRAVENOUS EVERY 4 HOURS PRN
Status: DISCONTINUED | OUTPATIENT
Start: 2023-12-01 | End: 2023-12-03 | Stop reason: HOSPADM

## 2023-12-01 RX ORDER — SODIUM CHLORIDE 0.9 % (FLUSH) 0.9 %
10 SYRINGE (ML) INJECTION EVERY 12 HOURS
Status: DISCONTINUED | OUTPATIENT
Start: 2023-12-01 | End: 2023-12-03 | Stop reason: HOSPADM

## 2023-12-01 RX ORDER — ONDANSETRON 2 MG/ML
4 INJECTION INTRAMUSCULAR; INTRAVENOUS EVERY 8 HOURS PRN
Status: DISCONTINUED | OUTPATIENT
Start: 2023-12-01 | End: 2023-12-01

## 2023-12-01 RX ORDER — HEPARIN SODIUM 10000 [USP'U]/100ML
INJECTION, SOLUTION INTRAVENOUS
Status: DISCONTINUED | OUTPATIENT
Start: 2023-12-01 | End: 2023-12-01

## 2023-12-01 RX ORDER — HEPARIN SODIUM 10000 [USP'U]/100ML
500 INJECTION, SOLUTION INTRAVENOUS CONTINUOUS
Status: DISCONTINUED | OUTPATIENT
Start: 2023-12-01 | End: 2023-12-02

## 2023-12-01 RX ORDER — HYDROMORPHONE HYDROCHLORIDE 1 MG/ML
2 INJECTION, SOLUTION INTRAMUSCULAR; INTRAVENOUS; SUBCUTANEOUS ONCE
Status: COMPLETED | OUTPATIENT
Start: 2023-12-01 | End: 2023-12-01

## 2023-12-01 RX ADMIN — SODIUM CHLORIDE, PRESERVATIVE FREE 10 ML: 5 INJECTION INTRAVENOUS at 03:12

## 2023-12-01 RX ADMIN — OXYCODONE AND ACETAMINOPHEN 1 TABLET: 5; 325 TABLET ORAL at 05:12

## 2023-12-01 RX ADMIN — ATORVASTATIN CALCIUM 10 MG: 10 TABLET, FILM COATED ORAL at 10:12

## 2023-12-01 RX ADMIN — HYDROMORPHONE HYDROCHLORIDE 2 MG: 0.5 INJECTION, SOLUTION INTRAMUSCULAR; INTRAVENOUS; SUBCUTANEOUS at 06:12

## 2023-12-01 RX ADMIN — LABETALOL HYDROCHLORIDE 150 MG: 100 TABLET, FILM COATED ORAL at 10:12

## 2023-12-01 RX ADMIN — HEPARIN SODIUM 500 UNITS/HR: 10000 INJECTION, SOLUTION INTRAVENOUS at 05:12

## 2023-12-01 RX ADMIN — LEVALBUTEROL HYDROCHLORIDE 1.25 MG: 1.25 SOLUTION RESPIRATORY (INHALATION) at 08:12

## 2023-12-01 RX ADMIN — MUPIROCIN 1 G: 20 OINTMENT TOPICAL at 10:12

## 2023-12-01 RX ADMIN — LORAZEPAM 2 MG: 2 INJECTION INTRAMUSCULAR; INTRAVENOUS at 05:12

## 2023-12-01 RX ADMIN — IPRATROPIUM BROMIDE 0.5 MG: 0.5 SOLUTION RESPIRATORY (INHALATION) at 08:12

## 2023-12-01 RX ADMIN — HEPARIN SODIUM 18 UNITS/KG/HR: 10000 INJECTION, SOLUTION INTRAVENOUS at 03:12

## 2023-12-01 RX ADMIN — SODIUM CHLORIDE: 0.9 INJECTION, SOLUTION INTRAVENOUS at 07:12

## 2023-12-01 NOTE — Clinical Note
All wires were removed.  Pt is calling she is out of her medication and would like another doctor to sent it in for her.Pt # [phone]

## 2023-12-01 NOTE — ED NOTES
Heparin is paused, per nomogram heparin should be paused for 2 hours. MD aware. Pt is having no S&S dipak of bleeding.

## 2023-12-01 NOTE — OP NOTE
[facility]  Surgery Department  Operative Note    SUMMARY     Date of Procedure: 12/1/2023     Procedure: Procedure(s) (LRB):  Angiogram Extremity Unilateral (Left)  EMBOLECTOMY OR THROMBECTOMY, BLOOD VESSEL, LOWER EXTREMITY (Left)     Surgeon(s) and Role:     * Ismael Esquivel MD - Primary     * Yen Rader MD - Co-Surgeon    Assisting Surgeon: None    Pre-Operative Diagnosis: Ischemic foot [I99.8]    Post-Operative Diagnosis: Post-Op Diagnosis Codes:     * Ischemic foot [I99.8]    Anesthesia: RN IV Sedation    Operative Findings (including complications, if any):  Left superficial femoral and popliteal and tibial artery thrombosis    Description of Technical Procedures:  Abdominal aortogram.  Left lower extremity angiogram.  Left SFA and popliteal Angiojet thrombectomy.  Selective catheterization to the left posterior tibial and anterior tibial arteries.  Initiation of tPA infusion with 50 cm long infusion catheter from the proximal SFA into the proximal posterior tibial artery.    Right femoral artery accessed with ultrasound guidance 5 Martiniquais sheath placed catheter advanced up the abdominal aorta abdominal aortogram performed catheter advanced into the left iliac and down into the left SFA left lower extremity angiogram was performed.  The abdominal aorta common internal and external iliac arteries are patent the left common femoral is patent profunda is patent left SFA is patent at its origin and then in the proximal 3rd of the SFA there is fresh thrombus and no visualization of tibial vessels distally we advanced catheter over wire into the distal popliteal artery placed a 6 Martiniquais sheath up and over the bifurcation.  Angiojet thrombectomy was performed through the SFA and popliteal arteries and injection demonstrated that there were intermittent areas of the SFA and popliteal artery bypass that were patent.  But the tibial vessels were thrombosed.  The proximal SFA had thrombus.  We then  passed a Glidewire into the left posterior tibial artery and then positioned a 50 cm long infusion catheter and initiated tPA infusion.  I discussed the tPA with Urology on-call since he had a TUR about 2 weeks ago.  Plan will be for catheter placement if hematuria becomes an issue.  Will return to cath lab tomorrow.    Significant Surgical Tasks Conducted by the Assistant(s), if Applicable:     Estimated Blood Loss (EBL): * No values recorded between 12/1/2023  3:28 PM and 12/1/2023  4:29 PM *           Implants: * No implants in log *    Specimens:   Specimen (24h ago, onward)      None                    Condition: Good    Disposition: ICU - extubated and stable.    Attestation: I was present and scrubbed for the entire procedure.

## 2023-12-01 NOTE — ED PROVIDER NOTES
Encounter Date: 12/1/2023       History     Chief Complaint   Patient presents with    Leg Pain     On eliquis and started back today because of clot concerns. Was not insturcted to start back. Has still been having bloody urine. Was off since around the 14th for TURP.      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  He noticed significant pallor to the foot tonight and worsening pain so came to the ER.  He notes that he can not feel a pulse anymore in his left foot.  No pain in the upper leg no abdominal pain.  Recently had a TURP and had been off anticoagulation for several weeks.  No chest pain or shortness of breath.  Patient has had previous interventions on the left leg and has had repair of the popliteal artery aneurysm and thrombectomy.    The history is provided by the patient.     Review of patient's allergies indicates:  No Known Allergies  Past Medical History:   Diagnosis Date    Blood clot in vein     left leg    Emphysema lung 2021    Enlarged prostate     Heterozygous MTHFR mutation [id number] 08/27/2023    Hyperlipidemia 2021    Hypertension 2001    Kidney stone 2001    x3    Kidney stones     x3    Personal history of colonic polyps 10/20/2023    Thyroid disease     benign growth, partial thyroidectomy     Past Surgical History:   Procedure Laterality Date    ANGIOGRAPHY OF LOWER EXTREMITY Left 08/05/2022    Procedure: Angiogram Extremity Unilateral;  Surgeon: Ali Khoobehi, MD;  Location: [facility] CATH/EP LAB;  Service: Peripheral Vascular;  Laterality: Left;    ANGIOGRAPHY OF LOWER EXTREMITY Left 08/06/2022    Procedure: Angioplasty-peripheral;  Surgeon: Ismael Esquivel MD;  Location: [facility] CATH/EP LAB;  Service: General;  Laterality: Left;    ANGIOGRAPHY OF LOWER EXTREMITY Left 08/06/2022    Procedure: Angiogram Extremity Unilateral;  Surgeon: Ismael Esquivel MD;  Location: [facility] CATH/EP LAB;   Service: General;  Laterality: Left;    ANGIOGRAPHY OF LOWER EXTREMITY Left 08/07/2022    Procedure: Angiogram Extremity Unilateral;  Surgeon: Ismael Esquivel MD;  Location: [facility] CATH/EP LAB;  Service: General;  Laterality: Left;    COLONOSCOPY N/A 10/20/2023    Procedure: COLONOSCOPY;  Surgeon: Jeff Call MD;  Location: [facility] ENDO;  Service: Endoscopy;  Laterality: N/A;    COLONOSCOPY  10/20/2023    5 YR RECALL    CYSTOSCOPY N/A 09/05/2023    Procedure: CYSTOSCOPY;  Surgeon: Adam Tyler MD;  Location: [facility] OR;  Service: Urology;  Laterality: N/A;    INJECTION OF TISSUE PLASMINOGEN ACTIVATOR Left 08/06/2022    Procedure: INJECTION, TISSUE PLASMINOGEN ACTIVATOR;  Surgeon: Ismael Esquivel MD;  Location: [facility] CATH/EP LAB;  Service: General;  Laterality: Left;    INJECTION OF TISSUE PLASMINOGEN ACTIVATOR Left 08/06/2022    Procedure: INJECTION, TISSUE PLASMINOGEN ACTIVATOR;  Surgeon: Ismael Esquivel MD;  Location: [facility] CATH/EP LAB;  Service: General;  Laterality: Left;    PROSTATE SURGERY  2018    REPAIR OF ANEURYSM Left 07/21/2021    Procedure: REPAIR POPLITEAL ARTERY ANEURYSM;  Surgeon: Ismael Esquivel MD;  Location: [facility] OR;  Service: Cardiovascular;  Laterality: Left;    THROMBECTOMY  08/06/2022    Procedure: THROMBECTOMY;  Surgeon: Ismael Esquivel MD;  Location: [facility] CATH/EP LAB;  Service: General;;    THROMBECTOMY Left 08/06/2022    Procedure: THROMBECTOMY;  Surgeon: Ismael Esquivel MD;  Location: [facility] CATH/EP LAB;  Service: General;  Laterality: Left;    THYROIDECTOMY, PARTIAL  2011    TONSILLECTOMY      as a child    TRANSRECTAL BIOPSY OF PROSTATE WITH ULTRASOUND GUIDANCE N/A 09/05/2023    Procedure: BIOPSY, PROSTATE, RECTAL APPROACH, WITH US GUIDANCE;  Surgeon: Adam Tyler MD;  Location: [facility] OR;  Service: Urology;  Laterality: N/A;    TRANSURETHRAL RESECTION OF PROSTATE N/A 11/16/2023    Procedure: TURP (TRANSURETHRAL RESECTION OF PROSTATE);  Surgeon: Adam Tyler,  MD;  Location: [facility];  Service: Urology;  Laterality: N/A;    urolift  04/2019    [facility]     Family History   Problem Relation Age of Onset    Hypertension Mother     COPD Mother     Pulmonary embolism Father      Social History     Tobacco Use    Smoking status: Former     Types: Cigarettes     Passive exposure: Past    Smokeless tobacco: Never   Substance Use Topics    Alcohol use: Not Currently     Alcohol/week: 1.0 standard drink of alcohol     Types: 1 Cans of beer per week     Comment: rare    Drug use: Never     Review of Systems   Musculoskeletal:         Left foot pain   Skin:  Positive for pallor.       Physical Exam     Initial Vitals [12/01/23 0136]   BP Pulse Resp Temp SpO2   (!) 131/94 78 16 98.3 °F (36.8 °C) 96 %      MAP       --         Physical Exam    Nursing note and vitals reviewed.  Constitutional: He appears well-developed and well-nourished. He is not diaphoretic.  Non-toxic appearance. He does not have a sickly appearance. He does not appear ill. No distress.   HENT:   Head: Normocephalic and atraumatic.   Eyes: EOM are normal.   Neck: Neck supple.   Normal range of motion.  Cardiovascular:  Normal rate, regular rhythm and normal heart sounds.     Exam reveals no gallop and no friction rub.       No murmur heard.  Pulses:       Femoral pulses are 2+ on the left side.       Popliteal pulses are 0 on the left side.        Dorsalis pedis pulses are 0 on the left side.        Posterior tibial pulses are 0 on the left side.   Bounding left femoral artery pulse.  He has a Doppler signal in the left popliteal fossa.  I can not palpate popliteal pulse.  He has no palpable pulse in the left dorsal pedal artery or the left posterior tibial artery.  No Doppler signal there either.  Left foot is cold.   Pulmonary/Chest: Breath sounds normal. No respiratory distress. He has no wheezes. He has no rhonchi. He has no rales.   Musculoskeletal:         General: Normal range of motion.      Cervical  back: Normal range of motion and neck supple. No rigidity. Normal range of motion.     Neurological: He is alert and oriented to person, place, and time.   Skin: Skin is warm and dry. No rash noted.   Psychiatric: He has a normal mood and affect. His behavior is normal. Judgment and thought content normal.         ED Course   Critical Care    Date/Time: 12/1/2023 1:19 AM    Performed by: Tulio Lancaster MD  Authorized by: Teofilo Galan MD  Direct patient critical care time: 120 minutes  Additional history critical care time: 15 minutes  Ordering / reviewing critical care time: 5 minutes  Documentation critical care time: 15 minutes  Consulting other physicians critical care time: 20 minutes  Total critical care time (exclusive of procedural time) : 175 minutes  Critical care was necessary to treat or prevent imminent or life-threatening deterioration of the following conditions: limb ischemia.  Critical care was time spent personally by me on the following activities: discussions with consultants, evaluation of patient's response to treatment, examination of patient, obtaining history from patient or surrogate, ordering and performing treatments and interventions, ordering and review of laboratory studies, pulse oximetry, re-evaluation of patient's condition and review of old charts.        Labs Reviewed   BASIC METABOLIC PANEL - Abnormal; Notable for the following components:       Result Value    CO2 21 (*)     All other components within normal limits   CBC W/ AUTO DIFFERENTIAL - Abnormal; Notable for the following components:    MCH 32.3 (*)     MPV 8.9 (*)     Mono # 1.3 (*)     Lymph % 16.9 (*)     All other components within normal limits   APTT - Abnormal; Notable for the following components:    aPTT 32.5 (*)     All other components within normal limits   APTT - Abnormal; Notable for the following components:    aPTT 110.5 (*)     All other components within normal limits   PROTIME-INR   PROTIME-INR           Imaging Results    None          Medications   mupirocin 2 % ointment (has no administration in time range)   hydrALAZINE injection 10 mg (has no administration in time range)   sodium chloride 0.9% flush 10 mL (10 mLs Intravenous Given 12/1/23 1530)   acetaminophen tablet 650 mg (has no administration in time range)   oxyCODONE-acetaminophen 5-325 mg per tablet 1 tablet (1 tablet Oral Given 12/1/23 1715)   HYDROmorphone injection 1 mg (has no administration in time range)   labetalol split tablet 150 mg (has no administration in time range)   losartan tablet 25 mg (has no administration in time range)   fluticasone propionate 50 mcg/actuation nasal spray 100 mcg (has no administration in time range)   albuterol-ipratropium 2.5 mg-0.5 mg/3 mL nebulizer solution 3 mL (has no administration in time range)   atorvastatin tablet 10 mg (has no administration in time range)   levalbuterol nebulizer solution 1.25 mg (has no administration in time range)     And   ipratropium 0.02 % nebulizer solution 0.5 mg (has no administration in time range)   alteplase (CATHFlo ACtivase) 8 mg in sodium chloride 0.9% 80 mL infusion (has no administration in time range)   heparin 25,000 units in dextrose 5% 250 mL (100 units/mL) infusion (heparin infusion - NO NOMOGRAM) (500 Units/hr Intra-Catheter New Bag 12/1/23 1715)   0.9%  NaCl infusion (has no administration in time range)   LORazepam injection 2 mg (2 mg Intravenous Given 12/1/23 1715)   HYDROmorphone PCA syringe 6 mg/30 mL (0.2 mg/mL) NS (has no administration in time range)   naloxone 0.4 mg/mL injection 0.4 mg (has no administration in time range)   diphenhydrAMINE injection 12.5 mg (has no administration in time range)   ondansetron injection 4 mg (has no administration in time range)   dexmedetomidine (PRECEDEX) 400mcg/100mL 0.9% NaCL infusion (has no administration in time range)   heparin 25,000 units in dextrose 5% (100 units/ml) IV bolus from bag INITIAL BOLUS  (6,480 Units Intravenous Bolus from Bag 12/1/23 0302)   HYDROmorphone injection 2 mg (2 mg Intravenous Given 12/1/23 1823)     Medical Decision Making  63-year-old male with a history of previous left lower extremity ischemia presents with several days of pain.  On exam he has no pulse in left foot and it is cold and pale.  Immediate suspicion for recurrent ischemia.  Started on heparin drip immediately.  Case discussed with patient has previous vascular surgeon Dr. Esquivel who he has not seen in over a year due to insurance issues.  Dr. Esquivel does accept the patient at [facility].  Initially no bed, eventually an ICU bed was obtained.  Patient will be transferred on a heparin drip.  Symptoms are greatly improved at this time.    Following acceptance at [facility] Dr. Esquivel called me around 630am and requested the patient be transferred to [place].  This was handed over to Dr. Dubose to finalize dispo.    Amount and/or Complexity of Data Reviewed  External Data Reviewed: notes.     Details: 0233 HPI:   62 year old male with history of COPD, Popliteal Aneurysm/Repair (left), Hypothyroidism, HTN and has had 2/3 COVID vaccinations presented to ED complaining of 2 day history of worsening left lower extremity pain with cold foot. No LOP. Had left popliteal aneurysm repair July 2021. No CP/SOB.      In ED: US Artery revealed thrombosis mid superficial femoral artery downward. Labs reviewed and noted below: normal CBC, trivial hyponatremia otherwise normal CMP. CTA lower extremity ordered and pending. Discussed with ED MD: Vascular is aware and will see patient later today. Full dose heparinization started. NPO        Procedure(s) (LRB):  Angiogram Extremity Unilateral (Left)  EMBOLECTOMY OR THROMBECTOMY, BLOOD VESSEL, LOWER EXTREMITY (Left)       Hospital Course:   8/6: Patient had initiation of thrombolysis done yesterday and is going back to the OR for thrombectomy today.  Patient is doing well. No  concerns/issues overnight reported by the patient or the nursing staff.     8/7:  Patient just returned back from the cath lab where he had thrombectomy done.  Hematology currently on board and has ordered clot workup.  No acute overnight event.     8/8:  Once cleared by vascular surgeon, patient subsequently discharged on oral Eliquis and is to follow-up with Hematology on discharge concerning clot workup.   [EF]    Labs: ordered.  Discussion of management or test interpretation with external provider(s): 0344 Case d/w dr daniel, he will accept at [facility] agrees with heparin gtt    Risk  Prescription drug management.  Decision regarding hospitalization.               ED Course as of 12/01/23 0604   Fri Dec 01, 2023   0148 BP(!): 131/94 [EF]   0148 Temp: 98.3 °F (36.8 °C) [EF]   0148 Temp Source: Oral [EF]   0148 Pulse: 78 [EF]   0148 Resp: 16 [EF]   0148 SpO2: 96 % [EF]       0238 Heparin drip ordered, transfer request placed clinically the patient has an ischemic left lower extremity [EF]   0344 Case d/w dr daniel, he will accept at [facility] [EF]   0417 Dr. Daniel has agreed to see the patient if he is transferred to [facility].  I have discussed the case with hospitalist there but it seems like they might not have any beds to take the patient.  This is currently being sorted out. [EF]   0436 Leg not as cold, less painful on heparin [EF]   0603 [facility] has ICU bed, accepted by Dr Rey, on heparin gtt, pain gone, pallor better [EF]      ED Course User Index  [EF] Tulio Lancaster MD                           Clinical Impression:  Final diagnoses:  [I99.8] Ischemic foot          ED Disposition Condition    Admit                 Tulio Lancaster MD  12/01/23 0606       Tulio Lancaster MD  12/01/23 1925

## 2023-12-01 NOTE — ED NOTES
Dr. Lancaster notified RN and Charge RN that Dr. Esquivel now wants pt to be transferred to [place].

## 2023-12-01 NOTE — HPI
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  This has been ongoing the past few days.  Patient noticed significant palliative to the left foot last night.  He noted he could not feel his pulse anymore.  Patient recently had a TURP and has been off Eliquis since then.  Vascular surgery consulted.  Patient transferred to [facility] for angiogram.  Patient started on heparin drip.

## 2023-12-01 NOTE — PROGRESS NOTES
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  This has been ongoing the past few days.  Patient noticed significant palliative to the left foot last night.  He noted he could not feel his pulse anymore.  Patient recently had a TURP and has been off Eliquis since then.  Vascular surgery consulted.  Patient transferred to [facility] for angiogram.  Patient started on heparin drip.    Overview/Hospital Course:  No notes on file    Past Medical History:   Diagnosis Date    Blood clot in vein     left leg    Emphysema lung 2021    Enlarged prostate     Heterozygous MTHFR mutation C677T 08/27/2023    Hyperlipidemia 2021    Hypertension 2001    Kidney stone 2001    x3    Kidney stones     x3    Personal history of colonic polyps 10/20/2023    Thyroid disease     benign growth, partial thyroidectomy       Past Surgical History:   Procedure Laterality Date    ANGIOGRAPHY OF LOWER EXTREMITY Left 08/05/2022    Procedure: Angiogram Extremity Unilateral;  Surgeon: Ali Khoobehi, MD;  Location: [facility] CATH/EP LAB;  Service: Peripheral Vascular;  Laterality: Left;    ANGIOGRAPHY OF LOWER EXTREMITY Left 08/06/2022    Procedure: Angioplasty-peripheral;  Surgeon: Ismael Esquivel MD;  Location: [facility] CATH/EP LAB;  Service: General;  Laterality: Left;    ANGIOGRAPHY OF LOWER EXTREMITY Left 08/06/2022    Procedure: Angiogram Extremity Unilateral;  Surgeon: Ismael Esquivel MD;  Location: [facility] CATH/EP LAB;  Service: General;  Laterality: Left;    ANGIOGRAPHY OF  LOWER EXTREMITY Left 08/07/2022    Procedure: Angiogram Extremity Unilateral;  Surgeon: Ismael Esquivel MD;  Location: [facility] CATH/EP LAB;  Service: General;  Laterality: Left;    COLONOSCOPY N/A 10/20/2023    Procedure: COLONOSCOPY;  Surgeon: Jeff Call MD;  Location: [facility] ENDO;  Service: Endoscopy;  Laterality: N/A;    COLONOSCOPY  10/20/2023    5 YR RECALL    CYSTOSCOPY N/A 09/05/2023    Procedure: CYSTOSCOPY;  Surgeon: Adam Tyler MD;  Location: [facility] OR;  Service: Urology;  Laterality: N/A;    INJECTION OF TISSUE PLASMINOGEN ACTIVATOR Left 08/06/2022    Procedure: INJECTION, TISSUE PLASMINOGEN ACTIVATOR;  Surgeon: Ismael Esquivel MD;  Location: [facility] CATH/EP LAB;  Service: General;  Laterality: Left;    INJECTION OF TISSUE PLASMINOGEN ACTIVATOR Left 08/06/2022    Procedure: INJECTION, TISSUE PLASMINOGEN ACTIVATOR;  Surgeon: Ismael Esquivel MD;  Location: [facility] CATH/EP LAB;  Service: General;  Laterality: Left;    PROSTATE SURGERY  2018    REPAIR OF ANEURYSM Left 07/21/2021    Procedure: REPAIR POPLITEAL ARTERY ANEURYSM;  Surgeon: Ismael Esquivel MD;  Location: [facility] OR;  Service: Cardiovascular;  Laterality: Left;    THROMBECTOMY  08/06/2022    Procedure: THROMBECTOMY;  Surgeon: Ismael Esquivel MD;  Location: [facility] CATH/EP LAB;  Service: General;;    THROMBECTOMY Left 08/06/2022    Procedure: THROMBECTOMY;  Surgeon: Ismael Esquivel MD;  Location: [facility] CATH/EP LAB;  Service: General;  Laterality: Left;    THYROIDECTOMY, PARTIAL  2011    TONSILLECTOMY      as a child    TRANSRECTAL BIOPSY OF PROSTATE WITH ULTRASOUND GUIDANCE N/A 09/05/2023    Procedure: BIOPSY, PROSTATE, RECTAL APPROACH, WITH US GUIDANCE;  Surgeon: Adam Tyler MD;  [facility];  Service: Urology;  Laterality: N/A;    TRANSURETHRAL RESECTION OF PROSTATE N/A 11/16/2023    Procedure: TURP (TRANSURETHRAL RESECTION OF PROSTATE);  Surgeon: Adam Tyler MD;  Location: [facility] OR;  Service: Urology;   Laterality: N/A;    urolift  04/2019    [facility]       Review of patient's allergies indicates:  No Known Allergies    Current Facility-Administered Medications on File Prior to Encounter   Medication    lactated ringers infusion     Current Outpatient Medications on File Prior to Encounter   Medication Sig    albuterol (PROVENTIL/VENTOLIN HFA) 90 mcg/actuation inhaler Inhale 2 puffs into the lungs every 6 (six) hours as needed for Shortness of Breath.    albuterol-ipratropium (DUO-NEB) 2.5 mg-0.5 mg/3 mL nebulizer solution Take 3 mLs by nebulization every 6 (six) hours as needed for Wheezing or Shortness of Breath. Rescue    budesonide-glycopyr-formoterol (BREZTRI AEROSPHERE) 160-9-4.8 mcg/actuation HFAA Inhale 2 puffs into the lungs 2 (two) times a day. (Patient taking differently: Inhale 2 puffs into the lungs 2 (two) times daily as needed (shortness of breath).)    cyanocobalamin/folic ac/vit B6 (FOLBIC ORAL) Take 1 tablet by mouth once daily.    ELIQUIS 5 mg Tab Take 1 tablet (5 mg total) by mouth 2 (two) times daily.    fluticasone propionate (FLONASE) 50 mcg/actuation nasal spray 2 sprays by Each Nostril route daily as needed for Allergies.    labetaloL (NORMODYNE) 300 MG tablet Take 1 tablet (300 mg total) by mouth 2 (two) times a day. (Patient taking differently: Take 150 mg by mouth 2 (two) times a day.)    olmesartan (BENICAR) 40 MG tablet Take 1 tablet (40 mg total) by mouth every morning.    predniSONE (DELTASONE) 20 MG tablet 3 pills for 3 days, 2 for 3 days, 1 for 3 days. Repeat for cough    rosuvastatin (CRESTOR) 10 MG tablet Take 1 tablet (10 mg total) by mouth once daily.     Family History       Problem Relation (Age of Onset)    COPD Mother    Hypertension Mother    Pulmonary embolism Father          Tobacco Use    Smoking status: Former     Types: Cigarettes     Passive exposure: Past    Smokeless tobacco: Never   Substance and Sexual Activity    Alcohol use: Not Currently     Alcohol/week:  1.0 standard drink of alcohol     Types: 1 Cans of beer per week     Comment: rare    Drug use: Never    Sexual activity: Not on file     Review of Systems   Constitutional: Negative.    HENT: Negative.     Eyes: Negative.    Respiratory: Negative.     Cardiovascular: Negative.    Gastrointestinal: Negative.    Endocrine: Negative.    Genitourinary: Negative.    Musculoskeletal:         Left foot pain   Skin:  Positive for pallor.   Neurological: Negative.    Hematological: Negative.    Psychiatric/Behavioral: Negative.       Objective:     Vital Signs (Most Recent):  Temp: 99.1 °F (37.3 °C) (12/01/23 1340)  Pulse: 76 (12/01/23 1340)  Resp: 18 (12/01/23 1340)  BP: (!) 155/95 (12/01/23 1340)  SpO2: 96 % (12/01/23 1340) Vital Signs (24h Range):  Temp:  [98.3 °F (36.8 °C)-99.1 °F (37.3 °C)] 99.1 °F (37.3 °C)  Pulse:  [65-78] 76  Resp:  [16-18] 18  SpO2:  [94 %-98 %] 96 %  BP: (129-167)/() 155/95     Weight: 86 kg (189 lb 9.5 oz)  Body mass index is 25.71 kg/m².     Physical Exam  Vitals and nursing note reviewed.   Constitutional:       Appearance: Normal appearance.   HENT:      Head: Normocephalic and atraumatic.      Nose: Nose normal.   Eyes:      Extraocular Movements: Extraocular movements intact.      Conjunctiva/sclera: Conjunctivae normal.   Cardiovascular:      Rate and Rhythm: Normal rate and regular rhythm.      Pulses:           Dorsalis pedis pulses are 0 on the left side.        Posterior tibial pulses are 0 on the left side.      Heart sounds: No murmur heard.  Pulmonary:      Effort: Pulmonary effort is normal. No respiratory distress.      Breath sounds: Normal breath sounds. No stridor. No wheezing or rales.   Abdominal:      General: Abdomen is flat. There is no distension.      Palpations: Abdomen is soft.      Tenderness: There is no abdominal tenderness.   Musculoskeletal:         General: No swelling, tenderness or deformity. Normal range of motion.      Cervical back: Normal range of  motion and neck supple.      Right lower leg: No edema.      Left lower leg: No edema.   Skin:     General: Skin is warm and dry.      Coloration: Skin is not jaundiced.   Neurological:      General: No focal deficit present.      Mental Status: He is alert and oriented to person, place, and time.                Significant Labs: All pertinent labs within the past 24 hours have been reviewed.  Recent Lab Results         12/01/23  0932   12/01/23  0222        Anion Gap   11       aPTT 110.5  Comment: Refer to local heparin nomogram for intensity/dose specific   therapeutic   range.     32.5  Comment: Refer to local heparin nomogram for intensity/dose specific   therapeutic   range.         Baso #   0.07       Basophil %   0.6       BUN   11       Calcium   9.2       Chloride   105       CO2   21       Creatinine   0.9       Differential Method   Automated       eGFR   >60       Eos #   0.5       Eosinophil %   4.4       Glucose   97       Gran # (ANC)   7.2       Gran %   65.7       Hematocrit   42.7       Hemoglobin   15.0       Immature Grans (Abs)   0.01  Comment: Mild elevation in immature granulocytes is non specific and   can be seen in a variety of conditions including stress response,   acute inflammation, trauma and pregnancy. Correlation with other   laboratory and clinical findings is essential.         Immature Granulocytes   0.1       INR 1.1  Comment: Coumadin Therapy:  2.0 - 3.0 for INR for all indicators except mechanical heart valves  and antiphospholipid syndromes which should use 2.5 - 3.5.     1.0  Comment: Coumadin Therapy:  2.0 - 3.0 for INR for all indicators except mechanical heart valves  and antiphospholipid syndromes which should use 2.5 - 3.5.         Lymph #   1.9       Lymph %   16.9       MCH   32.3       MCHC   35.1       MCV   92       Mono #   1.3       Mono %   12.3       MPV   8.9       nRBC   0       Platelet Count   229       Potassium   4.0       Protime 11.9   11.5       RBC    4.65       RDW   13.2       Sodium   137       WBC   10.93               Significant Imaging: I have reviewed all pertinent imaging results/findings within the past 24 hours.    Assessment/Plan:      * Ischemic foot  Continue heparin drip.  Vascular surgery consulted.  Angiogram today.  Monitor in ICU.      HTN (hypertension)  Continue home medications    BPH with urinary obstruction  S/p TURP 11/16        VTE Risk Mitigation (From admission, onward)           Ordered     heparin 25,000 units in dextrose 5% 250 mL (100 units/mL) infusion (heparin infusion - NO NOMOGRAM)  Intra-op continuous PRN         12/01/23 1609     IP VTE HIGH RISK PATIENT  Once         12/01/23 1517     Place sequential compression device  Until discontinued         12/01/23 1517     heparin 25,000 units in dextrose 5% (100 units/ml) IV bolus from bag - ADDITIONAL PRN BOLUS - 60 units/kg  As needed (PRN)        Question:  Heparin Infusion Adjustment (DO NOT MODIFY ANSWER)  Answer:  [email or web address]\Images\Pharmacy\HeparinInfusions\heparin HIGH INTENSITY nomogram for [facility] [id number]    12/01/23 0238     heparin 25,000 units in dextrose 5% (100 units/ml) IV bolus from bag - ADDITIONAL PRN BOLUS - 30 units/kg  As needed (PRN)        Question:  Heparin Infusion Adjustment (DO NOT MODIFY ANSWER)  Answer:  \\[email or web address]\epic\Images\Pharmacy\HeparinInfusions\heparin HIGH INTENSITY nomogram for [facility] [id number]    12/01/23 0238     heparin 25,000 units in dextrose 5% 250 mL (100 units/mL) infusion HIGH INTENSITY nomogram - [facility]  Continuous        Question:  Begin at (units/kg/hr)  Answer:  18    12/01/23 0238                    Discharge Planning   PETER:      Code Status: Full Code   Is the patient medically ready for discharge?:     Reason for patient still in hospital (select all that apply): Treatment                 Ismael Everett MD  Department of Hospital Medicine   [facility]

## 2023-12-01 NOTE — SUBJECTIVE & OBJECTIVE
Past Medical History:   Diagnosis Date    Blood clot in vein     left leg    Emphysema lung 2021    Enlarged prostate     Heterozygous MTHFR mutation C677T 08/27/2023    Hyperlipidemia 2021    Hypertension 2001    Kidney stone 2001    x3    Kidney stones     x3    Personal history of colonic polyps 10/20/2023    Thyroid disease     benign growth, partial thyroidectomy       Past Surgical History:   Procedure Laterality Date    ANGIOGRAPHY OF LOWER EXTREMITY Left 08/05/2022    Procedure: Angiogram Extremity Unilateral;  Surgeon: Ali Khoobehi, MD;  Location: [facility] CATH/EP LAB;  Service: Peripheral Vascular;  Laterality: Left;    ANGIOGRAPHY OF LOWER EXTREMITY Left 08/06/2022    Procedure: Angioplasty-peripheral;  Surgeon: Ismael Esquivel MD;  Location: [facility] CATH/EP LAB;  Service: General;  Laterality: Left;    ANGIOGRAPHY OF LOWER EXTREMITY Left 08/06/2022    Procedure: Angiogram Extremity Unilateral;  Surgeon: Ismael Esquivel MD;  Location: [facility] CATH/EP LAB;  Service: General;  Laterality: Left;    ANGIOGRAPHY OF LOWER EXTREMITY Left 08/07/2022    Procedure: Angiogram Extremity Unilateral;  Surgeon: Ismael Esquivel MD;  Location: [facility] CATH/EP LAB;  Service: General;  Laterality: Left;    COLONOSCOPY N/A 10/20/2023    Procedure: COLONOSCOPY;  Surgeon: Jeff Call MD;  Location: [facility] ENDO;  Service: Endoscopy;  Laterality: N/A;    COLONOSCOPY  10/20/2023    5 YR RECALL    CYSTOSCOPY N/A 09/05/2023    Procedure: CYSTOSCOPY;  Surgeon: Adam Tyler MD;  Location: [facility] A[facility] OR;  Service: Urology;  Laterality: N/A;    INJECTION OF TISSUE PLASMINOGEN ACTIVATOR Left 08/06/2022    Procedure: INJECTION, TISSUE PLASMINOGEN ACTIVATOR;  Surgeon: Ismael Esquivel MD;  Location: [facility] CATH/EP LAB;  Service: General;  Laterality: Left;    INJECTION OF TISSUE PLASMINOGEN ACTIVATOR Left 08/06/2022    Procedure: INJECTION, TISSUE PLASMINOGEN ACTIVATOR;  Surgeon: Ismael Esquivel MD;  Location: [facility] CATH/EP LAB;   Service: General;  Laterality: Left;    PROSTATE SURGERY  2018    REPAIR OF ANEURYSM Left 07/21/2021    Procedure: REPAIR POPLITEAL ARTERY ANEURYSM;  Surgeon: Ismael Esquivel MD;  Location: [facility] OR;  Service: Cardiovascular;  Laterality: Left;    THROMBECTOMY  08/06/2022    Procedure: THROMBECTOMY;  Surgeon: Ismael Esquivel MD;  Location: [facility] CATH/EP LAB;  Service: General;;    THROMBECTOMY Left 08/06/2022    Procedure: THROMBECTOMY;  Surgeon: Ismael Esquivel MD;  Location: [facility] CATH/EP LAB;  Service: General;  Laterality: Left;    THYROIDECTOMY, PARTIAL  2011    TONSILLECTOMY      as a child    TRANSRECTAL BIOPSY OF PROSTATE WITH ULTRASOUND GUIDANCE N/A 09/05/2023    Procedure: BIOPSY, PROSTATE, RECTAL APPROACH, WITH US GUIDANCE;  Surgeon: Adam Tyler MD;  Location: [facility] OR;  Service: Urology;  Laterality: N/A;    TRANSURETHRAL RESECTION OF PROSTATE N/A 11/16/2023    Procedure: TURP (TRANSURETHRAL RESECTION OF PROSTATE);  Surgeon: Adam Tyler MD;  Location: [facility] OR;  Service: Urology;  Laterality: N/A;    urolift  04/2019    [facility]       Review of patient's allergies indicates:  No Known Allergies    Current Facility-Administered Medications on File Prior to Encounter   Medication    lactated ringers infusion     Current Outpatient Medications on File Prior to Encounter   Medication Sig    albuterol (PROVENTIL/VENTOLIN HFA) 90 mcg/actuation inhaler Inhale 2 puffs into the lungs every 6 (six) hours as needed for Shortness of Breath.    albuterol-ipratropium (DUO-NEB) 2.5 mg-0.5 mg/3 mL nebulizer solution Take 3 mLs by nebulization every 6 (six) hours as needed for Wheezing or Shortness of Breath. Rescue    budesonide-glycopyr-formoterol (BREZTRI AEROSPHERE) 160-9-4.8 mcg/actuation HFAA Inhale 2 puffs into the lungs 2 (two) times a day. (Patient taking differently: Inhale 2 puffs into the lungs 2 (two) times daily as needed (shortness of breath).)    cyanocobalamin/folic ac/vit B6 (FOLBIC  ORAL) Take 1 tablet by mouth once daily.    ELIQUIS 5 mg Tab Take 1 tablet (5 mg total) by mouth 2 (two) times daily.    fluticasone propionate (FLONASE) 50 mcg/actuation nasal spray 2 sprays by Each Nostril route daily as needed for Allergies.    labetaloL (NORMODYNE) 300 MG tablet Take 1 tablet (300 mg total) by mouth 2 (two) times a day. (Patient taking differently: Take 150 mg by mouth 2 (two) times a day.)    olmesartan (BENICAR) 40 MG tablet Take 1 tablet (40 mg total) by mouth every morning.    predniSONE (DELTASONE) 20 MG tablet 3 pills for 3 days, 2 for 3 days, 1 for 3 days. Repeat for cough    rosuvastatin (CRESTOR) 10 MG tablet Take 1 tablet (10 mg total) by mouth once daily.     Family History       Problem Relation (Age of Onset)    COPD Mother    Hypertension Mother    Pulmonary embolism Father          Tobacco Use    Smoking status: Former     Types: Cigarettes     Passive exposure: Past    Smokeless tobacco: Never   Substance and Sexual Activity    Alcohol use: Not Currently     Alcohol/week: 1.0 standard drink of alcohol     Types: 1 Cans of beer per week     Comment: rare    Drug use: Never    Sexual activity: Not on file     Review of Systems   Constitutional: Negative.    HENT: Negative.     Eyes: Negative.    Respiratory: Negative.     Cardiovascular: Negative.    Gastrointestinal: Negative.    Endocrine: Negative.    Genitourinary: Negative.    Musculoskeletal:         Left foot pain   Skin:  Positive for pallor.   Neurological: Negative.    Hematological: Negative.    Psychiatric/Behavioral: Negative.       Objective:     Vital Signs (Most Recent):  Temp: 99.1 °F (37.3 °C) (12/01/23 1340)  Pulse: 76 (12/01/23 1340)  Resp: 18 (12/01/23 1340)  BP: (!) 155/95 (12/01/23 1340)  SpO2: 96 % (12/01/23 1340) Vital Signs (24h Range):  Temp:  [98.3 °F (36.8 °C)-99.1 °F (37.3 °C)] 99.1 °F (37.3 °C)  Pulse:  [65-78] 76  Resp:  [16-18] 18  SpO2:  [94 %-98 %] 96 %  BP: (129-167)/() 155/95     Weight:  86 kg (189 lb 9.5 oz)  Body mass index is 25.71 kg/m².     Physical Exam  Vitals and nursing note reviewed.   Constitutional:       Appearance: Normal appearance.   HENT:      Head: Normocephalic and atraumatic.      Nose: Nose normal.   Eyes:      Extraocular Movements: Extraocular movements intact.      Conjunctiva/sclera: Conjunctivae normal.   Cardiovascular:      Rate and Rhythm: Normal rate and regular rhythm.      Pulses:           Dorsalis pedis pulses are 0 on the left side.        Posterior tibial pulses are 0 on the left side.      Heart sounds: No murmur heard.  Pulmonary:      Effort: Pulmonary effort is normal. No respiratory distress.      Breath sounds: Normal breath sounds. No stridor. No wheezing or rales.   Abdominal:      General: Abdomen is flat. There is no distension.      Palpations: Abdomen is soft.      Tenderness: There is no abdominal tenderness.   Musculoskeletal:         General: No swelling, tenderness or deformity. Normal range of motion.      Cervical back: Normal range of motion and neck supple.      Right lower leg: No edema.      Left lower leg: No edema.   Skin:     General: Skin is warm and dry.      Coloration: Skin is not jaundiced.   Neurological:      General: No focal deficit present.      Mental Status: He is alert and oriented to person, place, and time.                Significant Labs: All pertinent labs within the past 24 hours have been reviewed.  Recent Lab Results         12/01/23  0932   12/01/23  0222        Anion Gap   11       aPTT 110.5  Comment: Refer to local heparin nomogram for intensity/dose specific   therapeutic   range.     32.5  Comment: Refer to local heparin nomogram for intensity/dose specific   therapeutic   range.         Baso #   0.07       Basophil %   0.6       BUN   11       Calcium   9.2       Chloride   105       CO2   21       Creatinine   0.9       Differential Method   Automated       eGFR   >60       Eos #   0.5       Eosinophil %   4.4        Glucose   97       Gran # (ANC)   7.2       Gran %   65.7       Hematocrit   42.7       Hemoglobin   15.0       Immature Grans (Abs)   0.01  Comment: Mild elevation in immature granulocytes is non specific and   can be seen in a variety of conditions including stress response,   acute inflammation, trauma and pregnancy. Correlation with other   laboratory and clinical findings is essential.         Immature Granulocytes   0.1       INR 1.1  Comment: Coumadin Therapy:  2.0 - 3.0 for INR for all indicators except mechanical heart valves  and antiphospholipid syndromes which should use 2.5 - 3.5.     1.0  Comment: Coumadin Therapy:  2.0 - 3.0 for INR for all indicators except mechanical heart valves  and antiphospholipid syndromes which should use 2.5 - 3.5.         Lymph #   1.9       Lymph %   16.9       MCH   32.3       MCHC   35.1       MCV   92       Mono #   1.3       Mono %   12.3       MPV   8.9       nRBC   0       Platelet Count   229       Potassium   4.0       Protime 11.9   11.5       RBC   4.65       RDW   13.2       Sodium   137       WBC   10.93               Significant Imaging: I have reviewed all pertinent imaging results/findings within the past 24 hours.

## 2023-12-01 NOTE — Clinical Note
An angiography was performed of the proximal and mid left anterior tibial arterypost intervention  via hand injection with

## 2023-12-01 NOTE — Clinical Note
The site was marked. The groin was prepped. The site was prepped with ChloraPrep. The site was clipped. The patient was draped. normal...

## 2023-12-01 NOTE — Clinical Note
The catheter was selectively engaged to the  proximal left posterior tibial artery. TPA TO CATHETER PER DR MENDEZ.

## 2023-12-01 NOTE — H&P
[facility]  History & Physical    Patient Name: Helder Brand  MRN: [id number]  Patient Class: IP- Inpatient  Admission Date: 12/1/2023  Attending Physician: Ismael Everett MD   Primary Care Provider: Riley Atkinson PA-C         Patient information was obtained from patient, past medical records, and ER records.     Subjective:     Principal Problem:Ischemic foot    Chief Complaint:   Chief Complaint   Patient presents with    Leg Pain     On eliquis and started back today because of clot concerns. Was not insturcted to start back. Has still been having bloody urine. Was off since around the 14th for TURP.         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  This has been ongoing the past few days.  Patient noticed significant palliative to the left foot last night.  He noted he could not feel his pulse anymore.  Patient recently had a TURP and has been off Eliquis since then.  Vascular surgery consulted.  Patient transferred to [facility] for angiogram.  Patient started on heparin drip.    Past Medical History:   Diagnosis Date    Blood clot in vein     left leg    Emphysema lung 2021    Enlarged prostate     Heterozygous MTHFR mutation C677T 08/27/2023    Hyperlipidemia 2021    Hypertension 2001    Kidney stone 2001    x3    Kidney stones     x3    Personal history of colonic polyps 10/20/2023    Thyroid disease     benign growth, partial thyroidectomy       Past Surgical History:   Procedure Laterality Date    ANGIOGRAPHY OF LOWER EXTREMITY Left 08/05/2022    Procedure: Angiogram Extremity Unilateral;  Surgeon: Ali Khoobehi, MD;  Location: [facility] CATH/EP LAB;  Service: Peripheral Vascular;  Laterality: Left;    ANGIOGRAPHY OF LOWER EXTREMITY Left 08/06/2022    Procedure: Angioplasty-peripheral;  Surgeon: Ismael Esquivel MD;   Location: [facility] CATH/EP LAB;  Service: General;  Laterality: Left;    ANGIOGRAPHY OF LOWER EXTREMITY Left 08/06/2022    Procedure: Angiogram Extremity Unilateral;  Surgeon: Ismael Esquivel MD;  Location: [facility] CATH/EP LAB;  Service: General;  Laterality: Left;    ANGIOGRAPHY OF LOWER EXTREMITY Left 08/07/2022    Procedure: Angiogram Extremity Unilateral;  Surgeon: Ismael Esquivel MD;  Location: [facility] CATH/EP LAB;  Service: General;  Laterality: Left;    COLONOSCOPY N/A 10/20/2023    Procedure: COLONOSCOPY;  Surgeon: Jeff Call MD;  Location: [facility] ENDO;  Service: Endoscopy;  Laterality: N/A;    COLONOSCOPY  10/20/2023    5 YR RECALL    CYSTOSCOPY N/A 09/05/2023    Procedure: CYSTOSCOPY;  Surgeon: Adam Tyler MD;  Location: [facility] ASU OR;  Service: Urology;  Laterality: N/A;    INJECTION OF TISSUE PLASMINOGEN ACTIVATOR Left 08/06/2022    Procedure: INJECTION, TISSUE PLASMINOGEN ACTIVATOR;  Surgeon: Ismael Esquivel MD;  Location: [facility] CATH/EP LAB;  Service: General;  Laterality: Left;    INJECTION OF TISSUE PLASMINOGEN ACTIVATOR Left 08/06/2022    Procedure: INJECTION, TISSUE PLASMINOGEN ACTIVATOR;  Surgeon: Ismael Esquivel MD;  Location: [facility] CATH/EP LAB;  Service: General;  Laterality: Left;    PROSTATE SURGERY  2018    REPAIR OF ANEURYSM Left 07/21/2021    Procedure: REPAIR POPLITEAL ARTERY ANEURYSM;  Surgeon: Ismael Esquivel MD;  Location: [facility] OR;  Service: Cardiovascular;  Laterality: Left;    THROMBECTOMY  08/06/2022    Procedure: THROMBECTOMY;  Surgeon: Ismael Esquivel MD;  Location: [facility] CATH/EP LAB;  Service: General;;    THROMBECTOMY Left 08/06/2022    Procedure: THROMBECTOMY;  Surgeon: Ismael Esquivel MD;  Location: [facility] CATH/EP LAB;  Service: General;  Laterality: Left;    THYROIDECTOMY, PARTIAL  2011    TONSILLECTOMY      as a child    TRANSRECTAL BIOPSY OF PROSTATE WITH ULTRASOUND GUIDANCE N/A 09/05/2023    Procedure: BIOPSY, PROSTATE, RECTAL APPROACH, WITH US GUIDANCE;  Surgeon:  Adam Tyler MD;  Location: [facility] ASU OR;  Service: Urology;  Laterality: N/A;    TRANSURETHRAL RESECTION OF PROSTATE N/A 11/16/2023    Procedure: TURP (TRANSURETHRAL RESECTION OF PROSTATE);  Surgeon: Adam Tyler MD;  Location: [facility] OR;  Service: Urology;  Laterality: N/A;    urolift  04/2019    [facility]       Review of patient's allergies indicates:  No Known Allergies    Current Facility-Administered Medications on File Prior to Encounter   Medication    lactated ringers infusion     Current Outpatient Medications on File Prior to Encounter   Medication Sig    albuterol (PROVENTIL/VENTOLIN HFA) 90 mcg/actuation inhaler Inhale 2 puffs into the lungs every 6 (six) hours as needed for Shortness of Breath.    albuterol-ipratropium (DUO-NEB) 2.5 mg-0.5 mg/3 mL nebulizer solution Take 3 mLs by nebulization every 6 (six) hours as needed for Wheezing or Shortness of Breath. Rescue    budesonide-glycopyr-formoterol (BREZTRI AEROSPHERE) 160-9-4.8 mcg/actuation HFAA Inhale 2 puffs into the lungs 2 (two) times a day. (Patient taking differently: Inhale 2 puffs into the lungs 2 (two) times daily as needed (shortness of breath).)    cyanocobalamin/folic ac/vit B6 (FOLBIC ORAL) Take 1 tablet by mouth once daily.    ELIQUIS 5 mg Tab Take 1 tablet (5 mg total) by mouth 2 (two) times daily.    fluticasone propionate (FLONASE) 50 mcg/actuation nasal spray 2 sprays by Each Nostril route daily as needed for Allergies.    labetaloL (NORMODYNE) 300 MG tablet Take 1 tablet (300 mg total) by mouth 2 (two) times a day. (Patient taking differently: Take 150 mg by mouth 2 (two) times a day.)    olmesartan (BENICAR) 40 MG tablet Take 1 tablet (40 mg total) by mouth every morning.    predniSONE (DELTASONE) 20 MG tablet 3 pills for 3 days, 2 for 3 days, 1 for 3 days. Repeat for cough    rosuvastatin (CRESTOR) 10 MG tablet Take 1 tablet (10 mg total) by mouth once daily.     Family History       Problem Relation (Age of Onset)     COPD Mother    Hypertension Mother    Pulmonary embolism Father          Tobacco Use    Smoking status: Former     Types: Cigarettes     Passive exposure: Past    Smokeless tobacco: Never   Substance and Sexual Activity    Alcohol use: Not Currently     Alcohol/week: 1.0 standard drink of alcohol     Types: 1 Cans of beer per week     Comment: rare    Drug use: Never    Sexual activity: Not on file     Review of Systems   Constitutional: Negative.    HENT: Negative.     Eyes: Negative.    Respiratory: Negative.     Cardiovascular: Negative.    Gastrointestinal: Negative.    Endocrine: Negative.    Genitourinary: Negative.    Musculoskeletal:         Left foot pain   Skin:  Positive for pallor.   Neurological: Negative.    Hematological: Negative.    Psychiatric/Behavioral: Negative.       Objective:     Vital Signs (Most Recent):  Temp: 99.1 °F (37.3 °C) (12/01/23 1340)  Pulse: 76 (12/01/23 1340)  Resp: 18 (12/01/23 1340)  BP: (!) 155/95 (12/01/23 1340)  SpO2: 96 % (12/01/23 1340) Vital Signs (24h Range):  Temp:  [98.3 °F (36.8 °C)-99.1 °F (37.3 °C)] 99.1 °F (37.3 °C)  Pulse:  [65-78] 76  Resp:  [16-18] 18  SpO2:  [94 %-98 %] 96 %  BP: (129-167)/() 155/95     Weight: 86 kg (189 lb 9.5 oz)  Body mass index is 25.71 kg/m².     Physical Exam  Vitals and nursing note reviewed.   Constitutional:       Appearance: Normal appearance.   HENT:      Head: Normocephalic and atraumatic.      Nose: Nose normal.   Eyes:      Extraocular Movements: Extraocular movements intact.      Conjunctiva/sclera: Conjunctivae normal.   Cardiovascular:      Rate and Rhythm: Normal rate and regular rhythm.      Pulses:           Dorsalis pedis pulses are 0 on the left side.        Posterior tibial pulses are 0 on the left side.      Heart sounds: No murmur heard.  Pulmonary:      Effort: Pulmonary effort is normal. No respiratory distress.      Breath sounds: Normal breath sounds. No stridor. No wheezing or rales.   Abdominal:       General: Abdomen is flat. There is no distension.      Palpations: Abdomen is soft.      Tenderness: There is no abdominal tenderness.   Musculoskeletal:         General: No swelling, tenderness or deformity. Normal range of motion.      Cervical back: Normal range of motion and neck supple.      Right lower leg: No edema.      Left lower leg: No edema.   Skin:     General: Skin is warm and dry.      Coloration: Skin is not jaundiced.   Neurological:      General: No focal deficit present.      Mental Status: He is alert and oriented to person, place, and time.                Significant Labs: All pertinent labs within the past 24 hours have been reviewed.  Recent Lab Results         12/01/23  0932   12/01/23  0222        Anion Gap   11       aPTT 110.5  Comment: Refer to local heparin nomogram for intensity/dose specific   therapeutic   range.     32.5  Comment: Refer to local heparin nomogram for intensity/dose specific   therapeutic   range.         Baso #   0.07       Basophil %   0.6       BUN   11       Calcium   9.2       Chloride   105       CO2   21       Creatinine   0.9       Differential Method   Automated       eGFR   >60       Eos #   0.5       Eosinophil %   4.4       Glucose   97       Gran # (ANC)   7.2       Gran %   65.7       Hematocrit   42.7       Hemoglobin   15.0       Immature Grans (Abs)   0.01  Comment: Mild elevation in immature granulocytes is non specific and   can be seen in a variety of conditions including stress response,   acute inflammation, trauma and pregnancy. Correlation with other   laboratory and clinical findings is essential.         Immature Granulocytes   0.1       INR 1.1  Comment: Coumadin Therapy:  2.0 - 3.0 for INR for all indicators except mechanical heart valves  and antiphospholipid syndromes which should use 2.5 - 3.5.     1.0  Comment: Coumadin Therapy:  2.0 - 3.0 for INR for all indicators except mechanical heart valves  and antiphospholipid syndromes which  should use 2.5 - 3.5.         Lymph #   1.9       Lymph %   16.9       MCH   32.3       MCHC   35.1       MCV   92       Mono #   1.3       Mono %   12.3       MPV   8.9       nRBC   0       Platelet Count   229       Potassium   4.0       Protime 11.9   11.5       RBC   4.65       RDW   13.2       Sodium   137       WBC   10.93               Significant Imaging: I have reviewed all pertinent imaging results/findings within the past 24 hours.  Assessment/Plan:     * Ischemic foot  Continue heparin drip.  Vascular surgery consulted.  Angiogram today.  Monitor in ICU.      HTN (hypertension)  Continue home medications    BPH with urinary obstruction  S/p TURP 11/16        VTE Risk Mitigation (From admission, onward)           Ordered     heparin 25,000 units in dextrose 5% 250 mL (100 units/mL) infusion (heparin infusion - NO NOMOGRAM)  Intra-op continuous PRN         12/01/23 1609     IP VTE HIGH RISK PATIENT  Once         12/01/23 1517     Place sequential compression device  Until discontinued         12/01/23 1517     heparin 25,000 units in dextrose 5% (100 units/ml) IV bolus from bag - ADDITIONAL PRN BOLUS - 60 units/kg  As needed (PRN)        Question:  Heparin Infusion Adjustment (DO NOT MODIFY ANSWER)  Answer:  \\[email or web address]\epic\Images\Pharmacy\HeparinInfusions\heparin HIGH INTENSITY nomogram for [facility] [id number]    12/01/23 0238     heparin 25,000 units in dextrose 5% (100 units/ml) IV bolus from bag - ADDITIONAL PRN BOLUS - 30 units/kg  As needed (PRN)        Question:  Heparin Infusion Adjustment (DO NOT MODIFY ANSWER)  Answer:  \\[email or web address]\epic\Images\Pharmacy\HeparinInfusions\heparin HIGH INTENSITY nomogram for [facility] [id number].pdf    12/01/23 0238     heparin 25,000 units in dextrose 5% 250 mL (100 units/mL) infusion HIGH INTENSITY nomogram - [facility]  Continuous        Question:  Begin at (units/kg/hr)  Answer:  18    12/01/23 0238                                 Ismael Everett MD  Department of Hospital  Medicine  [facility]

## 2023-12-01 NOTE — Clinical Note
An angiography of the  left lower extremity was performed with the sheath and hand injected with 10 mL of contrast

## 2023-12-01 NOTE — Clinical Note
An angiography was performed of the ostial, proximal, mid and distal left anterior tibial artery, tibio-peroneal trunk and posterior tibial artery via hand injection with 10 mL of contrast

## 2023-12-01 NOTE — ED NOTES
Charge RN spoke with ERP and reports that the transfer process has to start all over again. EMS reports that they can be placed on will call for transport of pt.

## 2023-12-01 NOTE — Clinical Note
An angiography of the  left lower extremity selectively was performed with the catheter and hand injected with 10 mL of contrast

## 2023-12-01 NOTE — Clinical Note
An angiography was performed of the mid and distal left superficial femoral artery via hand injection with  Used sheath

## 2023-12-01 NOTE — NURSING
Darwin Britt at [facility], Anayeli, notified that Martir is here to transport this patient to [facility].  She will notify Cath[facility] and Dr Esquivel .

## 2023-12-01 NOTE — Clinical Note
Diagnosis: Ischemic foot [[id number]]   Future Attending Provider: LUCERO GOLD [[id number]]   Admitting Provider:: LUCERO GOLD [[id number]]   Admit to which facility:: [facility] [[id number]]   Reason for IP Medical Treatment  (Clinical interventions that can only be accomplished in the IP setting? ) :: ischemic limb   I certify that Inpatient services for greater than or equal to 2 midnights are medically necessary:: Yes   Plans for Post-Acute care--if anticipated (pick the single best option):: A. No post acute care anticipated at this time

## 2023-12-01 NOTE — ED NOTES
EMS transport arrived to ED to transport pt to [facility]. Transport was notified that changes were being made and that these changes had just occurred prior to them walking in ED. EMS transport team stated understanding of not being notified.

## 2023-12-01 NOTE — ED NOTES
Report received from Ismael Simon RN. Reassessed pulses at bedside. Left posterior tibial and dorsalis pedis are absent with doppler.

## 2023-12-01 NOTE — PHARMACY MED REC
"Admission Medication History     The home medication history was taken by Juvenal Reynaga.    You may go to "Admission" then "Reconcile Home Medications" tabs to review and/or act upon these items.     The home medication list has been updated by the Pharmacy department.   Please read ALL comments highlighted in yellow.   Please address this information as you see fit.    Feel free to contact us if you have any questions or require assistance.        Medications listed below were obtained from: Patient/family and Analytic software- "[employer]"  Current Facility-Administered Medications on File Prior to Encounter   Medication Dose Route Frequency Provider Last Rate Last Admin    lactated ringers infusion   Intravenous Continuous Jasbir Aragon MD 75 mL/hr at 11/07/19 1333 1,000 mL at 11/07/19 1333     Current Outpatient Medications on File Prior to Encounter   Medication Sig Dispense Refill    albuterol (PROVENTIL/VENTOLIN HFA) 90 mcg/actuation inhaler Inhale 2 puffs into the lungs every 6 (six) hours as needed for Shortness of Breath.      albuterol-ipratropium (DUO-NEB) 2.5 mg-0.5 mg/3 mL nebulizer solution Take 3 mLs by nebulization every 6 (six) hours as needed for Wheezing or Shortness of Breath. Rescue 240 mL 5    budesonide-glycopyr-formoterol (BREZTRI AEROSPHERE) 160-9-4.8 mcg/actuation HFAA Inhale 2 puffs into the lungs 2 (two) times a day. (Patient taking differently: Inhale 2 puffs into the lungs 2 (two) times daily as needed (shortness of breath).) 10.7 g 0    cyanocobalamin/folic ac/vit B6 (FOLBIC ORAL) Take 1 tablet by mouth once daily.      ELIQUIS 5 mg Tab Take 1 tablet (5 mg total) by mouth 2 (two) times daily. 180 tablet 1    fluticasone propionate (FLONASE) 50 mcg/actuation nasal spray 2 sprays by Each Nostril route daily as needed for Allergies.      labetaloL (NORMODYNE) 300 MG tablet Take 1 tablet (300 mg total) by mouth 2 (two) times a day. (Patient taking differently: Take 150 mg by mouth 2 " (two) times a day.) 180 tablet 1    olmesartan (BENICAR) 40 MG tablet Take 1 tablet (40 mg total) by mouth every morning. 90 tablet 1    predniSONE (DELTASONE) 20 MG tablet 3 pills for 3 days, 2 for 3 days, 1 for 3 days. Repeat for cough 36 tablet 0    rosuvastatin (CRESTOR) 10 MG tablet Take 1 tablet (10 mg total) by mouth once daily. 90 tablet 1         Juvenal Reynaga  EXT 1924                .

## 2023-12-01 NOTE — Clinical Note
An angiography was performed of the proximal and mid left anterior tibial artery via hand injection with

## 2023-12-01 NOTE — Clinical Note
An angiography was performed of the ostial and proximal left superficial femoral artery via hand injection with

## 2023-12-01 NOTE — Clinical Note
An angiography was performed of the distal left anterior tibial artery and tibio-peroneal trunk via hand injection with

## 2023-12-02 LAB
ALBUMIN SERPL BCP-MCNC: 3.4 G/DL (ref 3.5–5.2)
ALP SERPL-CCNC: 74 U/L (ref 55–135)
ALT SERPL W/O P-5'-P-CCNC: 11 U/L (ref 10–44)
ANION GAP SERPL CALC-SCNC: 7 MMOL/L (ref 8–16)
APTT PPP: 40.3 SEC (ref 21–32)
AST SERPL-CCNC: 20 U/L (ref 10–40)
BILIRUB SERPL-MCNC: 1 MG/DL (ref 0.1–1)
BUN SERPL-MCNC: 14 MG/DL (ref 8–23)
CALCIUM SERPL-MCNC: 8.1 MG/DL (ref 8.7–10.5)
CHLORIDE SERPL-SCNC: 107 MMOL/L (ref 95–110)
CO2 SERPL-SCNC: 22 MMOL/L (ref 23–29)
CREAT SERPL-MCNC: 0.8 MG/DL (ref 0.5–1.4)
ERYTHROCYTE [DISTWIDTH] IN BLOOD BY AUTOMATED COUNT: 13.4 % (ref 11.5–14.5)
EST. GFR  (NO RACE VARIABLE): >60 ML/MIN/1.73 M^2
FIBRINOGEN PPP-MCNC: 115 MG/DL (ref 182–400)
FIBRINOGEN PPP-MCNC: 115 MG/DL (ref 182–400)
FIBRINOGEN PPP-MCNC: 149 MG/DL (ref 182–400)
FIBRINOGEN PPP-MCNC: 193 MG/DL (ref 182–400)
GLUCOSE SERPL-MCNC: 114 MG/DL (ref 70–110)
GLUCOSE SERPL-MCNC: 94 MG/DL (ref 70–110)
HCT VFR BLD AUTO: 41.1 % (ref 40–54)
HGB BLD-MCNC: 14.1 G/DL (ref 14–18)
INR PPP: 1.3 (ref 0.8–1.2)
MCH RBC QN AUTO: 32 PG (ref 27–31)
MCHC RBC AUTO-ENTMCNC: 34.3 G/DL (ref 32–36)
MCV RBC AUTO: 93 FL (ref 82–98)
PLATELET # BLD AUTO: 153 K/UL (ref 150–450)
PMV BLD AUTO: 8.8 FL (ref 9.2–12.9)
POTASSIUM SERPL-SCNC: 3.9 MMOL/L (ref 3.5–5.1)
PROT SERPL-MCNC: 6.1 G/DL (ref 6–8.4)
PROTHROMBIN TIME: 14.2 SEC (ref 9–12.5)
RBC # BLD AUTO: 4.4 M/UL (ref 4.6–6.2)
SODIUM SERPL-SCNC: 136 MMOL/L (ref 136–145)
WBC # BLD AUTO: 9.05 K/UL (ref 3.9–12.7)

## 2023-12-02 PROCEDURE — 94761 N-INVAS EAR/PLS OXIMETRY MLT: CPT

## 2023-12-02 PROCEDURE — 37185 PRIM ART M-THRMBC SBSQ VSL: CPT | Performed by: SURGERY

## 2023-12-02 PROCEDURE — C1757 CATH, THROMBECTOMY/EMBOLECT: HCPCS | Performed by: SURGERY

## 2023-12-02 PROCEDURE — 63600175 PHARM REV CODE 636 W HCPCS: Performed by: SURGERY

## 2023-12-02 PROCEDURE — 37232 HC TIB/PER REVASC ADD-ON: CPT | Performed by: SURGERY

## 2023-12-02 PROCEDURE — 25000003 PHARM REV CODE 250: Performed by: SURGERY

## 2023-12-02 PROCEDURE — 85730 THROMBOPLASTIN TIME PARTIAL: CPT | Performed by: SURGERY

## 2023-12-02 PROCEDURE — 80053 COMPREHEN METABOLIC PANEL: CPT | Performed by: SURGERY

## 2023-12-02 PROCEDURE — 99900035 HC TECH TIME PER 15 MIN (STAT)

## 2023-12-02 PROCEDURE — 99153 MOD SED SAME PHYS/QHP EA: CPT | Performed by: SURGERY

## 2023-12-02 PROCEDURE — A4216 STERILE WATER/SALINE, 10 ML: HCPCS | Performed by: SURGERY

## 2023-12-02 PROCEDURE — 94640 AIRWAY INHALATION TREATMENT: CPT

## 2023-12-02 PROCEDURE — 37184 PRIM ART M-THRMBC 1ST VSL: CPT | Mod: LT | Performed by: SURGERY

## 2023-12-02 PROCEDURE — 85610 PROTHROMBIN TIME: CPT | Performed by: SURGERY

## 2023-12-02 PROCEDURE — C1769 GUIDE WIRE: HCPCS | Performed by: SURGERY

## 2023-12-02 PROCEDURE — 37228 HC TIB/PER REVASC W/TLA: CPT | Mod: LT | Performed by: SURGERY

## 2023-12-02 PROCEDURE — 20000000 HC ICU ROOM

## 2023-12-02 PROCEDURE — 99152 MOD SED SAME PHYS/QHP 5/>YRS: CPT | Performed by: SURGERY

## 2023-12-02 PROCEDURE — 99900031 HC PATIENT EDUCATION (STAT)

## 2023-12-02 PROCEDURE — 85027 COMPLETE CBC AUTOMATED: CPT | Performed by: SURGERY

## 2023-12-02 PROCEDURE — 25000242 PHARM REV CODE 250 ALT 637 W/ HCPCS: Performed by: SURGERY

## 2023-12-02 PROCEDURE — 75710 ARTERY X-RAYS ARM/LEG: CPT | Mod: XE | Performed by: SURGERY

## 2023-12-02 PROCEDURE — 85384 FIBRINOGEN ACTIVITY: CPT | Mod: 91 | Performed by: SURGERY

## 2023-12-02 PROCEDURE — C1894 INTRO/SHEATH, NON-LASER: HCPCS | Performed by: SURGERY

## 2023-12-02 PROCEDURE — 36247 INS CATH ABD/L-EXT ART 3RD: CPT | Mod: XE | Performed by: SURGERY

## 2023-12-02 PROCEDURE — C1887 CATHETER, GUIDING: HCPCS | Performed by: SURGERY

## 2023-12-02 PROCEDURE — C1725 CATH, TRANSLUMIN NON-LASER: HCPCS | Performed by: SURGERY

## 2023-12-02 PROCEDURE — 63600175 PHARM REV CODE 636 W HCPCS: Performed by: STUDENT IN AN ORGANIZED HEALTH CARE EDUCATION/TRAINING PROGRAM

## 2023-12-02 PROCEDURE — 94799 UNLISTED PULMONARY SVC/PX: CPT

## 2023-12-02 PROCEDURE — 37214 CESSJ THERAPY CATH REMOVAL: CPT | Mod: 59 | Performed by: SURGERY

## 2023-12-02 PROCEDURE — 85384 FIBRINOGEN ACTIVITY: CPT | Performed by: SURGERY

## 2023-12-02 PROCEDURE — C1760 CLOSURE DEV, VASC: HCPCS | Performed by: SURGERY

## 2023-12-02 DEVICE — IMPLANTABLE DEVICE: Type: IMPLANTABLE DEVICE | Site: GROIN | Status: FUNCTIONAL

## 2023-12-02 RX ORDER — FENTANYL CITRATE 50 UG/ML
INJECTION, SOLUTION INTRAMUSCULAR; INTRAVENOUS
Status: DISCONTINUED | OUTPATIENT
Start: 2023-12-02 | End: 2023-12-02 | Stop reason: HOSPADM

## 2023-12-02 RX ORDER — ONDANSETRON 4 MG/1
8 TABLET, ORALLY DISINTEGRATING ORAL EVERY 8 HOURS PRN
Status: DISCONTINUED | OUTPATIENT
Start: 2023-12-02 | End: 2023-12-03 | Stop reason: HOSPADM

## 2023-12-02 RX ORDER — FENTANYL CITRATE 50 UG/ML
INJECTION, SOLUTION INTRAMUSCULAR; INTRAVENOUS
Status: DISCONTINUED | OUTPATIENT
Start: 2023-12-02 | End: 2023-12-02

## 2023-12-02 RX ORDER — OXYCODONE AND ACETAMINOPHEN 10; 325 MG/1; MG/1
1 TABLET ORAL EVERY 4 HOURS PRN
Status: DISCONTINUED | OUTPATIENT
Start: 2023-12-02 | End: 2023-12-03 | Stop reason: HOSPADM

## 2023-12-02 RX ORDER — SODIUM CHLORIDE 9 MG/ML
INJECTION, SOLUTION INTRAVENOUS CONTINUOUS
Status: DISCONTINUED | OUTPATIENT
Start: 2023-12-02 | End: 2023-12-02

## 2023-12-02 RX ORDER — MIDAZOLAM HYDROCHLORIDE 1 MG/ML
INJECTION INTRAMUSCULAR; INTRAVENOUS
Status: DISCONTINUED | OUTPATIENT
Start: 2023-12-02 | End: 2023-12-02 | Stop reason: HOSPADM

## 2023-12-02 RX ORDER — ACETAMINOPHEN 325 MG/1
650 TABLET ORAL EVERY 4 HOURS PRN
Status: DISCONTINUED | OUTPATIENT
Start: 2023-12-02 | End: 2023-12-03 | Stop reason: HOSPADM

## 2023-12-02 RX ORDER — NITROGLYCERIN 5 MG/ML
INJECTION, SOLUTION INTRAVENOUS
Status: DISCONTINUED | OUTPATIENT
Start: 2023-12-02 | End: 2023-12-02 | Stop reason: HOSPADM

## 2023-12-02 RX ORDER — HYDROMORPHONE HYDROCHLORIDE 1 MG/ML
2 INJECTION, SOLUTION INTRAMUSCULAR; INTRAVENOUS; SUBCUTANEOUS
Status: DISCONTINUED | OUTPATIENT
Start: 2023-12-02 | End: 2023-12-03 | Stop reason: HOSPADM

## 2023-12-02 RX ORDER — MIDAZOLAM HYDROCHLORIDE 1 MG/ML
INJECTION INTRAMUSCULAR; INTRAVENOUS
Status: DISCONTINUED | OUTPATIENT
Start: 2023-12-02 | End: 2023-12-02

## 2023-12-02 RX ORDER — SODIUM CHLORIDE 9 MG/ML
INJECTION, SOLUTION INTRAVENOUS CONTINUOUS
Status: DISCONTINUED | OUTPATIENT
Start: 2023-12-02 | End: 2023-12-03 | Stop reason: HOSPADM

## 2023-12-02 RX ORDER — HEPARIN SODIUM 10000 [USP'U]/100ML
500 INJECTION, SOLUTION INTRAVENOUS CONTINUOUS
Status: DISCONTINUED | OUTPATIENT
Start: 2023-12-02 | End: 2023-12-02

## 2023-12-02 RX ADMIN — LEVALBUTEROL HYDROCHLORIDE 1.25 MG: 1.25 SOLUTION RESPIRATORY (INHALATION) at 08:12

## 2023-12-02 RX ADMIN — LORAZEPAM 2 MG: 2 INJECTION INTRAMUSCULAR; INTRAVENOUS at 11:12

## 2023-12-02 RX ADMIN — OXYCODONE AND ACETAMINOPHEN 1 TABLET: 5; 325 TABLET ORAL at 11:12

## 2023-12-02 RX ADMIN — ALTEPLASE 0.5 MG/HR: 2.2 INJECTION, POWDER, LYOPHILIZED, FOR SOLUTION INTRAVENOUS at 10:12

## 2023-12-02 RX ADMIN — SODIUM CHLORIDE, PRESERVATIVE FREE 10 ML: 5 INJECTION INTRAVENOUS at 09:12

## 2023-12-02 RX ADMIN — LEVALBUTEROL HYDROCHLORIDE 1.25 MG: 1.25 SOLUTION RESPIRATORY (INHALATION) at 07:12

## 2023-12-02 RX ADMIN — HYDROMORPHONE HYDROCHLORIDE 1 MG: 1 INJECTION, SOLUTION INTRAMUSCULAR; INTRAVENOUS; SUBCUTANEOUS at 11:12

## 2023-12-02 RX ADMIN — LOSARTAN POTASSIUM 25 MG: 25 TABLET, FILM COATED ORAL at 10:12

## 2023-12-02 RX ADMIN — SODIUM CHLORIDE: 0.9 INJECTION, SOLUTION INTRAVENOUS at 08:12

## 2023-12-02 RX ADMIN — IPRATROPIUM BROMIDE 0.5 MG: 0.5 SOLUTION RESPIRATORY (INHALATION) at 08:12

## 2023-12-02 RX ADMIN — IPRATROPIUM BROMIDE 0.5 MG: 0.5 SOLUTION RESPIRATORY (INHALATION) at 07:12

## 2023-12-02 RX ADMIN — ATORVASTATIN CALCIUM 10 MG: 10 TABLET, FILM COATED ORAL at 09:12

## 2023-12-02 RX ADMIN — MUPIROCIN 1 G: 20 OINTMENT TOPICAL at 09:12

## 2023-12-02 RX ADMIN — LABETALOL HYDROCHLORIDE 150 MG: 100 TABLET, FILM COATED ORAL at 09:12

## 2023-12-02 RX ADMIN — HYDROMORPHONE HYDROCHLORIDE 1 MG: 1 INJECTION, SOLUTION INTRAMUSCULAR; INTRAVENOUS; SUBCUTANEOUS at 12:12

## 2023-12-02 RX ADMIN — IPRATROPIUM BROMIDE 0.5 MG: 0.5 SOLUTION RESPIRATORY (INHALATION) at 01:12

## 2023-12-02 RX ADMIN — MUPIROCIN 1 G: 20 OINTMENT TOPICAL at 10:12

## 2023-12-02 RX ADMIN — HYDROMORPHONE HYDROCHLORIDE 2 MG: 1 INJECTION, SOLUTION INTRAMUSCULAR; INTRAVENOUS; SUBCUTANEOUS at 11:12

## 2023-12-02 RX ADMIN — IPRATROPIUM BROMIDE 0.5 MG: 0.5 SOLUTION RESPIRATORY (INHALATION) at 12:12

## 2023-12-02 RX ADMIN — LABETALOL HYDROCHLORIDE 150 MG: 100 TABLET, FILM COATED ORAL at 10:12

## 2023-12-02 NOTE — ANESTHESIA POST-OP PAIN MANAGEMENT
Acute Pain Service Progress Note    Helder Brand is a 63 y.o., male, [id number].    Surgery:  Angiogram  Ischemic pain.  Patient is status post attempted thrombectomy.  Currently on tPA.  Patient with severe pain not responded to current pain management.  Consulted for pain management/PCA.  We will give 2 mg of Dilaudid for breakthrough pain, start Precedex drip and Dilaudid PCA.  We will follow in the morning to reassess patient.  Addendum 12/03/2023 742  Patient was given the Dilaudid IV and had no need for the PCA or the Precedex drip.  Patient with significant improvement today after tPA and intervention thrombectomy yesterday.  He is pain-free with no nausea or vomiting or over sedation.  No more follow-up needed.  Please re-consult if needed.

## 2023-12-02 NOTE — CARE UPDATE
12/01/23 2046   Patient Assessment/Suction   Level of Consciousness (AVPU) alert   Respiratory Effort Unlabored   Expansion/Accessory Muscles/Retractions no use of accessory muscles   All Lung Fields Breath Sounds clear;diminished   Rhythm/Pattern, Respiratory no shortness of breath reported   Cough Frequency infrequent   Cough Type no productive sputum   PRE-TX-O2   Device (Oxygen Therapy) nasal cannula   $ Is the patient on Low Flow Oxygen? Yes   Flow (L/min) 3   SpO2 95 %   Pulse Oximetry Type Continuous   $ Pulse Oximetry - Multiple Charge Pulse Oximetry - Multiple   Pulse 89   Resp 13   Positioning   Head of Bed (HOB) Positioning HOB elevated;HOB at 30 degrees   Aerosol Therapy   $ Aerosol Therapy Charges Aerosol Treatment   Daily Review of Necessity (SVN) completed   Respiratory Treatment Status (SVN) given   Treatment Route (SVN) mask   Patient Position (SVN) semi-Coy's   Post Treatment Assessment (SVN) breath sounds improved   Signs of Intolerance (SVN) none   Breath Sounds Post-Respiratory Treatment   Throughout All Fields Post-Treatment All Fields   Throughout All Fields Post-Treatment aeration increased   Post-treatment Heart Rate (beats/min) 88   Post-treatment Resp Rate (breaths/min) 12   Education   $ Education Bronchodilator;15 min   Respiratory Evaluation   $ Care Plan Tech Time 15 min   $ Eval/Re-eval Charges Re-evaluation

## 2023-12-02 NOTE — ASSESSMENT & PLAN NOTE
Vascular surgery consulted. Monitor in ICU.  Angiogram completed yesterday and repeat this morning.  Remains on heparin and alteplase drip.  Continue with Percocet and IV Dilaudid p.r.n. for severe pain.

## 2023-12-02 NOTE — PLAN OF CARE
[facility]  Initial Discharge Assessment       Primary Care Provider: Riley Atkinson PA-C    Admission Diagnosis: Ischemic foot [I99.8]    Admission Date: 12/1/2023  Expected Discharge Date:     Transition of Care Barriers: None    Initial assessment completed with patient at bedside.  AAOx4. Patient has living will but no POA. SW confirmed facesheet information. Patient reports being independent with all ADLs. He has a rolling walker, straight cane, and nebulizer to use at home, when needed. No HH, dialysis, coumadin, or medication affordability/compliance issues. Patient plans to discharge home with family, and his son will provide transportation. No discharge needs anticipated. SW/CM will continue to follow for any discharge needs that may arise.    Payor: [employer] MED[employer]ARE ADVANTAGE / Plan: [employer] H[employer] POS MCARE / Product Type: Medicare Advantage /     Extended Emergency Contact Information  Primary Emergency Contact: chilogalo  Mobile Phone: [phone]  Relation: Son  Preferred language: English   needed? No    Discharge Plan A: Home with family  Discharge Plan B: Home with family      [facility] - [place] - [address]  [address]  [place] [address]  Phone: [phone] Fax: [phone]    [facility]  [address]  [place] [address]  Phone: [phone] Fax: [phone]      Initial Assessment (most recent)       Adult Discharge Assessment - 12/02/23 1220          Discharge Assessment    Discharge Plan A Home with family     Discharge Plan B Home with family                        12/02/23 1108   Discharge Assessment   Assessment Type Discharge Planning Assessment   Confirmed/corrected address, phone number and insurance Yes   Confirmed Demographics Correct on Facesheet   Source of Information patient   When was your last doctors appointment? 11/16/23   Communicated PETER with patient/caregiver  Date not available/Unable to determine   Reason For Admission Ischemic foot   People in Home child(yojana), adult   Facility Arrived From: Home   Do you expect to return to your current living situation? Yes   Do you have help at home or someone to help you manage your care at home? Yes   Who are your caregiver(s) and their phone number(s)? Son Celso, if needed.   Prior to hospitilization cognitive status: No Deficits;Alert/Oriented   Current cognitive status: No Deficits;Alert/Oriented   Walking or Climbing Stairs   (Usually independent with all ADLs)   Equipment Currently Used at Home walker, rolling;cane, straight   Readmission within 30 days? No   Patient currently being followed by outpatient case management? No   Do you currently have service(s) that help you manage your care at home? No   Do you take prescription medications? Yes   Do you have prescription coverage? Yes   Coverage [employer] Medicare Advantage   Do you have any problems affording any of your prescribed medications? No   Is the patient taking medications as prescribed? yes   Who is going to help you get home at discharge? Celso - son   How do you get to doctors appointments? car, drives self;family or friend will provide   Are you on dialysis? No   Do you take coumadin? No   DME Needed Upon Discharge  none   Discharge Plan discussed with: Patient   Transition of Care Barriers None   Discharge Plan B Home with family   OTHER   Name(s) of People in Home Celso - son

## 2023-12-02 NOTE — PLAN OF CARE
Problem: Skin Injury Risk Increased  Goal: Skin Health and Integrity  Intervention: Promote and Optimize Oral Intake  Flowsheets (Taken 12/2/2023 1104)  Oral Nutrition Promotion:   calorie-dense foods provided   other (see comments)     Problem: Oral Intake Inadequate  Goal: Improved Oral Intake  Outcome: Ongoing, Progressing  Intervention: Promote and Optimize Oral Intake  Flowsheets (Taken 12/2/2023 1104)  Oral Nutrition Promotion:   calorie-dense foods provided   other (see comments)     Recommendations  1.) Continue regular diet at this time.   2.) If PO intakes <50% of meals, add Ensure HP BID.   3.) Suggest MVI for general health.   4.) RD to monitor and provide recommendations PRN.     Goals:   1.) Pt to consume/tolerate >75% of meals and ONS.  Nutrition Goal Status: new

## 2023-12-02 NOTE — CARE UPDATE
12/02/23 0726   Patient Assessment/Suction   Level of Consciousness (AVPU) alert   Respiratory Effort Unlabored   Expansion/Accessory Muscles/Retractions no use of accessory muscles   All Lung Fields Breath Sounds clear;diminished   Rhythm/Pattern, Respiratory unlabored   Cough Frequency no cough   PRE-TX-O2   Device (Oxygen Therapy) room air   SpO2 95 %   Pulse Oximetry Type Continuous   $ Pulse Oximetry - Multiple Charge Pulse Oximetry - Multiple   Pulse 69   Resp 17   Aerosol Therapy   $ Aerosol Therapy Charges Aerosol Treatment   Daily Review of Necessity (SVN) completed   Respiratory Treatment Status (SVN) given   Treatment Route (SVN) mask   Patient Position (SVN) HOB elevated   Post Treatment Assessment (SVN) increased aeration   Signs of Intolerance (SVN) none   Breath Sounds Post-Respiratory Treatment   Throughout All Fields Post-Treatment All Fields   Throughout All Fields Post-Treatment aeration increased   Post-treatment Heart Rate (beats/min) 64   Post-treatment Resp Rate (breaths/min) 16   Education   $ Education Bronchodilator;15 min

## 2023-12-02 NOTE — PROGRESS NOTES
[facility]  Adult Nutrition   Progress Note (Initial Assessment)     SUMMARY     Recommendations  1.) Continue regular diet at this time.   2.) If PO intakes <50% of meals, add Ensure HP BID.   3.) Suggest MVI for general health.   4.) RD to monitor and provide recommendations PRN.    Goals:   1.) Pt to consume/tolerate >75% of meals and ONS.  Nutrition Goal Status: new    Dietitian Rounds Brief  Pt presented to [facility] ED with left foot and left lower leg pain. Pt not in room at time of visit d/t procedure. S/p angiogram this morning. No GI distress. LBM . Skin: intact per chart. Wt reviewed. No malnutrition suspected.    Diet order:   Current Diet Order: regular           Evaluation of Received Nutrient/Fluid Intake  Energy Calories Required: not meeting needs  Protein Required: not meeting needs  Fluid Required: not meeting needs  Tolerance: tolerating     % Intake of Estimated Energy Needs: 0 - 25 %  % Meal Intake: 0 - 25 %      Intake/Output Summary (Last 24 hours) at 2023 1103  Last data filed at 2023 0530  Gross per 24 hour   Intake 1119.43 ml   Output 850 ml   Net 269.43 ml        Anthropometrics  Temp: 97.9 °F (36.6 °C)  Height Method: Stated  Height: 6' (182.9 cm)  Height (inches): 72 in  Weight Method: Bed Scale  Weight: 86 kg (189 lb 9.5 oz)  Weight (lb): 189.6 lb  Ideal Body Weight (IBW), Male: 178 lb  % Ideal Body Weight, Male (lb): 112.71 %  BMI (Calculated): 25.7  BMI Grade: 25 - 29.9 - overweight  Usual Body Weight (UBW), k kg ()  % Usual Body Weight: 101.39       Estimated/Assessed Needs  Weight Used For Calorie Calculations: 86 kg (189 lb 9.5 oz)  Energy Calorie Requirements (kcal): 4858-3348  Energy Need Method: Kcal/kg (25-30)  Protein Requirements: 69-86 (0.8-1.0)  Weight Used For Protein Calculations: 86 kg (189 lb 9.5 oz)  Fluid Requirements (mL): 1866-4028     RDA Method (mL):        Reason for Assessment  Reason For Assessment: identified at  risk by screening criteria (ICU admit)  Diagnosis: other (see comments) (ischemic foot)  Relevant Medical History: HTN, HLD, 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    Nutrition/Diet History  Food Allergies: NKFA  Factors Affecting Nutritional Intake: None identified at this time    Nutrition Risk Screen  Nutrition Risk Screen: no indicators present     MST Score: 0  Have you recently lost weight without trying?: No  Weight loss score: 0  Have you been eating poorly because of a decreased appetite?: No  Appetite score: 0       Weight History:  Wt Readings from Last 5 Encounters:   12/02/23 86 kg (189 lb 9.5 oz)   11/16/23 86.6 kg (191 lb)   11/08/23 86.8 kg (191 lb 7.5 oz)   10/20/23 88 kg (194 lb)   10/16/23 88 kg (194 lb)        Lab/Procedures/Meds: Pertinent Labs/Meds Reviewed    Medications:Pertinent Medications Reviewed  Scheduled Meds:   atorvastatin  10 mg Oral QHS    levalbuterol  1.25 mg Nebulization Q12H    And    ipratropium  0.5 mg Nebulization Q6H    labetaloL  150 mg Oral BID    losartan  25 mg Oral Daily    mupirocin   Nasal BID    naloxone  0.4 mg Intravenous Once    sodium chloride 0.9%  10 mL Intravenous Q12H     Continuous Infusions:   sodium chloride 0.9% 15 mL/hr at 12/01/23 1915    sodium chloride 0.9% 15 mL/hr at 12/02/23 1030    alteplase (CATHFlo ACtivase) 8 mg in sodium chloride 0.9% 80 mL infusion 0.5 mg/hr (12/02/23 1010)    dexmedeTOMIDine in 0.9 % NaCL      heparin (porcine) in 5 % dex 500 Units/hr (12/02/23 1030)    hydromorphone in 0.9 % NaCl 6 mg/30 ml       PRN Meds:.acetaminophen, albuterol-ipratropium, diphenhydrAMINE, fluticasone propionate, hydrALAZINE, HYDROmorphone, lorazepam, ondansetron, oxyCODONE-acetaminophen    Labs: Pertinent Labs Reviewed  Clinical Chemistry:  Recent Labs   Lab 12/02/23  0414      K 3.9      CO2 22*   GLU 94   BUN 14   CREATININE 0.8   CALCIUM 8.1*   PROT 6.1   ALBUMIN 3.4*   BILITOT 1.0   ALKPHOS 74   AST 20   ALT 11   ANIONGAP  7*     CBC:   Recent Labs   Lab 12/02/23  0600   WBC 9.05   RBC 4.40*   HGB 14.1   HCT 41.1      MCV 93   MCH 32.0*   MCHC 34.3       Monitor and Evaluation  Food and Nutrient Intake: food and beverage intake, energy intake  Food and Nutrient Adminstration: diet order  Knowledge/Beliefs/Attitudes: food and nutrition knowledge/skill  Physical Activity and Function: nutrition-related ADLs and IADLs  Anthropometric Measurements: weight, weight change  Biochemical Data, Medical Tests and Procedures: electrolyte and renal panel, gastrointestinal profile, glucose/endocrine profile  Nutrition-Focused Physical Findings: overall appearance     Nutrition Risk  Level of Risk/Frequency of Follow-up: moderate     Nutrition Follow-Up  RD Follow-up?: Yes      Kelly Esquivel RD 12/02/2023 11:03 AM

## 2023-12-02 NOTE — OP NOTE
[facility]  Surgery Department  Operative Note    SUMMARY     Date of Procedure: 12/2/2023     Procedure: Procedure(s) (LRB):  Angiogram Extremity Unilateral (Left)     Surgeon(s) and Role:     * Ismael Esquivel MD - Primary    Assisting Surgeon: None    Pre-Operative Diagnosis: Ischemic foot [I99.8]    Post-Operative Diagnosis: Post-Op Diagnosis Codes:     * Ischemic foot [I99.8]    Anesthesia: RN IV Sedation    Operative Findings (including complications, if any):  Resolution of proximal SFA thrombus.  Significant residual interposition popliteal graft thrombus.  Residual distal posterior tibial and proximal anterior tibial thrombus    Description of Technical Procedures:  Relook angiogram of the left lower extremity.  Repositioning of tPA infusion catheter.  Selective angiography of the anterior tibial and posterior tibial arteries.    Patient was brought back to the cath lab angiogram was performed through the infusion catheter which showed resolution of thrombus in the SFA down to the distal SFA.  Within the vein graft of the popliteal artery there is still residual thrombus within the graft.  The trifurcation vessels demonstrated patent posterior tibial at its origin and then it is thrombosed distally.  The anterior tibial was selected and has thrombus at its origin but distally it is patent down to the foot.  At this point we removed the 50 cm long infusion catheter and placed a infusion catheter measuring 20 cm infusion length within the popliteal graft to focus all from the lytics into the bulk of the thrombus and then was restarted at half a mg an hour.    Significant Surgical Tasks Conducted by the Assistant(s), if Applicable:     Estimated Blood Loss (EBL): * No values recorded between 12/2/2023  8:59 AM and 12/2/2023  9:25 AM *           Implants: * No implants in log *    Specimens:   Specimen (24h ago, onward)      None                    Condition: Good    Disposition: ICU -  extubated and stable.    Attestation: I was present and scrubbed for the entire procedure.

## 2023-12-02 NOTE — SUBJECTIVE & OBJECTIVE
Interval History:  Repeat angiogram cleared this morning.  Remains on heparin and alteplase drip.  Continues to have severe pain.  Percocet and IV Dilaudid increased.    Review of Systems   Constitutional: Negative.    HENT: Negative.     Eyes: Negative.    Respiratory: Negative.     Cardiovascular: Negative.    Gastrointestinal: Negative.    Endocrine: Negative.    Genitourinary: Negative.    Musculoskeletal:         Left foot pain   Skin:  Positive for pallor.   Neurological: Negative.    Hematological: Negative.    Psychiatric/Behavioral: Negative.       Objective:     Vital Signs (Most Recent):  Temp: 98 °F (36.7 °C) (12/02/23 1107)  Pulse: 63 (12/02/23 1234)  Resp: 14 (12/02/23 1234)  BP: 138/78 (12/02/23 1107)  SpO2: (!) 94 % (12/02/23 1234) Vital Signs (24h Range):  Temp:  [97.8 °F (36.6 °C)-98.6 °F (37 °C)] 98 °F (36.7 °C)  Pulse:  [63-93] 63  Resp:  [12-31] 14  SpO2:  [92 %-99 %] 94 %  BP: (123-155)/() 138/78  Arterial Line BP: (110-167)/(68-92) 163/89     Weight: 86 kg (189 lb 9.5 oz)  Body mass index is 25.71 kg/m².    Intake/Output Summary (Last 24 hours) at 12/2/2023 1357  Last data filed at 12/2/2023 0530  Gross per 24 hour   Intake 1039.21 ml   Output 850 ml   Net 189.21 ml         Physical Exam  Vitals and nursing note reviewed.   Constitutional:       Appearance: Normal appearance.   HENT:      Head: Normocephalic and atraumatic.      Nose: Nose normal.   Eyes:      Extraocular Movements: Extraocular movements intact.      Conjunctiva/sclera: Conjunctivae normal.      Pupils: Pupils are equal, round, and reactive to light.   Cardiovascular:      Rate and Rhythm: Normal rate and regular rhythm.      Pulses:           Dorsalis pedis pulses are 0 on the left side.        Posterior tibial pulses are 0 on the left side.      Heart sounds: No murmur heard.  Pulmonary:      Effort: Pulmonary effort is normal. No respiratory distress.      Breath sounds: Normal breath sounds. No stridor. No wheezing  or rales.   Abdominal:      General: Abdomen is flat. There is no distension.      Palpations: Abdomen is soft.      Tenderness: There is no abdominal tenderness.   Musculoskeletal:         General: No swelling, tenderness or deformity. Normal range of motion.      Cervical back: Normal range of motion and neck supple.      Right lower leg: No edema.      Left lower leg: No edema.   Skin:     General: Skin is warm and dry.      Coloration: Skin is not jaundiced.   Neurological:      General: No focal deficit present.      Mental Status: He is alert and oriented to person, place, and time.   Psychiatric:         Mood and Affect: Mood normal.         Behavior: Behavior normal.         Thought Content: Thought content normal.         Judgment: Judgment normal.             Significant Labs: All pertinent labs within the past 24 hours have been reviewed.  Recent Lab Results  (Last 5 results in the past 24 hours)        12/02/23  1144   12/02/23  1003   12/02/23  0600   12/02/23  0414   12/02/23  0041        Albumin       3.4         ALP       74         ALT       11         Anion Gap       7         aPTT         40.3  Comment: Refer to local heparin nomogram for intensity/dose specific   therapeutic   range.  Delta check review         AST       20         BILIRUBIN TOTAL       1.0  Comment: For infants and newborns, interpretation of results should be based  on gestational age, weight and in agreement with clinical  observations.    Premature Infant recommended reference ranges:  Up to 24 hours.............<8.0 mg/dL  Up to 48 hours............<12.0 mg/dL  3-5 days..................<15.0 mg/dL  6-29 days.................<15.0 mg/dL           BUN       14         Calcium       8.1         Chloride       107         CO2       22         Creatinine       0.8         eGFR       >60.0         Fibrinogen 115     149     193        115               Glucose       94         Hematocrit     41.1           Hemoglobin     14.1            INR   1.3  Comment: Coumadin Therapy:  2.0 - 3.0 for INR for all indicators except mechanical heart valves  and antiphospholipid syndromes which should use 2.5 - 3.5.               MCH     32.0           MCHC     34.3           MCV     93           MPV     8.8           Platelet Count     153           Potassium       3.9         PROTEIN TOTAL       6.1         Protime   14.2             RBC     4.40           RDW     13.4           Sodium       136         WBC     9.05                                  Significant Imaging: I have reviewed all pertinent imaging results/findings within the past 24 hours.

## 2023-12-02 NOTE — PROGRESS NOTES
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  This has been ongoing the past few days.  Patient noticed significant palliative to the left foot last night.  He noted he could not feel his pulse anymore.  Patient recently had a TURP and has been off Eliquis since then.  Vascular surgery consulted.  Patient transferred to [facility] for angiogram.  Patient started on heparin drip.    Overview/Hospital Course:  No notes on file    Interval History:  Repeat angiogram cleared this morning.  Remains on heparin and alteplase drip.  Continues to have severe pain.  Percocet and IV Dilaudid increased.    Review of Systems   Constitutional: Negative.    HENT: Negative.     Eyes: Negative.    Respiratory: Negative.     Cardiovascular: Negative.    Gastrointestinal: Negative.    Endocrine: Negative.    Genitourinary: Negative.    Musculoskeletal:         Left foot pain   Skin:  Positive for pallor.   Neurological: Negative.    Hematological: Negative.    Psychiatric/Behavioral: Negative.       Objective:     Vital Signs (Most Recent):  Temp: 98 °F (36.7 °C) (12/02/23 1107)  Pulse: 63 (12/02/23 1234)  Resp: 14 (12/02/23 1234)  BP: 138/78 (12/02/23 1107)  SpO2: (!) 94 % (12/02/23 1234) Vital Signs (24h Range):  Temp:  [97.8 °F (36.6 °C)-98.6 °F (37 °C)] 98 °F (36.7 °C)  Pulse:  [63-93] 63  Resp:  [12-31] 14  SpO2:  [92 %-99 %] 94 %  BP: (123-155)/() 138/78  Arterial Line BP: (110-167)/(68-92) 163/89     Weight: 86 kg (189 lb 9.5 oz)  Body mass  index is 25.71 kg/m².    Intake/Output Summary (Last 24 hours) at 12/2/2023 1357  Last data filed at 12/2/2023 0530  Gross per 24 hour   Intake 1039.21 ml   Output 850 ml   Net 189.21 ml         Physical Exam  Vitals and nursing note reviewed.   Constitutional:       Appearance: Normal appearance.   HENT:      Head: Normocephalic and atraumatic.      Nose: Nose normal.   Eyes:      Extraocular Movements: Extraocular movements intact.      Conjunctiva/sclera: Conjunctivae normal.      Pupils: Pupils are equal, round, and reactive to light.   Cardiovascular:      Rate and Rhythm: Normal rate and regular rhythm.      Pulses:           Dorsalis pedis pulses are 0 on the left side.        Posterior tibial pulses are 0 on the left side.      Heart sounds: No murmur heard.  Pulmonary:      Effort: Pulmonary effort is normal. No respiratory distress.      Breath sounds: Normal breath sounds. No stridor. No wheezing or rales.   Abdominal:      General: Abdomen is flat. There is no distension.      Palpations: Abdomen is soft.      Tenderness: There is no abdominal tenderness.   Musculoskeletal:         General: No swelling, tenderness or deformity. Normal range of motion.      Cervical back: Normal range of motion and neck supple.      Right lower leg: No edema.      Left lower leg: No edema.   Skin:     General: Skin is warm and dry.      Coloration: Skin is not jaundiced.   Neurological:      General: No focal deficit present.      Mental Status: He is alert and oriented to person, place, and time.   Psychiatric:         Mood and Affect: Mood normal.         Behavior: Behavior normal.         Thought Content: Thought content normal.         Judgment: Judgment normal.             Significant Labs: All pertinent labs within the past 24 hours have been reviewed.  Recent Lab Results  (Last 5 results in the past 24 hours)        12/02/23  1144   12/02/23  1003   12/02/23  0600   12/02/23  0414   12/02/23  0041        Albumin        3.4         ALP       74         ALT       11         Anion Gap       7         aPTT         40.3  Comment: Refer to local heparin nomogram for intensity/dose specific   therapeutic   range.  Delta check review         AST       20         BILIRUBIN TOTAL       1.0  Comment: For infants and newborns, interpretation of results should be based  on gestational age, weight and in agreement with clinical  observations.    Premature Infant recommended reference ranges:  Up to 24 hours.............<8.0 mg/dL  Up to 48 hours............<12.0 mg/dL  3-5 days..................<15.0 mg/dL  6-29 days.................<15.0 mg/dL           BUN       14         Calcium       8.1         Chloride       107         CO2       22         Creatinine       0.8         eGFR       >60.0         Fibrinogen 115     149     193        115               Glucose       94         Hematocrit     41.1           Hemoglobin     14.1           INR   1.3  Comment: Coumadin Therapy:  2.0 - 3.0 for INR for all indicators except mechanical heart valves  and antiphospholipid syndromes which should use 2.5 - 3.5.               MCH     32.0           MCHC     34.3           MCV     93           MPV     8.8           Platelet Count     153           Potassium       3.9         PROTEIN TOTAL       6.1         Protime   14.2             RBC     4.40           RDW     13.4           Sodium       136         WBC     9.05                                  Significant Imaging: I have reviewed all pertinent imaging results/findings within the past 24 hours.    Assessment/Plan:      * Ischemic foot  Vascular surgery consulted. Monitor in ICU.  Angiogram completed yesterday and repeat this morning.  Remains on heparin and alteplase drip.  Continue with Percocet and IV Dilaudid p.r.n. for severe pain.      HTN (hypertension)  Continue home medications    BPH with urinary obstruction  S/p TURP 11/16        VTE Risk Mitigation (From admission, onward)            Ordered     heparin 25,000 units in dextrose 5% 250 mL (100 units/mL) infusion (heparin infusion - NO NOMOGRAM)  Continuous         12/02/23 0932     IP VTE HIGH RISK PATIENT  Once         12/01/23 1517     Place sequential compression device  Until discontinued         12/01/23 1517                    Discharge Planning   PETER:      Code Status: Full Code   Is the patient medically ready for discharge?:     Reason for patient still in hospital (select all that apply): Treatment  Discharge Plan A: Home with family                Ismael Everett MD  Department of Hospital Medicine   [facility]

## 2023-12-03 VITALS
DIASTOLIC BLOOD PRESSURE: 86 MMHG | RESPIRATION RATE: 20 BRPM | HEIGHT: 72 IN | TEMPERATURE: 99 F | WEIGHT: 185.44 LBS | SYSTOLIC BLOOD PRESSURE: 122 MMHG | OXYGEN SATURATION: 95 % | HEART RATE: 77 BPM | BODY MASS INDEX: 25.12 KG/M2

## 2023-12-03 LAB
ALBUMIN SERPL BCP-MCNC: 3.3 G/DL (ref 3.5–5.2)
ALP SERPL-CCNC: 68 U/L (ref 55–135)
ALT SERPL W/O P-5'-P-CCNC: 9 U/L (ref 10–44)
ANION GAP SERPL CALC-SCNC: 5 MMOL/L (ref 8–16)
AST SERPL-CCNC: 16 U/L (ref 10–40)
BILIRUB SERPL-MCNC: 0.9 MG/DL (ref 0.1–1)
BUN SERPL-MCNC: 11 MG/DL (ref 8–23)
CALCIUM SERPL-MCNC: 8.5 MG/DL (ref 8.7–10.5)
CHLORIDE SERPL-SCNC: 106 MMOL/L (ref 95–110)
CO2 SERPL-SCNC: 25 MMOL/L (ref 23–29)
CREAT SERPL-MCNC: 0.8 MG/DL (ref 0.5–1.4)
ERYTHROCYTE [DISTWIDTH] IN BLOOD BY AUTOMATED COUNT: 13.4 % (ref 11.5–14.5)
EST. GFR  (NO RACE VARIABLE): >60 ML/MIN/1.73 M^2
FIBRINOGEN PPP-MCNC: 117 MG/DL (ref 182–400)
FIBRINOGEN PPP-MCNC: 134 MG/DL (ref 182–400)
GLUCOSE SERPL-MCNC: 96 MG/DL (ref 70–110)
HCT VFR BLD AUTO: 39 % (ref 40–54)
HGB BLD-MCNC: 13.3 G/DL (ref 14–18)
MCH RBC QN AUTO: 31.8 PG (ref 27–31)
MCHC RBC AUTO-ENTMCNC: 34.1 G/DL (ref 32–36)
MCV RBC AUTO: 93 FL (ref 82–98)
PLATELET # BLD AUTO: 152 K/UL (ref 150–450)
PMV BLD AUTO: 9.5 FL (ref 9.2–12.9)
POTASSIUM SERPL-SCNC: 4 MMOL/L (ref 3.5–5.1)
PROT SERPL-MCNC: 5.9 G/DL (ref 6–8.4)
RBC # BLD AUTO: 4.18 M/UL (ref 4.6–6.2)
SODIUM SERPL-SCNC: 136 MMOL/L (ref 136–145)
WBC # BLD AUTO: 9.9 K/UL (ref 3.9–12.7)

## 2023-12-03 PROCEDURE — 36415 COLL VENOUS BLD VENIPUNCTURE: CPT | Performed by: SURGERY

## 2023-12-03 PROCEDURE — 85384 FIBRINOGEN ACTIVITY: CPT | Mod: 91 | Performed by: SURGERY

## 2023-12-03 PROCEDURE — 85027 COMPLETE CBC AUTOMATED: CPT | Performed by: SURGERY

## 2023-12-03 PROCEDURE — 94640 AIRWAY INHALATION TREATMENT: CPT

## 2023-12-03 PROCEDURE — A4216 STERILE WATER/SALINE, 10 ML: HCPCS | Performed by: SURGERY

## 2023-12-03 PROCEDURE — 80053 COMPREHEN METABOLIC PANEL: CPT | Performed by: SURGERY

## 2023-12-03 PROCEDURE — 25000242 PHARM REV CODE 250 ALT 637 W/ HCPCS: Performed by: SURGERY

## 2023-12-03 PROCEDURE — 99900031 HC PATIENT EDUCATION (STAT)

## 2023-12-03 PROCEDURE — 25000003 PHARM REV CODE 250: Performed by: SURGERY

## 2023-12-03 PROCEDURE — 85384 FIBRINOGEN ACTIVITY: CPT | Performed by: SURGERY

## 2023-12-03 PROCEDURE — 94761 N-INVAS EAR/PLS OXIMETRY MLT: CPT

## 2023-12-03 RX ORDER — APIXABAN 5 MG/1
TABLET, FILM COATED ORAL
Qty: 88 TABLET | Refills: 0 | Status: SHIPPED | OUTPATIENT
Start: 2023-12-03 | End: 2024-01-09

## 2023-12-03 RX ADMIN — MUPIROCIN 1 G: 20 OINTMENT TOPICAL at 08:12

## 2023-12-03 RX ADMIN — IPRATROPIUM BROMIDE 0.5 MG: 0.5 SOLUTION RESPIRATORY (INHALATION) at 07:12

## 2023-12-03 RX ADMIN — LOSARTAN POTASSIUM 25 MG: 25 TABLET, FILM COATED ORAL at 08:12

## 2023-12-03 RX ADMIN — SODIUM CHLORIDE, PRESERVATIVE FREE 10 ML: 5 INJECTION INTRAVENOUS at 09:12

## 2023-12-03 RX ADMIN — LEVALBUTEROL HYDROCHLORIDE 1.25 MG: 1.25 SOLUTION RESPIRATORY (INHALATION) at 07:12

## 2023-12-03 RX ADMIN — IPRATROPIUM BROMIDE AND ALBUTEROL SULFATE 3 ML: 2.5; .5 SOLUTION RESPIRATORY (INHALATION) at 02:12

## 2023-12-03 RX ADMIN — LABETALOL HYDROCHLORIDE 150 MG: 100 TABLET, FILM COATED ORAL at 08:12

## 2023-12-03 RX ADMIN — APIXABAN 10 MG: 5 TABLET, FILM COATED ORAL at 08:12

## 2023-12-03 NOTE — OP NOTE
[facility]  Surgery Department  Operative Note    SUMMARY     Date of Procedure: 12/2/2023     Procedure: Procedure(s) (LRB):  Angiogram Extremity Unilateral (Left)  Thrombectomy, Lower Arterial     Surgeon(s) and Role:     * Ismael Esquivel MD - Primary    Assisting Surgeon: None    Pre-Operative Diagnosis: Ischemic foot [I99.8]    Post-Operative Diagnosis: Post-Op Diagnosis Codes:     * Ischemic foot [I99.8]    Anesthesia: RN IV Sedation    Operative Findings (including complications, if any):  Resolution of SFA and left popliteal graft thrombus with persistent thrombosis of the anterior tib and posterior tibial arteries.    Description of Technical Procedures:  Left lower extremity angiogram.  Successful percutaneous mechanical thrombectomy of the anterior tib and posterior tibial arteries with the CT 6 penumbra thrombectomy catheter.  Angioplasty of the distal posterior tibial and proximal anterior tibial arteries with 3 x 80 balloon.  Resolution of left lower extremity thrombosis with 2 vessel runoff    Patient was brought back to the cath lab after tripping tPA for another 8 hours and angiogram through the sheath demonstrated patent left superficial femoral and popliteal artery graft the trifurcation vessels were still thrombosed although I can see the origin and we advanced a wire into the posterior tibial artery and then used the penumbra CAT 6 catheter to performed thrombectomy of the posterior tibial and anterior tibial arteries.  We had excellent flow through the posterior tibial artery with a large diameter vessel.  There was some chronic changes in the distal vessel we ballooned that with a 3 by 80 balloon and ultimately had excellent luminal gain with rapid runoff to the foot.  The anterior tibial also had some stenosis with spasm we ballooned that with 3 mm balloon it was a more diminutive vessel then the posterior tibial but ultimately had flow through the anterior tibial to the base  of the foot.  We then performed a right femoral sheath angiogram and Vasc 8 closure.    Significant Surgical Tasks Conducted by the Assistant(s), if Applicable:     Estimated Blood Loss (EBL): * No values recorded between 12/2/2023  5:32 PM and 12/2/2023  6:42 PM *           Implants:   Implant Name Type Inv. Item Serial No.  Lot No. LRB No. Used Action   DEVICE CLOSURE VASCADE 6/7FR - [id number] Closure Device Angio DEVICE CLOSURE VASCADE 6/7FR    Right 1 Implanted       Specimens:   Specimen (24h ago, onward)      None                    Condition: Good    Disposition: PACU - hemodynamically stable.    Attestation: I was present and scrubbed for the entire procedure.

## 2023-12-03 NOTE — CARE UPDATE
12/02/23 2044   Patient Assessment/Suction   Level of Consciousness (AVPU) alert   Respiratory Effort Unlabored   Expansion/Accessory Muscles/Retractions no use of accessory muscles   All Lung Fields Breath Sounds clear;diminished   Rhythm/Pattern, Respiratory no shortness of breath reported   Cough Frequency infrequent   Cough Type no productive sputum;dry   PRE-TX-O2   Device (Oxygen Therapy) room air   SpO2 95 %   Pulse Oximetry Type Continuous   $ Pulse Oximetry - Multiple Charge Pulse Oximetry - Multiple   Pulse 71   Resp (!) 22   BP 99/67   Positioning   Head of Bed (HOB) Positioning HOB elevated;HOB at 30 degrees   Aerosol Therapy   $ Aerosol Therapy Charges Aerosol Treatment   Daily Review of Necessity (SVN) completed   Respiratory Treatment Status (SVN) given   Treatment Route (SVN) mask   Patient Position (SVN) semi-Coy's   Post Treatment Assessment (SVN) breath sounds improved   Signs of Intolerance (SVN) none   Breath Sounds Post-Respiratory Treatment   Throughout All Fields Post-Treatment All Fields   Throughout All Fields Post-Treatment aeration increased   Post-treatment Heart Rate (beats/min) 65   Post-treatment Resp Rate (breaths/min) 17   Education   $ Education Bronchodilator;15 min   Respiratory Evaluation   $ Care Plan Tech Time 15 min   $ Eval/Re-eval Charges Re-evaluation

## 2023-12-03 NOTE — DISCHARGE SUMMARY
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  This has been ongoing the past few days.  Patient noticed significant palliative to the left foot last night.  He noted he could not feel his pulse anymore.  Patient recently had a TURP and has been off Eliquis since then.  Vascular surgery consulted.  Patient transferred to [facility] for angiogram.  Patient started on heparin drip.    Procedure(s) (LRB):  Angiogram Extremity Unilateral (Left)  Thrombectomy, Lower Arterial      Hospital Course:   Patient was treated with heparin drip and alteplase gtt.  Patient underwent thrombectomy on December 2nd.  Patient was then transitioned to p.o. Eliquis 10 mg.  Patient will continue with p.o. Eliquis 10 mg b.i.d. for 7 days and then 5 mg b.i.d. thereafter.  This was discussed extensively at bedside with Dr. Esquivel and patient.  Patient discharged home on December 3rd.     Goals of Care Treatment Preferences:  Code Status: Full Code      Consults:   Consults (From admission, onward)          Status Ordering Provider     Inpatient consult to Vascular Surgery  Once        Provider:  Estefani Esquivel MD    Acknowledged ESTEFANI ESQUIVEL            No new Assessment & Plan notes have been filed under this hospital service since the last note was generated.  Service: Hospital Medicine    Final Active Diagnoses:    Diagnosis Date Noted POA     PRINCIPAL PROBLEM:  Ischemic foot [I99.8] 12/01/2023 Yes    HTN (hypertension) [I10] 01/04/2022 Yes    BPH with urinary obstruction [N40.1, N13.8] 11/07/2019 Yes      Problems Resolved During this Admission:       Discharged Condition: good    Disposition: Home or Self Care    Follow Up:    Patient Instructions:   No discharge procedures on file.    Significant Diagnostic Studies: N/A    Pending Diagnostic Studies:       Procedure Component Value Units Date/Time    Fibrinogen [[id number]] Collected: 12/02/23 2034    Order Status: Sent Lab Status: In process Updated: 12/02/23 2034    Specimen: Blood            Medications:  Reconciled Home Medications:      Medication List        CHANGE how you take these medications      BREZTRI AEROSPHERE 160-9-4.8 mcg/actuation Hfaa  Generic drug: budesonide-glycopyr-formoterol  Inhale 2 puffs into the lungs 2 (two) times a day.  What changed:   when to take this  reasons to take this     ELIQUIS 5 mg Tab  Generic drug: apixaban  Take 2 tablets (10 mg total) by mouth 2 (two) times daily for 7 days, THEN 1 tablet (5 mg total) 2 (two) times daily.  Start taking on: December 3, 2023  What changed: See the new instructions.     labetaloL 300 MG tablet  Commonly known as: NORMODYNE  Take 1 tablet (300 mg total) by mouth 2 (two) times a day.  What changed: how much to take            CONTINUE taking these medications      albuterol 90 mcg/actuation inhaler  Commonly known as: PROVENTIL/VENTOLIN HFA  Inhale 2 puffs into the lungs every 6 (six) hours as needed for Shortness of Breath.     albuterol-ipratropium 2.5 mg-0.5 mg/3 mL nebulizer solution  Commonly known as: DUO-NEB  Take 3 mLs by nebulization every 6 (six) hours as needed for Wheezing or Shortness of Breath. Rescue     fluticasone propionate 50 mcg/actuation nasal spray  Commonly known as: FLONASE  2 sprays by Each Nostril route daily as needed for Allergies.     FOLBIC ORAL  Take 1 tablet by mouth once daily.     olmesartan  40 MG tablet  Commonly known as: BENICAR  Take 1 tablet (40 mg total) by mouth every morning.     predniSONE 20 MG tablet  Commonly known as: DELTASONE  3 pills for 3 days, 2 for 3 days, 1 for 3 days. Repeat for cough     rosuvastatin 10 MG tablet  Commonly known as: CRESTOR  Take 1 tablet (10 mg total) by mouth once daily.              Indwelling Lines/Drains at time of discharge:   Lines/Drains/Airways       None                   Time spent on the discharge of patient: 40 minutes        Ismael Everett MD  Department of Hospital Medicine  [facility]

## 2023-12-03 NOTE — HOSPITAL COURSE
Patient was treated with heparin drip and alteplase gtt.  Patient underwent thrombectomy on December 2nd.  Patient was then transitioned to p.o. Eliquis 10 mg.  Patient will continue with p.o. Eliquis 10 mg b.i.d. for 7 days and then 5 mg b.i.d. thereafter.  This was discussed extensively at bedside with Dr. Esquivel and patient.  Patient discharged home on December 3rd.

## 2023-12-03 NOTE — PLAN OF CARE
SW reviewed discharge orders. Patient discharged home this morning and his son provided transportation. Left prior to IMM being signed. No case management needs.    12/03/23 0857   Final Note   Assessment Type Final Discharge Note   Anticipated Discharge Disposition Home   Post-Acute Status   Discharge Delays None known at this time

## 2023-12-03 NOTE — PROGRESS NOTES
Palp pt pulse left foot. Calf soft.  Minimal hematuria.  Ok for dc on eliquis load for 1 week then 5mg bid.  Follow up 2 weeks.

## 2023-12-03 NOTE — CARE UPDATE
12/03/23 0703   Patient Assessment/Suction   Level of Consciousness (AVPU) alert   Respiratory Effort Unlabored   Expansion/Accessory Muscles/Retractions no use of accessory muscles   All Lung Fields Breath Sounds clear;diminished   Rhythm/Pattern, Respiratory unlabored   Cough Frequency no cough   PRE-TX-O2   Device (Oxygen Therapy) room air   SpO2 (!) 94 %   Pulse Oximetry Type Continuous   $ Pulse Oximetry - Multiple Charge Pulse Oximetry - Multiple   Pulse 73   Resp 18   /86   Aerosol Therapy   $ Aerosol Therapy Charges Aerosol Treatment   Daily Review of Necessity (SVN) completed   Respiratory Treatment Status (SVN) given   Treatment Route (SVN) mask;oxygen   Patient Position (SVN) HOB elevated   Post Treatment Assessment (SVN) increased aeration   Signs of Intolerance (SVN) none   Breath Sounds Post-Respiratory Treatment   Throughout All Fields Post-Treatment All Fields   Throughout All Fields Post-Treatment aeration increased   Post-treatment Heart Rate (beats/min) 74   Post-treatment Resp Rate (breaths/min) 17   Education   $ Education Bronchodilator;15 min

## 2023-12-03 NOTE — NURSING
Arrived in the unit per bed as assisted by Nazanin SILVEIRA. Pt AAOx4, reports 1/10 slight pain to R groin. Site WDL - no bruising, no hematoma, no tenderness; warm to touch.    R pedal pulse +1, L pedal pulse obtained per Doppler.No reports of tingling or numbness to lower extremities. Pt reminded to remain flat on bed for 6 hours; voiced back understanding. Cup of water within reach per request. Call bell within reach, bed low, SR up x3, alarms on.    Monitoring continues

## 2023-12-04 ENCOUNTER — PATIENT OUTREACH (OUTPATIENT)
Dept: ADMINISTRATIVE | Facility: CLINIC | Age: 63
End: 2023-12-04
Payer: MEDICARE

## 2023-12-04 ENCOUNTER — TELEPHONE (OUTPATIENT)
Dept: FAMILY MEDICINE | Facility: CLINIC | Age: 63
End: 2023-12-04

## 2023-12-04 ENCOUNTER — PATIENT OUTREACH (OUTPATIENT)
Dept: FAMILY MEDICINE | Facility: CLINIC | Age: 63
End: 2023-12-04

## 2023-12-04 NOTE — TELEPHONE ENCOUNTER
----- Message from Marti River sent at 12/4/2023  4:11 PM CST -----   3:40  Returning a call about an appointment pt's #  [phone] GH

## 2023-12-04 NOTE — PROGRESS NOTES
C3 nurse spoke with Helder Brand for a TCC post hospital discharge follow up call. The patient does not have a scheduled HOSFU appointment with Riley Atkinson PA-C within 5-7 days post hospital discharge date 12/03/23. C3 nurse was unable to schedule HOSFU appointment in [facility].    Message sent to PCP staff requesting they contact patient and schedule follow up appointment.

## 2023-12-04 NOTE — PROGRESS NOTES
Discharge Information     Discharge Date:   12/02/2023    Primary Discharge Diagnosis:  Ischemic foot         Discharge Summary:  Reviewed      Medication & Order Review     Were medication changes made or new medications added?   Yes    If so, has the patient filled the prescriptions?  Yes     Was Home Health ordered? No    If so, has Home Health contacted patient and/or initiated services?  No    Name of Home Health Agency? N/A    Durable Medical Equipment ordered?  Yes     If so, has the DME provider contacted patient and delivered equipment?  N/A    Follow Up               Any problems since discharge? NO    How is the patient feeling since returning home?      Have you set up recommended follow up appointments?  (cardiology, surgery, etc.)    Schedule Hospital Follow-up appointment within 7-14 days (preferably 7).      Notes:  Spoke to pt and he stated he is doing okay. Pt is going to call cardiology and reschedule appointment. Pt has been scheduled with Maxi 12/11 at 1:40pm. Pt has no complaints. Let pt know to call our office if needed before appointment. Pt verbalized understanding             Marcia Koo

## 2023-12-11 ENCOUNTER — OFFICE VISIT (OUTPATIENT)
Dept: FAMILY MEDICINE | Facility: CLINIC | Age: 63
End: 2023-12-11
Payer: MEDICARE

## 2023-12-11 VITALS
OXYGEN SATURATION: 99 % | SYSTOLIC BLOOD PRESSURE: 110 MMHG | HEIGHT: 72 IN | HEART RATE: 66 BPM | DIASTOLIC BLOOD PRESSURE: 70 MMHG | BODY MASS INDEX: 25.6 KG/M2 | WEIGHT: 189 LBS

## 2023-12-11 DIAGNOSIS — I99.8 ISCHEMIC FOOT: ICD-10-CM

## 2023-12-11 DIAGNOSIS — I74.3 FEMORAL POPLITEAL ARTERY THROMBUS: Primary | ICD-10-CM

## 2023-12-11 DIAGNOSIS — Z09 HOSPITAL DISCHARGE FOLLOW-UP: ICD-10-CM

## 2023-12-11 DIAGNOSIS — Z15.89 HETEROZYGOUS MTHFR MUTATION C677T: ICD-10-CM

## 2023-12-11 PROCEDURE — 3288F FALL RISK ASSESSMENT DOCD: CPT | Mod: CPTII,S$GLB,, | Performed by: PHYSICIAN ASSISTANT

## 2023-12-11 PROCEDURE — 1157F ADVNC CARE PLAN IN RCRD: CPT | Mod: CPTII,S$GLB,, | Performed by: PHYSICIAN ASSISTANT

## 2023-12-11 PROCEDURE — 1157F PR ADVANCE CARE PLAN OR EQUIV PRESENT IN MEDICAL RECORD: ICD-10-PCS | Mod: CPTII,S$GLB,, | Performed by: PHYSICIAN ASSISTANT

## 2023-12-11 PROCEDURE — 4010F ACE/ARB THERAPY RXD/TAKEN: CPT | Mod: CPTII,S$GLB,, | Performed by: PHYSICIAN ASSISTANT

## 2023-12-11 PROCEDURE — 99496 TRANSITIONAL CARE MANAGE SERVICE 7 DAY DISCHARGE: ICD-10-PCS | Mod: S$GLB,,, | Performed by: PHYSICIAN ASSISTANT

## 2023-12-11 PROCEDURE — 3078F DIAST BP <80 MM HG: CPT | Mod: CPTII,S$GLB,, | Performed by: PHYSICIAN ASSISTANT

## 2023-12-11 PROCEDURE — 1159F PR MEDICATION LIST DOCUMENTED IN MEDICAL RECORD: ICD-10-PCS | Mod: CPTII,S$GLB,, | Performed by: PHYSICIAN ASSISTANT

## 2023-12-11 PROCEDURE — 4010F PR ACE/ARB THEARPY RXD/TAKEN: ICD-10-PCS | Mod: CPTII,S$GLB,, | Performed by: PHYSICIAN ASSISTANT

## 2023-12-11 PROCEDURE — 1111F DSCHRG MED/CURRENT MED MERGE: CPT | Mod: CPTII,S$GLB,, | Performed by: PHYSICIAN ASSISTANT

## 2023-12-11 PROCEDURE — 1101F PR PT FALLS ASSESS DOC 0-1 FALLS W/OUT INJ PAST YR: ICD-10-PCS | Mod: CPTII,S$GLB,, | Performed by: PHYSICIAN ASSISTANT

## 2023-12-11 PROCEDURE — 3074F SYST BP LT 130 MM HG: CPT | Mod: CPTII,S$GLB,, | Performed by: PHYSICIAN ASSISTANT

## 2023-12-11 PROCEDURE — 3288F PR FALLS RISK ASSESSMENT DOCUMENTED: ICD-10-PCS | Mod: CPTII,S$GLB,, | Performed by: PHYSICIAN ASSISTANT

## 2023-12-11 PROCEDURE — 3074F PR MOST RECENT SYSTOLIC BLOOD PRESSURE < 130 MM HG: ICD-10-PCS | Mod: CPTII,S$GLB,, | Performed by: PHYSICIAN ASSISTANT

## 2023-12-11 PROCEDURE — 1159F MED LIST DOCD IN RCRD: CPT | Mod: CPTII,S$GLB,, | Performed by: PHYSICIAN ASSISTANT

## 2023-12-11 PROCEDURE — 3078F PR MOST RECENT DIASTOLIC BLOOD PRESSURE < 80 MM HG: ICD-10-PCS | Mod: CPTII,S$GLB,, | Performed by: PHYSICIAN ASSISTANT

## 2023-12-11 PROCEDURE — 1111F PR DISCHARGE MEDS RECONCILED W/ CURRENT OUTPATIENT MED LIST: ICD-10-PCS | Mod: CPTII,S$GLB,, | Performed by: PHYSICIAN ASSISTANT

## 2023-12-11 PROCEDURE — 1101F PT FALLS ASSESS-DOCD LE1/YR: CPT | Mod: CPTII,S$GLB,, | Performed by: PHYSICIAN ASSISTANT

## 2023-12-11 PROCEDURE — 99496 TRANSJ CARE MGMT HIGH F2F 7D: CPT | Mod: S$GLB,,, | Performed by: PHYSICIAN ASSISTANT

## 2023-12-11 RX ORDER — FOLIC ACID-PYRIDOXINE-CYANOCOBALAMIN TAB 2.5-25-2 MG 2.5-25-2 MG
1 TAB ORAL
Qty: 30 TABLET | Refills: 0 | Status: SHIPPED | OUTPATIENT
Start: 2023-12-11 | End: 2024-02-28 | Stop reason: SDUPTHER

## 2023-12-11 NOTE — PROGRESS NOTES
"  SUBJECTIVE:    Patient ID: Helder Brand is a 63 y.o. male.    Chief Complaint: Follow-up (Hospital follow up. Stopped eliquis for Colonoscopy in October, then needed off it again for dunbar so thinks it was too long and had the clot. Doing well, some weight loss and fatigue. Back on eliquis 5 mg bid. )    Helder Brand is a 63 y.o. male PMHx COPD, HTN, HLD, and thyroid disease presenting for a hospital follow up.  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  This has been ongoing the past few days.  Patient noticed significant palliative to the left foot last night.  He noted he could not feel his pulse anymore.  Patient recently had a TURP and has been off Eliquis since then.  Vascular surgery consulted.  Patient transferred to [facility] for angiogram. Patient was treated with heparin drip and alteplase gtt.  Patient underwent thrombectomy on December 2nd.  Patient was then transitioned to p.o. Eliquis 10 mg.  Patient will continue with p.o. Eliquis 10 mg b.i.d. for 7 days and then 5 mg b.i.d. thereafter.  This was discussed extensively at bedside with Dr. Esquivel and patient.  Patient discharged home on December 3rd.     Today he reports he is doing ok. Complains of "fogginess feeling" with associated occasional unsteady balance and lightheadedness since leaving the hospital. Denies headache, chest pain, SOB, syncope, and palpitations. Leg pain has much improved. Does have slight tingling of lower leg and foot post procedure. Sensation intact. Notes loss of muscle mass to left calf post procedure due to an old procedure. Still taking Eliquis 5 mg.   Also reports pain with urination post TURBT procedure. Denies blood or discharge. Has an appointment with Urology tomorrow.           Admission on 12/01/2023, Discharged on 12/03/2023   Component " Date Value Ref Range Status    Sodium 12/01/2023 137  136 - 145 mmol/L Final    Potassium 12/01/2023 4.0  3.5 - 5.1 mmol/L Final    Chloride 12/01/2023 105  95 - 110 mmol/L Final    CO2 12/01/2023 21 (L)  23 - 29 mmol/L Final    Glucose 12/01/2023 97  70 - 110 mg/dL Final    BUN 12/01/2023 11  8 - 23 mg/dL Final    Creatinine 12/01/2023 0.9  0.5 - 1.4 mg/dL Final    Calcium 12/01/2023 9.2  8.7 - 10.5 mg/dL Final    Anion Gap 12/01/2023 11  8 - 16 mmol/L Final    eGFR 12/01/2023 >60  >60 mL/min/1.73 m^2 Final    WBC 12/01/2023 10.93  3.90 - 12.70 K/uL Final    RBC 12/01/2023 4.65  4.60 - 6.20 M/uL Final    Hemoglobin 12/01/2023 15.0  14.0 - 18.0 g/dL Final    Hematocrit 12/01/2023 42.7  40.0 - 54.0 % Final    MCV 12/01/2023 92  82 - 98 fL Final    MCH 12/01/2023 32.3 (H)  27.0 - 31.0 pg Final    MCHC 12/01/2023 35.1  32.0 - 36.0 g/dL Final    RDW 12/01/2023 13.2  11.5 - 14.5 % Final    Platelets 12/01/2023 229  150 - 450 K/uL Final    MPV 12/01/2023 8.9 (L)  9.2 - 12.9 fL Final    Immature Granulocytes 12/01/2023 0.1  0.0 - 0.5 % Final    Gran # (ANC) 12/01/2023 7.2  1.8 - 7.7 K/uL Final    Immature Grans (Abs) 12/01/2023 0.01  0.00 - 0.04 K/uL Final    Lymph # 12/01/2023 1.9  1.0 - 4.8 K/uL Final    Mono # 12/01/2023 1.3 (H)  0.3 - 1.0 K/uL Final    Eos # 12/01/2023 0.5  0.0 - 0.5 K/uL Final    Baso # 12/01/2023 0.07  0.00 - 0.20 K/uL Final    nRBC 12/01/2023 0  0 /100 WBC Final    Gran % 12/01/2023 65.7  38.0 - 73.0 % Final    Lymph % 12/01/2023 16.9 (L)  18.0 - 48.0 % Final    Mono % 12/01/2023 12.3  4.0 - 15.0 % Final    Eosinophil % 12/01/2023 4.4  0.0 - 8.0 % Final    Basophil % 12/01/2023 0.6  0.0 - 1.9 % Final    Differential Method 12/01/2023 Automated   Final    Prothrombin Time 12/01/2023 11.5  9.0 - 12.5 sec Final    INR 12/01/2023 1.0  0.8 - 1.2 Final    aPTT 12/01/2023 32.5 (H)  21.0 - 32.0 sec Final    aPTT 12/01/2023 110.5 (H)  21.0 - 32.0 sec Final    Prothrombin Time 12/01/2023 11.9  9.0 - 12.5 sec  Final    INR 12/01/2023 1.1  0.8 - 1.2 Final    WBC 12/01/2023 11.31  3.90 - 12.70 K/uL Final    RBC 12/01/2023 4.84  4.60 - 6.20 M/uL Final    Hemoglobin 12/01/2023 15.6  14.0 - 18.0 g/dL Final    Hematocrit 12/01/2023 45.3  40.0 - 54.0 % Final    MCV 12/01/2023 94  82 - 98 fL Final    MCH 12/01/2023 32.2 (H)  27.0 - 31.0 pg Final    MCHC 12/01/2023 34.4  32.0 - 36.0 g/dL Final    RDW 12/01/2023 13.3  11.5 - 14.5 % Final    Platelets 12/01/2023 230  150 - 450 K/uL Final    MPV 12/01/2023 9.2  9.2 - 12.9 fL Final    Immature Granulocytes 12/01/2023 0.4  0.0 - 0.5 % Final    Gran # (ANC) 12/01/2023 7.9 (H)  1.8 - 7.7 K/uL Final    Immature Grans (Abs) 12/01/2023 0.04  0.00 - 0.04 K/uL Final    Lymph # 12/01/2023 1.7  1.0 - 4.8 K/uL Final    Mono # 12/01/2023 1.3 (H)  0.3 - 1.0 K/uL Final    Eos # 12/01/2023 0.2  0.0 - 0.5 K/uL Final    Baso # 12/01/2023 0.07  0.00 - 0.20 K/uL Final    nRBC 12/01/2023 0  0 /100 WBC Final    Gran % 12/01/2023 70.2  38.0 - 73.0 % Final    Lymph % 12/01/2023 15.2 (L)  18.0 - 48.0 % Final    Mono % 12/01/2023 11.6  4.0 - 15.0 % Final    Eosinophil % 12/01/2023 2.0  0.0 - 8.0 % Final    Basophil % 12/01/2023 0.6  0.0 - 1.9 % Final    Differential Method 12/01/2023 Automated   Final    Fibrinogen 12/01/2023 312  182 - 400 mg/dL Final    Fibrinogen 12/02/2023 193  182 - 400 mg/dL Final    aPTT 12/02/2023 40.3 (H)  21.0 - 32.0 sec Final    WBC 12/02/2023 9.05  3.90 - 12.70 K/uL Final    RBC 12/02/2023 4.40 (L)  4.60 - 6.20 M/uL Final    Hemoglobin 12/02/2023 14.1  14.0 - 18.0 g/dL Final    Hematocrit 12/02/2023 41.1  40.0 - 54.0 % Final    MCV 12/02/2023 93  82 - 98 fL Final    MCH 12/02/2023 32.0 (H)  27.0 - 31.0 pg Final    MCHC 12/02/2023 34.3  32.0 - 36.0 g/dL Final    RDW 12/02/2023 13.4  11.5 - 14.5 % Final    Platelets 12/02/2023 153  150 - 450 K/uL Final    MPV 12/02/2023 8.8 (L)  9.2 - 12.9 fL Final    Sodium 12/02/2023 136  136 - 145 mmol/L Final    Potassium 12/02/2023 3.9  3.5 -  5.1 mmol/L Final    Chloride 12/02/2023 107  95 - 110 mmol/L Final    CO2 12/02/2023 22 (L)  23 - 29 mmol/L Final    Glucose 12/02/2023 94  70 - 110 mg/dL Final    BUN 12/02/2023 14  8 - 23 mg/dL Final    Creatinine 12/02/2023 0.8  0.5 - 1.4 mg/dL Final    Calcium 12/02/2023 8.1 (L)  8.7 - 10.5 mg/dL Final    Total Protein 12/02/2023 6.1  6.0 - 8.4 g/dL Final    Albumin 12/02/2023 3.4 (L)  3.5 - 5.2 g/dL Final    Total Bilirubin 12/02/2023 1.0  0.1 - 1.0 mg/dL Final    Alkaline Phosphatase 12/02/2023 74  55 - 135 U/L Final    AST 12/02/2023 20  10 - 40 U/L Final    ALT 12/02/2023 11  10 - 44 U/L Final    eGFR 12/02/2023 >60.0  >60 mL/min/1.73 m^2 Final    Anion Gap 12/02/2023 7 (L)  8 - 16 mmol/L Final    Fibrinogen 12/02/2023 149 (L)  182 - 400 mg/dL Final    Prothrombin Time 12/02/2023 14.2 (H)  9.0 - 12.5 sec Final    INR 12/02/2023 1.3 (H)  0.8 - 1.2 Final    Fibrinogen 12/02/2023 115 (L)  182 - 400 mg/dL Final    Fibrinogen 12/02/2023 115 (L)  182 - 400 mg/dL Final    Fibrinogen 12/03/2023 117 (L)  182 - 400 mg/dL Final    POC Glucose 12/02/2023 114 (H)  70 - 110 Final    WBC 12/03/2023 9.90  3.90 - 12.70 K/uL Final    RBC 12/03/2023 4.18 (L)  4.60 - 6.20 M/uL Final    Hemoglobin 12/03/2023 13.3 (L)  14.0 - 18.0 g/dL Final    Hematocrit 12/03/2023 39.0 (L)  40.0 - 54.0 % Final    MCV 12/03/2023 93  82 - 98 fL Final    MCH 12/03/2023 31.8 (H)  27.0 - 31.0 pg Final    MCHC 12/03/2023 34.1  32.0 - 36.0 g/dL Final    RDW 12/03/2023 13.4  11.5 - 14.5 % Final    Platelets 12/03/2023 152  150 - 450 K/uL Final    MPV 12/03/2023 9.5  9.2 - 12.9 fL Final    Sodium 12/03/2023 136  136 - 145 mmol/L Final    Potassium 12/03/2023 4.0  3.5 - 5.1 mmol/L Final    Chloride 12/03/2023 106  95 - 110 mmol/L Final    CO2 12/03/2023 25  23 - 29 mmol/L Final    Glucose 12/03/2023 96  70 - 110 mg/dL Final    BUN 12/03/2023 11  8 - 23 mg/dL Final    Creatinine 12/03/2023 0.8  0.5 - 1.4 mg/dL Final    Calcium 12/03/2023 8.5 (L)  8.7 -  10.5 mg/dL Final    Total Protein 12/03/2023 5.9 (L)  6.0 - 8.4 g/dL Final    Albumin 12/03/2023 3.3 (L)  3.5 - 5.2 g/dL Final    Total Bilirubin 12/03/2023 0.9  0.1 - 1.0 mg/dL Final    Alkaline Phosphatase 12/03/2023 68  55 - 135 U/L Final    AST 12/03/2023 16  10 - 40 U/L Final    ALT 12/03/2023 9 (L)  10 - 44 U/L Final    eGFR 12/03/2023 >60.0  >60 mL/min/1.73 m^2 Final    Anion Gap 12/03/2023 5 (L)  8 - 16 mmol/L Final    Fibrinogen 12/03/2023 134 (L)  182 - 400 mg/dL Final   Lab Visit on 11/06/2023   Component Date Value Ref Range Status    Sodium 11/06/2023 139  136 - 145 mmol/L Final    Potassium 11/06/2023 3.8  3.5 - 5.1 mmol/L Final    Chloride 11/06/2023 106  95 - 110 mmol/L Final    CO2 11/06/2023 25  23 - 29 mmol/L Final    Glucose 11/06/2023 79  70 - 110 mg/dL Final    BUN 11/06/2023 16  8 - 23 mg/dL Final    Creatinine 11/06/2023 0.9  0.5 - 1.4 mg/dL Final    Calcium 11/06/2023 9.3  8.7 - 10.5 mg/dL Final    Anion Gap 11/06/2023 8  8 - 16 mmol/L Final    eGFR 11/06/2023 >60  >60 mL/min/1.73 m^2 Final    WBC 11/06/2023 7.85  3.90 - 12.70 K/uL Final    RBC 11/06/2023 4.76  4.60 - 6.20 M/uL Final    Hemoglobin 11/06/2023 15.3  14.0 - 18.0 g/dL Final    Hematocrit 11/06/2023 45.1  40.0 - 54.0 % Final    MCV 11/06/2023 95  82 - 98 fL Final    MCH 11/06/2023 32.1 (H)  27.0 - 31.0 pg Final    MCHC 11/06/2023 33.9  32.0 - 36.0 g/dL Final    RDW 11/06/2023 13.1  11.5 - 14.5 % Final    Platelets 11/06/2023 270  150 - 450 K/uL Final    MPV 11/06/2023 9.6  9.2 - 12.9 fL Final    Immature Granulocytes 11/06/2023 0.1  0.0 - 0.5 % Final    Gran # (ANC) 11/06/2023 4.8  1.8 - 7.7 K/uL Final    Immature Grans (Abs) 11/06/2023 0.01  0.00 - 0.04 K/uL Final    Lymph # 11/06/2023 1.8  1.0 - 4.8 K/uL Final    Mono # 11/06/2023 1.0  0.3 - 1.0 K/uL Final    Eos # 11/06/2023 0.2  0.0 - 0.5 K/uL Final    Baso # 11/06/2023 0.05  0.00 - 0.20 K/uL Final    nRBC 11/06/2023 0  0 /100 WBC Final    Gran % 11/06/2023 60.8  38.0 - 73.0  % Final    Lymph % 11/06/2023 23.2  18.0 - 48.0 % Final    Mono % 11/06/2023 12.2  4.0 - 15.0 % Final    Eosinophil % 11/06/2023 3.1  0.0 - 8.0 % Final    Basophil % 11/06/2023 0.6  0.0 - 1.9 % Final    Differential Method 11/06/2023 Automated   Final    Specimen UA 11/06/2023 Urine, Clean Catch   Final    Color, UA 11/06/2023 Yellow  Yellow, Straw, Kasie Final    Appearance, UA 11/06/2023 Clear  Clear Final    pH, UA 11/06/2023 7.0  5.0 - 8.0 Final    Specific Gravity, UA 11/06/2023 1.015  1.005 - 1.030 Final    Protein, UA 11/06/2023 Negative  Negative Final    Glucose, UA 11/06/2023 Negative  Negative Final    Ketones, UA 11/06/2023 Negative  Negative Final    Bilirubin (UA) 11/06/2023 Negative  Negative Final    Occult Blood UA 11/06/2023 1+ (A)  Negative Final    Nitrite, UA 11/06/2023 Negative  Negative Final    Urobilinogen, UA 11/06/2023 Negative  <2.0 EU/dL Final    Leukocytes, UA 11/06/2023 Negative  Negative Final    Urine Culture, Routine 11/06/2023 No growth   Final    Prothrombin Time 11/06/2023 11.5  9.0 - 12.5 sec Final    INR 11/06/2023 1.1  0.8 - 1.2 Final    aPTT 11/06/2023 28.2  21.0 - 32.0 sec Final    RBC, UA 11/06/2023 8 (H)  0 - 4 /hpf Final    WBC, UA 11/06/2023 0  0 - 5 /hpf Final    Microscopic Comment 11/06/2023 SEE COMMENT   Final   Lab Visit on 10/16/2023   Component Date Value Ref Range Status    WBC 10/16/2023 6.83  3.90 - 12.70 K/uL Final    RBC 10/16/2023 4.88  4.60 - 6.20 M/uL Final    Hemoglobin 10/16/2023 16.1  14.0 - 18.0 g/dL Final    Hematocrit 10/16/2023 46.5  40.0 - 54.0 % Final    MCV 10/16/2023 95  82 - 98 fL Final    MCH 10/16/2023 33.0 (H)  27.0 - 31.0 pg Final    MCHC 10/16/2023 34.6  32.0 - 36.0 g/dL Final    RDW 10/16/2023 13.0  11.5 - 14.5 % Final    Platelets 10/16/2023 225  150 - 450 K/uL Final    MPV 10/16/2023 9.2  9.2 - 12.9 fL Final    Immature Granulocytes 10/16/2023 0.1  0.0 - 0.5 % Final    Gran # (ANC) 10/16/2023 3.8  1.8 - 7.7 K/uL Final    Immature Grans  (Abs) 10/16/2023 0.01  0.00 - 0.04 K/uL Final    Lymph # 10/16/2023 1.8  1.0 - 4.8 K/uL Final    Mono # 10/16/2023 0.9  0.3 - 1.0 K/uL Final    Eos # 10/16/2023 0.2  0.0 - 0.5 K/uL Final    Baso # 10/16/2023 0.05  0.00 - 0.20 K/uL Final    nRBC 10/16/2023 0  0 /100 WBC Final    Gran % 10/16/2023 55.4  38.0 - 73.0 % Final    Lymph % 10/16/2023 26.5  18.0 - 48.0 % Final    Mono % 10/16/2023 13.8  4.0 - 15.0 % Final    Eosinophil % 10/16/2023 3.5  0.0 - 8.0 % Final    Basophil % 10/16/2023 0.7  0.0 - 1.9 % Final    Differential Method 10/16/2023 Automated   Final    Sodium 10/16/2023 139  136 - 145 mmol/L Final    Potassium 10/16/2023 4.1  3.5 - 5.1 mmol/L Final    Chloride 10/16/2023 105  95 - 110 mmol/L Final    CO2 10/16/2023 29  23 - 29 mmol/L Final    Glucose 10/16/2023 91  70 - 110 mg/dL Final    BUN 10/16/2023 11  8 - 23 mg/dL Final    Creatinine 10/16/2023 1.0  0.5 - 1.4 mg/dL Final    Calcium 10/16/2023 9.3  8.7 - 10.5 mg/dL Final    Anion Gap 10/16/2023 5 (L)  8 - 16 mmol/L Final    eGFR 10/16/2023 >60.0  >60 mL/min/1.73 m^2 Final   Hospital Outpatient Visit on 09/06/2023   Component Date Value Ref Range Status    LVOT stroke volume 09/06/2023 89.53  cm3 Final    LVIDd 09/06/2023 4.02  3.5 - 6.0 cm Final    LV Systolic Volume 09/06/2023 26.00  mL Final    LVIDs 09/06/2023 2.66  2.1 - 4.0 cm Final    LV Diastolic Volume 09/06/2023 70.80  mL Final    IVS 09/06/2023 1.36 (A)  0.6 - 1.1 cm Final    LVOT diameter 09/06/2023 2.40  cm Final    LVOT area 09/06/2023 4.5  cm2 Final    FS 09/06/2023 34  28 - 44 % Final    Left Ventricle Relative Wall Thick* 09/06/2023 0.56  cm Final    Posterior Wall 09/06/2023 1.12 (A)  0.6 - 1.1 cm Final    LV mass 09/06/2023 175.04  g Final    MV Peak E Santos 09/06/2023 0.54  m/s Final    TDI LATERAL 09/06/2023 0.11  m/s Final    TDI SEPTAL 09/06/2023 0.07  m/s Final    E/E' ratio 09/06/2023 6.00  m/s Final    MV Peak A Santos 09/06/2023 0.58  m/s Final    TR Max Santos 09/06/2023 1.95  m/s  Final    E/A ratio 09/06/2023 0.93   Final    IVRT 09/06/2023 90.00  msec Final    E wave deceleration time 09/06/2023 290.00  msec Final    LV SEPTAL E/E' RATIO 09/06/2023 7.71  m/s Final    LV LATERAL E/E' RATIO 09/06/2023 4.91  m/s Final    LVOT peak mel 09/06/2023 0.93  m/s Final    Left Ventricular Outflow Tract Tiara* 09/06/2023 0.60  cm/s Final    Left Ventricular Outflow Tract Tiara* 09/06/2023 2.00  mmHg Final    LA size 09/06/2023 3.90  cm Final    RVDD 09/06/2023 2.46  cm Final    AV mean gradient 09/06/2023 2  mmHg Final    AV peak gradient 09/06/2023 4  mmHg Final    Ao peak mel 09/06/2023 1.01  m/s Final    Ao VTI 09/06/2023 22.20  cm Final    LVOT peak VTI 09/06/2023 19.80  cm Final    AV valve area 09/06/2023 4.03  cm² Final    AV Velocity Ratio 09/06/2023 0.92   Final    AV index (prosthetic) 09/06/2023 0.89   Final    MICHELLE by Velocity Ratio 09/06/2023 4.16  cm² Final    MV stenosis pressure 1/2 time 09/06/2023 68.00  ms Final    MV valve area p 1/2 method 09/06/2023 3.24  cm2 Final    Triscuspid Valve Regurgitation Pea* 09/06/2023 15  mmHg Final    Ao root annulus 09/06/2023 4.20  cm Final    Mean e' 09/06/2023 0.09  m/s Final    AORTIC VALVE CUSP SEPERATION 09/06/2023 2.00  cm Final    TV resting pulmonary artery pressu* 09/06/2023 18  mmHg Final    RV TB RVSP 09/06/2023 5  mmHg Final    Est. RA pres 09/06/2023 3  mmHg Final   Admission on 09/05/2023, Discharged on 09/05/2023   Component Date Value Ref Range Status    Color, POC UA 09/05/2023 Yellow  Yellow, Straw, Colorless Final-Edited    Clarity, POC UA 09/05/2023 Clear  Clear Final-Edited    Glucose, POC UA 09/05/2023 Negative  Negative Final-Edited    Bilirubin, POC UA 09/05/2023 Negative  Negative Final-Edited    Ketones, POC UA 09/05/2023 Negative  Negative Final-Edited    Spec Grav POC UA 09/05/2023 1.020  1.005 - 1.030 Final-Edited    Blood, POC UA 09/05/2023 Small (A)  Negative Final-Edited    pH, POC UA 09/05/2023 6.0  5.0 - 8.0 Final-Edited     Protein, POC UA 09/05/2023 Negative  Negative Final-Edited    Urobilinogen, POC UA 09/05/2023 0.2  <=1.0 Final-Edited    Nitrite, POC UA 09/05/2023 Negative  Negative Final-Edited    WBC, POC UA 09/05/2023 Negative  Negative Final-Edited   Lab Visit on 08/09/2023   Component Date Value Ref Range Status    Creatinine 08/09/2023 1.0  0.5 - 1.4 mg/dL Final    eGFR 08/09/2023 >60  >60 mL/min/1.73 m^2 Final    PSA Total 08/09/2023 6.8 (H)  <=4.5 ng/mL Final    PSA, Free 08/09/2023 0.8  ng/mL Final    Free PSA/PSA Ratio 08/09/2023 0.12  ratio Final    WBC 08/09/2023 12.84 (H)  3.90 - 12.70 K/uL Final    RBC 08/09/2023 5.08  4.60 - 6.20 M/uL Final    Hemoglobin 08/09/2023 16.3  14.0 - 18.0 g/dL Final    Hematocrit 08/09/2023 47.4  40.0 - 54.0 % Final    MCV 08/09/2023 93  82 - 98 fL Final    MCH 08/09/2023 32.1 (H)  27.0 - 31.0 pg Final    MCHC 08/09/2023 34.4  32.0 - 36.0 g/dL Final    RDW 08/09/2023 14.3  11.5 - 14.5 % Final    Platelets 08/09/2023 239  150 - 450 K/uL Final    MPV 08/09/2023 9.0 (L)  9.2 - 12.9 fL Final    Immature Granulocytes 08/09/2023 0.3  0.0 - 0.5 % Final    Gran # (ANC) 08/09/2023 11.1 (H)  1.8 - 7.7 K/uL Final    Immature Grans (Abs) 08/09/2023 0.04  0.00 - 0.04 K/uL Final    Lymph # 08/09/2023 1.1  1.0 - 4.8 K/uL Final    Mono # 08/09/2023 0.5  0.3 - 1.0 K/uL Final    Eos # 08/09/2023 0.0  0.0 - 0.5 K/uL Final    Baso # 08/09/2023 0.03  0.00 - 0.20 K/uL Final    nRBC 08/09/2023 0  0 /100 WBC Final    Gran % 08/09/2023 86.8 (H)  38.0 - 73.0 % Final    Lymph % 08/09/2023 8.8 (L)  18.0 - 48.0 % Final    Mono % 08/09/2023 3.9 (L)  4.0 - 15.0 % Final    Eosinophil % 08/09/2023 0.0  0.0 - 8.0 % Final    Basophil % 08/09/2023 0.2  0.0 - 1.9 % Final    Differential Method 08/09/2023 Automated   Final   Office Visit on 07/18/2023   Component Date Value Ref Range Status    POC Residual Urine Volume 07/18/2023 83  0 - 100 mL Final    Color, POC UA 07/18/2023 Yellow  Yellow, Straw, Colorless Final     Clarity, POC UA 07/18/2023 Clear  Clear Final    Glucose, POC UA 07/18/2023 Negative  Negative Final    Bilirubin, POC UA 07/18/2023 Negative  Negative Final    Ketones, POC UA 07/18/2023 Negative  Negative Final    Spec Grav POC UA 07/18/2023 1.010  1.005 - 1.030 Final    Blood, POC UA 07/18/2023 Small (A)  Negative Final    pH, POC UA 07/18/2023 6.0  5.0 - 8.0 Final    Protein, POC UA 07/18/2023 Negative  Negative Final    Urobilinogen, POC UA 07/18/2023 0.2  <=1.0 Final    Nitrite, POC UA 07/18/2023 Negative  Negative Final    WBC, POC UA 07/18/2023 Negative  Negative Final    RBC, UA 07/18/2023 0  0 - 4 /hpf Final    WBC, UA 07/18/2023 0  0 - 5 /hpf Final    Bacteria 07/18/2023 Rare  None-Occ /hpf Final    Microscopic Comment 07/18/2023 SEE COMMENT   Final    Urine Culture, Routine 07/18/2023 No growth   Final       Past Medical History:   Diagnosis Date    Blood clot in vein     left leg    Emphysema lung 2021    Enlarged prostate     Heterozygous MTHFR mutation [id number] 08/27/2023    Hyperlipidemia 2021    Hypertension 2001    Kidney stone 2001    x3    Kidney stones     x3    Personal history of colonic polyps 10/20/2023    Thyroid disease     benign growth, partial thyroidectomy     Past Surgical History:   Procedure Laterality Date    ANGIOGRAPHY OF LOWER EXTREMITY Left 08/05/2022    Procedure: Angiogram Extremity Unilateral;  Surgeon: Ali Khoobehi, MD;  Location: [facility] CATH/EP LAB;  Service: Peripheral Vascular;  Laterality: Left;    ANGIOGRAPHY OF LOWER EXTREMITY Left 08/06/2022    Procedure: Angioplasty-peripheral;  Surgeon: Ismael Esquivel MD;  Location: [facility] CATH/EP LAB;  Service: General;  Laterality: Left;    ANGIOGRAPHY OF LOWER EXTREMITY Left 08/06/2022    Procedure: Angiogram Extremity Unilateral;  Surgeon: Ismael Esquivel MD;  Location: [facility] CATH/EP LAB;  Service: General;  Laterality: Left;    ANGIOGRAPHY OF LOWER EXTREMITY Left 08/07/2022    Procedure: Angiogram Extremity Unilateral;  Surgeon:  Ismael Esquivel MD;  Location: [facility] CATH/EP LAB;  Service: General;  Laterality: Left;    ANGIOGRAPHY OF LOWER EXTREMITY Left 12/1/2023    Procedure: Angiogram Extremity Unilateral;  Surgeon: Ismael Esquivel MD;  Location: [facility] CATH/EP LAB;  Service: General;  Laterality: Left;    ANGIOGRAPHY OF LOWER EXTREMITY Left 12/2/2023    Procedure: Angiogram Extremity Unilateral;  Surgeon: Ismael Esquivel MD;  Location: [facility] CATH/EP LAB;  Service: General;  Laterality: Left;    COLONOSCOPY N/A 10/20/2023    Procedure: COLONOSCOPY;  Surgeon: Jeff Call MD;  Location: [facility] ENDO;  Service: Endoscopy;  Laterality: N/A;    COLONOSCOPY  10/20/2023    5 YR RECALL    CYSTOSCOPY N/A 09/05/2023    Procedure: CYSTOSCOPY;  Surgeon: Adam Tyler MD;  Location: [facility] OR;  Service: Urology;  Laterality: N/A;    EMBOLECTOMY OR THROMBECTOMY, BLOOD VESSEL, LOWER EXTREMITY Left 12/1/2023    Procedure: EMBOLECTOMY OR THROMBECTOMY, BLOOD VESSEL, LOWER EXTREMITY;  Surgeon: Ismael Esquivel MD;  Location: [facility] CATH/EP LAB;  Service: General;  Laterality: Left;    INJECTION OF TISSUE PLASMINOGEN ACTIVATOR Left 08/06/2022    Procedure: INJECTION, TISSUE PLASMINOGEN ACTIVATOR;  Surgeon: Ismael Esquivel MD;  Location: [facility] CATH/EP LAB;  Service: General;  Laterality: Left;    INJECTION OF TISSUE PLASMINOGEN ACTIVATOR Left 08/06/2022    Procedure: INJECTION, TISSUE PLASMINOGEN ACTIVATOR;  Surgeon: Ismael Esquivel MD;  Location: [facility] CATH/EP LAB;  Service: General;  Laterality: Left;    PROSTATE SURGERY  2018    REPAIR OF ANEURYSM Left 07/21/2021    Procedure: REPAIR POPLITEAL ARTERY ANEURYSM;  Surgeon: Ismael Esquivel MD;  Location: [facility] OR;  Service: Cardiovascular;  Laterality: Left;    THROMBECTOMY  08/06/2022    Procedure: THROMBECTOMY;  Surgeon: sImael Esquivel MD;  Location: [facility] CATH/EP LAB;  Service: General;;    THROMBECTOMY Left 08/06/2022    Procedure: THROMBECTOMY;  Surgeon: Ismael Esquivel MD;  Location:  [facility] CATH/EP LAB;  Service: General;  Laterality: Left;    THYROIDECTOMY, PARTIAL  2011    TONSILLECTOMY      as a child    TRANSRECTAL BIOPSY OF PROSTATE WITH ULTRASOUND GUIDANCE N/A 09/05/2023    Procedure: BIOPSY, PROSTATE, RECTAL APPROACH, WITH US GUIDANCE;  Surgeon: Adam Tyler MD;  Location: [facility] OR;  Service: Urology;  Laterality: N/A;    TRANSURETHRAL RESECTION OF PROSTATE N/A 11/16/2023    Procedure: TURP (TRANSURETHRAL RESECTION OF PROSTATE);  Surgeon: Adam Tyler MD;  Location: [facility] OR;  Service: Urology;  Laterality: N/A;    urolift  04/2019    [facility]     Family History   Problem Relation Age of Onset    Hypertension Mother     COPD Mother     Pulmonary embolism Father        Marital Status:   Alcohol History:  reports that he does not currently use alcohol after a past usage of about 1.0 standard drink of alcohol per week.  Tobacco History:  reports that he has quit smoking. His smoking use included cigarettes. He has been exposed to tobacco smoke. He has never used smokeless tobacco.  Drug History:  reports no history of drug use.    Review of patient's allergies indicates:  No Known Allergies    Current Outpatient Medications:     albuterol (PROVENTIL/VENTOLIN HFA) 90 mcg/actuation inhaler, Inhale 2 puffs into the lungs every 6 (six) hours as needed for Shortness of Breath., Disp: , Rfl:     albuterol-ipratropium (DUO-NEB) 2.5 mg-0.5 mg/3 mL nebulizer solution, Take 3 mLs by nebulization every 6 (six) hours as needed for Wheezing or Shortness of Breath. Rescue, Disp: 240 mL, Rfl: 5    budesonide-glycopyr-formoterol (BREZTRI AEROSPHERE) 160-9-4.8 mcg/actuation HFAA, Inhale 2 puffs into the lungs 2 (two) times a day. (Patient taking differently: Inhale 2 puffs into the lungs 2 (two) times daily as needed.), Disp: 10.7 g, Rfl: 0    cyanocobalamin/folic ac/vit B6 (FOLBIC ORAL), Take 1 tablet by mouth once daily., Disp: , Rfl:     ELIQUIS 5 mg Tab, Take 2 tablets (10 mg  total) by mouth 2 (two) times daily for 7 days, THEN 1 tablet (5 mg total) 2 (two) times daily., Disp: 88 tablet, Rfl: 0    fluticasone propionate (FLONASE) 50 mcg/actuation nasal spray, 2 sprays by Each Nostril route daily as needed for Allergies., Disp: , Rfl:     FOLBIC 2.5-25-2 mg Tab, Take 1 tablet by mouth once daily, Disp: 30 tablet, Rfl: 0    labetaloL (NORMODYNE) 300 MG tablet, Take 1 tablet (300 mg total) by mouth 2 (two) times a day. (Patient taking differently: Take 300 mg by mouth 2 (two) times a day. Taking 1/2 bid), Disp: 180 tablet, Rfl: 1    olmesartan (BENICAR) 40 MG tablet, Take 1 tablet (40 mg total) by mouth every morning. (Patient taking differently: Take 40 mg by mouth every morning. 1/2 bid), Disp: 90 tablet, Rfl: 1    rosuvastatin (CRESTOR) 10 MG tablet, Take 1 tablet (10 mg total) by mouth once daily., Disp: 90 tablet, Rfl: 1    predniSONE (DELTASONE) 20 MG tablet, 3 pills for 3 days, 2 for 3 days, 1 for 3 days. Repeat for cough (Patient not taking: Reported on 12/4/2023), Disp: 36 tablet, Rfl: 0  No current facility-administered medications for this visit.    Facility-Administered Medications Ordered in Other Visits:     lactated ringers infusion, , Intravenous, Continuous, Jasbir Aragon MD, Last Rate: 75 mL/hr at 11/07/19 1333, 1,000 mL at 11/07/19 1333    Review of Systems   Constitutional:  Negative for chills and fever.   HENT:  Negative for congestion and sore throat.    Eyes:  Negative for photophobia and discharge.   Respiratory:  Negative for cough and shortness of breath.    Cardiovascular:  Negative for chest pain, palpitations and leg swelling.   Gastrointestinal:  Negative for abdominal pain, constipation, diarrhea and vomiting.   Genitourinary:  Positive for dysuria. Negative for hematuria.   Musculoskeletal:  Negative for arthralgias.   Neurological:  Positive for light-headedness. Negative for dizziness, weakness and headaches.   Psychiatric/Behavioral:  Positive for  decreased concentration.           Objective:      Vitals:    12/11/23 1333   BP: 110/70   Pulse: 66   SpO2: 99%   Weight: 85.7 kg (189 lb)   Height: 6' (1.829 m)     Physical Exam  Constitutional:       General: He is not in acute distress.     Appearance: He is not toxic-appearing.   HENT:      Head: Normocephalic and atraumatic.      Nose: Nose normal.      Mouth/Throat:      Mouth: Mucous membranes are moist.      Pharynx: Oropharynx is clear.   Eyes:      Extraocular Movements: Extraocular movements intact.      Conjunctiva/sclera: Conjunctivae normal.      Pupils: Pupils are equal, round, and reactive to light.   Cardiovascular:      Rate and Rhythm: Normal rate.   Pulmonary:      Effort: Pulmonary effort is normal. No respiratory distress.      Breath sounds: Normal breath sounds. No stridor. No wheezing, rhonchi or rales.   Abdominal:      General: There is no distension.      Palpations: Abdomen is soft.   Musculoskeletal:         General: No tenderness. Normal range of motion.      Right lower leg: No tenderness. No edema.      Left lower leg: No tenderness. No edema.      Comments: Muscle atrophy noted to the left calf   Feet:      Left foot:      Protective Sensation: 3 sites tested.  3 sites sensed.      Skin integrity: Skin integrity normal.   Skin:     General: Skin is warm and dry.   Neurological:      General: No focal deficit present.      Mental Status: He is alert and oriented to person, place, and time.           Assessment:       1. Femoral popliteal artery thrombus    2. Hospital discharge follow-up    3. Ischemic foot    4. 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 Esquivel.  Palpable pulses today.  Maintain Eliquis 5 mg b.i.d. 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  High  risk for clot in the future.  Caution to be taken around surgeries      Follow up in about 6 months (around 6/11/2024).        12/11/2023 Riley Atkinson PA-C

## 2023-12-12 ENCOUNTER — CLINICAL SUPPORT (OUTPATIENT)
Dept: UROLOGY | Facility: CLINIC | Age: 63
End: 2023-12-12
Payer: MEDICARE

## 2023-12-12 DIAGNOSIS — N13.8 ENLARGED PROSTATE WITH URINARY OBSTRUCTION: Primary | ICD-10-CM

## 2023-12-12 DIAGNOSIS — N40.1 ENLARGED PROSTATE WITH URINARY OBSTRUCTION: Primary | ICD-10-CM

## 2023-12-12 LAB — POC RESIDUAL URINE VOLUME: 0 ML (ref 0–100)

## 2023-12-12 PROCEDURE — 51798 POCT BLADDER SCAN: ICD-10-PCS | Mod: S$GLB,,, | Performed by: UROLOGY

## 2023-12-12 PROCEDURE — 99499 NO LOS: ICD-10-PCS | Mod: S$GLB,,, | Performed by: UROLOGY

## 2023-12-12 PROCEDURE — 99499 UNLISTED E&M SERVICE: CPT | Mod: S$GLB,,, | Performed by: UROLOGY

## 2023-12-12 PROCEDURE — 51798 US URINE CAPACITY MEASURE: CPT | Mod: S$GLB,,, | Performed by: UROLOGY

## 2023-12-12 NOTE — PROGRESS NOTES
AUASS:  Incomplete emptying- 2  Frequency- 2  Intermittency- 3  Urgency- 2  Weak Stream- 0  Straining- 1  Sleeping- 4  Total-  14  QOL- 5  PVR- 0 ml

## 2023-12-28 ENCOUNTER — OFFICE VISIT (OUTPATIENT)
Dept: PULMONOLOGY | Facility: CLINIC | Age: 63
End: 2023-12-28
Payer: MEDICARE

## 2023-12-28 VITALS
BODY MASS INDEX: 25.41 KG/M2 | DIASTOLIC BLOOD PRESSURE: 86 MMHG | SYSTOLIC BLOOD PRESSURE: 137 MMHG | HEIGHT: 72 IN | WEIGHT: 187.63 LBS | HEART RATE: 58 BPM | OXYGEN SATURATION: 98 %

## 2023-12-28 DIAGNOSIS — Z86.718 HISTORY OF RECURRENT DEEP VEIN THROMBOSIS (DVT): ICD-10-CM

## 2023-12-28 DIAGNOSIS — R06.02 SHORTNESS OF BREATH: ICD-10-CM

## 2023-12-28 DIAGNOSIS — J44.9 COPD, MILD: Primary | ICD-10-CM

## 2023-12-28 PROCEDURE — 4010F PR ACE/ARB THEARPY RXD/TAKEN: ICD-10-PCS | Mod: CPTII,S$GLB,, | Performed by: NURSE PRACTITIONER

## 2023-12-28 PROCEDURE — 3008F PR BODY MASS INDEX (BMI) DOCUMENTED: ICD-10-PCS | Mod: CPTII,S$GLB,, | Performed by: NURSE PRACTITIONER

## 2023-12-28 PROCEDURE — 3075F SYST BP GE 130 - 139MM HG: CPT | Mod: CPTII,S$GLB,, | Performed by: NURSE PRACTITIONER

## 2023-12-28 PROCEDURE — 3008F BODY MASS INDEX DOCD: CPT | Mod: CPTII,S$GLB,, | Performed by: NURSE PRACTITIONER

## 2023-12-28 PROCEDURE — 3079F PR MOST RECENT DIASTOLIC BLOOD PRESSURE 80-89 MM HG: ICD-10-PCS | Mod: CPTII,S$GLB,, | Performed by: NURSE PRACTITIONER

## 2023-12-28 PROCEDURE — 1111F DSCHRG MED/CURRENT MED MERGE: CPT | Mod: CPTII,S$GLB,, | Performed by: NURSE PRACTITIONER

## 2023-12-28 PROCEDURE — 1157F PR ADVANCE CARE PLAN OR EQUIV PRESENT IN MEDICAL RECORD: ICD-10-PCS | Mod: CPTII,S$GLB,, | Performed by: NURSE PRACTITIONER

## 2023-12-28 PROCEDURE — 3288F FALL RISK ASSESSMENT DOCD: CPT | Mod: CPTII,S$GLB,, | Performed by: NURSE PRACTITIONER

## 2023-12-28 PROCEDURE — 99213 PR OFFICE/OUTPT VISIT, EST, LEVL III, 20-29 MIN: ICD-10-PCS | Mod: S$GLB,,, | Performed by: NURSE PRACTITIONER

## 2023-12-28 PROCEDURE — 3079F DIAST BP 80-89 MM HG: CPT | Mod: CPTII,S$GLB,, | Performed by: NURSE PRACTITIONER

## 2023-12-28 PROCEDURE — 4010F ACE/ARB THERAPY RXD/TAKEN: CPT | Mod: CPTII,S$GLB,, | Performed by: NURSE PRACTITIONER

## 2023-12-28 PROCEDURE — 1111F PR DISCHARGE MEDS RECONCILED W/ CURRENT OUTPATIENT MED LIST: ICD-10-PCS | Mod: CPTII,S$GLB,, | Performed by: NURSE PRACTITIONER

## 2023-12-28 PROCEDURE — 1101F PR PT FALLS ASSESS DOC 0-1 FALLS W/OUT INJ PAST YR: ICD-10-PCS | Mod: CPTII,S$GLB,, | Performed by: NURSE PRACTITIONER

## 2023-12-28 PROCEDURE — 99999 PR PBB SHADOW E&M-EST. PATIENT-LVL IV: ICD-10-PCS | Mod: PBBFAC,,, | Performed by: NURSE PRACTITIONER

## 2023-12-28 PROCEDURE — 99999 PR PBB SHADOW E&M-EST. PATIENT-LVL IV: CPT | Mod: PBBFAC,,, | Performed by: NURSE PRACTITIONER

## 2023-12-28 PROCEDURE — 99213 OFFICE O/P EST LOW 20 MIN: CPT | Mod: S$GLB,,, | Performed by: NURSE PRACTITIONER

## 2023-12-28 PROCEDURE — 1159F PR MEDICATION LIST DOCUMENTED IN MEDICAL RECORD: ICD-10-PCS | Mod: CPTII,S$GLB,, | Performed by: NURSE PRACTITIONER

## 2023-12-28 PROCEDURE — 1159F MED LIST DOCD IN RCRD: CPT | Mod: CPTII,S$GLB,, | Performed by: NURSE PRACTITIONER

## 2023-12-28 PROCEDURE — 1157F ADVNC CARE PLAN IN RCRD: CPT | Mod: CPTII,S$GLB,, | Performed by: NURSE PRACTITIONER

## 2023-12-28 PROCEDURE — 3288F PR FALLS RISK ASSESSMENT DOCUMENTED: ICD-10-PCS | Mod: CPTII,S$GLB,, | Performed by: NURSE PRACTITIONER

## 2023-12-28 PROCEDURE — 1101F PT FALLS ASSESS-DOCD LE1/YR: CPT | Mod: CPTII,S$GLB,, | Performed by: NURSE PRACTITIONER

## 2023-12-28 PROCEDURE — 3075F PR MOST RECENT SYSTOLIC BLOOD PRESS GE 130-139MM HG: ICD-10-PCS | Mod: CPTII,S$GLB,, | Performed by: NURSE PRACTITIONER

## 2023-12-28 RX ORDER — ALBUTEROL SULFATE 90 UG/1
2 AEROSOL, METERED RESPIRATORY (INHALATION) EVERY 4 HOURS PRN
Qty: 18 G | Refills: 11 | Status: SHIPPED | OUTPATIENT
Start: 2023-12-28

## 2023-12-28 RX ORDER — IPRATROPIUM BROMIDE AND ALBUTEROL SULFATE 2.5; .5 MG/3ML; MG/3ML
3 SOLUTION RESPIRATORY (INHALATION) EVERY 6 HOURS PRN
Qty: 240 ML | Refills: 5 | Status: SHIPPED | OUTPATIENT
Start: 2023-12-28 | End: 2024-12-27

## 2023-12-28 NOTE — PROGRESS NOTES
12/28/2023    Helder Brand  Follow up    Chief Complaint   Patient presents with    6m f/u    Medication Refill    Shortness of Breath       HPI:   12/28/2023- stopped blood thinners for surgery, had left foot and left lower leg pain.dx DVT left leg admitted to [facility]  12/03/2023 currently on Eliquis and will continue lifetime therapy. States SOB is persistent complaint, on Breztri most days with benefit.   Had exacerbation 2 weeks prior improved with steroid therapy.     7/6/2023- complaint of COPD exacerbation onset 3 weeks treated with antibiotics and oral steroids took two doses of therapy.   Complaint of productive cough. Green mucous. Treated with z-pack and doxycycline with no benefit.   Associated with chest tightness and wheeze.       5/10/23- complaint of cough, onset 2 weeks, improved with nebulizer therapy.   Difficult to clear chest of mucous. Clear mucous. Worse in late evenings.   Associated with wheeze and chest tightness.   Currently on Eliquis for history DVT    10/20/22- had COVID 19 July 2022, states no current complaint of shortness of breath, cough, chest tightness, or wheeze. not currently using advair or nebulizer machine.   Left DVT in August currently on Eliquis,     6/1/2022- states breathing is improved after started nebulizer as needed. PCP treated with antibiotics for productive green mucous in April, resolved with therapy.   SOB- recurrent complaint, had trouble breathing while laying down improved with albuterol inhaler. Associated with chest tightness and wheeze in late evenings, most nights. Nocturnal arousals 1x weekly. States doing better while on antibiotics.   On advair most day, sometimes forgets. Started on Trelegy but insurance coverage is not affordable.   Able to work in garden but still having to take breaks. Worse in high heat.   Declined sleep apnea therapy.     3/9/2022-Cough- worsened in past 2 weeks, has to sleep in recliner, coughing fits at night. Productive thick  mucous green in color. Using nebulizer 3x daily with benefit for few hours.   SOB- severe, worse with exertion, associated with wheeze and chest tightness.     States was previously doing well, able to do gardening for 10 minutes then having to rest. Not using stiolto due to difficultly with device.       11/19/2021- Complaint of worsening cough- onset 2 weeks, tx by PCP with azithromycin and Levaquin with minimal benefit. Coughing through out night. Productive thick green mucous that has turned yellow in color.   Associated with chest tightness and wheeze.        10/06/2021- since last visit pt had blood clot to left popliteal artery and required vascular surgery. He has a L leg wound vacc and getting wound care now.   He hasn't smoked for 40 yrs. Feels some mild throat clearing w/ cough.   Used to swim a lot when young. Worked [employer] 8-10 yrs and got lots of exercise.  Mother smoked a little and got bad copd.    6/17/21-  Need disc of images from [facility]- please bring disc from home and drop off to our office. Once I review the images I can give more advice- possible biopsy?  Get pulmonary function tests  Follow up with cardiologist  Pt is a 60 yo male with HTN, thyroid disease presenting for new evaluation.  Pt reports he had abnormal chest x-ray which was found after he had episode of chest tightness.  Has been getting these episodes of pressure like anterior chest discomfort, off and on for several months, usually while at rest and lasts few minutes. He rides bike 3-6 miles a day and denies LAW or chest tightness w/ exertion. Sometimes has slight wheeze when laying down at night but no cough/mucous. No inhaler use. No childhood asthma or breathing trouble. Denies frequent bronchitis.  Secondhand smoke from grandparents and parents- teenage cigarette smoke but quit at age 17. Mother smoked and now on hospice with COPD.  Pt had CT chest after abnormality seen on CXR 3/2021 with RLL nodule- forgot disc at home. (done at  [facility])  He is going to have an echo at 3pm today.  In past had growth on thyroid- benign, had partial thyroidectomy.  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 08/27/2023    Hyperlipidemia 2021    Hypertension 2001    Kidney stone 2001    x3    Kidney stones     x3    Personal history of colonic polyps 10/20/2023    Thyroid disease     benign growth, partial thyroidectomy         Past Surgical History:   Procedure Laterality Date    ANGIOGRAPHY OF LOWER EXTREMITY Left 08/05/2022    Procedure: Angiogram Extremity Unilateral;  Surgeon: Ali Khoobehi, MD;  Location: [facility] CATH/EP LAB;  Service: Peripheral Vascular;  Laterality: Left;    ANGIOGRAPHY OF LOWER EXTREMITY Left 08/06/2022    Procedure: Angioplasty-peripheral;  Surgeon: Ismael Esquivel MD;  Location: [facility] CATH/EP LAB;  Service: General;  Laterality: Left;    ANGIOGRAPHY OF LOWER EXTREMITY Left 08/06/2022    Procedure: Angiogram Extremity Unilateral;  Surgeon: Ismael Esquivel MD;  Location: [facility] CATH/EP LAB;  Service: General;  Laterality: Left;    ANGIOGRAPHY OF LOWER EXTREMITY Left 08/07/2022    Procedure: Angiogram Extremity Unilateral;  Surgeon: Ismael Esquivel MD;  Location: [facility] CATH/EP LAB;  Service: General;  Laterality: Left;    ANGIOGRAPHY OF LOWER EXTREMITY Left 12/1/2023    Procedure: Angiogram Extremity Unilateral;  Surgeon: Ismael Esquivel MD;  Location: [facility] CATH/EP LAB;  Service: General;  Laterality: Left;    ANGIOGRAPHY OF LOWER EXTREMITY Left 12/2/2023    Procedure: Angiogram Extremity Unilateral;  Surgeon: Ismael Esquivel MD;  Location: [facility] CATH/EP LAB;  Service: General;  Laterality: Left;    ANGIOGRAPHY OF LOWER EXTREMITY Left 12/2/2023    Procedure: Angiogram Extremity Unilateral;   Surgeon: Ismael Esquivel MD;  Location: [facility] CATH/EP LAB;  Service: General;  Laterality: Left;    COLONOSCOPY N/A 10/20/2023    Procedure: COLONOSCOPY;  Surgeon: Jeff Call MD;  Location: [facility] ENDO;  Service: Endoscopy;  Laterality: N/A;    COLONOSCOPY  10/20/2023    5 YR RECALL    CYSTOSCOPY N/A 09/05/2023    Procedure: CYSTOSCOPY;  Surgeon: Adam Tyler MD;  Location: [facility] AS OR;  Service: Urology;  Laterality: N/A;    EMBOLECTOMY OR THROMBECTOMY, BLOOD VESSEL, LOWER EXTREMITY Left 12/1/2023    Procedure: EMBOLECTOMY OR THROMBECTOMY, BLOOD VESSEL, LOWER EXTREMITY;  Surgeon: Ismael Esquivel MD;  Location: [facility] CATH/EP LAB;  Service: General;  Laterality: Left;    INJECTION OF TISSUE PLASMINOGEN ACTIVATOR Left 08/06/2022    Procedure: INJECTION, TISSUE PLASMINOGEN ACTIVATOR;  Surgeon: Ismael Esquivel MD;  Location: [facility] CATH/EP LAB;  Service: General;  Laterality: Left;    INJECTION OF TISSUE PLASMINOGEN ACTIVATOR Left 08/06/2022    Procedure: INJECTION, TISSUE PLASMINOGEN ACTIVATOR;  Surgeon: Ismael Esquivel MD;  Location: [facility] CATH/EP LAB;  Service: General;  Laterality: Left;    PROSTATE SURGERY  2018    REPAIR OF ANEURYSM Left 07/21/2021    Procedure: REPAIR POPLITEAL ARTERY ANEURYSM;  Surgeon: Ismael Esquivel MD;  Location: [facility] OR;  Service: Cardiovascular;  Laterality: Left;    THROMBECTOMY  08/06/2022    Procedure: THROMBECTOMY;  Surgeon: Ismael Esquivel MD;  Location: [facility] CATH/EP LAB;  Service: General;;    THROMBECTOMY Left 08/06/2022    Procedure: THROMBECTOMY;  Surgeon: Ismael Esquivel MD;  Location: [facility] CATH/EP LAB;  Service: General;  Laterality: Left;    THROMBECTOMY, LOWER ARTERIAL  12/2/2023    Procedure: Thrombectomy, Lower Arterial;  Surgeon: Ismael Esquivel MD;  Location: [facility] CATH/EP LAB;  Service: General;;    THYROIDECTOMY, PARTIAL  2011    TONSILLECTOMY      as a child    TRANSRECTAL BIOPSY OF PROSTATE WITH ULTRASOUND GUIDANCE N/A 09/05/2023    Procedure:  BIOPSY, PROSTATE, RECTAL APPROACH, WITH US GUIDANCE;  Surgeon: Adam Tyler MD;  Location: [facility] ASU OR;  Service: Urology;  Laterality: N/A;    TRANSURETHRAL RESECTION OF PROSTATE N/A 11/16/2023    Procedure: TURP (TRANSURETHRAL RESECTION OF PROSTATE);  Surgeon: Adam Tyler MD;  Location: [facility] OR;  Service: Urology;  Laterality: N/A;    urolift  04/2019    [facility]     Social History     Tobacco Use    Smoking status: Former     Types: Cigarettes     Passive exposure: Past    Smokeless tobacco: Never   Substance Use Topics    Alcohol use: Not Currently     Alcohol/week: 1.0 standard drink of alcohol     Types: 1 Cans of beer per week     Comment: rare    Drug use: Never     Family History   Problem Relation Age of Onset    Hypertension Mother     COPD Mother     Pulmonary embolism Father      Review of patient's allergies indicates:  No Known Allergies    Performance Status:The patient's activity level is regular exercise.      Review of Systems:  a review of eleven systems covering constitutional, Eye, HEENT, Psych, Respiratory, Cardiac, GI, , Musculoskeletal, Endocrine, Dermatologic was negative except for pertinent findings as listed ABOVE and below:  All negative with pertinent positives as above     Shortness of breath    Exam:Comprehensive exam done. /86 (BP Location: Right arm, Patient Position: Sitting, BP Method: Medium (Automatic))   Pulse (!) 58   Ht 6' (1.829 m)   Wt 85.1 kg (187 lb 9.8 oz)   SpO2 98% Comment: on room air at rest  BMI 25.44 kg/m²   Exam included Vitals as listed, and patient's appearance and affect and alertness and mood, oral exam for yeast and hygiene and pharynx lesions and Mallapatti (M) score, neck with inspection for jvd and masses and thyroid abnormalities and lymph nodes (supraclavicular and infraclavicular nodes and axillary also examined and noted if abn), chest exam included symmetry and effort and fremitus and percussion and auscultation,  cardiac exam included rhythm and gallops and murmur and rubs and jvd and edema, abdominal exam for mass and hepatosplenomegaly and tenderness and hernias and bowel sounds, Musculoskeletal exam with muscle tone and posture and mobility/gait and  strength, and skin for rashes and cyanosis and pallor and turgor, extremity for clubbing.  Findings were normal except for pertinent findings listed below:  Thin, athletic build  Breath sounds diminished        Radiographs (ct chest and cxr) reviewed: view by direct vision   X-Ray Chest PA And Lateral 06/22/2023 lungs clear.    CTA Chest Non-Coronary 03/22/23 dependent atelectasis, no lung nodules    CTA Chest Non-Coronary 08/13/22 no PE seen; right lower lung nodule decreased in size, left is stable    [facility] Chest x-ray: 4/21/22 findings appear consitent wtih chronic small airwasy disease. no consolidation or other adute process in evident, no significnat or detrimental interval changes in comparison to CXR 11/15/2021      X-Ray Chest AP Portable 01/02/22    Minimal basilar parenchymal opacities or atelectasis.  Small nodular opacity observed in the right lung base laterally.      CTA Chest Non-Coronary (PE Study) 01/03/22 Bibasilar atelectasis/infiltrate. Pneumonia must be considered     Outside CT (uploaded) chest 6/9/21- mild linear atelectasis bilat lower lobes, mild pleural based nodules which look like rounded atelectasis.  CXR 3/9/21- RLL nodule  CT abd/p 8/2019- rounded atelectasis bibasilar present at that time    Labs reviewed    3/2021- wnl  Lab Results   Component Value Date    WBC 9.90 12/03/2023    RBC 4.18 (L) 12/03/2023    HGB 13.3 (L) 12/03/2023    HCT 39.0 (L) 12/03/2023    MCV 93 12/03/2023    MCH 31.8 (H) 12/03/2023    MCHC 34.1 12/03/2023    RDW 13.4 12/03/2023     12/03/2023    MPV 9.5 12/03/2023    GRAN 7.9 (H) 12/01/2023    GRAN 70.2 12/01/2023    LYMPH 1.7 12/01/2023    LYMPH 15.2 (L) 12/01/2023    MONO 1.3 (H) 12/01/2023    MONO 11.6  12/01/2023    EOS 0.2 12/01/2023    BASO 0.07 12/01/2023    EOSINOPHIL 2.0 12/01/2023    BASOPHIL 0.6 12/01/2023         Culture, Respiratory with Gram Stain 03/10/22   Normal respiratory whitney       PFT reviewed- mild obstruction, increased lung vol. DLCO normal          Plan:  Clinical impression is apparently straight forward and impression with management as below.    Helder was seen today for 6m f/u, medication refill and shortness of breath.    Diagnoses and all orders for this visit:    COPD, mild  -     albuterol-ipratropium (DUO-NEB) 2.5 mg-0.5 mg/3 mL nebulizer solution; Take 3 mLs by nebulization every 6 (six) hours as needed for Wheezing or Shortness of Breath. Rescue  -     albuterol (VENTOLIN HFA) 90 mcg/actuation inhaler; Inhale 2 puffs into the lungs every 4 (four) hours as needed for Wheezing or Shortness of Breath. Rescue    Shortness of breath  -     albuterol-ipratropium (DUO-NEB) 2.5 mg-0.5 mg/3 mL nebulizer solution; Take 3 mLs by nebulization every 6 (six) hours as needed for Wheezing or Shortness of Breath. Rescue  -     albuterol (VENTOLIN HFA) 90 mcg/actuation inhaler; Inhale 2 puffs into the lungs every 4 (four) hours as needed for Wheezing or Shortness of Breath. Rescue  -     CTA Chest Non-Coronary (PE Studies); Future    History of recurrent deep vein thrombosis (DVT)  -     CTA Chest Non-Coronary (PE Studies); Future          Follow up in about 3 months (around 3/28/2024), or if symptoms worsen or fail to improve.      Discussed with patient above for education the following:      Patient Instructions   Due to worsening shortness of breath ordering CTA of chest to evaluate possibility of pulmonary blood clot    Continue COPD medication regiment

## 2023-12-28 NOTE — PATIENT INSTRUCTIONS
Due to worsening shortness of breath ordering CTA of chest to evaluate possibility of pulmonary blood clot    Continue COPD medication regiment

## 2023-12-30 ENCOUNTER — TELEPHONE (OUTPATIENT)
Dept: UROLOGY | Facility: CLINIC | Age: 63
End: 2023-12-30
Payer: MEDICARE

## 2023-12-30 NOTE — TELEPHONE ENCOUNTER
Post turp NV reviewed   Symptom improvement  Nocturia x4 so remind avoid bladder irritants in afternoon/evening and consistently stop fluids 2h before bed and urinate before bed    Set f/u with me in feb (after 2/16) - first AM DB ok where not already DB

## 2024-01-11 DIAGNOSIS — Z00.00 ENCOUNTER FOR MEDICARE ANNUAL WELLNESS EXAM: ICD-10-CM

## 2024-01-12 ENCOUNTER — HOSPITAL ENCOUNTER (OUTPATIENT)
Dept: RADIOLOGY | Facility: HOSPITAL | Age: 64
Discharge: HOME | End: 2024-01-12
Attending: NURSE PRACTITIONER
Payer: MEDICARE

## 2024-01-12 DIAGNOSIS — R06.02 SHORTNESS OF BREATH: ICD-10-CM

## 2024-01-12 DIAGNOSIS — Z86.718 HISTORY OF RECURRENT DEEP VEIN THROMBOSIS (DVT): ICD-10-CM

## 2024-01-12 LAB
CREAT SERPL-MCNC: 1.1 MG/DL (ref 0.5–1.4)
SAMPLE: NORMAL

## 2024-01-12 PROCEDURE — 71275 CT ANGIOGRAPHY CHEST: CPT | Mod: TC

## 2024-01-12 PROCEDURE — 71275 CT ANGIOGRAPHY CHEST: CPT | Mod: 26,,, | Performed by: RADIOLOGY

## 2024-01-12 PROCEDURE — 25500020 PHARM REV CODE 255

## 2024-01-12 RX ADMIN — IOHEXOL 75 ML: 350 INJECTION, SOLUTION INTRAVENOUS at 09:01

## 2024-02-18 NOTE — PROGRESS NOTES
[facility] Urology Progress Note    Helder Brand is a 63 y.o. male who presents for BPH follow up    HTN, hypothyroid, COPD/emphysema, PVD w/ L popliteal artery clot and vasc surgery 2021, L DVT 8/22,  MTHFR mutation on eliqus (Calabresi), CAD/aortic ectasia    July 2023: He is experiencing some nocturia. 3-4 times at night.  This is his main concern.  Keeping him from sleeping well at night.  Reports colonoscopy completed 5 years ago at age 58.  Started on flomax, and psa ordered with screening labs  Labs 7/7/23 included PSA of 9.33 (as well as Cr 0.86, eGFR 96, WBC 13.2)  - advised to see urology about elevated psa and recheck labs in a few months before seeing hemonc re his leukocytosis (Calabresi 8/28/23)     He was previously followed by Dr Montague: 11/7/2019 [place] Laser vap with Dr Montague  Was following with Dr. Montague for quite some time for his BPH/LUTS and did have UroLift approximally 5 years ago, which did not work and was followed by laser vap as above.  He reports a family history of prostate cancer in his father, diagnosed in his 50s and treated with prostatectomy at that time.  AUA symptom score:  20/5 (4: Urgency, straining; 3: Emptying, nocturia; 2: Frequency, intermittency, weak stream). NTF most bothersome. Similar to preop  Does drink tea at night/before bed.  PVR 83 cc by bladder scan.  Urinalysis with small blood.  No hx UTI or prostatitis. No dysuria, hematuria, denies perineal, perirectal, testicular ejaculatory pain.   Wife passed away 7 yrs ago and not sexually active. Quit smoking 40 yrs ago.   Was on Flomax prior to BPH intervention.  Has not restarted any meds. Doesn't  snore to his knowledge     On record review from Dr. Montague, in May of 2019, after his UroLift, still complained of urinary urgency and had a uroflow at that time voiding 600 cc with a peak flow of only 12.6 cc/second and an average flow of only 4 cc/second with a PVR of 23 cc after which cystoscopy was discussed and  laser vap of prostate was considered, which ultimately happened as above.  AUA symptom score 17 after UroLift. UroLift was on 3/27/19, with a total of 5 implants.  There was additional obstructing tissue on the patient's right for with 5th implant was placed here.  Last visit with Dr. Montague was 12/20/19 approximally 1 month after laser vap noting urinary problem was improving.  Doing well, no leakage, happy with stream.     He reports never felt relief after laser vap: Takes his eliqus 5mg only once daily in evening. Had discussed 2.5 bid but elected just take 1  Recent screening psa to 9.3, repeated as 6.8 (12% free) and MRI with 53 g gland and RA pirad3 lesion and LA pirad2 lesion.   Cysto/Prostate biopsy 9/5/23:  SEGUNDO: 40g benign; PSA: 6.8 (12% free); TRUS VOLUME: 54.39cm3 (W 52.49mm, H 38.91mm, L 50.91mm)  SPECIMEN: 18 core prostate biopsy - right and left base, middle, apex - medial and lateral of each, and bilateral TZ cores - with 2 cores total each at RA med/lat and 2 cores RA post PZ  CYSTO: Anterior dimpling from prior UroLift implants seen without significant tissue retraction proximally and distally.  Proximal near bladder neck, dimpling from anterior proximal implants seen with significant obstructing anterior tissue across the top 3rd of the prostatic urethra here with obstruction anterior at the bladder neck, though well-healed laser changes of the posterior bladder neck, but is only open just at the proximal portion of the prostatic urethra as if channel procedure, as there is still significant mid and distal lateral lobe obstruction, of which the dimpling from the distal anterior implants forms a shelf of tissue in otherwise obstructing tissue.  Scattered trabeculations of bladder, with no intravesical extension of median lobe  PATH: ASAP left base lateral, focal chronic prostatitis LTZ, and focal chronic inflammation RA post PZ    Did well after biopsy .Still some spotting bleeding. On his  bloodthijose again  Has colonoscopy pending - sees Dr Call later this month to plan  AUA symptom score 22/4 (4: Emptying; 3: all other symptoms)  Not taking any Flomax.  Never restarted it after initial conversation.  Took it a long time prior to initial intervention and did not perceive benefit.  Sees Dr Bundy, cardiology again on 10/30 and had recent EKG. Does report prior biopsy negative as well before initial bph intervention    Elected to proceed with TURP 11/16/23, and all 4 prior urolift endplates removed  PATH: BPH and chronic prostatitis  12/12/23 off meds:  AUASS: 14/5 (emptying- 2; Frequency- 2; Intermittency- 3; Urgency- 2; Weak Stream- 0; Straining- 1; Sleeping- 4). PVR 0cc  Of note he did have admission and treatment on 12/2/23 for ischemic foot/LLE DVT with left lower extremity angiogram and thrombectomy  12/28/23 fu CT chest from [facility] was negative for PE    Returns today noting  Urinating much better.   AUA SS: 12/3 (4 urgency; 3 frequency; 2 sleeping; 1: emptying, intermit, straining)  NTF improving. But does like to drink warm tea or coffe in evening but cut down a lot and only at 6pm  Urgency still hits occ when out. No UUI. And overall is much better than preop      Focused Physical Exam:    Vitals:    02/20/24 0818   BP: 123/83   Pulse: 87         Abdomen: Soft, non-tender, nondistended, no CVA tenderness      ASSESSMENT   1. BPH without obstruction/lower urinary tract symptoms        2. Atypical small acinar proliferation of prostate  PSA, Total and Free    PSA, Total and Free      3. History of elevated PSA  PSA, Total and Free          Plan    Overall doing well 3 months status post Transurethral resection of prostate.  Interim medical record reviewed as above.  Unfortunately he has had another clotting event since the procedure, presumably from being off blood thinners for perioperative timeline in his status post thrombectomy.  He follows up with Hematology next week.  He is otherwise  doing well and back on blood thinners.  No hematuria or dysuria.  He is very pleased with his urinary quality of life in his now off of all genitourinary medications.  He feels significant relief now after this procedure than his previous 2, and despite some continued urgency, notes that is better than preop.  We did review conservative recommendations for urgency and frequency especially timed voiding during the day and avoiding bladder irritants, especially in the afternoon and evening hours, which he still can make an effort to do so.  These were provided on his after visit summary and reviewed again.  Otherwise overall doing well and pleased with his urinary quality of life.  We did have a discussion about the natural history of BPH with obstruction and long-term obstructive changes at the level of the bladder and unmasking some overactive bladder after relief of obstruction, but as it is better than preop, and he can work on conservative recommendations he would like to focus on this rather than any OAB meds at this time.  Quite reasonable    We did also review his history of PSA elevation prior to this BPH intervention, noting a reasonable PSA density but low free PSA however -18 core biopsy except for a core of atypical small acinar proliferation.  His MRI defined a few index lesions however none were clinically significant and were PI-rad 2 only without concern for malignancy, and all of the resected tissue from his TURP was BPH and chronic inflammation.  We did however review that atypical small acinar proliferation can carry with it and up to 40% chance of finding malignancy on future biopsy in the OR guideline recommendations to rebiopsy within 1 year, however after risk benefit discussion, will continue to follow PSA at this time closely.  Will get a new baseline free and total PSA, as his PSA should set a new baseline with reduction of prostate volume, and will follow it free and total from there.   Certainly would repeat MRI 1st at the site of PSA rise before considering repeat prostate biopsy, but will follow prostate trends.  Does understand he would need to discontinue blood thinners for prostate biopsy, or bridge likely with Lovenox, but he is high-risk for this as well, and at this time is quite reasonable with the above negative biopsy within the last year and negative pathology from his TURP to trend PSA very closely at this time.  Will plan PSA free and total today to get a new baseline, and follow the free and total PSA to trend it every 6 months with annual follow-up.    CC: Marino    Total time spent in/on encounter today, including face to face time with patient, counseling, medical record review, interpretation of tests/results, , and treatment plan coordination: 40 minutes    *Visit today included increased complexity associated with the current or anticipated ongoing medical longitudinal care and management related to this patient's serious and/or complex managed problem(s) as described above.

## 2024-02-19 DIAGNOSIS — J44.1 CHRONIC OBSTRUCTIVE PULMONARY DISEASE WITH ACUTE EXACERBATION: ICD-10-CM

## 2024-02-19 DIAGNOSIS — J44.1 COPD WITH ACUTE EXACERBATION: Primary | ICD-10-CM

## 2024-02-19 RX ORDER — PREDNISONE 20 MG/1
TABLET ORAL
Qty: 36 TABLET | Refills: 0 | Status: SHIPPED | OUTPATIENT
Start: 2024-02-19 | End: 2024-03-12 | Stop reason: SDUPTHER

## 2024-02-19 RX ORDER — AZITHROMYCIN 250 MG/1
TABLET, FILM COATED ORAL
Qty: 6 TABLET | Refills: 0 | Status: SHIPPED | OUTPATIENT
Start: 2024-02-19

## 2024-02-20 ENCOUNTER — OFFICE VISIT (OUTPATIENT)
Dept: UROLOGY | Facility: CLINIC | Age: 64
End: 2024-02-20
Payer: MEDICARE

## 2024-02-20 ENCOUNTER — LAB VISIT (OUTPATIENT)
Dept: LAB | Facility: HOSPITAL | Age: 64
End: 2024-02-20
Attending: UROLOGY
Payer: MEDICARE

## 2024-02-20 VITALS — SYSTOLIC BLOOD PRESSURE: 123 MMHG | DIASTOLIC BLOOD PRESSURE: 83 MMHG | HEART RATE: 87 BPM

## 2024-02-20 DIAGNOSIS — Z87.898 HISTORY OF ELEVATED PSA: ICD-10-CM

## 2024-02-20 DIAGNOSIS — N40.0 BPH WITHOUT OBSTRUCTION/LOWER URINARY TRACT SYMPTOMS: Primary | ICD-10-CM

## 2024-02-20 DIAGNOSIS — N42.32 ATYPICAL SMALL ACINAR PROLIFERATION OF PROSTATE: ICD-10-CM

## 2024-02-20 LAB
PROSTATE SPECIFIC ANTIGEN, TOTAL: 2.7 NG/ML (ref 0–4)
PSA FREE MFR SERPL: 20 %
PSA FREE SERPL-MCNC: 0.54 NG/ML (ref 0–1.5)

## 2024-02-20 PROCEDURE — 3288F FALL RISK ASSESSMENT DOCD: CPT | Mod: CPTII,S$GLB,, | Performed by: UROLOGY

## 2024-02-20 PROCEDURE — 1101F PT FALLS ASSESS-DOCD LE1/YR: CPT | Mod: CPTII,S$GLB,, | Performed by: UROLOGY

## 2024-02-20 PROCEDURE — 84153 ASSAY OF PSA TOTAL: CPT | Performed by: UROLOGY

## 2024-02-20 PROCEDURE — 3074F SYST BP LT 130 MM HG: CPT | Mod: CPTII,S$GLB,, | Performed by: UROLOGY

## 2024-02-20 PROCEDURE — 99999 PR PBB SHADOW E&M-EST. PATIENT-LVL IV: CPT | Mod: PBBFAC,,, | Performed by: UROLOGY

## 2024-02-20 PROCEDURE — 3079F DIAST BP 80-89 MM HG: CPT | Mod: CPTII,S$GLB,, | Performed by: UROLOGY

## 2024-02-20 PROCEDURE — 84154 ASSAY OF PSA FREE: CPT | Performed by: UROLOGY

## 2024-02-20 PROCEDURE — 1159F MED LIST DOCD IN RCRD: CPT | Mod: CPTII,S$GLB,, | Performed by: UROLOGY

## 2024-02-20 PROCEDURE — 1157F ADVNC CARE PLAN IN RCRD: CPT | Mod: CPTII,S$GLB,, | Performed by: UROLOGY

## 2024-02-20 PROCEDURE — 1160F RVW MEDS BY RX/DR IN RCRD: CPT | Mod: CPTII,S$GLB,, | Performed by: UROLOGY

## 2024-02-20 PROCEDURE — 36415 COLL VENOUS BLD VENIPUNCTURE: CPT | Performed by: UROLOGY

## 2024-02-20 PROCEDURE — G2211 COMPLEX E/M VISIT ADD ON: HCPCS | Mod: S$GLB,,, | Performed by: UROLOGY

## 2024-02-20 PROCEDURE — 99215 OFFICE O/P EST HI 40 MIN: CPT | Mod: S$GLB,,, | Performed by: UROLOGY

## 2024-02-20 PROCEDURE — 4010F ACE/ARB THERAPY RXD/TAKEN: CPT | Mod: CPTII,S$GLB,, | Performed by: UROLOGY

## 2024-02-20 PROCEDURE — 1126F AMNT PAIN NOTED NONE PRSNT: CPT | Mod: CPTII,S$GLB,, | Performed by: UROLOGY

## 2024-02-28 ENCOUNTER — OFFICE VISIT (OUTPATIENT)
Dept: HEMATOLOGY/ONCOLOGY | Facility: CLINIC | Age: 64
End: 2024-02-28
Payer: MEDICARE

## 2024-02-28 VITALS
RESPIRATION RATE: 18 BRPM | HEART RATE: 65 BPM | SYSTOLIC BLOOD PRESSURE: 131 MMHG | WEIGHT: 189.5 LBS | HEIGHT: 72 IN | DIASTOLIC BLOOD PRESSURE: 84 MMHG | BODY MASS INDEX: 25.67 KG/M2 | TEMPERATURE: 98 F

## 2024-02-28 DIAGNOSIS — I82.432 ACUTE EMBOLISM AND THROMBOSIS OF LEFT POPLITEAL VEIN: ICD-10-CM

## 2024-02-28 DIAGNOSIS — Z15.89 HETEROZYGOUS MTHFR MUTATION C677T: Primary | ICD-10-CM

## 2024-02-28 DIAGNOSIS — I74.3 FEMORAL POPLITEAL ARTERY THROMBUS: ICD-10-CM

## 2024-02-28 DIAGNOSIS — Z15.89 HETEROZYGOUS MTHFR MUTATION C677T: ICD-10-CM

## 2024-02-28 PROCEDURE — 1101F PT FALLS ASSESS-DOCD LE1/YR: CPT | Mod: CPTII,S$GLB,, | Performed by: INTERNAL MEDICINE

## 2024-02-28 PROCEDURE — 99213 OFFICE O/P EST LOW 20 MIN: CPT | Mod: S$GLB,,, | Performed by: INTERNAL MEDICINE

## 2024-02-28 PROCEDURE — 1159F MED LIST DOCD IN RCRD: CPT | Mod: CPTII,S$GLB,, | Performed by: INTERNAL MEDICINE

## 2024-02-28 PROCEDURE — 3079F DIAST BP 80-89 MM HG: CPT | Mod: CPTII,S$GLB,, | Performed by: INTERNAL MEDICINE

## 2024-02-28 PROCEDURE — 3075F SYST BP GE 130 - 139MM HG: CPT | Mod: CPTII,S$GLB,, | Performed by: INTERNAL MEDICINE

## 2024-02-28 PROCEDURE — 3008F BODY MASS INDEX DOCD: CPT | Mod: CPTII,S$GLB,, | Performed by: INTERNAL MEDICINE

## 2024-02-28 PROCEDURE — 1157F ADVNC CARE PLAN IN RCRD: CPT | Mod: CPTII,S$GLB,, | Performed by: INTERNAL MEDICINE

## 2024-02-28 PROCEDURE — 3288F FALL RISK ASSESSMENT DOCD: CPT | Mod: CPTII,S$GLB,, | Performed by: INTERNAL MEDICINE

## 2024-02-28 PROCEDURE — 1125F AMNT PAIN NOTED PAIN PRSNT: CPT | Mod: CPTII,S$GLB,, | Performed by: INTERNAL MEDICINE

## 2024-02-28 PROCEDURE — 1160F RVW MEDS BY RX/DR IN RCRD: CPT | Mod: CPTII,S$GLB,, | Performed by: INTERNAL MEDICINE

## 2024-02-28 PROCEDURE — 4010F ACE/ARB THERAPY RXD/TAKEN: CPT | Mod: CPTII,S$GLB,, | Performed by: INTERNAL MEDICINE

## 2024-02-29 RX ORDER — FOLIC ACID-PYRIDOXINE-CYANOCOBALAMIN TAB 2.5-25-2 MG 2.5-25-2 MG
1 TAB ORAL DAILY
Qty: 30 TABLET | Refills: 11 | Status: SHIPPED | OUTPATIENT
Start: 2024-02-29

## 2024-03-07 ENCOUNTER — TELEPHONE (OUTPATIENT)
Dept: FAMILY MEDICINE | Facility: CLINIC | Age: 64
End: 2024-03-07
Payer: MEDICARE

## 2024-03-07 NOTE — TELEPHONE ENCOUNTER
----- Message from Kelly Narvaez sent at 3/7/2024  8:52 AM CST -----  Pt calling back about wellness appointment.   [phone]

## 2024-03-12 ENCOUNTER — OFFICE VISIT (OUTPATIENT)
Dept: PULMONOLOGY | Facility: CLINIC | Age: 64
End: 2024-03-12
Payer: MEDICARE

## 2024-03-12 ENCOUNTER — LAB VISIT (OUTPATIENT)
Dept: LAB | Facility: HOSPITAL | Age: 64
End: 2024-03-12
Attending: NURSE PRACTITIONER
Payer: MEDICARE

## 2024-03-12 VITALS
BODY MASS INDEX: 26.12 KG/M2 | OXYGEN SATURATION: 96 % | HEIGHT: 72 IN | WEIGHT: 192.81 LBS | DIASTOLIC BLOOD PRESSURE: 94 MMHG | HEART RATE: 59 BPM | SYSTOLIC BLOOD PRESSURE: 134 MMHG

## 2024-03-12 DIAGNOSIS — J47.9 BRONCHIECTASIS WITHOUT COMPLICATION: ICD-10-CM

## 2024-03-12 DIAGNOSIS — J45.50 SEVERE PERSISTENT ASTHMA WITHOUT COMPLICATION: ICD-10-CM

## 2024-03-12 DIAGNOSIS — J45.50 SEVERE PERSISTENT ASTHMA WITHOUT COMPLICATION: Primary | ICD-10-CM

## 2024-03-12 DIAGNOSIS — J44.89 COPD WITH ASTHMA: ICD-10-CM

## 2024-03-12 LAB
BASOPHILS # BLD AUTO: 0.06 K/UL (ref 0–0.2)
BASOPHILS NFR BLD: 0.7 % (ref 0–1.9)
DIFFERENTIAL METHOD BLD: ABNORMAL
EOSINOPHIL # BLD AUTO: 0.3 K/UL (ref 0–0.5)
EOSINOPHIL NFR BLD: 3.9 % (ref 0–8)
ERYTHROCYTE [DISTWIDTH] IN BLOOD BY AUTOMATED COUNT: 14 % (ref 11.5–14.5)
HCT VFR BLD AUTO: 45.9 % (ref 40–54)
HGB BLD-MCNC: 15.3 G/DL (ref 14–18)
IMM GRANULOCYTES # BLD AUTO: 0.03 K/UL (ref 0–0.04)
IMM GRANULOCYTES NFR BLD AUTO: 0.3 % (ref 0–0.5)
LYMPHOCYTES # BLD AUTO: 2.4 K/UL (ref 1–4.8)
LYMPHOCYTES NFR BLD: 28 % (ref 18–48)
MCH RBC QN AUTO: 30.5 PG (ref 27–31)
MCHC RBC AUTO-ENTMCNC: 33.3 G/DL (ref 32–36)
MCV RBC AUTO: 92 FL (ref 82–98)
MONOCYTES # BLD AUTO: 1.1 K/UL (ref 0.3–1)
MONOCYTES NFR BLD: 12.7 % (ref 4–15)
NEUTROPHILS # BLD AUTO: 4.7 K/UL (ref 1.8–7.7)
NEUTROPHILS NFR BLD: 54.4 % (ref 38–73)
NRBC BLD-RTO: 0 /100 WBC
PLATELET # BLD AUTO: 227 K/UL (ref 150–450)
PMV BLD AUTO: 9.1 FL (ref 9.2–12.9)
RBC # BLD AUTO: 5.01 M/UL (ref 4.6–6.2)
WBC # BLD AUTO: 8.63 K/UL (ref 3.9–12.7)

## 2024-03-12 PROCEDURE — 99999 PR PBB SHADOW E&M-EST. PATIENT-LVL III: CPT | Mod: PBBFAC,,, | Performed by: NURSE PRACTITIONER

## 2024-03-12 PROCEDURE — 1101F PT FALLS ASSESS-DOCD LE1/YR: CPT | Mod: CPTII,S$GLB,, | Performed by: NURSE PRACTITIONER

## 2024-03-12 PROCEDURE — 4010F ACE/ARB THERAPY RXD/TAKEN: CPT | Mod: CPTII,S$GLB,, | Performed by: NURSE PRACTITIONER

## 2024-03-12 PROCEDURE — 3288F FALL RISK ASSESSMENT DOCD: CPT | Mod: CPTII,S$GLB,, | Performed by: NURSE PRACTITIONER

## 2024-03-12 PROCEDURE — 3075F SYST BP GE 130 - 139MM HG: CPT | Mod: CPTII,S$GLB,, | Performed by: NURSE PRACTITIONER

## 2024-03-12 PROCEDURE — 99213 OFFICE O/P EST LOW 20 MIN: CPT | Mod: S$GLB,,, | Performed by: NURSE PRACTITIONER

## 2024-03-12 PROCEDURE — 1159F MED LIST DOCD IN RCRD: CPT | Mod: CPTII,S$GLB,, | Performed by: NURSE PRACTITIONER

## 2024-03-12 PROCEDURE — 1126F AMNT PAIN NOTED NONE PRSNT: CPT | Mod: CPTII,S$GLB,, | Performed by: NURSE PRACTITIONER

## 2024-03-12 PROCEDURE — 85025 COMPLETE CBC W/AUTO DIFF WBC: CPT | Performed by: NURSE PRACTITIONER

## 2024-03-12 PROCEDURE — 3008F BODY MASS INDEX DOCD: CPT | Mod: CPTII,S$GLB,, | Performed by: NURSE PRACTITIONER

## 2024-03-12 PROCEDURE — 1157F ADVNC CARE PLAN IN RCRD: CPT | Mod: CPTII,S$GLB,, | Performed by: NURSE PRACTITIONER

## 2024-03-12 PROCEDURE — 3080F DIAST BP >= 90 MM HG: CPT | Mod: CPTII,S$GLB,, | Performed by: NURSE PRACTITIONER

## 2024-03-12 PROCEDURE — 36415 COLL VENOUS BLD VENIPUNCTURE: CPT | Performed by: NURSE PRACTITIONER

## 2024-03-12 RX ORDER — BENRALIZUMAB 30 MG/ML
30 INJECTION, SOLUTION SUBCUTANEOUS
Qty: 1 EACH | Refills: 5 | Status: ACTIVE | OUTPATIENT
Start: 2024-03-12

## 2024-03-12 RX ORDER — BENRALIZUMAB 30 MG/ML
30 INJECTION, SOLUTION SUBCUTANEOUS
Qty: 1 ML | Refills: 2 | Status: ACTIVE | OUTPATIENT
Start: 2024-03-12

## 2024-03-12 RX ORDER — PREDNISONE 20 MG/1
TABLET ORAL
Qty: 36 TABLET | Refills: 0 | Status: SHIPPED | OUTPATIENT
Start: 2024-03-12

## 2024-03-12 NOTE — PATIENT INSTRUCTIONS
Will continue current Breztri 2 puffs twice daily    Adding new medication called fasenra, expect phone call from [facility]    Use nebulizer with aerobika device at least once a day until cough improves.

## 2024-03-12 NOTE — PROGRESS NOTES
3/12/2024    Helder Brand  Follow up    Chief Complaint   Patient presents with    Breathing Problem       HPI:   3/12/2024- complaint of hoarse voice changes onset 1 month. Treated with prednisone and azithromycin with benefit. Symptoms returned after completing steroid therapy. Complaint of thick productive cough, associated with wheeze when laying down.   Using nebulizer and aerobika device  daily. Requires systemic steroids every other month for past year.     On Eliquis for DVTs. No issues with excessive bleeding.       12/28/2023- stopped blood thinners for surgery, had left foot and left lower leg pain.dx DVT left leg admitted to [facility]  12/03/2023 currently on Eliquis and will continue lifetime therapy. States SOB is persistent complaint, on Breztri most days with benefit.   Had exacerbation 2 weeks prior improved with steroid therapy.     7/6/2023- complaint of COPD exacerbation onset 3 weeks treated with antibiotics and oral steroids took two doses of therapy.   Complaint of productive cough. Green mucous. Treated with z-pack and doxycycline with no benefit.   Associated with chest tightness and wheeze.       5/10/23- complaint of cough, onset 2 weeks, improved with nebulizer therapy.   Difficult to clear chest of mucous. Clear mucous. Worse in late evenings.   Associated with wheeze and chest tightness.   Currently on Eliquis for history DVT    10/20/22- had COVID 19 July 2022, states no current complaint of shortness of breath, cough, chest tightness, or wheeze. not currently using advair or nebulizer machine.   Left DVT in August currently on Eliquis,     6/1/2022- states breathing is improved after started nebulizer as needed. PCP treated with antibiotics for productive green mucous in April, resolved with therapy.   SOB- recurrent complaint, had trouble breathing while laying down improved with albuterol inhaler. Associated with chest tightness and wheeze in late evenings, most nights. Nocturnal  arousals 1x weekly. States doing better while on antibiotics.   On advair most day, sometimes forgets. Started on Trelegy but insurance coverage is not affordable.   Able to work in garden but still having to take breaks. Worse in high heat.   Declined sleep apnea therapy.     3/9/2022-Cough- worsened in past 2 weeks, has to sleep in recliner, coughing fits at night. Productive thick mucous green in color. Using nebulizer 3x daily with benefit for few hours.   SOB- severe, worse with exertion, associated with wheeze and chest tightness.     States was previously doing well, able to do gardening for 10 minutes then having to rest. Not using stiolto due to difficultly with device.       11/19/2021- Complaint of worsening cough- onset 2 weeks, tx by PCP with azithromycin and Levaquin with minimal benefit. Coughing through out night. Productive thick green mucous that has turned yellow in color.   Associated with chest tightness and wheeze.        10/06/2021- since last visit pt had blood clot to left popliteal artery and required vascular surgery. He has a L leg wound vacc and getting wound care now.   He hasn't smoked for 40 yrs. Feels some mild throat clearing w/ cough.   Used to swim a lot when young. Worked [employer] 8-10 yrs and got lots of exercise.  Mother smoked a little and got bad copd.    6/17/21-  Need disc of images from [facility]- please bring disc from home and drop off to our office. Once I review the images I can give more advice- possible biopsy?  Get pulmonary function tests  Follow up with cardiologist  Pt is a 62 yo male with HTN, thyroid disease presenting for new evaluation.  Pt reports he had abnormal chest x-ray which was found after he had episode of chest tightness.  Has been getting these episodes of pressure like anterior chest discomfort, off and on for several months, usually while at rest and lasts few minutes. He rides bike 3-6 miles a day and denies LAW or chest tightness w/ exertion. Sometimes  has slight wheeze when laying down at night but no cough/mucous. No inhaler use. No childhood asthma or breathing trouble. Denies frequent bronchitis.  Secondhand smoke from grandparents and parents- teenage cigarette smoke but quit at age 17. Mother smoked and now on hospice with COPD.  Pt had CT chest after abnormality seen on CXR 3/2021 with RLL nodule- forgot disc at home. (done at [facility])  He is going to have an echo at 3pm today.  In past had growth on thyroid- benign, had partial thyroidectomy.  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 08/27/2023    Hyperlipidemia 2021    Hypertension 2001    Kidney stone 2001    x3    Kidney stones     x3    Personal history of colonic polyps 10/20/2023    Thyroid disease     benign growth, partial thyroidectomy         Past Surgical History:   Procedure Laterality Date    ANGIOGRAPHY OF LOWER EXTREMITY Left 08/05/2022    Procedure: Angiogram Extremity Unilateral;  Surgeon: Ali Khoobehi, MD;  Location: [facility] CATH/EP LAB;  Service: Peripheral Vascular;  Laterality: Left;    ANGIOGRAPHY OF LOWER EXTREMITY Left 08/06/2022    Procedure: Angioplasty-peripheral;  Surgeon: Ismael Esquivel MD;  Location: [facility] CATH/EP LAB;  Service: General;  Laterality: Left;    ANGIOGRAPHY OF LOWER EXTREMITY Left 08/06/2022    Procedure: Angiogram Extremity Unilateral;  Surgeon: Ismael Esquivel MD;  Location: [facility] CATH/EP LAB;  Service: General;  Laterality: Left;    ANGIOGRAPHY OF LOWER EXTREMITY Left 08/07/2022    Procedure: Angiogram Extremity Unilateral;  Surgeon: Ismael Esquivel MD;  Location: [facility] CATH/EP LAB;  Service: General;  Laterality: Left;    ANGIOGRAPHY OF LOWER EXTREMITY Left 12/1/2023    Procedure: Angiogram Extremity  Unilateral;  Surgeon: Ismael Esquivel MD;  Location: [facility] CATH/EP LAB;  Service: General;  Laterality: Left;    ANGIOGRAPHY OF LOWER EXTREMITY Left 12/2/2023    Procedure: Angiogram Extremity Unilateral;  Surgeon: Ismael Esquivel MD;  Location: [facility] CATH/EP LAB;  Service: General;  Laterality: Left;    ANGIOGRAPHY OF LOWER EXTREMITY Left 12/2/2023    Procedure: Angiogram Extremity Unilateral;  Surgeon: Ismael Esquivel MD;  Location: [facility] CATH/EP LAB;  Service: General;  Laterality: Left;    COLONOSCOPY N/A 10/20/2023    Procedure: COLONOSCOPY;  Surgeon: Jeff Call MD;  Location: [facility] ENDO;  Service: Endoscopy;  Laterality: N/A;    COLONOSCOPY  10/20/2023    5 YR RECALL    CYSTOSCOPY N/A 09/05/2023    Procedure: CYSTOSCOPY;  Surgeon: Adam Tyler MD;  Location: [facility] OR;  Service: Urology;  Laterality: N/A;    EMBOLECTOMY OR THROMBECTOMY, BLOOD VESSEL, LOWER EXTREMITY Left 12/1/2023    Procedure: EMBOLECTOMY OR THROMBECTOMY, BLOOD VESSEL, LOWER EXTREMITY;  Surgeon: Ismael Esquivel MD;  Location: [facility] CATH/EP LAB;  Service: General;  Laterality: Left;    INJECTION OF TISSUE PLASMINOGEN ACTIVATOR Left 08/06/2022    Procedure: INJECTION, TISSUE PLASMINOGEN ACTIVATOR;  Surgeon: Ismael Esquivel MD;  Location: [facility] CATH/EP LAB;  Service: General;  Laterality: Left;    INJECTION OF TISSUE PLASMINOGEN ACTIVATOR Left 08/06/2022    Procedure: INJECTION, TISSUE PLASMINOGEN ACTIVATOR;  Surgeon: Ismael Esquivel MD;  Location: [facility] CATH/EP LAB;  Service: General;  Laterality: Left;    PROSTATE SURGERY  2018    REPAIR OF ANEURYSM Left 07/21/2021    Procedure: REPAIR POPLITEAL ARTERY ANEURYSM;  Surgeon: Ismael Esquivel MD;  Location: [facility] OR;  Service: Cardiovascular;  Laterality: Left;    THROMBECTOMY  08/06/2022    Procedure: THROMBECTOMY;  Surgeon: Ismael Esquivel MD;  Location: [facility] CATH/EP LAB;  Service: General;;    THROMBECTOMY Left 08/06/2022    Procedure: THROMBECTOMY;  Surgeon: Ismael VIDAL  MD Sierra;  Location: [facility] CATH/EP LAB;  Service: General;  Laterality: Left;    THROMBECTOMY, LOWER ARTERIAL  12/2/2023    Procedure: Thrombectomy, Lower Arterial;  Surgeon: Ismael Esquivel MD;  Location: [facility] CATH/EP LAB;  Service: General;;    THYROIDECTOMY, PARTIAL  2011    TONSILLECTOMY      as a child    TRANSRECTAL BIOPSY OF PROSTATE WITH ULTRASOUND GUIDANCE N/A 09/05/2023    Procedure: BIOPSY, PROSTATE, RECTAL APPROACH, WITH US GUIDANCE;  Surgeon: Adam Tyler MD;  Location: [facility] OR;  Service: Urology;  Laterality: N/A;    TRANSURETHRAL RESECTION OF PROSTATE N/A 11/16/2023    Procedure: TURP (TRANSURETHRAL RESECTION OF PROSTATE);  Surgeon: Adam Tyler MD;  Location: [facility] OR;  Service: Urology;  Laterality: N/A;    urolift  04/2019    [facility]     Social History     Tobacco Use    Smoking status: Former     Types: Cigarettes     Passive exposure: Past    Smokeless tobacco: Never   Substance Use Topics    Alcohol use: Not Currently     Alcohol/week: 1.0 standard drink of alcohol     Types: 1 Cans of beer per week     Comment: rare    Drug use: Never     Family History   Problem Relation Age of Onset    Hypertension Mother     COPD Mother     Pulmonary embolism Father      Review of patient's allergies indicates:  No Known Allergies    Performance Status:The patient's activity level is regular exercise.      Review of Systems:  a review of eleven systems covering constitutional, Eye, HEENT, Psych, Respiratory, Cardiac, GI, , Musculoskeletal, Endocrine, Dermatologic was negative except for pertinent findings as listed ABOVE and below:  All negative with pertinent positives as above     Shortness of breath    Exam:Comprehensive exam done. BP (!) 134/94 (BP Location: Right arm, Patient Position: Sitting, BP Method: Medium (Automatic))   Pulse (!) 59   Ht 6' (1.829 m)   Wt 87.5 kg (192 lb 12.7 oz)   SpO2 96% Comment: on room air at rest  BMI 26.15 kg/m²   Exam included Vitals as  listed, and patient's appearance and affect and alertness and mood, oral exam for yeast and hygiene and pharynx lesions and Mallapatti (M) score, neck with inspection for jvd and masses and thyroid abnormalities and lymph nodes (supraclavicular and infraclavicular nodes and axillary also examined and noted if abn), chest exam included symmetry and effort and fremitus and percussion and auscultation, cardiac exam included rhythm and gallops and murmur and rubs and jvd and edema, abdominal exam for mass and hepatosplenomegaly and tenderness and hernias and bowel sounds, Musculoskeletal exam with muscle tone and posture and mobility/gait and  strength, and skin for rashes and cyanosis and pallor and turgor, extremity for clubbing.  Findings were normal except for pertinent findings listed below:  Thin, athletic build  Breath sounds diminished        Radiographs (ct chest and cxr) reviewed: view by direct vision   CTA Chest Non-Coronary 3/22/2023 peribronchial thickening, mild bronchiectasis. Bilateral lower lobe linear atelectasis    X-Ray Chest PA And Lateral 06/22/2023 lungs clear.    CTA Chest Non-Coronary 03/22/23 dependent atelectasis, no lung nodules    CTA Chest Non-Coronary 08/13/22 no PE seen; right lower lung nodule decreased in size, left is stable    [facility] Chest x-ray: 4/21/22 findings appear consitent wtih chronic small airwasy disease. no consolidation or other adute process in evident, no significnat or detrimental interval changes in comparison to CXR 11/15/2021      X-Ray Chest AP Portable 01/02/22    Minimal basilar parenchymal opacities or atelectasis.  Small nodular opacity observed in the right lung base laterally.      CTA Chest Non-Coronary (PE Study) 01/03/22 Bibasilar atelectasis/infiltrate. Pneumonia must be considered     Outside CT (uploaded) chest 6/9/21- mild linear atelectasis bilat lower lobes, mild pleural based nodules which look like rounded atelectasis.  CXR 3/9/21- RLL nodule  CT  abd/p 8/2019- rounded atelectasis bibasilar present at that time    Labs reviewed      Lab Results   Component Value Date    WBC 9.90 12/03/2023    RBC 4.18 (L) 12/03/2023    HGB 13.3 (L) 12/03/2023    HCT 39.0 (L) 12/03/2023    MCV 93 12/03/2023    MCH 31.8 (H) 12/03/2023    MCHC 34.1 12/03/2023    RDW 13.4 12/03/2023     12/03/2023    MPV 9.5 12/03/2023    GRAN 7.9 (H) 12/01/2023    GRAN 70.2 12/01/2023    LYMPH 1.7 12/01/2023    LYMPH 15.2 (L) 12/01/2023    MONO 1.3 (H) 12/01/2023    MONO 11.6 12/01/2023    EOS 0.2 12/01/2023    BASO 0.07 12/01/2023    EOSINOPHIL 2.0 12/01/2023    BASOPHIL 0.6 12/01/2023      Latest Reference Range & Units 10/16/23 08:17 11/06/23 13:20 12/01/23 02:22 12/01/23 18:25   Eos # 0.0 - 0.5 K/uL 0.2 0.2 0.5 0.2       Culture, Respiratory with Gram Stain 03/10/22   Normal respiratory whitney       PFT reviewed- mild obstruction, increased lung vol. DLCO normal          Plan:  Clinical impression is apparently straight forward and impression with management as below.    Helder was seen today for breathing problem.    Diagnoses and all orders for this visit:    Severe persistent asthma without complication  -     CBC auto differential; Future  -     predniSONE (DELTASONE) 20 MG tablet; 3 pills for 3 days, 2 for 3 days, 1 for 3 days. Repeat for cough  -     benralizumab (FASENRA PEN) 30 mg/mL AtIn; Inject 30 mg into the skin every 28 days.  -     benralizumab (FASENRA PEN) 30 mg/mL AtIn; Inject 30 mg into the skin every 8 weeks.    Bronchiectasis without complication  Comments:  - continue nebulizer therapy daily    COPD with asthma  Comments:  - continue current prescription medication regiment            Follow up in about 3 months (around 6/12/2024), or if symptoms worsen or fail to improve.      Discussed with patient above for education the following:      Patient Instructions   Will continue current Breztri 2 puffs twice daily    Adding new medication called fasenra, expect phone  call from [facility]    Use nebulizer with aerobika device at least once a day until cough improves.

## 2024-03-13 DIAGNOSIS — J44.1 COPD WITH ACUTE EXACERBATION: ICD-10-CM

## 2024-03-14 ENCOUNTER — OFFICE VISIT (OUTPATIENT)
Dept: FAMILY MEDICINE | Facility: CLINIC | Age: 64
End: 2024-03-14
Payer: MEDICARE

## 2024-03-14 VITALS
RESPIRATION RATE: 18 BRPM | DIASTOLIC BLOOD PRESSURE: 78 MMHG | WEIGHT: 191 LBS | BODY MASS INDEX: 25.87 KG/M2 | HEIGHT: 72 IN | SYSTOLIC BLOOD PRESSURE: 109 MMHG | OXYGEN SATURATION: 97 % | HEART RATE: 67 BPM

## 2024-03-14 DIAGNOSIS — J44.9 CHRONIC OBSTRUCTIVE PULMONARY DISEASE, UNSPECIFIED COPD TYPE: ICD-10-CM

## 2024-03-14 DIAGNOSIS — I10 HYPERTENSION, UNSPECIFIED TYPE: ICD-10-CM

## 2024-03-14 DIAGNOSIS — Z15.89 HETEROZYGOUS MTHFR MUTATION C677T: Primary | ICD-10-CM

## 2024-03-14 PROBLEM — J45.50 SEVERE PERSISTENT ASTHMA, UNCOMPLICATED: Status: ACTIVE | Noted: 2024-03-14

## 2024-03-14 PROCEDURE — 1159F MED LIST DOCD IN RCRD: CPT | Mod: CPTII,S$GLB,, | Performed by: PHYSICIAN ASSISTANT

## 2024-03-14 PROCEDURE — 3008F BODY MASS INDEX DOCD: CPT | Mod: CPTII,S$GLB,, | Performed by: PHYSICIAN ASSISTANT

## 2024-03-14 PROCEDURE — 3074F SYST BP LT 130 MM HG: CPT | Mod: CPTII,S$GLB,, | Performed by: PHYSICIAN ASSISTANT

## 2024-03-14 PROCEDURE — 3288F FALL RISK ASSESSMENT DOCD: CPT | Mod: CPTII,S$GLB,, | Performed by: PHYSICIAN ASSISTANT

## 2024-03-14 PROCEDURE — 3078F DIAST BP <80 MM HG: CPT | Mod: CPTII,S$GLB,, | Performed by: PHYSICIAN ASSISTANT

## 2024-03-14 PROCEDURE — 1157F ADVNC CARE PLAN IN RCRD: CPT | Mod: CPTII,S$GLB,, | Performed by: PHYSICIAN ASSISTANT

## 2024-03-14 PROCEDURE — 99214 OFFICE O/P EST MOD 30 MIN: CPT | Mod: S$GLB,,, | Performed by: PHYSICIAN ASSISTANT

## 2024-03-14 PROCEDURE — 1101F PT FALLS ASSESS-DOCD LE1/YR: CPT | Mod: CPTII,S$GLB,, | Performed by: PHYSICIAN ASSISTANT

## 2024-03-14 PROCEDURE — 4010F ACE/ARB THERAPY RXD/TAKEN: CPT | Mod: CPTII,S$GLB,, | Performed by: PHYSICIAN ASSISTANT

## 2024-03-14 PROCEDURE — 1126F AMNT PAIN NOTED NONE PRSNT: CPT | Mod: CPTII,S$GLB,, | Performed by: PHYSICIAN ASSISTANT

## 2024-03-14 RX ORDER — BUDESONIDE, GLYCOPYRROLATE, AND FORMOTEROL FUMARATE 160; 9; 4.8 UG/1; UG/1; UG/1
2 AEROSOL, METERED RESPIRATORY (INHALATION) 2 TIMES DAILY
Qty: 10.7 G | Refills: 0 | Status: SHIPPED | OUTPATIENT
Start: 2024-03-14 | End: 2024-06-13 | Stop reason: SDUPTHER

## 2024-03-14 RX ORDER — OLMESARTAN MEDOXOMIL 40 MG/1
40 TABLET ORAL EVERY MORNING
Qty: 90 TABLET | Refills: 1 | Status: SHIPPED | OUTPATIENT
Start: 2024-03-14 | End: 2024-04-05 | Stop reason: SDUPTHER

## 2024-03-14 NOTE — PROGRESS NOTES
SUBJECTIVE:    Patient ID: Helder Brand is a 63 y.o. male.    Chief Complaint: Follow-up (No bottles//refills needed//pt states his cardiologist changed dosage of Labotalol to 300 mg pt states he was feeling dizzy so he started cutting pill in half and other half 5 hours later pt states he's been feeling better since but sometimes at night his BP goes up )    64 yo male presents today for regular checkup and refills. He follows closely with pulmonology,cards and Heme-onc. MTHFR with clot in fem/pop. On eliquis. Most recently has been having some trouble with BP. Cardiology started the medication but he has had to lower dose to 150am and 300pm. Also on olmesartan.     He is planning to start fasenra very soon for pulmonary. Just waiting on approval. Weight steady. Just completed steroid and zpack. Feeling much better now.    Hypertension  This is a recurrent problem. The current episode started in the past 7 days. The problem has been waxing and waning since onset. The problem is resistant. Associated symptoms include anxiety, blurred vision, headaches, neck pain, orthopnea and PND. Risk factors for coronary artery disease include dyslipidemia. Past treatments include ACE inhibitors and beta blockers. The current treatment provides mild improvement.       Lab Visit on 03/12/2024   Component Date Value Ref Range Status    WBC 03/12/2024 8.63  3.90 - 12.70 K/uL Final    RBC 03/12/2024 5.01  4.60 - 6.20 M/uL Final    Hemoglobin 03/12/2024 15.3  14.0 - 18.0 g/dL Final    Hematocrit 03/12/2024 45.9  40.0 - 54.0 % Final    MCV 03/12/2024 92  82 - 98 fL Final    MCH 03/12/2024 30.5  27.0 - 31.0 pg Final    MCHC 03/12/2024 33.3  32.0 - 36.0 g/dL Final    RDW 03/12/2024 14.0  11.5 - 14.5 % Final    Platelets 03/12/2024 227  150 - 450 K/uL Final    MPV 03/12/2024 9.1 (L)  9.2 - 12.9 fL Final    Immature Granulocytes 03/12/2024 0.3  0.0 - 0.5 % Final    Gran # (ANC) 03/12/2024 4.7  1.8 - 7.7 K/uL Final    Immature Grans  (Abs) 03/12/2024 0.03  0.00 - 0.04 K/uL Final    Lymph # 03/12/2024 2.4  1.0 - 4.8 K/uL Final    Mono # 03/12/2024 1.1 (H)  0.3 - 1.0 K/uL Final    Eos # 03/12/2024 0.3  0.0 - 0.5 K/uL Final    Baso # 03/12/2024 0.06  0.00 - 0.20 K/uL Final    nRBC 03/12/2024 0  0 /100 WBC Final    Gran % 03/12/2024 54.4  38.0 - 73.0 % Final    Lymph % 03/12/2024 28.0  18.0 - 48.0 % Final    Mono % 03/12/2024 12.7  4.0 - 15.0 % Final    Eosinophil % 03/12/2024 3.9  0.0 - 8.0 % Final    Basophil % 03/12/2024 0.7  0.0 - 1.9 % Final    Differential Method 03/12/2024 Automated   Final   Lab Visit on 02/20/2024   Component Date Value Ref Range Status    PSA Total 02/20/2024 2.7  0.00 - 4.00 ng/mL Final    PSA, Free 02/20/2024 0.54  0.00 - 1.50 ng/mL Final    PSA, Free % 02/20/2024 20.00  Not established % Final   Hospital Outpatient Visit on 01/12/2024   Component Date Value Ref Range Status    POC Creatinine 01/12/2024 1.1  0.5 - 1.4 mg/dL Final    Sample 01/12/2024 unknown   Final   Clinical Support on 12/12/2023   Component Date Value Ref Range Status    POC Residual Urine Volume 12/12/2023 0  0 - 100 mL Final   Admission on 12/01/2023, Discharged on 12/03/2023   Component Date Value Ref Range Status    Sodium 12/01/2023 137  136 - 145 mmol/L Final    Potassium 12/01/2023 4.0  3.5 - 5.1 mmol/L Final    Chloride 12/01/2023 105  95 - 110 mmol/L Final    CO2 12/01/2023 21 (L)  23 - 29 mmol/L Final    Glucose 12/01/2023 97  70 - 110 mg/dL Final    BUN 12/01/2023 11  8 - 23 mg/dL Final    Creatinine 12/01/2023 0.9  0.5 - 1.4 mg/dL Final    Calcium 12/01/2023 9.2  8.7 - 10.5 mg/dL Final    Anion Gap 12/01/2023 11  8 - 16 mmol/L Final    eGFR 12/01/2023 >60  >60 mL/min/1.73 m^2 Final    WBC 12/01/2023 10.93  3.90 - 12.70 K/uL Final    RBC 12/01/2023 4.65  4.60 - 6.20 M/uL Final    Hemoglobin 12/01/2023 15.0  14.0 - 18.0 g/dL Final    Hematocrit 12/01/2023 42.7  40.0 - 54.0 % Final    MCV 12/01/2023 92  82 - 98 fL Final    MCH 12/01/2023 32.3  (H)  27.0 - 31.0 pg Final    MCHC 12/01/2023 35.1  32.0 - 36.0 g/dL Final    RDW 12/01/2023 13.2  11.5 - 14.5 % Final    Platelets 12/01/2023 229  150 - 450 K/uL Final    MPV 12/01/2023 8.9 (L)  9.2 - 12.9 fL Final    Immature Granulocytes 12/01/2023 0.1  0.0 - 0.5 % Final    Gran # (ANC) 12/01/2023 7.2  1.8 - 7.7 K/uL Final    Immature Grans (Abs) 12/01/2023 0.01  0.00 - 0.04 K/uL Final    Lymph # 12/01/2023 1.9  1.0 - 4.8 K/uL Final    Mono # 12/01/2023 1.3 (H)  0.3 - 1.0 K/uL Final    Eos # 12/01/2023 0.5  0.0 - 0.5 K/uL Final    Baso # 12/01/2023 0.07  0.00 - 0.20 K/uL Final    nRBC 12/01/2023 0  0 /100 WBC Final    Gran % 12/01/2023 65.7  38.0 - 73.0 % Final    Lymph % 12/01/2023 16.9 (L)  18.0 - 48.0 % Final    Mono % 12/01/2023 12.3  4.0 - 15.0 % Final    Eosinophil % 12/01/2023 4.4  0.0 - 8.0 % Final    Basophil % 12/01/2023 0.6  0.0 - 1.9 % Final    Differential Method 12/01/2023 Automated   Final    Prothrombin Time 12/01/2023 11.5  9.0 - 12.5 sec Final    INR 12/01/2023 1.0  0.8 - 1.2 Final    aPTT 12/01/2023 32.5 (H)  21.0 - 32.0 sec Final    aPTT 12/01/2023 110.5 (H)  21.0 - 32.0 sec Final    Prothrombin Time 12/01/2023 11.9  9.0 - 12.5 sec Final    INR 12/01/2023 1.1  0.8 - 1.2 Final    WBC 12/01/2023 11.31  3.90 - 12.70 K/uL Final    RBC 12/01/2023 4.84  4.60 - 6.20 M/uL Final    Hemoglobin 12/01/2023 15.6  14.0 - 18.0 g/dL Final    Hematocrit 12/01/2023 45.3  40.0 - 54.0 % Final    MCV 12/01/2023 94  82 - 98 fL Final    MCH 12/01/2023 32.2 (H)  27.0 - 31.0 pg Final    MCHC 12/01/2023 34.4  32.0 - 36.0 g/dL Final    RDW 12/01/2023 13.3  11.5 - 14.5 % Final    Platelets 12/01/2023 230  150 - 450 K/uL Final    MPV 12/01/2023 9.2  9.2 - 12.9 fL Final    Immature Granulocytes 12/01/2023 0.4  0.0 - 0.5 % Final    Gran # (ANC) 12/01/2023 7.9 (H)  1.8 - 7.7 K/uL Final    Immature Grans (Abs) 12/01/2023 0.04  0.00 - 0.04 K/uL Final    Lymph # 12/01/2023 1.7  1.0 - 4.8 K/uL Final    Mono # 12/01/2023 1.3 (H)   0.3 - 1.0 K/uL Final    Eos # 12/01/2023 0.2  0.0 - 0.5 K/uL Final    Baso # 12/01/2023 0.07  0.00 - 0.20 K/uL Final    nRBC 12/01/2023 0  0 /100 WBC Final    Gran % 12/01/2023 70.2  38.0 - 73.0 % Final    Lymph % 12/01/2023 15.2 (L)  18.0 - 48.0 % Final    Mono % 12/01/2023 11.6  4.0 - 15.0 % Final    Eosinophil % 12/01/2023 2.0  0.0 - 8.0 % Final    Basophil % 12/01/2023 0.6  0.0 - 1.9 % Final    Differential Method 12/01/2023 Automated   Final    Fibrinogen 12/01/2023 312  182 - 400 mg/dL Final    Fibrinogen 12/02/2023 193  182 - 400 mg/dL Final    aPTT 12/02/2023 40.3 (H)  21.0 - 32.0 sec Final    WBC 12/02/2023 9.05  3.90 - 12.70 K/uL Final    RBC 12/02/2023 4.40 (L)  4.60 - 6.20 M/uL Final    Hemoglobin 12/02/2023 14.1  14.0 - 18.0 g/dL Final    Hematocrit 12/02/2023 41.1  40.0 - 54.0 % Final    MCV 12/02/2023 93  82 - 98 fL Final    MCH 12/02/2023 32.0 (H)  27.0 - 31.0 pg Final    MCHC 12/02/2023 34.3  32.0 - 36.0 g/dL Final    RDW 12/02/2023 13.4  11.5 - 14.5 % Final    Platelets 12/02/2023 153  150 - 450 K/uL Final    MPV 12/02/2023 8.8 (L)  9.2 - 12.9 fL Final    Sodium 12/02/2023 136  136 - 145 mmol/L Final    Potassium 12/02/2023 3.9  3.5 - 5.1 mmol/L Final    Chloride 12/02/2023 107  95 - 110 mmol/L Final    CO2 12/02/2023 22 (L)  23 - 29 mmol/L Final    Glucose 12/02/2023 94  70 - 110 mg/dL Final    BUN 12/02/2023 14  8 - 23 mg/dL Final    Creatinine 12/02/2023 0.8  0.5 - 1.4 mg/dL Final    Calcium 12/02/2023 8.1 (L)  8.7 - 10.5 mg/dL Final    Total Protein 12/02/2023 6.1  6.0 - 8.4 g/dL Final    Albumin 12/02/2023 3.4 (L)  3.5 - 5.2 g/dL Final    Total Bilirubin 12/02/2023 1.0  0.1 - 1.0 mg/dL Final    Alkaline Phosphatase 12/02/2023 74  55 - 135 U/L Final    AST 12/02/2023 20  10 - 40 U/L Final    ALT 12/02/2023 11  10 - 44 U/L Final    eGFR 12/02/2023 >60.0  >60 mL/min/1.73 m^2 Final    Anion Gap 12/02/2023 7 (L)  8 - 16 mmol/L Final    Fibrinogen 12/02/2023 149 (L)  182 - 400 mg/dL Final     Prothrombin Time 12/02/2023 14.2 (H)  9.0 - 12.5 sec Final    INR 12/02/2023 1.3 (H)  0.8 - 1.2 Final    Fibrinogen 12/02/2023 115 (L)  182 - 400 mg/dL Final    Fibrinogen 12/02/2023 115 (L)  182 - 400 mg/dL Final    Fibrinogen 12/03/2023 117 (L)  182 - 400 mg/dL Final    POC Glucose 12/02/2023 114 (H)  70 - 110 Final    WBC 12/03/2023 9.90  3.90 - 12.70 K/uL Final    RBC 12/03/2023 4.18 (L)  4.60 - 6.20 M/uL Final    Hemoglobin 12/03/2023 13.3 (L)  14.0 - 18.0 g/dL Final    Hematocrit 12/03/2023 39.0 (L)  40.0 - 54.0 % Final    MCV 12/03/2023 93  82 - 98 fL Final    MCH 12/03/2023 31.8 (H)  27.0 - 31.0 pg Final    MCHC 12/03/2023 34.1  32.0 - 36.0 g/dL Final    RDW 12/03/2023 13.4  11.5 - 14.5 % Final    Platelets 12/03/2023 152  150 - 450 K/uL Final    MPV 12/03/2023 9.5  9.2 - 12.9 fL Final    Sodium 12/03/2023 136  136 - 145 mmol/L Final    Potassium 12/03/2023 4.0  3.5 - 5.1 mmol/L Final    Chloride 12/03/2023 106  95 - 110 mmol/L Final    CO2 12/03/2023 25  23 - 29 mmol/L Final    Glucose 12/03/2023 96  70 - 110 mg/dL Final    BUN 12/03/2023 11  8 - 23 mg/dL Final    Creatinine 12/03/2023 0.8  0.5 - 1.4 mg/dL Final    Calcium 12/03/2023 8.5 (L)  8.7 - 10.5 mg/dL Final    Total Protein 12/03/2023 5.9 (L)  6.0 - 8.4 g/dL Final    Albumin 12/03/2023 3.3 (L)  3.5 - 5.2 g/dL Final    Total Bilirubin 12/03/2023 0.9  0.1 - 1.0 mg/dL Final    Alkaline Phosphatase 12/03/2023 68  55 - 135 U/L Final    AST 12/03/2023 16  10 - 40 U/L Final    ALT 12/03/2023 9 (L)  10 - 44 U/L Final    eGFR 12/03/2023 >60.0  >60 mL/min/1.73 m^2 Final    Anion Gap 12/03/2023 5 (L)  8 - 16 mmol/L Final    Fibrinogen 12/03/2023 134 (L)  182 - 400 mg/dL Final   Lab Visit on 11/06/2023   Component Date Value Ref Range Status    Sodium 11/06/2023 139  136 - 145 mmol/L Final    Potassium 11/06/2023 3.8  3.5 - 5.1 mmol/L Final    Chloride 11/06/2023 106  95 - 110 mmol/L Final    CO2 11/06/2023 25  23 - 29 mmol/L Final    Glucose 11/06/2023 79  70  - 110 mg/dL Final    BUN 11/06/2023 16  8 - 23 mg/dL Final    Creatinine 11/06/2023 0.9  0.5 - 1.4 mg/dL Final    Calcium 11/06/2023 9.3  8.7 - 10.5 mg/dL Final    Anion Gap 11/06/2023 8  8 - 16 mmol/L Final    eGFR 11/06/2023 >60  >60 mL/min/1.73 m^2 Final    WBC 11/06/2023 7.85  3.90 - 12.70 K/uL Final    RBC 11/06/2023 4.76  4.60 - 6.20 M/uL Final    Hemoglobin 11/06/2023 15.3  14.0 - 18.0 g/dL Final    Hematocrit 11/06/2023 45.1  40.0 - 54.0 % Final    MCV 11/06/2023 95  82 - 98 fL Final    MCH 11/06/2023 32.1 (H)  27.0 - 31.0 pg Final    MCHC 11/06/2023 33.9  32.0 - 36.0 g/dL Final    RDW 11/06/2023 13.1  11.5 - 14.5 % Final    Platelets 11/06/2023 270  150 - 450 K/uL Final    MPV 11/06/2023 9.6  9.2 - 12.9 fL Final    Immature Granulocytes 11/06/2023 0.1  0.0 - 0.5 % Final    Gran # (ANC) 11/06/2023 4.8  1.8 - 7.7 K/uL Final    Immature Grans (Abs) 11/06/2023 0.01  0.00 - 0.04 K/uL Final    Lymph # 11/06/2023 1.8  1.0 - 4.8 K/uL Final    Mono # 11/06/2023 1.0  0.3 - 1.0 K/uL Final    Eos # 11/06/2023 0.2  0.0 - 0.5 K/uL Final    Baso # 11/06/2023 0.05  0.00 - 0.20 K/uL Final    nRBC 11/06/2023 0  0 /100 WBC Final    Gran % 11/06/2023 60.8  38.0 - 73.0 % Final    Lymph % 11/06/2023 23.2  18.0 - 48.0 % Final    Mono % 11/06/2023 12.2  4.0 - 15.0 % Final    Eosinophil % 11/06/2023 3.1  0.0 - 8.0 % Final    Basophil % 11/06/2023 0.6  0.0 - 1.9 % Final    Differential Method 11/06/2023 Automated   Final    Specimen UA 11/06/2023 Urine, Clean Catch   Final    Color, UA 11/06/2023 Yellow  Yellow, Straw, Kasie Final    Appearance, UA 11/06/2023 Clear  Clear Final    pH, UA 11/06/2023 7.0  5.0 - 8.0 Final    Specific Gravity, UA 11/06/2023 1.015  1.005 - 1.030 Final    Protein, UA 11/06/2023 Negative  Negative Final    Glucose, UA 11/06/2023 Negative  Negative Final    Ketones, UA 11/06/2023 Negative  Negative Final    Bilirubin (UA) 11/06/2023 Negative  Negative Final    Occult Blood UA 11/06/2023 1+ (A)  Negative  Final    Nitrite, UA 11/06/2023 Negative  Negative Final    Urobilinogen, UA 11/06/2023 Negative  <2.0 EU/dL Final    Leukocytes, UA 11/06/2023 Negative  Negative Final    Urine Culture, Routine 11/06/2023 No growth   Final    Prothrombin Time 11/06/2023 11.5  9.0 - 12.5 sec Final    INR 11/06/2023 1.1  0.8 - 1.2 Final    aPTT 11/06/2023 28.2  21.0 - 32.0 sec Final    RBC, UA 11/06/2023 8 (H)  0 - 4 /hpf Final    WBC, UA 11/06/2023 0  0 - 5 /hpf Final    Microscopic Comment 11/06/2023 SEE COMMENT   Final   Lab Visit on 10/16/2023   Component Date Value Ref Range Status    WBC 10/16/2023 6.83  3.90 - 12.70 K/uL Final    RBC 10/16/2023 4.88  4.60 - 6.20 M/uL Final    Hemoglobin 10/16/2023 16.1  14.0 - 18.0 g/dL Final    Hematocrit 10/16/2023 46.5  40.0 - 54.0 % Final    MCV 10/16/2023 95  82 - 98 fL Final    MCH 10/16/2023 33.0 (H)  27.0 - 31.0 pg Final    MCHC 10/16/2023 34.6  32.0 - 36.0 g/dL Final    RDW 10/16/2023 13.0  11.5 - 14.5 % Final    Platelets 10/16/2023 225  150 - 450 K/uL Final    MPV 10/16/2023 9.2  9.2 - 12.9 fL Final    Immature Granulocytes 10/16/2023 0.1  0.0 - 0.5 % Final    Gran # (ANC) 10/16/2023 3.8  1.8 - 7.7 K/uL Final    Immature Grans (Abs) 10/16/2023 0.01  0.00 - 0.04 K/uL Final    Lymph # 10/16/2023 1.8  1.0 - 4.8 K/uL Final    Mono # 10/16/2023 0.9  0.3 - 1.0 K/uL Final    Eos # 10/16/2023 0.2  0.0 - 0.5 K/uL Final    Baso # 10/16/2023 0.05  0.00 - 0.20 K/uL Final    nRBC 10/16/2023 0  0 /100 WBC Final    Gran % 10/16/2023 55.4  38.0 - 73.0 % Final    Lymph % 10/16/2023 26.5  18.0 - 48.0 % Final    Mono % 10/16/2023 13.8  4.0 - 15.0 % Final    Eosinophil % 10/16/2023 3.5  0.0 - 8.0 % Final    Basophil % 10/16/2023 0.7  0.0 - 1.9 % Final    Differential Method 10/16/2023 Automated   Final    Sodium 10/16/2023 139  136 - 145 mmol/L Final    Potassium 10/16/2023 4.1  3.5 - 5.1 mmol/L Final    Chloride 10/16/2023 105  95 - 110 mmol/L Final    CO2 10/16/2023 29  23 - 29 mmol/L Final     Glucose 10/16/2023 91  70 - 110 mg/dL Final    BUN 10/16/2023 11  8 - 23 mg/dL Final    Creatinine 10/16/2023 1.0  0.5 - 1.4 mg/dL Final    Calcium 10/16/2023 9.3  8.7 - 10.5 mg/dL Final    Anion Gap 10/16/2023 5 (L)  8 - 16 mmol/L Final    eGFR 10/16/2023 >60.0  >60 mL/min/1.73 m^2 Final       Past Medical History:   Diagnosis Date    Blood clot in vein     left leg    Emphysema lung 2021    Enlarged prostate     Heterozygous MTHFR mutation [id number] 08/27/2023    Hyperlipidemia 2021    Hypertension 2001    Kidney stone 2001    x3    Kidney stones     x3    Personal history of colonic polyps 10/20/2023    Thyroid disease     benign growth, partial thyroidectomy     Past Surgical History:   Procedure Laterality Date    ANGIOGRAPHY OF LOWER EXTREMITY Left 08/05/2022    Procedure: Angiogram Extremity Unilateral;  Surgeon: Ali Khoobehi, MD;  Location: [facility] CATH/EP LAB;  Service: Peripheral Vascular;  Laterality: Left;    ANGIOGRAPHY OF LOWER EXTREMITY Left 08/06/2022    Procedure: Angioplasty-peripheral;  Surgeon: Ismael Esquivel MD;  Location: [facility] CATH/EP LAB;  Service: General;  Laterality: Left;    ANGIOGRAPHY OF LOWER EXTREMITY Left 08/06/2022    Procedure: Angiogram Extremity Unilateral;  Surgeon: Ismael Esquivel MD;  Location: [facility] CATH/EP LAB;  Service: General;  Laterality: Left;    ANGIOGRAPHY OF LOWER EXTREMITY Left 08/07/2022    Procedure: Angiogram Extremity Unilateral;  Surgeon: Ismael Esquivel MD;  Location: [facility] CATH/EP LAB;  Service: General;  Laterality: Left;    ANGIOGRAPHY OF LOWER EXTREMITY Left 12/1/2023    Procedure: Angiogram Extremity Unilateral;  Surgeon: Ismael Esquivel MD;  Location: [facility] CATH/EP LAB;  Service: General;  Laterality: Left;    ANGIOGRAPHY OF LOWER EXTREMITY Left 12/2/2023    Procedure: Angiogram Extremity Unilateral;  Surgeon: Ismael Esquivel MD;  Location: [facility] CATH/EP LAB;  Service: General;  Laterality: Left;    ANGIOGRAPHY OF LOWER EXTREMITY Left 12/2/2023    Procedure:  Angiogram Extremity Unilateral;  Surgeon: Ismael Esquivel MD;  Location: [facility] CATH/EP LAB;  Service: General;  Laterality: Left;    COLONOSCOPY N/A 10/20/2023    Procedure: COLONOSCOPY;  Surgeon: Jeff Call MD;  Location: [facility] ENDO;  Service: Endoscopy;  Laterality: N/A;    COLONOSCOPY  10/20/2023    5 YR RECALL    CYSTOSCOPY N/A 09/05/2023    Procedure: CYSTOSCOPY;  Surgeon: Adam Tyler MD;  Location: [facility] AS OR;  Service: Urology;  Laterality: N/A;    EMBOLECTOMY OR THROMBECTOMY, BLOOD VESSEL, LOWER EXTREMITY Left 12/1/2023    Procedure: EMBOLECTOMY OR THROMBECTOMY, BLOOD VESSEL, LOWER EXTREMITY;  Surgeon: Ismael Esquivel MD;  Location: [facility] CATH/EP LAB;  Service: General;  Laterality: Left;    INJECTION OF TISSUE PLASMINOGEN ACTIVATOR Left 08/06/2022    Procedure: INJECTION, TISSUE PLASMINOGEN ACTIVATOR;  Surgeon: Ismael Esquivel MD;  Location: [facility] CATH/EP LAB;  Service: General;  Laterality: Left;    INJECTION OF TISSUE PLASMINOGEN ACTIVATOR Left 08/06/2022    Procedure: INJECTION, TISSUE PLASMINOGEN ACTIVATOR;  Surgeon: Ismael Esquivel MD;  Location: [facility] CATH/EP LAB;  Service: General;  Laterality: Left;    PROSTATE SURGERY  2018    REPAIR OF ANEURYSM Left 07/21/2021    Procedure: REPAIR POPLITEAL ARTERY ANEURYSM;  Surgeon: Ismael Esquivel MD;  Location: [facility] OR;  Service: Cardiovascular;  Laterality: Left;    THROMBECTOMY  08/06/2022    Procedure: THROMBECTOMY;  Surgeon: Ismael Esquivel MD;  Location: [facility] CATH/EP LAB;  Service: General;;    THROMBECTOMY Left 08/06/2022    Procedure: THROMBECTOMY;  Surgeon: Ismael Esquivel MD;  Location: [facility] CATH/EP LAB;  Service: General;  Laterality: Left;    THROMBECTOMY, LOWER ARTERIAL  12/2/2023    Procedure: Thrombectomy, Lower Arterial;  Surgeon: Ismael Esquivel MD;  Location: [facility] CATH/EP LAB;  Service: General;;    THYROIDECTOMY, PARTIAL  2011    TONSILLECTOMY      as a child    TRANSRECTAL BIOPSY OF PROSTATE WITH ULTRASOUND GUIDANCE  N/A 09/05/2023    Procedure: BIOPSY, PROSTATE, RECTAL APPROACH, WITH US GUIDANCE;  Surgeon: Adam Tyler MD;  Location: [facility] OR;  Service: Urology;  Laterality: N/A;    TRANSURETHRAL RESECTION OF PROSTATE N/A 11/16/2023    Procedure: TURP (TRANSURETHRAL RESECTION OF PROSTATE);  Surgeon: Adam Tyler MD;  Location: [facility] OR;  Service: Urology;  Laterality: N/A;    [facility]rolift  04/2019    [facility]     Family History   Problem Relation Age of Onset    Hypertension Mother     COPD Mother     Pulmonary embolism Father        Marital Status:   Alcohol History:  reports that he does not currently use alcohol after a past usage of about 1.0 standard drink of alcohol per week.  Tobacco History:  reports that he has quit smoking. His smoking use included cigarettes. He has been exposed to tobacco smoke. He has never used smokeless tobacco.  Drug History:  reports no history of drug use.    Review of patient's allergies indicates:  No Known Allergies    Current Outpatient Medications:     albuterol (VENTOLIN HFA) 90 mcg/actuation inhaler, Inhale 2 puffs into the lungs every 4 (four) hours as needed for Wheezing or Shortness of Breath. Rescue, Disp: 18 g, Rfl: 11    albuterol-ipratropium (DUO-NEB) 2.5 mg-0.5 mg/3 mL nebulizer solution, Take 3 mLs by nebulization every 6 (six) hours as needed for Wheezing or Shortness of Breath. Rescue, Disp: 240 mL, Rfl: 5    apixaban (ELIQUIS) 2.5 mg Tab, Take by mouth 2 (two) times daily., Disp: , Rfl:     cyanocobalamin/folic ac/vit B6 (FOLBIC ORAL), Take 1 tablet by mouth once daily., Disp: , Rfl:     fluticasone propionate (FLONASE) 50 mcg/actuation nasal spray, 2 sprays by Each Nostril route daily as needed for Allergies., Disp: , Rfl:     labetaloL (NORMODYNE) 300 MG tablet, Take 1 tablet (300 mg total) by mouth 2 (two) times a day. (Patient taking differently: Take 300 mg by mouth 2 (two) times a day. Taking 1/2 bid), Disp: 180 tablet, Rfl: 1    predniSONE  (DELTASONE) 20 MG tablet, 3 pills for 3 days, 2 for 3 days, 1 for 3 days. Repeat for cough, Disp: 36 tablet, Rfl: 0    rosuvastatin (CRESTOR) 10 MG tablet, Take 1 tablet (10 mg total) by mouth once daily., Disp: 90 tablet, Rfl: 1    albuterol (PROVENTIL/VENTOLIN HFA) 90 mcg/actuation inhaler, Inhale 2 puffs into the lungs every 6 (six) hours as needed for Shortness of Breath., Disp: , Rfl:     azithromycin (Z-ALONDRA) 250 MG tablet, 2 pills day one, then 1 pill for 4 days (Patient not taking: Reported on 2/28/2024), Disp: 6 tablet, Rfl: 0    benralizumab (FASENRA PEN) 30 mg/mL AtIn, Inject 30 mg into the skin every 28 days. (Patient not taking: Reported on 3/14/2024), Disp: 1 each, Rfl: 2    benralizumab (FASENRA PEN) 30 mg/mL AtIn, Inject 30 mg into the skin every 8 weeks. (Patient not taking: Reported on 3/14/2024), Disp: 1 each, Rfl: 5    budesonide-glycopyr-formoterol (BREZTRI AEROSPHERE) 160-9-4.8 mcg/actuation HFAA, Inhale 2 puffs into the lungs 2 (two) times a day. (Patient taking differently: Inhale 2 puffs into the lungs 2 (two) times daily as needed.), Disp: 10.7 g, Rfl: 0    folic acid-vit B6-vit B12 2.5-25-2 mg (FOLBIC) 2.5-25-2 mg Tab, Take 1 tablet by mouth once daily., Disp: 30 tablet, Rfl: 11    olmesartan (BENICAR) 40 MG tablet, Take 1 tablet (40 mg total) by mouth every morning., Disp: 90 tablet, Rfl: 1  No current facility-administered medications for this visit.    Facility-Administered Medications Ordered in Other Visits:     lactated ringers infusion, , Intravenous, Continuous, Jasbir Aragon MD, Last Rate: 75 mL/hr at 11/07/19 1333, 1,000 mL at 11/07/19 1333    Review of Systems   Eyes:  Positive for blurred vision.   Cardiovascular:  Positive for orthopnea and PND.   Musculoskeletal:  Positive for neck pain.   Neurological:  Positive for headaches.          Objective:      Vitals:    03/14/24 0946   BP: 109/78   Pulse: 67   Resp: 18   SpO2: 97%   Weight: 86.6 kg (191 lb)   Height: 6'  (1.829 m)     Physical Exam  Constitutional:       General: He is not in acute distress.     Appearance: He is well-developed.   HENT:      Head: Normocephalic and atraumatic.   Eyes:      Pupils: Pupils are equal, round, and reactive to light.   Neck:      Thyroid: No thyromegaly.   Cardiovascular:      Rate and Rhythm: Normal rate and regular rhythm.      Heart sounds: Normal heart sounds.   Pulmonary:      Effort: Pulmonary effort is normal.      Breath sounds: Normal breath sounds.   Abdominal:      General: Bowel sounds are normal. There is no distension.      Palpations: Abdomen is soft.      Tenderness: There is no abdominal tenderness.   Musculoskeletal:         General: Normal range of motion.      Cervical back: Normal range of motion and neck supple.   Skin:     General: Skin is warm and dry.      Findings: No erythema or rash.   Neurological:      Mental Status: He is alert and oriented to person, place, and time.      Cranial Nerves: No cranial nerve deficit.           Assessment:       1. 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 Hypertension, unspecified type    3. 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 heme-onc following.    Hypertension, unspecified type  Comments:  BP appears to be well managed. He has cut back on the daily dose as well. will keep meds as is. labetalon 300mg and 150mg morning and evening.  Orders:  -     olmesartan (BENICAR) 40 MG tablet; Take 1 tablet (40 mg total) by mouth every morning.  Dispense: 90 tablet; Refill: 1    Chronic obstructive pulmonary disease, unspecified COPD type  Comments:  hoping to start fasenra soon. for now will continue with inhalers as prescribed.      Follow up in about 6 months (around 9/14/2024) for Annual Physical.        3/14/2024 Riley Atkinson PA-C

## 2024-04-05 DIAGNOSIS — I10 HYPERTENSION, UNSPECIFIED TYPE: ICD-10-CM

## 2024-04-08 RX ORDER — OLMESARTAN MEDOXOMIL 40 MG/1
40 TABLET ORAL EVERY MORNING
Qty: 90 TABLET | Refills: 1 | Status: SHIPPED | OUTPATIENT
Start: 2024-04-08

## 2024-04-11 DIAGNOSIS — I74.3 FEMORAL POPLITEAL ARTERY THROMBUS: ICD-10-CM

## 2024-04-11 DIAGNOSIS — Z15.89 HETEROZYGOUS MTHFR MUTATION C677T: Primary | ICD-10-CM

## 2024-04-11 NOTE — TELEPHONE ENCOUNTER
Eliquis 5mg 1 po bid. Just starting to run low from the rx given at discharge. Verified dose with pt

## 2024-04-11 NOTE — TELEPHONE ENCOUNTER
----- Message from Kelly Narvaez sent at 4/11/2024  8:35 AM CDT -----  -8:29- pt needs refill on eliquis   [facility]

## 2024-04-12 ENCOUNTER — OFFICE VISIT (OUTPATIENT)
Dept: CARDIOLOGY | Facility: CLINIC | Age: 64
End: 2024-04-12
Payer: MEDICARE

## 2024-04-12 VITALS
BODY MASS INDEX: 25.98 KG/M2 | HEART RATE: 71 BPM | WEIGHT: 191.81 LBS | HEIGHT: 72 IN | SYSTOLIC BLOOD PRESSURE: 121 MMHG | DIASTOLIC BLOOD PRESSURE: 81 MMHG

## 2024-04-12 DIAGNOSIS — I10 PRIMARY HYPERTENSION: Primary | ICD-10-CM

## 2024-04-12 DIAGNOSIS — E78.2 MIXED HYPERLIPIDEMIA: ICD-10-CM

## 2024-04-12 DIAGNOSIS — M54.2 NECK PAIN: ICD-10-CM

## 2024-04-12 PROCEDURE — 93000 ELECTROCARDIOGRAM COMPLETE: CPT | Mod: S$GLB,,, | Performed by: GENERAL PRACTICE

## 2024-04-12 PROCEDURE — 3288F FALL RISK ASSESSMENT DOCD: CPT | Mod: CPTII,S$GLB,, | Performed by: INTERNAL MEDICINE

## 2024-04-12 PROCEDURE — 1101F PT FALLS ASSESS-DOCD LE1/YR: CPT | Mod: CPTII,S$GLB,, | Performed by: INTERNAL MEDICINE

## 2024-04-12 PROCEDURE — 1126F AMNT PAIN NOTED NONE PRSNT: CPT | Mod: CPTII,S$GLB,, | Performed by: INTERNAL MEDICINE

## 2024-04-12 PROCEDURE — 1160F RVW MEDS BY RX/DR IN RCRD: CPT | Mod: CPTII,S$GLB,, | Performed by: INTERNAL MEDICINE

## 2024-04-12 PROCEDURE — 1159F MED LIST DOCD IN RCRD: CPT | Mod: CPTII,S$GLB,, | Performed by: INTERNAL MEDICINE

## 2024-04-12 PROCEDURE — 99214 OFFICE O/P EST MOD 30 MIN: CPT | Mod: S$GLB,,, | Performed by: INTERNAL MEDICINE

## 2024-04-12 PROCEDURE — 3008F BODY MASS INDEX DOCD: CPT | Mod: CPTII,S$GLB,, | Performed by: INTERNAL MEDICINE

## 2024-04-12 PROCEDURE — 3074F SYST BP LT 130 MM HG: CPT | Mod: CPTII,S$GLB,, | Performed by: INTERNAL MEDICINE

## 2024-04-12 PROCEDURE — 4010F ACE/ARB THERAPY RXD/TAKEN: CPT | Mod: CPTII,S$GLB,, | Performed by: INTERNAL MEDICINE

## 2024-04-12 PROCEDURE — 3079F DIAST BP 80-89 MM HG: CPT | Mod: CPTII,S$GLB,, | Performed by: INTERNAL MEDICINE

## 2024-04-12 PROCEDURE — 1157F ADVNC CARE PLAN IN RCRD: CPT | Mod: CPTII,S$GLB,, | Performed by: INTERNAL MEDICINE

## 2024-04-12 PROCEDURE — 99999 PR PBB SHADOW E&M-EST. PATIENT-LVL III: CPT | Mod: PBBFAC,,, | Performed by: INTERNAL MEDICINE

## 2024-04-12 NOTE — PROGRESS NOTES
Subjective:    Patient ID:  Helder Brand is a 64 y.o. male patient here for evaluation Hypertension and Neck Pain    Follow up visit 04/12/2024:  Patient came for follow up for evaluation of hypertension and neck pain.  He has intermittent neck pain which is sharp, worse with neck movement and radiates down to the shoulder like a shooting pain and associated with occipital headaches sometimes.  Denies any exertional anginal symptoms.  He had prostate surgery last year, and after the surgery he had ischemic left foot due to thrombosis which was treated with thrombectomy.  Compliant with the medications.  Blood pressure in the evening sometimes elevated.  max blood pressure was 150/90    History of Present Illness during initial clinic visit:     63-year-old male with past medical history of hypertension hyperlipidemia, emphysema, former smoker, lower extremity arterial and venous thrombosis due to MTHFR mutation on Eliquis came here for establishment of care.  He had a negative stress test in 2021. Recently had a CT scan which was negative for PE and showed mild aortic ectasia and coronary artery calcifications.  He had abdominal aortic ultrasound with vascular surgery office. He is functionally very active at baseline and denies any anginal symptoms.  Compliant with medications.       Review of patient's allergies indicates:  No Known Allergies    Past Medical History:   Diagnosis Date    Blood clot in vein     left leg    Emphysema lung 2021    Enlarged prostate     Heterozygous MTHFR mutation [id number] 08/27/2023    Hyperlipidemia 2021    Hypertension 2001    Kidney stone 2001    x3    Kidney stones     x3    Personal history of colonic polyps 10/20/2023    Thyroid disease     benign growth, partial thyroidectomy     Past Surgical History:   Procedure Laterality Date    ANGIOGRAPHY OF LOWER EXTREMITY Left 08/05/2022    Procedure: Angiogram Extremity Unilateral;  Surgeon: Ali Khoobehi, MD;  Location: [facility] CATH/EP  LAB;  Service: Peripheral Vascular;  Laterality: Left;    ANGIOGRAPHY OF LOWER EXTREMITY Left 08/06/2022    Procedure: Angioplasty-peripheral;  Surgeon: Ismael Esquivel MD;  Location: [facility] CATH/EP LAB;  Service: General;  Laterality: Left;    ANGIOGRAPHY OF LOWER EXTREMITY Left 08/06/2022    Procedure: Angiogram Extremity Unilateral;  Surgeon: Ismael Esquivel MD;  Location: [facility] CATH/EP LAB;  Service: General;  Laterality: Left;    ANGIOGRAPHY OF LOWER EXTREMITY Left 08/07/2022    Procedure: Angiogram Extremity Unilateral;  Surgeon: Ismael Esquivel MD;  Location: [facility] CATH/EP LAB;  Service: General;  Laterality: Left;    ANGIOGRAPHY OF LOWER EXTREMITY Left 12/1/2023    Procedure: Angiogram Extremity Unilateral;  Surgeon: Ismael Esquivel MD;  Location: [facility] CATH/EP LAB;  Service: General;  Laterality: Left;    ANGIOGRAPHY OF LOWER EXTREMITY Left 12/2/2023    Procedure: Angiogram Extremity Unilateral;  Surgeon: Ismael Esquivel MD;  Location: [facility] CATH/EP LAB;  Service: General;  Laterality: Left;    ANGIOGRAPHY OF LOWER EXTREMITY Left 12/2/2023    Procedure: Angiogram Extremity Unilateral;  Surgeon: Ismael Esquivel MD;  Location: [facility] CATH/EP LAB;  Service: General;  Laterality: Left;    COLONOSCOPY N/A 10/20/2023    Procedure: COLONOSCOPY;  Surgeon: Jeff Call MD;  Location: [facility] ENDO;  Service: Endoscopy;  Laterality: N/A;    COLONOSCOPY  10/20/2023    5 YR RECALL    CYSTOSCOPY N/A 09/05/2023    Procedure: CYSTOSCOPY;  Surgeon: Adam Tyler MD;  Location: [facility] ASU OR;  Service: Urology;  Laterality: N/A;    EMBOLECTOMY OR THROMBECTOMY, BLOOD VESSEL, LOWER EXTREMITY Left 12/1/2023    Procedure: EMBOLECTOMY OR THROMBECTOMY, BLOOD VESSEL, LOWER EXTREMITY;  Surgeon: Ismael Esquivel MD;  Location: [facility] CATH/EP LAB;  Service: General;  Laterality: Left;    INJECTION OF TISSUE PLASMINOGEN ACTIVATOR Left 08/06/2022    Procedure: INJECTION, TISSUE PLASMINOGEN ACTIVATOR;  Surgeon: Ismael Esquivel  MD;  Location: [facility] CATH/EP LAB;  Service: General;  Laterality: Left;    INJECTION OF TISSUE PLASMINOGEN ACTIVATOR Left 08/06/2022    Procedure: INJECTION, TISSUE PLASMINOGEN ACTIVATOR;  Surgeon: Ismael Esquivel MD;  Location: [facility] CATH/EP LAB;  Service: General;  Laterality: Left;    PROSTATE SURGERY  2018    REPAIR OF ANEURYSM Left 07/21/2021    Procedure: REPAIR POPLITEAL ARTERY ANEURYSM;  Surgeon: Ismael Esquivel MD;  Location: [facility] OR;  Service: Cardiovascular;  Laterality: Left;    THROMBECTOMY  08/06/2022    Procedure: THROMBECTOMY;  Surgeon: Ismael Esquivel MD;  Location: [facility] CATH/EP LAB;  Service: General;;    THROMBECTOMY Left 08/06/2022    Procedure: THROMBECTOMY;  Surgeon: Ismael Esquivel MD;  Location: [facility] CATH/EP LAB;  Service: General;  Laterality: Left;    THROMBECTOMY, LOWER ARTERIAL  12/2/2023    Procedure: Thrombectomy, Lower Arterial;  Surgeon: Ismael Esquivel MD;  Location: [facility] CATH/EP LAB;  Service: General;;    THYROIDECTOMY, PARTIAL  2011    TONSILLECTOMY      as a child    TRANSRECTAL BIOPSY OF PROSTATE WITH ULTRASOUND GUIDANCE N/A 09/05/2023    Procedure: BIOPSY, PROSTATE, RECTAL APPROACH, WITH US GUIDANCE;  Surgeon: Adam Tyler MD;  Location: [facility] OR;  Service: Urology;  Laterality: N/A;    TRANSURETHRAL RESECTION OF PROSTATE N/A 11/16/2023    Procedure: TURP (TRANSURETHRAL RESECTION OF PROSTATE);  Surgeon: Adam Tyler MD;  Location: [facility];  Service: Urology;  Laterality: N/A;    urolift  04/2019    [facility]     Social History     Tobacco Use    Smoking status: Former     Types: Cigarettes     Passive exposure: Past    Smokeless tobacco: Never   Substance Use Topics    Alcohol use: Not Currently     Alcohol/week: 1.0 standard drink of alcohol     Types: 1 Cans of beer per week     Comment: rare    Drug use: Never        Review of Systems   Negative except as mentioned in HPI         Objective        Vitals:    04/12/24 0830   BP: 121/81   Pulse: 71        LIPIDS - LAST 2   Lab Results   Component Value Date    CHOL 138 07/07/2023    CHOL 197 03/09/2022    HDL 66 07/07/2023    HDL 57 03/09/2022    LDLCALC 61 07/07/2023    LDLCALC 130.2 03/09/2022    TRIG 37 07/07/2023    TRIG 49 03/09/2022    CHOLHDL 2.1 07/07/2023    CHOLHDL 28.9 03/09/2022       CBC - LAST 2  Lab Results   Component Value Date    WBC 8.63 03/12/2024    WBC 9.90 12/03/2023    RBC 5.01 03/12/2024    RBC 4.18 (L) 12/03/2023    HGB 15.3 03/12/2024    HGB 13.3 (L) 12/03/2023    HCT 45.9 03/12/2024    HCT 39.0 (L) 12/03/2023    MCV 92 03/12/2024    MCV 93 12/03/2023    MCH 30.5 03/12/2024    MCH 31.8 (H) 12/03/2023    MCHC 33.3 03/12/2024    MCHC 34.1 12/03/2023    RDW 14.0 03/12/2024    RDW 13.4 12/03/2023     03/12/2024     12/03/2023    MPV 9.1 (L) 03/12/2024    MPV 9.5 12/03/2023    GRAN 4.7 03/12/2024    GRAN 54.4 03/12/2024    LYMPH 2.4 03/12/2024    LYMPH 28.0 03/12/2024    MONO 1.1 (H) 03/12/2024    MONO 12.7 03/12/2024    BASO 0.06 03/12/2024    BASO 0.07 12/01/2023    NRBC 0 03/12/2024    NRBC 0 12/01/2023       CHEMISTRY & LIVER FUNCTION - LAST 2  Lab Results   Component Value Date     12/03/2023     12/02/2023    K 4.0 12/03/2023    K 3.9 12/02/2023     12/03/2023     12/02/2023    CO2 25 12/03/2023    CO2 22 (L) 12/02/2023    ANIONGAP 5 (L) 12/03/2023    ANIONGAP 7 (L) 12/02/2023    BUN 11 12/03/2023    BUN 14 12/02/2023    CREATININE 0.8 12/03/2023    CREATININE 0.8 12/02/2023    GLU 96 12/03/2023    GLU 94 12/02/2023    CALCIUM 8.5 (L) 12/03/2023    CALCIUM 8.1 (L) 12/02/2023    MG 2.0 08/13/2022    MG 2.0 08/08/2022    ALBUMIN 3.3 (L) 12/03/2023    ALBUMIN 3.4 (L) 12/02/2023    PROT 5.9 (L) 12/03/2023    PROT 6.1 12/02/2023    ALKPHOS 68 12/03/2023    ALKPHOS 74 12/02/2023    ALT 9 (L) 12/03/2023    ALT 11 12/02/2023    AST 16 12/03/2023    AST 20 12/02/2023    BILITOT 0.9 12/03/2023    BILITOT 1.0 12/02/2023        CARDIAC PROFILE - LAST 2  Lab  Results   Component Value Date    BNP 27 11/15/2022    BNP 61 08/13/2022     08/05/2022    TROPONINI <0.030 08/13/2022    TROPONINI <0.030 01/03/2022    TROPONINIHS 4.0 11/16/2022    TROPONINIHS 3.2 11/15/2022        COAGULATION - LAST 2  Lab Results   Component Value Date    LABPT 15.1 (H) 08/05/2022    LABPT 14.4 (H) 08/05/2022    INR 1.3 (H) 12/02/2023    INR 1.1 12/01/2023    APTT 40.3 (H) 12/02/2023    APTT 110.5 (H) 12/01/2023       ENDOCRINE & PSA - LAST 2  Lab Results   Component Value Date    HGBA1C 5.2 03/09/2022    HGBA1C 5.5 07/14/2021    TSH 0.57 07/07/2023    TSH 1.840 08/05/2022    PROCAL <0.05 01/03/2022        ECHOCARDIOGRAM RESULTS  No results found for this or any previous visit.      CURRENT/PREVIOUS VISIT EKG  Results for orders placed or performed in visit on 12/01/23   EKG 12-lead    Collection Time: 12/01/23  2:15 AM    Narrative    Test Reason : I99.8    Vent. Rate : 070 BPM     Atrial Rate : 070 BPM     P-R Int : 190 ms          QRS Dur : 094 ms      QT Int : 386 ms       P-R-T Axes : 068 051 074 degrees     QTc Int : 416 ms    Normal sinus rhythm  Normal ECG  When compared with ECG of 08-NOV-2023 09:34,  No significant change was found  Confirmed by Mariah DOUGLAS, Russell HERNANDEZ ([id number]) on 12/6/2023 10:50:58 PM    Referred By: [email or web address]   SELF           Confirmed By:Russell Lemus MD     No valid procedures specified.   Results for orders placed during the hospital encounter of 07/13/21    Nuclear Stress Test    Interpretation Summary    The EKG portion of this study is negative for ischemia.    The patient reported no chest pain during the stress test.    There were no arrhythmias during stress.    The nuclear portion of this study will be reported separately.    No valid procedures specified.          PREVIOUS STRESS TEST              PREVIOUS ANGIOGRAM        PHYSICAL EXAM    CONSTITUTIONAL: Well built, well nourished in no apparent distress  HEENT: No pallor  NECK: no JVD  LUNGS: CTA  b/l  HEART: regular rate and rhythm, S1, S2 normal, no murmur   ABDOMEN: soft, non-tender; bowel sounds normal  EXTREMITIES: No edema  NEURO: AAO X 3   SKIN:  No rash  Psych:  Normal affect    I HAVE REVIEWED :    The vital signs, nurses notes, and all the pertinent radiology and labs.        Current Outpatient Medications   Medication Instructions    albuterol (PROVENTIL/VENTOLIN HFA) 90 mcg/actuation inhaler 2 puffs, Inhalation, Every 6 hours PRN    albuterol (VENTOLIN HFA) 90 mcg/actuation inhaler 2 puffs, Inhalation, Every 4 hours PRN, Rescue    albuterol-ipratropium (DUO-NEB) 2.5 mg-0.5 mg/3 mL nebulizer solution 3 mLs, Nebulization, Every 6 hours PRN, Rescue    apixaban (ELIQUIS) 5 mg, Oral, 2 times daily    azithromycin (Z-ALONDRA) 250 MG tablet 2 pills day one, then 1 pill for 4 days    budesonide-glycopyr-formoterol (BREZTRI AEROSPHERE) 160-9-4.8 mcg/actuation HFAA 2 puffs, Inhalation, 2 times daily    cyanocobalamin/folic ac/vit B6 (FOLBIC ORAL) 1 tablet, Oral, Daily    FASENRA PEN 30 mg, Subcutaneous, Every 28 days    FASENRA PEN 30 mg, Subcutaneous, Every 8 weeks    fluticasone propionate (FLONASE) 50 mcg/actuation nasal spray 2 sprays, Each Nostril, Daily PRN    folic acid-vit B6-vit B12 2.5-25-2 mg (FOLBIC) 2.5-25-2 mg Tab 1 tablet, Oral, Daily    labetaloL (NORMODYNE) 300 mg, Oral, 2 times daily    olmesartan (BENICAR) 40 mg, Oral, Every morning    predniSONE (DELTASONE) 20 MG tablet 3 pills for 3 days, 2 for 3 days, 1 for 3 days. Repeat for cough    rosuvastatin (CRESTOR) 10 mg, Oral, Daily        ECG reviewed by me:  sinus rhythm, first-degree AV block, PVC  Assessment & Plan     63-year-old male with past medical history of hypertension hyperlipidemia, emphysema, former smoker, lower extremity arterial and venous thrombosis due to MTHFR mutation on Eliquis.  He had a negative stress test in 2021.  Status post prostate surgery last year and had left ischemic lower limb December status post thrombectomy.   Noticed elevated blood pressure at home in the evening.  Max blood pressure was 150/90.  Has intermittent sharp left neck pain which is worse with movement and radiating down the left shoulder, appears to be radiculopathy related and advised to follow up with PCP.    -continue current medications.  Continue statin, Eliquis   -BP controlled today  -provided patient with a BP log.  Advised follow up in 2 weeks with a BP log    Follow up in about 2 weeks (around 4/26/2024).

## 2024-05-02 LAB
OHS QRS DURATION: 98 MS
OHS QTC CALCULATION: 418 MS

## 2024-05-29 NOTE — Clinical Note
All catheters were removed.    Xiomy gandhi Kaiser has been in the hospital for GI issues going to have a colonoscopy , and Xiomy is asking he is on Xarelto something that needs to be continued? or is there another option such as watchman procedure, they do have the kardiamobile.   Concerned about the BW wondering if it also has to do with being on blood thinners    Would like to discuss   C/b [phone]

## 2024-06-13 ENCOUNTER — OFFICE VISIT (OUTPATIENT)
Dept: PULMONOLOGY | Facility: CLINIC | Age: 64
End: 2024-06-13
Payer: MEDICARE

## 2024-06-13 VITALS
DIASTOLIC BLOOD PRESSURE: 84 MMHG | HEART RATE: 70 BPM | HEIGHT: 72 IN | OXYGEN SATURATION: 98 % | BODY MASS INDEX: 25.22 KG/M2 | SYSTOLIC BLOOD PRESSURE: 123 MMHG | WEIGHT: 186.19 LBS

## 2024-06-13 DIAGNOSIS — J44.89 ASTHMA-COPD OVERLAP SYNDROME: Primary | ICD-10-CM

## 2024-06-13 PROCEDURE — 3008F BODY MASS INDEX DOCD: CPT | Mod: CPTII,S$GLB,, | Performed by: NURSE PRACTITIONER

## 2024-06-13 PROCEDURE — 1159F MED LIST DOCD IN RCRD: CPT | Mod: CPTII,S$GLB,, | Performed by: NURSE PRACTITIONER

## 2024-06-13 PROCEDURE — 99213 OFFICE O/P EST LOW 20 MIN: CPT | Mod: S$GLB,,, | Performed by: NURSE PRACTITIONER

## 2024-06-13 PROCEDURE — 1101F PT FALLS ASSESS-DOCD LE1/YR: CPT | Mod: CPTII,S$GLB,, | Performed by: NURSE PRACTITIONER

## 2024-06-13 PROCEDURE — 1126F AMNT PAIN NOTED NONE PRSNT: CPT | Mod: CPTII,S$GLB,, | Performed by: NURSE PRACTITIONER

## 2024-06-13 PROCEDURE — 1157F ADVNC CARE PLAN IN RCRD: CPT | Mod: CPTII,S$GLB,, | Performed by: NURSE PRACTITIONER

## 2024-06-13 PROCEDURE — 3288F FALL RISK ASSESSMENT DOCD: CPT | Mod: CPTII,S$GLB,, | Performed by: NURSE PRACTITIONER

## 2024-06-13 PROCEDURE — 3079F DIAST BP 80-89 MM HG: CPT | Mod: CPTII,S$GLB,, | Performed by: NURSE PRACTITIONER

## 2024-06-13 PROCEDURE — 99999 PR PBB SHADOW E&M-EST. PATIENT-LVL IV: CPT | Mod: PBBFAC,,, | Performed by: NURSE PRACTITIONER

## 2024-06-13 PROCEDURE — 4010F ACE/ARB THERAPY RXD/TAKEN: CPT | Mod: CPTII,S$GLB,, | Performed by: NURSE PRACTITIONER

## 2024-06-13 PROCEDURE — 3074F SYST BP LT 130 MM HG: CPT | Mod: CPTII,S$GLB,, | Performed by: NURSE PRACTITIONER

## 2024-06-13 RX ORDER — BUDESONIDE, GLYCOPYRROLATE, AND FORMOTEROL FUMARATE 160; 9; 4.8 UG/1; UG/1; UG/1
2 AEROSOL, METERED RESPIRATORY (INHALATION) 2 TIMES DAILY
Qty: 32.1 G | Refills: 3 | Status: SHIPPED | OUTPATIENT
Start: 2024-06-13

## 2024-06-13 NOTE — PROGRESS NOTES
6/13/2024    Helder Brand  Follow up    Chief Complaint   Patient presents with    3m f/u    Asthma       HPI:     6/13/2024- on Fasenra for 56 days, has noticed dramatic improvement in daily activities, has not required systemic steroids in past 3 months. States he is finally able to start exercising again, No longer has to take albuterol before exercising. Able to ride bike with out stopping to rest for several miles.   On Breztri daily, no complaints of cough, wheeze, or chest tightness. Has noticed decrease in amount of mucous in chest, no longer coughing to clear mucous.     3/12/2024- complaint of hoarse voice changes onset 1 month. Treated with prednisone and azithromycin with benefit. Symptoms returned after completing steroid therapy. Complaint of thick productive cough, associated with wheeze when laying down.   Using nebulizer and aerobika device  daily. Requires systemic steroids every other month for past year.     On Eliquis for DVTs. No issues with excessive bleeding.       12/28/2023- stopped blood thinners for surgery, had left foot and left lower leg pain.dx DVT left leg admitted to [facility]  12/03/2023 currently on Eliquis and will continue lifetime therapy. States SOB is persistent complaint, on Breztri most days with benefit.   Had exacerbation 2 weeks prior improved with steroid therapy.     7/6/2023- complaint of COPD exacerbation onset 3 weeks treated with antibiotics and oral steroids took two doses of therapy.   Complaint of productive cough. Green mucous. Treated with z-pack and doxycycline with no benefit.   Associated with chest tightness and wheeze.       5/10/23- complaint of cough, onset 2 weeks, improved with nebulizer therapy.   Difficult to clear chest of mucous. Clear mucous. Worse in late evenings.   Associated with wheeze and chest tightness.   Currently on Eliquis for history DVT    10/20/22- had COVID 19 July 2022, states no current complaint of shortness of breath, cough,  chest tightness, or wheeze. not currently using advair or nebulizer machine.   Left DVT in August currently on Eliquis,     6/1/2022- states breathing is improved after started nebulizer as needed. PCP treated with antibiotics for productive green mucous in April, resolved with therapy.   SOB- recurrent complaint, had trouble breathing while laying down improved with albuterol inhaler. Associated with chest tightness and wheeze in late evenings, most nights. Nocturnal arousals 1x weekly. States doing better while on antibiotics.   On advair most day, sometimes forgets. Started on Trelegy but insurance coverage is not affordable.   Able to work in garden but still having to take breaks. Worse in high heat.   Declined sleep apnea therapy.     3/9/2022-Cough- worsened in past 2 weeks, has to sleep in recliner, coughing fits at night. Productive thick mucous green in color. Using nebulizer 3x daily with benefit for few hours.   SOB- severe, worse with exertion, associated with wheeze and chest tightness.     States was previously doing well, able to do gardening for 10 minutes then having to rest. Not using stiolto due to difficultly with device.       11/19/2021- Complaint of worsening cough- onset 2 weeks, tx by PCP with azithromycin and Levaquin with minimal benefit. Coughing through out night. Productive thick green mucous that has turned yellow in color.   Associated with chest tightness and wheeze.        10/06/2021- since last visit pt had blood clot to left popliteal artery and required vascular surgery. He has a L leg wound vacc and getting wound care now.   He hasn't smoked for 40 yrs. Feels some mild throat clearing w/ cough.   Used to swim a lot when young. Worked [employer] 8-10 yrs and got lots of exercise.  Mother smoked a little and got bad copd.    6/17/21-  Need disc of images from [facility]- please bring disc from home and drop off to our office. Once I review the images I can give more advice- possible  biopsy?  Get pulmonary function tests  Follow up with cardiologist  Pt is a 62 yo male with HTN, thyroid disease presenting for new evaluation.  Pt reports he had abnormal chest x-ray which was found after he had episode of chest tightness.  Has been getting these episodes of pressure like anterior chest discomfort, off and on for several months, usually while at rest and lasts few minutes. He rides bike 3-6 miles a day and denies LAW or chest tightness w/ exertion. Sometimes has slight wheeze when laying down at night but no cough/mucous. No inhaler use. No childhood asthma or breathing trouble. Denies frequent bronchitis.  Secondhand smoke from grandparents and parents- teenage cigarette smoke but quit at age 17. Mother smoked and now on hospice with COPD.  Pt had CT chest after abnormality seen on CXR 3/2021 with RLL nodule- forgot disc at home. (done at [facility])  He is going to have an echo at 3pm today.  In past had growth on thyroid- benign, had partial thyroidectomy.  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 08/27/2023    Hyperlipidemia 2021    Hypertension 2001    Kidney stone 2001    x3    Kidney stones     x3    Personal history of colonic polyps 10/20/2023    Thyroid disease     benign growth, partial thyroidectomy         Past Surgical History:   Procedure Laterality Date    ANGIOGRAPHY OF LOWER EXTREMITY Left 08/05/2022    Procedure: Angiogram Extremity Unilateral;  Surgeon: Ali Khoobehi, MD;  Location: [facility] CATH/EP LAB;  Service: Peripheral Vascular;  Laterality: Left;    ANGIOGRAPHY OF LOWER EXTREMITY Left 08/06/2022    Procedure: Angioplasty-peripheral;  Surgeon: Ismael Esquivel MD;  Location: [facility] CATH/EP LAB;  Service: General;  Laterality: Left;     ANGIOGRAPHY OF LOWER EXTREMITY Left 08/06/2022    Procedure: Angiogram Extremity Unilateral;  Surgeon: Ismael Esquivel MD;  Location: [facility] CATH/EP LAB;  Service: General;  Laterality: Left;    ANGIOGRAPHY OF LOWER EXTREMITY Left 08/07/2022    Procedure: Angiogram Extremity Unilateral;  Surgeon: Ismael Esquivel MD;  Location: [facility] CATH/EP LAB;  Service: General;  Laterality: Left;    ANGIOGRAPHY OF LOWER EXTREMITY Left 12/1/2023    Procedure: Angiogram Extremity Unilateral;  Surgeon: Ismael Esquivel MD;  Location: [facility] CATH/EP LAB;  Service: General;  Laterality: Left;    ANGIOGRAPHY OF LOWER EXTREMITY Left 12/2/2023    Procedure: Angiogram Extremity Unilateral;  Surgeon: Ismael Esquivel MD;  Location: [facility] CATH/EP LAB;  Service: General;  Laterality: Left;    ANGIOGRAPHY OF LOWER EXTREMITY Left 12/2/2023    Procedure: Angiogram Extremity Unilateral;  Surgeon: Ismael Esquivel MD;  Location: [facility] CATH/EP LAB;  Service: General;  Laterality: Left;    COLONOSCOPY N/A 10/20/2023    Procedure: COLONOSCOPY;  Surgeon: Jeff Call MD;  Location: [facility] ENDO;  Service: Endoscopy;  Laterality: N/A;    COLONOSCOPY  10/20/2023    5 YR RECALL    CYSTOSCOPY N/A 09/05/2023    Procedure: CYSTOSCOPY;  Surgeon: Adam Tyler MD;  Location: [facility] OR;  Service: Urology;  Laterality: N/A;    EMBOLECTOMY OR THROMBECTOMY, BLOOD VESSEL, LOWER EXTREMITY Left 12/1/2023    Procedure: EMBOLECTOMY OR THROMBECTOMY, BLOOD VESSEL, LOWER EXTREMITY;  Surgeon: Ismael Esquivel MD;  Location: [facility] CATH/EP LAB;  Service: General;  Laterality: Left;    INJECTION OF TISSUE PLASMINOGEN ACTIVATOR Left 08/06/2022    Procedure: INJECTION, TISSUE PLASMINOGEN ACTIVATOR;  Surgeon: Ismael Esquivel MD;  Location: [facility] CATH/EP LAB;  Service: General;  Laterality: Left;    INJECTION OF TISSUE PLASMINOGEN ACTIVATOR Left 08/06/2022    Procedure: INJECTION, TISSUE PLASMINOGEN ACTIVATOR;  Surgeon: Ismael Esquivel MD;  Location: [facility]  CATH/EP LAB;  Service: General;  Laterality: Left;    PROSTATE SURGERY  2018    REPAIR OF ANEURYSM Left 07/21/2021    Procedure: REPAIR POPLITEAL ARTERY ANEURYSM;  Surgeon: Ismael Esquivel MD;  Location: [facility] OR;  Service: Cardiovascular;  Laterality: Left;    THROMBECTOMY  08/06/2022    Procedure: THROMBECTOMY;  Surgeon: Ismael Esquivel MD;  Location: [facility] CATH/EP LAB;  Service: General;;    THROMBECTOMY Left 08/06/2022    Procedure: THROMBECTOMY;  Surgeon: Ismael Esquivel MD;  Location: [facility] CATH/EP LAB;  Service: General;  Laterality: Left;    THROMBECTOMY, LOWER ARTERIAL  12/2/2023    Procedure: Thrombectomy, Lower Arterial;  Surgeon: Ismael Esquivel MD;  Location: [facility] CATH/EP LAB;  Service: General;;    THYROIDECTOMY, PARTIAL  2011    TONSILLECTOMY      as a child    TRANSRECTAL BIOPSY OF PROSTATE WITH ULTRASOUND GUIDANCE N/A 09/05/2023    Procedure: BIOPSY, PROSTATE, RECTAL APPROACH, WITH US GUIDANCE;  Surgeon: Adam Tyler MD;  Location: [facility] OR;  Service: Urology;  Laterality: N/A;    TRANSURETHRAL RESECTION OF PROSTATE N/A 11/16/2023    Procedure: TURP (TRANSURETHRAL RESECTION OF PROSTATE);  Surgeon: Adam Tyler MD;  Location: [facility] OR;  Service: Urology;  Laterality: N/A;    urolift  04/2019    [facility]     Social History     Tobacco Use    Smoking status: Former     Types: Cigarettes     Passive exposure: Past    Smokeless tobacco: Never   Substance Use Topics    Alcohol use: Not Currently     Alcohol/week: 1.0 standard drink of alcohol     Types: 1 Cans of beer per week     Comment: rare    Drug use: Never     Family History   Problem Relation Name Age of Onset    Hypertension Mother      COPD Mother      Pulmonary embolism Father       Review of patient's allergies indicates:  No Known Allergies    Performance Status:The patient's activity level is regular exercise.      Review of Systems:  a review of eleven systems covering constitutional, Eye, HEENT, Psych, Respiratory,  Cardiac, GI, , Musculoskeletal, Endocrine, Dermatologic was negative except for pertinent findings as listed ABOVE and below:  All negative with pertinent positives as above       Exam:Comprehensive exam done. /84 (BP Location: Right arm, Patient Position: Sitting, BP Method: Medium (Automatic))   Pulse 70   Ht 6' (1.829 m)   Wt 84.5 kg (186 lb 2.9 oz)   SpO2 98% Comment: on room air at rest  BMI 25.25 kg/m²   Exam included Vitals as listed, and patient's appearance and affect and alertness and mood, oral exam for yeast and hygiene and pharynx lesions and Mallapatti (M) score, neck with inspection for jvd and masses and thyroid abnormalities and lymph nodes (supraclavicular and infraclavicular nodes and axillary also examined and noted if abn), chest exam included symmetry and effort and fremitus and percussion and auscultation, cardiac exam included rhythm and gallops and murmur and rubs and jvd and edema, abdominal exam for mass and hepatosplenomegaly and tenderness and hernias and bowel sounds, Musculoskeletal exam with muscle tone and posture and mobility/gait and  strength, and skin for rashes and cyanosis and pallor and turgor, extremity for clubbing.  Findings were normal except for pertinent findings listed below:  Thin, athletic build  Breath sounds clear        Radiographs (ct chest and cxr) reviewed: view by direct vision   CTA Chest Non-Coronary 01/12/2024 Band like and platelike posterior bibasilar opacification, peribronchial thickening, mild bronchiectasis not significantly changed in appearance    CTA Chest Non-Coronary 3/22/2023 peribronchial thickening, mild bronchiectasis. Bilateral lower lobe linear atelectasis    X-Ray Chest PA And Lateral 06/22/2023 lungs clear.    CTA Chest Non-Coronary 03/22/23 dependent atelectasis, no lung nodules    CTA Chest Non-Coronary 08/13/22 no PE seen; right lower lung nodule decreased in size, left is stable    [facility] Chest x-ray: 4/21/22 findings  appear consitent wtih chronic small airwasy disease. no consolidation or other adute process in evident, no significnat or detrimental interval changes in comparison to CXR 11/15/2021      X-Ray Chest AP Portable 01/02/22    Minimal basilar parenchymal opacities or atelectasis.  Small nodular opacity observed in the right lung base laterally.      CTA Chest Non-Coronary (PE Study) 01/03/22 Bibasilar atelectasis/infiltrate. Pneumonia must be considered     Outside CT (uploaded) chest 6/9/21- mild linear atelectasis bilat lower lobes, mild pleural based nodules which look like rounded atelectasis.  CXR 3/9/21- RLL nodule  CT abd/p 8/2019- rounded atelectasis bibasilar present at that time    Labs reviewed      Lab Results   Component Value Date    WBC 8.63 03/12/2024    RBC 5.01 03/12/2024    HGB 15.3 03/12/2024    HCT 45.9 03/12/2024    MCV 92 03/12/2024    MCH 30.5 03/12/2024    MCHC 33.3 03/12/2024    RDW 14.0 03/12/2024     03/12/2024    MPV 9.1 (L) 03/12/2024    GRAN 4.7 03/12/2024    GRAN 54.4 03/12/2024    LYMPH 2.4 03/12/2024    LYMPH 28.0 03/12/2024    MONO 1.1 (H) 03/12/2024    MONO 12.7 03/12/2024    EOS 0.3 03/12/2024    BASO 0.06 03/12/2024    EOSINOPHIL 3.9 03/12/2024    BASOPHIL 0.7 03/12/2024      Latest Reference Range & Units 10/16/23 08:17 11/06/23 13:20 12/01/23 02:22 12/01/23 18:25   Eos # 0.0 - 0.5 K/uL 0.2 0.2 0.5 0.2       Culture, Respiratory with Gram Stain 03/10/22   Normal respiratory whitney       PFT reviewed- mild obstruction, increased lung vol. DLCO normal          Plan:  Clinical impression is apparently straight forward and impression with management as below.    Helder was seen today for 3m f/u and asthma.    Diagnoses and all orders for this visit:    Asthma-COPD overlap syndrome  -     budesonide-glycopyr-formoterol (BREZTRI AEROSPHERE) 160-9-4.8 mcg/actuation HFAA; Inhale 2 puffs into the lungs 2 (two) times a day.              Follow up in about 6 months (around  12/13/2024), or if symptoms worsen or fail to improve.      Discussed with patient above for education the following:      There are no Patient Instructions on file for this visit.

## 2024-07-23 DIAGNOSIS — I10 HYPERTENSION, UNSPECIFIED TYPE: ICD-10-CM

## 2024-07-23 RX ORDER — LABETALOL 300 MG/1
300 TABLET, FILM COATED ORAL 2 TIMES DAILY
Qty: 180 TABLET | Refills: 1 | Status: SHIPPED | OUTPATIENT
Start: 2024-07-23

## 2024-08-20 ENCOUNTER — LAB VISIT (OUTPATIENT)
Dept: LAB | Facility: HOSPITAL | Age: 64
End: 2024-08-20
Attending: UROLOGY
Payer: MEDICARE

## 2024-08-20 DIAGNOSIS — N42.32 ATYPICAL SMALL ACINAR PROLIFERATION OF PROSTATE: ICD-10-CM

## 2024-08-20 LAB
PROSTATE SPECIFIC ANTIGEN, TOTAL: 2.3 NG/ML (ref 0–4)
PSA FREE MFR SERPL: 20 %
PSA FREE SERPL-MCNC: 0.46 NG/ML (ref 0–1.5)

## 2024-08-20 PROCEDURE — 36415 COLL VENOUS BLD VENIPUNCTURE: CPT | Performed by: UROLOGY

## 2024-08-20 PROCEDURE — 84153 ASSAY OF PSA TOTAL: CPT | Performed by: UROLOGY

## 2024-08-20 PROCEDURE — 84154 ASSAY OF PSA FREE: CPT | Performed by: UROLOGY

## 2024-08-28 ENCOUNTER — PATIENT MESSAGE (OUTPATIENT)
Dept: ADMINISTRATIVE | Facility: OTHER | Age: 64
End: 2024-08-28
Payer: MEDICARE

## 2024-08-28 DIAGNOSIS — J45.50 SEVERE PERSISTENT ASTHMA WITHOUT COMPLICATION: ICD-10-CM

## 2024-08-28 RX ORDER — BENRALIZUMAB 30 MG/ML
30 INJECTION, SOLUTION SUBCUTANEOUS
Qty: 1 ML | Refills: 2 | Status: CANCELLED | OUTPATIENT
Start: 2024-08-28

## 2024-08-29 ENCOUNTER — TELEPHONE (OUTPATIENT)
Dept: FAMILY MEDICINE | Facility: CLINIC | Age: 64
End: 2024-08-29
Payer: MEDICARE

## 2024-08-29 DIAGNOSIS — Z79.899 ENCOUNTER FOR LONG-TERM (CURRENT) USE OF OTHER MEDICATIONS: Primary | ICD-10-CM

## 2024-08-29 DIAGNOSIS — I10 HYPERTENSION, UNSPECIFIED TYPE: ICD-10-CM

## 2024-08-29 DIAGNOSIS — E78.5 HYPERLIPIDEMIA, UNSPECIFIED HYPERLIPIDEMIA TYPE: ICD-10-CM

## 2024-09-16 ENCOUNTER — OFFICE VISIT (OUTPATIENT)
Dept: FAMILY MEDICINE | Facility: CLINIC | Age: 64
End: 2024-09-16
Payer: MEDICARE

## 2024-09-16 VITALS
OXYGEN SATURATION: 97 % | BODY MASS INDEX: 24.65 KG/M2 | DIASTOLIC BLOOD PRESSURE: 82 MMHG | WEIGHT: 182 LBS | HEART RATE: 67 BPM | SYSTOLIC BLOOD PRESSURE: 130 MMHG | RESPIRATION RATE: 18 BRPM | HEIGHT: 72 IN

## 2024-09-16 DIAGNOSIS — J44.9 CHRONIC OBSTRUCTIVE PULMONARY DISEASE, UNSPECIFIED COPD TYPE: Primary | ICD-10-CM

## 2024-09-16 DIAGNOSIS — I10 HYPERTENSION, UNSPECIFIED TYPE: ICD-10-CM

## 2024-09-16 DIAGNOSIS — Z00.00 ROUTINE PHYSICAL EXAMINATION: ICD-10-CM

## 2024-09-16 DIAGNOSIS — Z15.89 HETEROZYGOUS MTHFR MUTATION C677T: ICD-10-CM

## 2024-09-16 DIAGNOSIS — H25.9 AGE-RELATED CATARACT OF BOTH EYES, UNSPECIFIED AGE-RELATED CATARACT TYPE: ICD-10-CM

## 2024-09-16 PROCEDURE — 3288F FALL RISK ASSESSMENT DOCD: CPT | Mod: CPTII,S$GLB,, | Performed by: PHYSICIAN ASSISTANT

## 2024-09-16 PROCEDURE — 1126F AMNT PAIN NOTED NONE PRSNT: CPT | Mod: CPTII,S$GLB,, | Performed by: PHYSICIAN ASSISTANT

## 2024-09-16 PROCEDURE — 4010F ACE/ARB THERAPY RXD/TAKEN: CPT | Mod: CPTII,S$GLB,, | Performed by: PHYSICIAN ASSISTANT

## 2024-09-16 PROCEDURE — 3061F NEG MICROALBUMINURIA REV: CPT | Mod: CPTII,S$GLB,, | Performed by: PHYSICIAN ASSISTANT

## 2024-09-16 PROCEDURE — 1101F PT FALLS ASSESS-DOCD LE1/YR: CPT | Mod: CPTII,S$GLB,, | Performed by: PHYSICIAN ASSISTANT

## 2024-09-16 PROCEDURE — 3075F SYST BP GE 130 - 139MM HG: CPT | Mod: CPTII,S$GLB,, | Performed by: PHYSICIAN ASSISTANT

## 2024-09-16 PROCEDURE — 3079F DIAST BP 80-89 MM HG: CPT | Mod: CPTII,S$GLB,, | Performed by: PHYSICIAN ASSISTANT

## 2024-09-16 PROCEDURE — 1159F MED LIST DOCD IN RCRD: CPT | Mod: CPTII,S$GLB,, | Performed by: PHYSICIAN ASSISTANT

## 2024-09-16 PROCEDURE — 3066F NEPHROPATHY DOC TX: CPT | Mod: CPTII,S$GLB,, | Performed by: PHYSICIAN ASSISTANT

## 2024-09-16 PROCEDURE — 3008F BODY MASS INDEX DOCD: CPT | Mod: CPTII,S$GLB,, | Performed by: PHYSICIAN ASSISTANT

## 2024-09-16 PROCEDURE — 1157F ADVNC CARE PLAN IN RCRD: CPT | Mod: CPTII,S$GLB,, | Performed by: PHYSICIAN ASSISTANT

## 2024-09-16 PROCEDURE — 99214 OFFICE O/P EST MOD 30 MIN: CPT | Mod: S$GLB,,, | Performed by: PHYSICIAN ASSISTANT

## 2024-09-16 NOTE — PROGRESS NOTES
SUBJECTIVE:    Patient ID: Helder Brand is a 64 y.o. male.    Chief Complaint: Annual Exam (Annual visit // no bottles// no refills needed// lab results//pt states his eye sight had been declining since last eye exam in January )    HPI  Patient is a 64 year old male who presents to the clinic today for his annual follow up. Patient states his visual acuity has been declining since his last eye exam in January of this year. Patient was diagnosed with cataracts of both eyes at the time. His vision has been progressively worsening since. His right visual acuity is better than the left. His next optometry appointment is in January. He is willing to see another optometrist because he was not too happy with his previous one.Patient started his Fasenra and it has helped tremendously alongside with his inhalers. Patient has not seen cardiologist since April of this year. He decreased his sodium intake to 1000mg and increased fruit, vegetable, and protein intake. Since then, he lost over 15 pounds and his BP has been running on the lower side. His max BP at home is usually 130/85. His physical activity includes walking, riding his bike, and doing house work..He currently takes Eliquis daily and follows with heme-onc and pulmonology regularly. His last colonoscopy was last year which showed multiple polyps that had to be removed. He is due in 5 years. He is willing to get the flu shot next month. He denies any other medical complaints.     Telephone on 08/29/2024   Component Date Value Ref Range Status    TSH w/reflex to FT4 09/04/2024 1.78  0.40 - 4.50 mIU/L Final    Cholesterol 09/04/2024 178  <200 mg/dL Final    HDL 09/04/2024 60  > OR = 40 mg/dL Final    Triglycerides 09/04/2024 48  <150 mg/dL Final    LDL Cholesterol 09/04/2024 104 (H)  mg/dL (calc) Final    HDL/Cholesterol Ratio 09/04/2024 3.0  <5.0 (calc) Final    Non HDL Chol. (LDL+VLDL) 09/04/2024 118  <130 mg/dL (calc) Final    WBC 09/04/2024 6.2  3.8 - 10.8  Thousand/uL Final    RBC 09/04/2024 5.05  4.20 - 5.80 Million/uL Final    Hemoglobin 09/04/2024 16.3  13.2 - 17.1 g/dL Final    Hematocrit 09/04/2024 49.0  38.5 - 50.0 % Final    MCV 09/04/2024 97.0  80.0 - 100.0 fL Final    MCH 09/04/2024 32.3  27.0 - 33.0 pg Final    MCHC 09/04/2024 33.3  32.0 - 36.0 g/dL Final    RDW 09/04/2024 13.0  11.0 - 15.0 % Final    Platelets 09/04/2024 244  140 - 400 Thousand/uL Final    MPV 09/04/2024 9.5  7.5 - 12.5 fL Final    Neutrophils, Abs 09/04/2024 3,571  1,500 - 7,800 cells/uL Final    Lymph # 09/04/2024 1,773  850 - 3,900 cells/uL Final    Mono # 09/04/2024 843  200 - 950 cells/uL Final    Eos # 09/04/2024 0 (L)  15 - 500 cells/uL Final    Baso # 09/04/2024 12  0 - 200 cells/uL Final    Neutrophils Relative 09/04/2024 57.6  % Final    Lymph % 09/04/2024 28.6  % Final    Mono % 09/04/2024 13.6  % Final    Eosinophil % 09/04/2024 0.0  % Final    Basophil % 09/04/2024 0.2  % Final    Glucose 09/04/2024 84  65 - 99 mg/dL Final    BUN 09/04/2024 11  7 - 25 mg/dL Final    Creatinine 09/04/2024 0.90  0.70 - 1.35 mg/dL Final    eGFR 09/04/2024 94  > OR = 60 mL/min/1.73m2 Final    BUN/Creatinine Ratio 09/04/2024 SEE NOTE:  6 - 22 (calc) Final    Sodium 09/04/2024 139  135 - 146 mmol/L Final    Potassium 09/04/2024 4.3  3.5 - 5.3 mmol/L Final    Chloride 09/04/2024 104  98 - 110 mmol/L Final    CO2 09/04/2024 26  20 - 32 mmol/L Final    Calcium 09/04/2024 9.1  8.6 - 10.3 mg/dL Final    Total Protein 09/04/2024 6.8  6.1 - 8.1 g/dL Final    Albumin 09/04/2024 4.0  3.6 - 5.1 g/dL Final    Globulin, Total 09/04/2024 2.8  1.9 - 3.7 g/dL (calc) Final    Albumin/Globulin Ratio 09/04/2024 1.4  1.0 - 2.5 (calc) Final    Total Bilirubin 09/04/2024 0.8  0.2 - 1.2 mg/dL Final    Alkaline Phosphatase 09/04/2024 81  35 - 144 U/L Final    AST 09/04/2024 19  10 - 35 U/L Final    ALT 09/04/2024 13  9 - 46 U/L Final    Creatinine, Urine 09/04/2024 114  20 - 320 mg/dL Final    Microalb, Ur 09/04/2024 0.5   See Note: mg/dL Final    Microalb/Creat Ratio 09/04/2024 4  <30 mg/g creat Final   Lab Visit on 08/20/2024   Component Date Value Ref Range Status    PSA Total 08/20/2024 2.3  0.00 - 4.00 ng/mL Final    PSA, Free 08/20/2024 0.46  0.00 - 1.50 ng/mL Final    PSA, Free % 08/20/2024 20.00  Not established % Final   Office Visit on 04/12/2024   Component Date Value Ref Range Status    QRS Duration 04/12/2024 98  ms Final    [facility] QTC Calculation 04/12/2024 418  ms Final       Past Medical History:   Diagnosis Date    Blood clot in vein     left leg    Emphysema lung 2021    Enlarged prostate     Heterozygous MTHFR mutation C677T 08/27/2023    Hyperlipidemia 2021    Hypertension 2001    Kidney stone 2001    x3    Kidney stones     x3    Personal history of colonic polyps 10/20/2023    Thyroid disease     benign growth, partial thyroidectomy     Past Surgical History:   Procedure Laterality Date    ANGIOGRAPHY OF LOWER EXTREMITY Left 08/05/2022    Procedure: Angiogram Extremity Unilateral;  Surgeon: Ali Khoobehi, MD;  Location: [facility] CATH/EP LAB;  Service: Peripheral Vascular;  Laterality: Left;    ANGIOGRAPHY OF LOWER EXTREMITY Left 08/06/2022    Procedure: Angioplasty-peripheral;  Surgeon: Ismael Esquivel MD;  Location: [facility] CATH/EP LAB;  Service: General;  Laterality: Left;    ANGIOGRAPHY OF LOWER EXTREMITY Left 08/06/2022    Procedure: Angiogram Extremity Unilateral;  Surgeon: Ismael Esquivel MD;  Location: [facility] CATH/EP LAB;  Service: General;  Laterality: Left;    ANGIOGRAPHY OF LOWER EXTREMITY Left 08/07/2022    Procedure: Angiogram Extremity Unilateral;  Surgeon: Ismael Esquivel MD;  Location: [facility] CATH/EP LAB;  Service: General;  Laterality: Left;    ANGIOGRAPHY OF LOWER EXTREMITY Left 12/1/2023    Procedure: Angiogram Extremity Unilateral;  Surgeon: Ismael Esquivel MD;  Location: [facility] CATH/EP LAB;  Service: General;  Laterality: Left;    ANGIOGRAPHY OF LOWER EXTREMITY Left 12/2/2023    Procedure: Angiogram  Extremity Unilateral;  Surgeon: Ismael Esquivel MD;  Location: [facility] CATH/EP LAB;  Service: General;  Laterality: Left;    ANGIOGRAPHY OF LOWER EXTREMITY Left 12/2/2023    Procedure: Angiogram Extremity Unilateral;  Surgeon: Ismael Esquivel MD;  Location: [facility] CATH/EP LAB;  Service: General;  Laterality: Left;    COLONOSCOPY N/A 10/20/2023    Procedure: COLONOSCOPY;  Surgeon: Jeff Call MD;  Location: [facility] ENDO;  Service: Endoscopy;  Laterality: N/A;    COLONOSCOPY  10/20/2023    5 YR RECALL    CYSTOSCOPY N/A 09/05/2023    Procedure: CYSTOSCOPY;  Surgeon: Adam Tyler MD;  Location: [facility] AS OR;  Service: Urology;  Laterality: N/A;    EMBOLECTOMY OR THROMBECTOMY, BLOOD VESSEL, LOWER EXTREMITY Left 12/1/2023    Procedure: EMBOLECTOMY OR THROMBECTOMY, BLOOD VESSEL, LOWER EXTREMITY;  Surgeon: Ismael Esquivel MD;  Location: [facility] CATH/EP LAB;  Service: General;  Laterality: Left;    INJECTION OF TISSUE PLASMINOGEN ACTIVATOR Left 08/06/2022    Procedure: INJECTION, TISSUE PLASMINOGEN ACTIVATOR;  Surgeon: Ismael Esquivel MD;  Location: [facility] CATH/EP LAB;  Service: General;  Laterality: Left;    INJECTION OF TISSUE PLASMINOGEN ACTIVATOR Left 08/06/2022    Procedure: INJECTION, TISSUE PLASMINOGEN ACTIVATOR;  Surgeon: Ismael Esquivel MD;  Location: [facility] CATH/EP LAB;  Service: General;  Laterality: Left;    PROSTATE SURGERY  2018    REPAIR OF ANEURYSM Left 07/21/2021    Procedure: REPAIR POPLITEAL ARTERY ANEURYSM;  Surgeon: Ismael Esquivel MD;  Location: [facility] OR;  Service: Cardiovascular;  Laterality: Left;    THROMBECTOMY  08/06/2022    Procedure: THROMBECTOMY;  Surgeon: Ismael Esquivel MD;  Location: [facility] CATH/EP LAB;  Service: General;;    THROMBECTOMY Left 08/06/2022    Procedure: THROMBECTOMY;  Surgeon: Ismael Esquivel MD;  Location: [facility] CATH/EP LAB;  Service: General;  Laterality: Left;    THROMBECTOMY, LOWER ARTERIAL  12/2/2023    Procedure: Thrombectomy, Lower Arterial;  Surgeon: Sierra  Ismael VIDAL MD;  Location: [facility] CATH/EP LAB;  Service: General;;    THYROIDECTOMY, PARTIAL  2011    TONSILLECTOMY      as a child    TRANSRECTAL BIOPSY OF PROSTATE WITH ULTRASOUND GUIDANCE N/A 09/05/2023    Procedure: BIOPSY, PROSTATE, RECTAL APPROACH, WITH US GUIDANCE;  Surgeon: Adam Tyler MD;  Location: [facility] ASU OR;  Service: Urology;  Laterality: N/A;    TRANSURETHRAL RESECTION OF PROSTATE N/A 11/16/2023    Procedure: TURP (TRANSURETHRAL RESECTION OF PROSTATE);  Surgeon: Adam Tyler MD;  Location: [facility] OR;  Service: Urology;  Laterality: N/A;    urolift  04/2019    [facility]     Family History   Problem Relation Name Age of Onset    Hypertension Mother      COPD Mother      Pulmonary embolism Father         Marital Status:   Alcohol History:  reports that he does not currently use alcohol after a past usage of about 1.0 standard drink of alcohol per week.  Tobacco History:  reports that he has quit smoking. His smoking use included cigarettes. He has been exposed to tobacco smoke. He has never used smokeless tobacco.  Drug History:  reports no history of drug use.    Review of patient's allergies indicates:  No Known Allergies    Current Outpatient Medications:     albuterol (PROVENTIL/VENTOLIN HFA) 90 mcg/actuation inhaler, Inhale 2 puffs into the lungs every 6 (six) hours as needed for Shortness of Breath., Disp: , Rfl:     albuterol (VENTOLIN HFA) 90 mcg/actuation inhaler, Inhale 2 puffs into the lungs every 4 (four) hours as needed for Wheezing or Shortness of Breath. Rescue, Disp: 18 g, Rfl: 11    albuterol-ipratropium (DUO-NEB) 2.5 mg-0.5 mg/3 mL nebulizer solution, Take 3 mLs by nebulization every 6 (six) hours as needed for Wheezing or Shortness of Breath. Rescue, Disp: 240 mL, Rfl: 5    apixaban (ELIQUIS) 5 mg Tab, Take 1 tablet (5 mg total) by mouth 2 (two) times daily., Disp: 60 tablet, Rfl: 5    benralizumab (FASENRA PEN) 30 mg/mL AtIn, Inject 30 mg into the skin every 8  weeks., Disp: 1 mL, Rfl: 5    budesonide-glycopyr-formoterol (BREZTRI AEROSPHERE) 160-9-4.8 mcg/actuation HFAA, Inhale 2 puffs into the lungs 2 (two) times a day., Disp: 32.1 g, Rfl: 3    folic acid-vit B6-vit B12 2.5-25-2 mg (FOLBIC) 2.5-25-2 mg Tab, Take 1 tablet by mouth once daily., Disp: 30 tablet, Rfl: 11    labetaloL (NORMODYNE) 300 MG tablet, Take 1 tablet (300 mg total) by mouth 2 (two) times a day., Disp: 180 tablet, Rfl: 1    olmesartan (BENICAR) 40 MG tablet, Take 1 tablet (40 mg total) by mouth every morning., Disp: 90 tablet, Rfl: 1    azithromycin (Z-ALONDRA) 250 MG tablet, 2 pills day one, then 1 pill for 4 days (Patient not taking: Reported on 2/28/2024), Disp: 6 tablet, Rfl: 0    cyanocobalamin/folic ac/vit B6 (FOLBIC ORAL), Take 1 tablet by mouth once daily. (Patient not taking: Reported on 9/16/2024), Disp: , Rfl:     fluticasone propionate (FLONASE) 50 mcg/actuation nasal spray, 2 sprays by Each Nostril route daily as needed for Allergies. (Patient not taking: Reported on 9/16/2024), Disp: , Rfl:     predniSONE (DELTASONE) 20 MG tablet, 3 pills for 3 days, 2 for 3 days, 1 for 3 days. Repeat for cough (Patient not taking: Reported on 9/16/2024), Disp: 36 tablet, Rfl: 0    rosuvastatin (CRESTOR) 10 MG tablet, Take 1 tablet (10 mg total) by mouth once daily. (Patient not taking: Reported on 9/16/2024), Disp: 90 tablet, Rfl: 1  No current facility-administered medications for this visit.    Facility-Administered Medications Ordered in Other Visits:     lactated ringers infusion, , Intravenous, Continuous, Jasbir Aragon MD, Last Rate: 75 mL/hr at 11/07/19 1333, 1,000 mL at 11/07/19 1333    Review of Systems   Constitutional:  Positive for unexpected weight change. Negative for activity change.   HENT:  Negative for hearing loss, rhinorrhea and trouble swallowing.    Eyes:  Positive for visual disturbance. Negative for discharge.   Respiratory:  Negative for chest tightness and wheezing.     Cardiovascular:  Negative for chest pain and palpitations.   Gastrointestinal:  Negative for blood in stool, constipation, diarrhea and vomiting.   Endocrine: Negative for polydipsia and polyuria.   Genitourinary:  Negative for difficulty urinating, hematuria and urgency.   Musculoskeletal:  Positive for neck pain. Negative for arthralgias and joint swelling.   Neurological:  Negative for weakness and headaches.   Psychiatric/Behavioral:  Negative for confusion and dysphoric mood.           Objective:      Vitals:    09/16/24 0752   BP: 130/82   Pulse: 67   Resp: 18   SpO2: 97%   Weight: 82.6 kg (182 lb)   Height: 6' (1.829 m)     Physical Exam  Constitutional:       General: He is not in acute distress.     Appearance: He is normal weight. He is not ill-appearing.   HENT:      Head: Normocephalic and atraumatic.      Nose: Nose normal. No congestion or rhinorrhea.      Mouth/Throat:      Mouth: Mucous membranes are moist.      Pharynx: Oropharynx is clear. No oropharyngeal exudate or posterior oropharyngeal erythema.   Eyes:      General:         Right eye: No discharge.         Left eye: No discharge.      Extraocular Movements: Extraocular movements intact.      Conjunctiva/sclera: Conjunctivae normal.      Pupils: Pupils are equal, round, and reactive to light.   Cardiovascular:      Rate and Rhythm: Normal rate and regular rhythm.      Pulses: Normal pulses.      Heart sounds: Normal heart sounds. No murmur heard.     No gallop.   Pulmonary:      Effort: Pulmonary effort is normal. No respiratory distress.      Breath sounds: Normal breath sounds. No wheezing.   Abdominal:      General: Abdomen is flat. There is no distension.      Palpations: Abdomen is soft.      Tenderness: There is no abdominal tenderness. There is no guarding.   Musculoskeletal:         General: Normal range of motion.      Cervical back: Normal range of motion. No rigidity.      Comments: Moves all 4 extremities spontaneously    Skin:      General: Skin is dry.      Coloration: Skin is not jaundiced or pale.   Neurological:      Mental Status: He is alert and oriented to person, place, and time.           Assessment:       1. Chronic obstructive pulmonary disease, unspecified COPD type    2. Heterozygous MTHFR mutation C677T    3. Hypertension, unspecified type    4. Routine physical examination    5. Age-related cataract of both eyes, unspecified age-related cataract type         Plan:       Chronic obstructive pulmonary disease, unspecified COPD type  Comments:  Patient seems to be doing well. continue fasanra and home inhalers. Followed closely by pulmonary.    Heterozygous MTHFR mutation C677T  Comments:  continue home Elquis 5mg    Hypertension, unspecified type  Comments:  continue home medications and lifestyle modifications.    Routine physical examination  Comments:  labs look great. continue as is    Age-related cataract of both eyes, unspecified age-related cataract type  Comments:  worsening. warrants referral to ophthalmology to discuss surgical removal.  Orders:  -     Ambulatory referral/consult to Ophthalmology; Future; Expected date: 09/16/2024      Follow up in about 6 months (around 3/16/2025).        9/16/2024 Riley Atkinson PA-C

## 2024-09-25 DIAGNOSIS — Z00.00 ENCOUNTER FOR MEDICARE ANNUAL WELLNESS EXAM: ICD-10-CM

## 2024-09-27 ENCOUNTER — TELEPHONE (OUTPATIENT)
Dept: PULMONOLOGY | Facility: CLINIC | Age: 64
End: 2024-09-27
Payer: MEDICARE

## 2024-09-27 NOTE — TELEPHONE ENCOUNTER
Explained to pt that no one from our office called him and I didn't see where any other dept was trying to reach him  he said hopefully they will call back

## 2024-09-27 NOTE — TELEPHONE ENCOUNTER
----- Message from Ugo hKan sent at 9/27/2024  1:43 PM CDT -----  Type:  Patient Returning Call    Who Called:  pt  Who Left Message for Patient:  not sure   Does the patient know what this is regarding?:  no  Best Call Back Number:  [phone]  Additional Information:  pt received a call but o msg was left , Please call back to advise. Thanks.

## 2024-10-11 ENCOUNTER — TELEPHONE (OUTPATIENT)
Dept: ADMINISTRATIVE | Facility: CLINIC | Age: 64
End: 2024-10-11
Payer: MEDICARE

## 2024-10-14 ENCOUNTER — OFFICE VISIT (OUTPATIENT)
Dept: FAMILY MEDICINE | Facility: CLINIC | Age: 64
End: 2024-10-14
Payer: MEDICARE

## 2024-10-14 VITALS
TEMPERATURE: 98 F | HEIGHT: 72 IN | OXYGEN SATURATION: 99 % | BODY MASS INDEX: 24.69 KG/M2 | HEART RATE: 61 BPM | WEIGHT: 182.31 LBS | SYSTOLIC BLOOD PRESSURE: 136 MMHG | DIASTOLIC BLOOD PRESSURE: 82 MMHG

## 2024-10-14 DIAGNOSIS — I77.819 ECTATIC AORTA: ICD-10-CM

## 2024-10-14 DIAGNOSIS — Z23 NEED FOR VACCINATION: ICD-10-CM

## 2024-10-14 DIAGNOSIS — Z00.00 ENCOUNTER FOR PREVENTIVE HEALTH EXAMINATION: Primary | ICD-10-CM

## 2024-10-14 DIAGNOSIS — Z15.89 HETEROZYGOUS MTHFR MUTATION C677T: ICD-10-CM

## 2024-10-14 PROCEDURE — 1159F MED LIST DOCD IN RCRD: CPT | Mod: CPTII,S$GLB,, | Performed by: NURSE PRACTITIONER

## 2024-10-14 PROCEDURE — 1126F AMNT PAIN NOTED NONE PRSNT: CPT | Mod: CPTII,S$GLB,, | Performed by: NURSE PRACTITIONER

## 2024-10-14 PROCEDURE — 3075F SYST BP GE 130 - 139MM HG: CPT | Mod: CPTII,S$GLB,, | Performed by: NURSE PRACTITIONER

## 2024-10-14 PROCEDURE — 1160F RVW MEDS BY RX/DR IN RCRD: CPT | Mod: CPTII,S$GLB,, | Performed by: NURSE PRACTITIONER

## 2024-10-14 PROCEDURE — 1170F FXNL STATUS ASSESSED: CPT | Mod: CPTII,S$GLB,, | Performed by: NURSE PRACTITIONER

## 2024-10-14 PROCEDURE — G0439 PPPS, SUBSEQ VISIT: HCPCS | Mod: S$GLB,,, | Performed by: NURSE PRACTITIONER

## 2024-10-14 PROCEDURE — G0008 ADMIN INFLUENZA VIRUS VAC: HCPCS | Mod: S$GLB,,, | Performed by: NURSE PRACTITIONER

## 2024-10-14 PROCEDURE — 90653 IIV ADJUVANT VACCINE IM: CPT | Mod: S$GLB,,, | Performed by: NURSE PRACTITIONER

## 2024-10-14 PROCEDURE — 1123F ACP DISCUSS/DSCN MKR DOCD: CPT | Mod: CPTII,S$GLB,, | Performed by: NURSE PRACTITIONER

## 2024-10-14 PROCEDURE — 3066F NEPHROPATHY DOC TX: CPT | Mod: CPTII,S$GLB,, | Performed by: NURSE PRACTITIONER

## 2024-10-14 PROCEDURE — 4010F ACE/ARB THERAPY RXD/TAKEN: CPT | Mod: CPTII,S$GLB,, | Performed by: NURSE PRACTITIONER

## 2024-10-14 PROCEDURE — 3079F DIAST BP 80-89 MM HG: CPT | Mod: CPTII,S$GLB,, | Performed by: NURSE PRACTITIONER

## 2024-10-14 PROCEDURE — 99999 PR PBB SHADOW E&M-EST. PATIENT-LVL IV: CPT | Mod: PBBFAC,,, | Performed by: NURSE PRACTITIONER

## 2024-10-14 PROCEDURE — 3061F NEG MICROALBUMINURIA REV: CPT | Mod: CPTII,S$GLB,, | Performed by: NURSE PRACTITIONER

## 2024-10-14 NOTE — PATIENT INSTRUCTIONS
Counseling and Referral of Other Preventative  (Italic type indicates deductible and co-insurance are waived)    Patient Name: Helder Brand  Today's Date: 10/14/2024    Health Maintenance       Date Due Completion Date    Hepatitis C Screening Never done ---    Influenza Vaccine (1) 09/01/2024 11/7/2022    COVID-19 Vaccine (3 - 2024-25 season) 09/01/2024 4/8/2021    Colorectal Cancer Screening 10/20/2028 10/20/2023    Lipid Panel 09/04/2029 9/4/2024    TETANUS VACCINE 08/31/2031 8/31/2021        No orders of the defined types were placed in this encounter.      The following information is provided to all patients.  This information is to help you find resources for any of the problems found today that may be affecting your health:                  Living healthy guide: www.[facility].[place].gov      Understanding Diabetes: www.diabetes.org      Eating healthy: www.cdc.gov/healthyweight      CDC home safety checklist: www.cdc.gov/steadi/patient.html      Agency on Aging: www.goea.[place].gov      Alcoholics anonymous (AA): www.aa.org      Physical Activity: www.cherelle.nih.gov/zb7vmjb      Tobacco use: www.quitwithusla.org

## 2024-10-14 NOTE — PROGRESS NOTES
Helder Brand presented for a  Medicare AWV and comprehensive Health Risk Assessment today. The following components were reviewed and updated:    Medical history  Family History  Social history  Allergies and Current Medications  Health Risk Assessment  Health Maintenance  Care Team         ** See Completed Assessments for Annual Wellness Visit within the encounter summary.**         The following assessments were completed:  Living Situation  CAGE  Depression Screening  Timed Get Up and Go  Whisper Test  Cognitive Function Screening  Nutrition Screening  ADL Screening  PAQ Screening    Clock in media   Opioid documentation:      Patient does not have a current opioid prescription.        Vitals:    10/14/24 0739   BP: 136/82   Pulse: 61   Temp: 98.2 °F (36.8 °C)   TempSrc: Oral   SpO2: 99%   Weight: 82.7 kg (182 lb 5.1 oz)   Height: 6' (1.829 m)     Body mass index is 24.73 kg/m².  Physical Exam  Constitutional:       Appearance: He is well-developed.   HENT:      Head: Normocephalic and atraumatic.      Right Ear: Hearing normal.      Left Ear: Hearing normal.      Nose: Nose normal.   Eyes:      General: Lids are normal.      Conjunctiva/sclera: Conjunctivae normal.      Pupils: Pupils are equal, round, and reactive to light.   Cardiovascular:      Rate and Rhythm: Normal rate.   Pulmonary:      Effort: Pulmonary effort is normal.   Abdominal:      Palpations: Abdomen is soft.   Musculoskeletal:         General: Normal range of motion.      Cervical back: Normal range of motion and neck supple.   Skin:     General: Skin is warm and dry.   Neurological:      Mental Status: He is alert and oriented to person, place, and time.               Diagnoses and health risks identified today and associated recommendations/orders:    1. Encounter for preventive health examination  Discussed health maintenance guidelines appropriate for age.        2. Ectatic aorta  Stable, continue to monitor  Bp controlled   Followed  by pcp     3. Heterozygous MTHFR mutation C677T  Stable, continue care and follow up per hem/onc     4. Need for vaccination  - influenza (adjuvanted) (Fluad) 45 mcg/0.5 mL IM vaccine (> or = 64 yo) 0.5 mL      Provided Helder with a 5-10 year written screening schedule and personal prevention plan. Recommendations were developed using the USPSTF age appropriate recommendations. Education, counseling, and referrals were provided as needed. After Visit Summary printed and given to patient which includes a list of additional screenings\tests needed.    Follow up for One year for Annual Wellness Visit.    Amy Cannon NP      I offered to discuss advanced care planning, including how to pick a person who would make decisions for you if you were unable to make them for yourself, called a health care power of , and what kind of decisions you might make such as use of life sustaining treatments such as ventilators and tube feeding when faced with a life limiting illness recorded on a living will that they will need to know. (How you want to be cared for as you near the end of your natural life)     X  Patient has advanced directives on file, which we reviewed, and they do not wish to make changes.

## 2024-10-23 ENCOUNTER — PATIENT MESSAGE (OUTPATIENT)
Dept: ADMINISTRATIVE | Facility: OTHER | Age: 64
End: 2024-10-23
Payer: MEDICARE

## 2024-11-04 DIAGNOSIS — I74.3 FEMORAL POPLITEAL ARTERY THROMBUS: ICD-10-CM

## 2024-11-04 DIAGNOSIS — Z15.89 HETEROZYGOUS MTHFR MUTATION C677T: ICD-10-CM

## 2024-11-04 NOTE — TELEPHONE ENCOUNTER
----- Message from Sofi sent at 11/4/2024 12:27 PM CST -----  Refill for Eliquis 5 mg. [facility] [address]. Pt #[phone]

## 2024-11-27 ENCOUNTER — TELEPHONE (OUTPATIENT)
Facility: CLINIC | Age: 64
End: 2024-11-27
Payer: MEDICARE

## 2024-12-27 ENCOUNTER — TELEPHONE (OUTPATIENT)
Dept: PHARMACY | Facility: CLINIC | Age: 64
End: 2024-12-27
Payer: MEDICARE

## 2024-12-27 NOTE — TELEPHONE ENCOUNTER
[facility]/Population Health Chart Review & Patient Outreach Details For Medication Adherence Project    Reason for Outreach Encounter: 3rd Party payor non-compliance report ([employer], [employer], UHC, etc)  2.  Patient Outreach Method: Reviewed patient chart  and [employer] message  3.   Medication in question:    Hypertension Medications               labetaloL (NORMODYNE) 300 MG tablet Take 1 tablet (300 mg total) by mouth 2 (two) times a day.    olmesartan (BENICAR) 40 MG tablet Take 1 tablet (40 mg total) by mouth every morning.                 Olmesartan  last filled  7/5/24 for 90 day supply    4.  Reviewed and or Updates Made To: Patient Chart  5. Outreach Outcomes and/or actions taken: Sent inquiry to patient: Waiting for response  Additional Notes:

## 2025-01-07 ENCOUNTER — OFFICE VISIT (OUTPATIENT)
Dept: OPHTHALMOLOGY | Facility: CLINIC | Age: 65
End: 2025-01-07
Payer: MEDICARE

## 2025-01-07 DIAGNOSIS — H25.13 AGE-RELATED NUCLEAR CATARACT, BILATERAL: Primary | ICD-10-CM

## 2025-01-07 DIAGNOSIS — G51.32 HEMIFACIAL SPASM OF LEFT SIDE OF FACE: ICD-10-CM

## 2025-01-07 DIAGNOSIS — H43.811 POSTERIOR VITREOUS DETACHMENT OF RIGHT EYE: ICD-10-CM

## 2025-01-07 PROCEDURE — 99999 PR PBB SHADOW E&M-EST. PATIENT-LVL III: CPT | Mod: PBBFAC,,, | Performed by: OPHTHALMOLOGY

## 2025-01-07 PROCEDURE — 1159F MED LIST DOCD IN RCRD: CPT | Mod: CPTII,S$GLB,, | Performed by: OPHTHALMOLOGY

## 2025-01-07 PROCEDURE — 1157F ADVNC CARE PLAN IN RCRD: CPT | Mod: CPTII,S$GLB,, | Performed by: OPHTHALMOLOGY

## 2025-01-07 PROCEDURE — 1160F RVW MEDS BY RX/DR IN RCRD: CPT | Mod: CPTII,S$GLB,, | Performed by: OPHTHALMOLOGY

## 2025-01-07 PROCEDURE — 3288F FALL RISK ASSESSMENT DOCD: CPT | Mod: CPTII,S$GLB,, | Performed by: OPHTHALMOLOGY

## 2025-01-07 PROCEDURE — 1126F AMNT PAIN NOTED NONE PRSNT: CPT | Mod: CPTII,S$GLB,, | Performed by: OPHTHALMOLOGY

## 2025-01-07 PROCEDURE — 1101F PT FALLS ASSESS-DOCD LE1/YR: CPT | Mod: CPTII,S$GLB,, | Performed by: OPHTHALMOLOGY

## 2025-01-07 PROCEDURE — 99204 OFFICE O/P NEW MOD 45 MIN: CPT | Mod: S$GLB,,, | Performed by: OPHTHALMOLOGY

## 2025-01-07 NOTE — PROGRESS NOTES
HPI    Pt present for CE     Pt states has noticed a change in vision and difficulty with night vision.   Also states thinks he sees flashes but not sure been a few years.      Last edited by Jordyn Fraire on 1/7/2025  8:09 AM.            Assessment /Plan     For exam results, see Encounter Report.    Age-related nuclear cataract, bilateral  Comments:  worsening. warrants referral to ophthalmology to discuss surgical removal.  Orders:  -     Ambulatory referral/consult to Ophthalmology    Hemifacial spasm of left side of face    Posterior vitreous detachment of right eye      1. Age-related nuclear cataract, bilateral (Primary)  OS>OD  VS and bothersome, RBA of sx discussed  At this time pt wants to think about it  Can call to schedule and go to preop if he desires  Consider toric, or referral to Dr. Michael if he wants premium    Can f/u in 1 year if no sx    2. Hemifacial spasm of left side of face  ~7yr hx  S/p MRI in 2020, reviewed   I would recommend dedicated MRI/A of cerebellopontine angle to assess for vascular compression of 7th nerve, pt declines at this time  Discussed botox treatments, pt declines  monitor    3. Posterior vitreous detachment of right eye  Rd precautions discussed    Optic nerve drusen suspected

## 2025-01-23 NOTE — PROGRESS NOTES
[facility] Hematology/Oncology  PROGRESS NOTE -  Follow-up Visit      Subjective:       Patient ID:   NAME: Helder Brand : 1960     64 y.o. male    Referring Doc: Sierra  Other Physicians: Chapito Raza (CTS)    Chief Complaint:  PAD/clot workup    History of Present Illness:     Patient returns today for a regularly scheduled follow-up visit.  The patient is here today to go over the results of the recently ordered labs, tests and studies. He is here by himself.     He has been having more neuropathy in his legs.     He saw Robert NP with pulmonary in 2024 and sees her again on 2025    He saw Maxi Atkinson in 2024 and Amy Clinton NP in Oct 2024    He has been having HBP issues lately    He has since been back on eliquis and is doing ok    He is on eliquis po bid and vitamins.  No excessive bleeding or bruising.     Breathing ok, no CP, HA's or N/V                  ROS:   GEN: normal without any fever, night sweats or weight loss;  some chronic neuropathy issues in leg  HEENT: normal with no HA's, sore throat, stiff neck, changes in vision  CV: normal with no CP, SOB, PND, LAW or orthopnea  PULM: normal with no SOB, cough, hemoptysis, sputum or pleuritic pain  GI: normal with no abdominal pain, nausea, vomiting, constipation, diarrhea, melanotic stools, BRBPR, or hematemesis  : normal with no hematuria, dysuria  BREAST: normal with no mass, discharge, pain  SKIN: normal with no rash, erythema, bruising, or swelling    Pain Scale: 0    Allergies:  Review of patient's allergies indicates:  No Known Allergies    Medications:    Current Outpatient Medications:     albuterol (VENTOLIN HFA) 90 mcg/actuation inhaler, Inhale 2 puffs into the lungs every 4 (four) hours as needed for Wheezing or Shortness of Breath. Rescue, Disp: 18 g, Rfl: 11    apixaban (ELIQUIS) 5 mg Tab, Take 1 tablet (5 mg total) by mouth 2 (two) times daily., Disp: 60 tablet, Rfl: 5    benralizumab (FASENRA PEN) 30 mg/mL  AtIn, Inject 30 mg into the skin every 8 weeks., Disp: 1 mL, Rfl: 5    budesonide-glycopyr-formoterol (BREZTRI AEROSPHERE) 160-9-4.8 mcg/actuation HFAA, Inhale 2 puffs into the lungs 2 (two) times a day., Disp: 32.1 g, Rfl: 3    cyanocobalamin/folic ac/vit B6 (FOLBIC ORAL), Take 1 tablet by mouth once daily., Disp: , Rfl:     fluticasone propionate (FLONASE) 50 mcg/actuation nasal spray, 2 sprays by Each Nostril route daily as needed for Allergies., Disp: , Rfl:     labetaloL (NORMODYNE) 300 MG tablet, Take 1 tablet (300 mg total) by mouth 2 (two) times a day., Disp: 180 tablet, Rfl: 1    olmesartan (BENICAR) 40 MG tablet, Take 1 tablet (40 mg total) by mouth every morning., Disp: 90 tablet, Rfl: 1    albuterol-ipratropium (DUO-NEB) 2.5 mg-0.5 mg/3 mL nebulizer solution, Take 3 mLs by nebulization every 6 (six) hours as needed for Wheezing or Shortness of Breath. Rescue, Disp: 240 mL, Rfl: 5    rosuvastatin (CRESTOR) 10 MG tablet, Take 1 tablet (10 mg total) by mouth once daily. (Patient not taking: Reported on 9/16/2024), Disp: 90 tablet, Rfl: 1  No current facility-administered medications for this visit.    Facility-Administered Medications Ordered in Other Visits:     lactated ringers infusion, , Intravenous, Continuous, Jasbir Aragon MD, Last Rate: 75 mL/hr at 11/07/19 1333, 1,000 mL at 11/07/19 1333    PMHx/PSHx Updates:  See patient's last visit with me on 2/28/2024  See H&P on 8/7/2022        Pathology:   Cancer Staging   No matching staging information was found for the patient.            Objective:     Vitals:  Blood pressure 109/77, pulse 75, temperature 97.2 °F (36.2 °C), temperature source Temporal, resp. rate 16, height 6' (1.829 m), weight 85.7 kg (189 lb).    Physical Examination:   GEN: no apparent distress, comfortable; AAOx3  HEAD: atraumatic and normocephalic  EYES: no pallor, no icterus, PERRLA  ENT: OMM, no pharyngeal erythema, external ears WNL; no nasal discharge; no thrush  NECK: no  masses, thyroid normal, trachea midline, no LAD/LN's, supple  CV: RRR with no murmur; normal pulse; normal S1 and S2; no pedal edema  CHEST: Normal respiratory effort; CTAB; normal breath sounds; no wheeze or crackles  ABDOM: nontender and nondistended; soft; normal bowel sounds; no rebound/guarding  MUSC/Skeletal: ROM normal; no crepitus; joints normal; no deformities or arthropathy  EXTREM: no clubbing, cyanosis, inflammation or swelling  SKIN: no rashes, lesions, ulcers, petechiae or subcutaneous nodules  : no dunbar  NEURO: grossly intact; motor/sensory WNL; AAOx3; no tremors  PSYCH: normal mood, affect and behavior  LYMPH: normal cervical, supraclavicular, axillary and groin LN's            Labs:     Lab Results   Component Value Date    WBC 6.2 09/04/2024    HGB 16.3 09/04/2024    HCT 49.0 09/04/2024    MCV 97.0 09/04/2024     09/04/2024       CMP  Sodium   Date Value Ref Range Status   09/04/2024 139 135 - 146 mmol/L Final     Potassium   Date Value Ref Range Status   09/04/2024 4.3 3.5 - 5.3 mmol/L Final     Chloride   Date Value Ref Range Status   09/04/2024 104 98 - 110 mmol/L Final     CO2   Date Value Ref Range Status   09/04/2024 26 20 - 32 mmol/L Final     Glucose   Date Value Ref Range Status   09/04/2024 84 65 - 99 mg/dL Final     Comment:                   Fasting reference interval          BUN   Date Value Ref Range Status   09/04/2024 11 7 - 25 mg/dL Final     Creatinine   Date Value Ref Range Status   09/04/2024 0.90 0.70 - 1.35 mg/dL Final     Calcium   Date Value Ref Range Status   09/04/2024 9.1 8.6 - 10.3 mg/dL Final     Total Protein   Date Value Ref Range Status   09/04/2024 6.8 6.1 - 8.1 g/dL Final     Albumin   Date Value Ref Range Status   09/04/2024 4.0 3.6 - 5.1 g/dL Final     Total Bilirubin   Date Value Ref Range Status   09/04/2024 0.8 0.2 - 1.2 mg/dL Final     Alkaline Phosphatase   Date Value Ref Range Status   12/03/2023 68 55 - 135 U/L Final     AST   Date Value Ref  Range Status   09/04/2024 19 10 - 35 U/L Final     ALT   Date Value Ref Range Status   09/04/2024 13 9 - 46 U/L Final     Anion Gap   Date Value Ref Range Status   12/03/2023 5 (L) 8 - 16 mmol/L Final     eGFR if    Date Value Ref Range Status   03/09/2022 >60 >60 mL/min/1.73 m^2 Final     eGFR if non    Date Value Ref Range Status   03/09/2022 >60 >60 mL/min/1.73 m^2 Final     Comment:     Calculation used to obtain the estimated glomerular filtration  rate (eGFR) is the CKD-EPI equation.             Fibrinogen 194 - 545 mg/dL 229       PT 11.4 - 13.7 sec 15.1 High   14.4 High   13.7  13.5 R    INR   1.3  1.2 CM  1.1 CM  1.1 CM       aPTT 23.3 - 35.1 sec 146.3 High Panic   30.9      Lupus Reflex Interpretation  Comment:    Comment: No lupus anticoagulant was detected. Mixing studies suggest the presence of an inhibitor         MTHFR Comment    Comment: Result:   c.665C>T (p. [id number]), legacy name:   C677T - Detected, heterozygous c.1286A>C (p.   [id number]), legacy name: [id number] - Not      Homocysteine 0.0 - 17.2 umol/L 9.6        Protein S Ag, Total 60 - 150 % 106    Comment: This test was developed and its performance   characteristics determined by [facility]. It   has not been cleared or approved   by the Food and Drug Administration.    Protein S Ag, Free 61 - 136 % 85      Prothrombin Mutation Comment    Comment: Result: c.*97G>A - Not Detected      Anticardiolipin IgG 0 - 14 GPL U/mL <9      Anticardiolipin IgA 0 - 11 APL U/mL <9     Anticardiolipin IgM 0 - 12 MPL U/mL 9        Radiology/Diagnostic Studies:    CTA Chest Non-Coronary - PE Study    Result Date: 8/13/2022  CT angiogram chest with contrast on 8/13/2022 CLINICAL INDICATION: Chest pain TECHNIQUE: Multiple axial images are obtained throughout the chest following the administration of IV contrast.  Computer generated 3D reconstructions/MIPS were performed. This exam was performed according to our departmental  dose-optimization program, which includes automated exposure control, adjustment of the mA and/or kV according to patient size and/or use of iterative reconstruction technique. Total DLP is 387.1 mGycm. COMPARISON: 1/3/2022 FINDINGS: There is no thoracic aortic aneurysm or dissection. There is a small left hepatic cyst. Limited visualized upper abdomen is otherwise unremarkable. There is no pleural or pericardial effusion. There is no thoracic adenopathy. There are not no filling defects in the pulmonary arteries to suggest pulmonary embolus. There is minimal bilateral dependent and basilar atelectasis. The lungs are otherwise clear. No acute bony abnormality is noted. IMPRESSION: 1.  No evidence of pulmonary embolus. 2.  No acute abnormality. Electronically signed by:  Jasbir Chapa  8/13/2022 3:56 AM CDT Workstation: [id number]    US Lower Extremity Arteries Left    Result Date: 8/5/2022   ADDENDUM #1 THIS REPORT CONTAINS FINDINGS THAT MAY BE CRITICAL TO PATIENT CARE:  Called, telephoned, verbal report was given oral to DR. VELMA AVALOS at 2:57 AM CDT on 8/5/2022. Electronically signed by:  Piotr Cole MD  8/5/2022 4:43 AM CDT Workstation: [id number]  ORIGINAL REPORT EXAM DESCRIPTION: US LOWER EXTREMITY ARTERIES LEFT 8/5/2022 2:49 AM CDT CLINICAL HISTORY: 62 years, Male, COMPARISON: None FINDINGS: Multiple grayscale images and duplex Doppler ultrasound of the left arterial system was performed with color flow, spectral waveform and peak systolic velocities. Left common femoral artery peak systolic velocity of 29 cm/sec. triphasic waveform Left profunda peak systolic velocity of 38 cm/sec. triphasic waveform Left superficial femoral artery proximal peak systolic velocity 19 cm/sec. triphasic waveform. Left superficial femoral artery mid aspect demonstrate a filling defect with abnormal waveform and peak systolic velocity of 19 cm/s. Left superficial femoral artery distally demonstrate filling defect with a  markedly decreased velocity of 12 cm/s. Left popliteal artery demonstrate filling defect with abnormal decreased velocity of 19 cm/s Left posterior tibial artery demonstrated filling defect with lack of flow. Left anterior tibial artery demonstrate filling defect within the lack of flow Left peroneal artery demonstrate filling defect with lack of flow Left dorsalis pedis artery demonstrate filling defect with lack of flow. IMPRESSION: Extensive left lower extremity arterial thrombus from mid superficial femoral artery downward. Electronically signed by:  Piotr Cole MD  8/5/2022 2:54 AM CDT Workstation: [id number]    US Lower Extremity Veins Left    Result Date: 8/5/2022   ADDENDUM #1 Findings were discussed with Dr. Alfa Keene on 8/5/2022 at 3:21 AM Central standard time via telephone conference. 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 Further evaluation with CTA of the left lower extremity will be obtained. Electronically signed by:  Jordyn Shah MD  8/5/2022 3:45 AM [employer] Workstation: [id number]  ORIGINAL REPORT EXAM: US Duplex Left Lower Extremity Veins CLINICAL HISTORY: The patient is 62 years old and is Male; TECHNIQUE: Real-time duplex ultrasound scan of the left lower extremity veins integrating B-mode two-dimensional vascular structure, Doppler spectral analysis, color flow Doppler imaging and compression. COMPARISON: No relevant prior studies available. FINDINGS: DEEP VEINS: No DVT in the visualized common femoral, femoral, proximal deep femoral or popliteal veins.  The veins demonstrate normal color flow, are normally compressible, with normal phasic flow and/or augmentation response. SUPERFICIAL VEINS:  Unremarkable.  No thrombus in the visualized great saphenous vein. SOFT TISSUES:  Large heterogeneous lesion in the left popliteal fossa measuring 7.1 x 3.3 x 3.9 cm of indeterminate etiology. IMPRESSION: 1.  No  evidence of deep venous thrombosis. 2.  Large heterogeneous lesion in the left popliteal fossa measuring 7.1 x 3.3 x 3.9 cm of indeterminate etiology.  Recommend further evaluation with cross-sectional imaging. Electronically signed by:  Jordyn Shah MD  8/5/2022 3:18 AM CDT Workstation: [id number]    CTA Lower Extremity Bilateral    Result Date: 8/5/2022  EXAM: CT Angiography of the Bilateral Lower Extremities With Intravenous Contrast CLINICAL HISTORY: The patient is 62 years old and is Male; Arterial embolism, lower extremity TECHNIQUE: Axial computed tomographic angiography images of the bilateral lower extremities with intravenous contrast.  Sagittal and coronal reformatted images were created and reviewed.  This CT exam was performed using one or more of the following dose reduction techniques:  automated exposure control, adjustment of the mA and/or kV according to patient size, and/or use of iterative reconstruction technique. MIP reconstructed images were created and reviewed. COMPARISON: No relevant prior studies available. FINDINGS: VASCULATURE: RIGHT FEMORAL/POPLITEAL ARTERIES:  No acute findings.  No occlusion or significant stenosis. RIGHT CALF/FOOT ARTERIES:  The right posterior tibial artery is dominant and visualized into the hindfoot. The right peroneal and anterior tibial arteries contrast tapers gradually faded and are not visualized in the mid to distal lower extremity.  No occlusion or significant stenosis. LEFT FEMORAL/POPLITEAL ARTERIES:  Complete occlusion from the left proximal superficial femoral artery into all 3 infrapopliteal arteries.  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  No filling defects in the visualized left deep femoral artery. LEFT CALF/FOOT ARTERIES: No opacification. LOWER EXTREMITIES: BONES/JOINTS:  No acute fracture.  No dislocation. SOFT TISSUES:  Left popliteal/Baker's cyst. REPRODUCTIVE:  Fiducial markers in the prostate. IMPRESSION: 1.   Complete occlusion from the left proximal superficial femoral artery into all 3 infrapopliteal arteries. 2.  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 3.  Left popliteal/Baker's cyst. Electronically signed by:  Alvarado Tejada MD  8/5/2022 6:00 AM CDT Workstation: [id number]      I have reviewed all available lab results and radiology reports.    Assessment/Plan:   (1) 64 y.o. male with diagnosis of severe recurrent PAD who presented to the ER at [facility] with severe left leg pain. He had prior left popliteal aneurysm repair with Dr daniel in July 2021. Doppler studies in ED showed thrombosis of the mid-superficial femoral artery. He underwent angiography yesterday on 8/6 with thrombectomy and TPA with Dr Daniel. He is currently in the ICU and is awake and alert. He denies any current pain in the left leg. No current CP, SOB, HA's or N/V. He is a Bahai and is absolutely against receiving any blood products or transfusions even at the risk of his own life. I discussed with his ICU nurse Mima and Dr Daniel both in person.      8/7/2022: seen as consult in-hospital  - hematology consulted for evaluation for any underlying clot disorders  - vascular plans to start him on either eliquis of xarelto    8/29/2022:  - he is doing ok post-hospitalization  - he saw Lacho and had repeat studies which were good per patient  - clot workup essentially WNL except for MTHFR-C gene heterozygous positive but the homocysteine was WNL  - discussed the genetic implications in children, siblings, etc  - add folbic  - continue eliquis as per the directions of Dr Daniel  - LA was negative but he was on heparinoid products at time of test - thus the presence of an inhibitor was detected    2/27/2023:  - he is doing ok with no new issues  - continued on eliquis  - he sees Dr Daniel every 6 months  - no excessive bleeding or bruising    8/28/2023:  - continued on eliquis  - no excessive bleeding or bruising  -  "Sierra is no longer on his insurance  - He saw Dr Tyler in July 2023 and they are planning prostate biopsy with elevated PSA; he saw Robert in July 2023; Dr Llamas in June 2023; he saw Riley Atkinson in May 2023    2/28/2024:  - continued on eliquis  - no excessive bleeding or bruising  - 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 most likely due to the fact that he was off the eliquis for a period of time for the TURP and colonoscopy  - he had thrombectomy with Dr Esquivel in Dec 2023    1/24/2025:  - continued on eliquis  - no excessive bleeding or bruising  - labs in Sept 2024 were adequate     (2) HTN and hypercholesterolemia     (3) Hx/of kidney stones     (4)  Thyroid disease s/p partial thyroidectomy     (5) BPH     (6) COPD     (7) Mild anemia - NCNC parameters  - also since resolved     (8) Mild borderline thrombocytopenia - resolved    (9) Mild leucocytosis - resolved - suspect secondary to the use of steroids    (10) Elevated PSA - followed by Dr Tyler    (11) COPD/"Runner's Lungs" - followed by Robert and treated with steroids and periodically on abx       VISIT DIAGNOSES:      Heterozygous MTHFR mutation C677T    Femoral popliteal artery thrombus    Acute embolism and thrombosis of left popliteal vein          PLAN:  Continued on eliquis and folbic  2. F/u with pulmonary (Feb 2025)  3. Previously discussed the genetic implications of the MTHFR gene abnormality in children and siblings, etc  4. F/u with PCP, Vascular, Pulm, Card etc   5. F/u with  and GI  RTC in 6 months    Fax note to Demetrio Esquivel Matthew J., PA-C, Robert Tyler; Oneyda Bundy    Discussion:     COVID-19 Discussion:    I had long discussion with patient and any applicable family about the COVID-19 coronavirus epidemic and the recommended precautions with regard to cancer and/or hematology patients. I have re-iterated the CDC recommendations for adequate hand washing, use of hand -like products, and coughing into elbow, etc. In " addition, especially for our patients who are on chemotherapy and/or our otherwise immunocompromised patients, I have recommended avoidance of crowds, including movie theaters, restaurants, churches, etc. I have recommended avoidance of any sick or symptomatic family members and/or friends. I have also recommended avoidance of any raw and unwashed food products, and general avoidance of food items that have not been prepared by themselves. The patient has been asked to call us immediately with any symptom developments, issues, questions or other general concerns.       Anticoagulation Discussion:    Discussed with patient and any applicable family members about the benefit and/or need for anticoagulation. I communicated about the risks of bleeding while on any anticoagulation, which could be serious and/or life-threatening, and which can occur at any time, regardless of degree of the level of anticoagulation. I expressed the need for compliance with any anticoagulation regimen and that failure to do so could potential lead to excessive bleeding, and risk to health and/or life. In particular, with patients on coumadin therapy, compliance with requested blood work is absolutely essential, as coumadin levels can vary from time to time, and failure to do so could potentially place the patient at risk for bleeding and/or clotting events which could be fatal. Patients on coumadin are encouraged to call the day after they have their levels drawn, as to obtain the appropriate instructions from my staff. Patients are aware that self-regulating or self-dosing of their medications is strictly prohibited.       I spent over 25 mins of time with the patient. Reviewed results of the recently ordered labs, tests and studies; made directives with regards to the results. Over half of this time was spent couseling and coordinating care.    I have explained all of the above in detail and the patient understands all of the current  recommendation(s). I have answered all of their questions to the best of my ability and to their complete satisfaction.   The patient is to continue with the current management plan.            Electronically signed by Stefano Pichardo MD                    Answers submitted by the patient for this visit:  Review of Systems Questionnaire (Submitted on 1/23/2025)  appetite change : No  unexpected weight change: No  mouth sores: No  visual disturbance: Yes  cough: No  shortness of breath: Yes  chest pain: No  abdominal pain: No  diarrhea: No  frequency: Yes  back pain: No  rash: No  headaches: No  adenopathy: No  nervous/ anxious: No

## 2025-01-24 ENCOUNTER — OFFICE VISIT (OUTPATIENT)
Facility: CLINIC | Age: 65
End: 2025-01-24
Payer: MEDICARE

## 2025-01-24 VITALS
BODY MASS INDEX: 25.6 KG/M2 | HEART RATE: 75 BPM | DIASTOLIC BLOOD PRESSURE: 77 MMHG | HEIGHT: 72 IN | SYSTOLIC BLOOD PRESSURE: 109 MMHG | WEIGHT: 189 LBS | TEMPERATURE: 97 F | RESPIRATION RATE: 16 BRPM

## 2025-01-24 DIAGNOSIS — Z15.89 HETEROZYGOUS MTHFR MUTATION C677T: Primary | ICD-10-CM

## 2025-01-24 DIAGNOSIS — I74.3 FEMORAL POPLITEAL ARTERY THROMBUS: ICD-10-CM

## 2025-01-24 DIAGNOSIS — I82.432 ACUTE EMBOLISM AND THROMBOSIS OF LEFT POPLITEAL VEIN: ICD-10-CM

## 2025-01-24 PROCEDURE — 3008F BODY MASS INDEX DOCD: CPT | Mod: CPTII,S$GLB,, | Performed by: INTERNAL MEDICINE

## 2025-01-24 PROCEDURE — 99214 OFFICE O/P EST MOD 30 MIN: CPT | Mod: S$GLB,,, | Performed by: INTERNAL MEDICINE

## 2025-01-24 PROCEDURE — 1157F ADVNC CARE PLAN IN RCRD: CPT | Mod: CPTII,S$GLB,, | Performed by: INTERNAL MEDICINE

## 2025-01-24 PROCEDURE — 1160F RVW MEDS BY RX/DR IN RCRD: CPT | Mod: CPTII,S$GLB,, | Performed by: INTERNAL MEDICINE

## 2025-01-24 PROCEDURE — G2211 COMPLEX E/M VISIT ADD ON: HCPCS | Mod: S$GLB,,, | Performed by: INTERNAL MEDICINE

## 2025-01-24 PROCEDURE — 1126F AMNT PAIN NOTED NONE PRSNT: CPT | Mod: CPTII,S$GLB,, | Performed by: INTERNAL MEDICINE

## 2025-01-24 PROCEDURE — 3288F FALL RISK ASSESSMENT DOCD: CPT | Mod: CPTII,S$GLB,, | Performed by: INTERNAL MEDICINE

## 2025-01-24 PROCEDURE — 1101F PT FALLS ASSESS-DOCD LE1/YR: CPT | Mod: CPTII,S$GLB,, | Performed by: INTERNAL MEDICINE

## 2025-01-24 PROCEDURE — 3078F DIAST BP <80 MM HG: CPT | Mod: CPTII,S$GLB,, | Performed by: INTERNAL MEDICINE

## 2025-01-24 PROCEDURE — 99999 PR PBB SHADOW E&M-EST. PATIENT-LVL III: CPT | Mod: PBBFAC,,, | Performed by: INTERNAL MEDICINE

## 2025-01-24 PROCEDURE — 1159F MED LIST DOCD IN RCRD: CPT | Mod: CPTII,S$GLB,, | Performed by: INTERNAL MEDICINE

## 2025-01-24 PROCEDURE — 3074F SYST BP LT 130 MM HG: CPT | Mod: CPTII,S$GLB,, | Performed by: INTERNAL MEDICINE

## 2025-02-04 ENCOUNTER — OFFICE VISIT (OUTPATIENT)
Dept: PULMONOLOGY | Facility: CLINIC | Age: 65
End: 2025-02-04
Payer: MEDICARE

## 2025-02-04 VITALS
WEIGHT: 188.06 LBS | SYSTOLIC BLOOD PRESSURE: 121 MMHG | OXYGEN SATURATION: 97 % | BODY MASS INDEX: 25.47 KG/M2 | DIASTOLIC BLOOD PRESSURE: 74 MMHG | HEART RATE: 63 BPM | HEIGHT: 72 IN

## 2025-02-04 DIAGNOSIS — J47.9 BRONCHIECTASIS WITHOUT COMPLICATION: ICD-10-CM

## 2025-02-04 DIAGNOSIS — G47.30 SLEEP APNEA, UNSPECIFIED TYPE: ICD-10-CM

## 2025-02-04 DIAGNOSIS — J44.89 COPD WITH ASTHMA: Primary | ICD-10-CM

## 2025-02-04 PROBLEM — J45.50 SEVERE PERSISTENT ASTHMA, UNCOMPLICATED: Status: RESOLVED | Noted: 2024-03-14 | Resolved: 2025-02-04

## 2025-02-04 PROCEDURE — 3008F BODY MASS INDEX DOCD: CPT | Mod: CPTII,S$GLB,, | Performed by: INTERNAL MEDICINE

## 2025-02-04 PROCEDURE — 1159F MED LIST DOCD IN RCRD: CPT | Mod: CPTII,S$GLB,, | Performed by: INTERNAL MEDICINE

## 2025-02-04 PROCEDURE — 3074F SYST BP LT 130 MM HG: CPT | Mod: CPTII,S$GLB,, | Performed by: INTERNAL MEDICINE

## 2025-02-04 PROCEDURE — 3078F DIAST BP <80 MM HG: CPT | Mod: CPTII,S$GLB,, | Performed by: INTERNAL MEDICINE

## 2025-02-04 PROCEDURE — 1157F ADVNC CARE PLAN IN RCRD: CPT | Mod: CPTII,S$GLB,, | Performed by: INTERNAL MEDICINE

## 2025-02-04 PROCEDURE — 99999 PR PBB SHADOW E&M-EST. PATIENT-LVL III: CPT | Mod: PBBFAC,,, | Performed by: INTERNAL MEDICINE

## 2025-02-04 PROCEDURE — 3288F FALL RISK ASSESSMENT DOCD: CPT | Mod: CPTII,S$GLB,, | Performed by: INTERNAL MEDICINE

## 2025-02-04 PROCEDURE — 99214 OFFICE O/P EST MOD 30 MIN: CPT | Mod: S$GLB,,, | Performed by: INTERNAL MEDICINE

## 2025-02-04 PROCEDURE — 1101F PT FALLS ASSESS-DOCD LE1/YR: CPT | Mod: CPTII,S$GLB,, | Performed by: INTERNAL MEDICINE

## 2025-02-04 NOTE — PROGRESS NOTES
2/4/2025    Helder Brand  Follow up    Chief Complaint   Patient presents with    COPD       HPI:   02/04/2025- pt here for f/u; had been seeing Kayley Jones NP. He is a 63 yo male with COPD/asthma and bronchiectasis, on fasenra w/ benefit.   He complains of waking up at night with high blood pressure and neck pains, back of head headache. He sleeps w head elevated and finds he sleeps better that way.  He is Sob with exercise- rides bike or cuts grass 20-30 min. At times he has cough.       6/13/2024- on Fasenra for 56 days, has noticed dramatic improvement in daily activities, has not required systemic steroids in past 3 months. States he is finally able to start exercising again, No longer has to take albuterol before exercising. Able to ride bike with out stopping to rest for several miles.   On Breztri daily, no complaints of cough, wheeze, or chest tightness. Has noticed decrease in amount of mucous in chest, no longer coughing to clear mucous.     3/12/2024- complaint of hoarse voice changes onset 1 month. Treated with prednisone and azithromycin with benefit. Symptoms returned after completing steroid therapy. Complaint of thick productive cough, associated with wheeze when laying down.   Using nebulizer and aerobika device  daily. Requires systemic steroids every other month for past year.     On Eliquis for DVTs. No issues with excessive bleeding.       12/28/2023- stopped blood thinners for surgery, had left foot and left lower leg pain.dx DVT left leg admitted to [facility]  12/03/2023 currently on Eliquis and will continue lifetime therapy. States SOB is persistent complaint, on Breztri most days with benefit.   Had exacerbation 2 weeks prior improved with steroid therapy.     7/6/2023- complaint of COPD exacerbation onset 3 weeks treated with antibiotics and oral steroids took two doses of therapy.   Complaint of productive cough. Green mucous. Treated with z-pack and doxycycline with no benefit.    Associated with chest tightness and wheeze.       5/10/23- complaint of cough, onset 2 weeks, improved with nebulizer therapy.   Difficult to clear chest of mucous. Clear mucous. Worse in late evenings.   Associated with wheeze and chest tightness.   Currently on Eliquis for history DVT    10/20/22- had COVID 19 July 2022, states no current complaint of shortness of breath, cough, chest tightness, or wheeze. not currently using advair or nebulizer machine.   Left DVT in August currently on Eliquis,     6/1/2022- states breathing is improved after started nebulizer as needed. PCP treated with antibiotics for productive green mucous in April, resolved with therapy.   SOB- recurrent complaint, had trouble breathing while laying down improved with albuterol inhaler. Associated with chest tightness and wheeze in late evenings, most nights. Nocturnal arousals 1x weekly. States doing better while on antibiotics.   On advair most day, sometimes forgets. Started on Trelegy but insurance coverage is not affordable.   Able to work in garden but still having to take breaks. Worse in high heat.   Declined sleep apnea therapy.     3/9/2022-Cough- worsened in past 2 weeks, has to sleep in recliner, coughing fits at night. Productive thick mucous green in color. Using nebulizer 3x daily with benefit for few hours.   SOB- severe, worse with exertion, associated with wheeze and chest tightness.     States was previously doing well, able to do gardening for 10 minutes then having to rest. Not using stiolto due to difficultly with device.       11/19/2021- Complaint of worsening cough- onset 2 weeks, tx by PCP with azithromycin and Levaquin with minimal benefit. Coughing through out night. Productive thick green mucous that has turned yellow in color.   Associated with chest tightness and wheeze.        10/06/2021- since last visit pt had blood clot to left popliteal artery and required vascular surgery. He has a L leg wound vacc and  getting wound care now.   He hasn't smoked for 40 yrs. Feels some mild throat clearing w/ cough.   Used to swim a lot when young. Worked [employer] 8-10 yrs and got lots of exercise.  Mother smoked a little and got bad copd.    6/17/21-  Need disc of images from [facility]- please bring disc from home and drop off to our office. Once I review the images I can give more advice- possible biopsy?  Get pulmonary function tests  Follow up with cardiologist  Pt is a 62 yo male with HTN, thyroid disease presenting for new evaluation.  Pt reports he had abnormal chest x-ray which was found after he had episode of chest tightness.  Has been getting these episodes of pressure like anterior chest discomfort, off and on for several months, usually while at rest and lasts few minutes. He rides bike 3-6 miles a day and denies LAW or chest tightness w/ exertion. Sometimes has slight wheeze when laying down at night but no cough/mucous. No inhaler use. No childhood asthma or breathing trouble. Denies frequent bronchitis.  Secondhand smoke from grandparents and parents- teenage cigarette smoke but quit at age 17. Mother smoked and now on hospice with COPD.  Pt had CT chest after abnormality seen on CXR 3/2021 with RLL nodule- forgot disc at home. (done at [facility])  He is going to have an echo at 3pm today.  In past had growth on thyroid- benign, had partial thyroidectomy.  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 Denies asbestos or other exposures    The chief complaint problem new to me      PFS:  Past Medical History:   Diagnosis Date    Blood clot in vein     left leg    Emphysema lung 2021    Enlarged prostate     Heterozygous MTHFR mutation C677T 08/27/2023    Hyperlipidemia 2021    Hypertension 2001    Kidney stone 2001    x3    Kidney stones     x3    Personal history of colonic polyps 10/20/2023    Thyroid disease     benign growth, partial thyroidectomy         Past Surgical History:   Procedure Laterality Date    ANGIOGRAPHY OF LOWER EXTREMITY  Left 08/05/2022    Procedure: Angiogram Extremity Unilateral;  Surgeon: Ali Khoobehi, MD;  Location: [facility] CATH/EP LAB;  Service: Peripheral Vascular;  Laterality: Left;    ANGIOGRAPHY OF LOWER EXTREMITY Left 08/06/2022    Procedure: Angioplasty-peripheral;  Surgeon: Ismael Esquivel MD;  Location: [facility] CATH/EP LAB;  Service: General;  Laterality: Left;    ANGIOGRAPHY OF LOWER EXTREMITY Left 08/06/2022    Procedure: Angiogram Extremity Unilateral;  Surgeon: Ismael Esquivel MD;  Location: [facility] CATH/EP LAB;  Service: General;  Laterality: Left;    ANGIOGRAPHY OF LOWER EXTREMITY Left 08/07/2022    Procedure: Angiogram Extremity Unilateral;  Surgeon: Ismael Esquivel MD;  Location: [facility] CATH/EP LAB;  Service: General;  Laterality: Left;    ANGIOGRAPHY OF LOWER EXTREMITY Left 12/1/2023    Procedure: Angiogram Extremity Unilateral;  Surgeon: Ismael Esquivel MD;  Location: [facility] CATH/EP LAB;  Service: General;  Laterality: Left;    ANGIOGRAPHY OF LOWER EXTREMITY Left 12/2/2023    Procedure: Angiogram Extremity Unilateral;  Surgeon: Ismael Esquivel MD;  Location: [facility] CATH/EP LAB;  Service: General;  Laterality: Left;    ANGIOGRAPHY OF LOWER EXTREMITY Left 12/2/2023    Procedure: Angiogram Extremity Unilateral;  Surgeon: Ismael Esquivel MD;  Location: [facility] CATH/EP LAB;  Service: General;  Laterality: Left;    COLONOSCOPY N/A 10/20/2023    Procedure: COLONOSCOPY;  Surgeon: Jeff Call MD;  Location: [facility] ENDO;  Service: Endoscopy;  Laterality: N/A;    COLONOSCOPY  10/20/2023    5 YR RECALL    CYSTOSCOPY N/A 09/05/2023    Procedure: CYSTOSCOPY;  Surgeon: Adam Tyler MD;  Location: [facility] ASU OR;  Service: Urology;  Laterality: N/A;    EMBOLECTOMY OR THROMBECTOMY, BLOOD VESSEL, LOWER EXTREMITY Left 12/1/2023    Procedure: EMBOLECTOMY OR THROMBECTOMY, BLOOD VESSEL, LOWER EXTREMITY;  Surgeon: Ismael Esquivel MD;  Location: [facility] CATH/EP LAB;  Service: General;  Laterality: Left;    INJECTION OF TISSUE  PLASMINOGEN ACTIVATOR Left 08/06/2022    Procedure: INJECTION, TISSUE PLASMINOGEN ACTIVATOR;  Surgeon: Ismael Esquivel MD;  Location: [facility] CATH/EP LAB;  Service: General;  Laterality: Left;    INJECTION OF TISSUE PLASMINOGEN ACTIVATOR Left 08/06/2022    Procedure: INJECTION, TISSUE PLASMINOGEN ACTIVATOR;  Surgeon: Ismael Esquivel MD;  Location: [facility] CATH/EP LAB;  Service: General;  Laterality: Left;    PROSTATE SURGERY  2018    REPAIR OF ANEURYSM Left 07/21/2021    Procedure: REPAIR POPLITEAL ARTERY ANEURYSM;  Surgeon: Ismael Esquivel MD;  Location: [facility] OR;  Service: Cardiovascular;  Laterality: Left;    THROMBECTOMY  08/06/2022    Procedure: THROMBECTOMY;  Surgeon: Ismael Esquivel MD;  Location: [facility] CATH/EP LAB;  Service: General;;    THROMBECTOMY Left 08/06/2022    Procedure: THROMBECTOMY;  Surgeon: Ismael Esquivel MD;  Location: [facility] CATH/EP LAB;  Service: General;  Laterality: Left;    THROMBECTOMY, LOWER ARTERIAL  12/2/2023    Procedure: Thrombectomy, Lower Arterial;  Surgeon: Ismael Esquivel MD;  Location: [facility] CATH/EP LAB;  Service: General;;    THYROIDECTOMY, PARTIAL  2011    TONSILLECTOMY      as a child    TRANSRECTAL BIOPSY OF PROSTATE WITH ULTRASOUND GUIDANCE N/A 09/05/2023    Procedure: BIOPSY, PROSTATE, RECTAL APPROACH, WITH US GUIDANCE;  Surgeon: Adam Tyler MD;  Location: [facility] OR;  Service: Urology;  Laterality: N/A;    TRANSURETHRAL RESECTION OF PROSTATE N/A 11/16/2023    Procedure: TURP (TRANSURETHRAL RESECTION OF PROSTATE);  Surgeon: Adam Tyler MD;  Location: [facility] OR;  Service: Urology;  Laterality: N/A;    urolift  04/2019    [facility]     Social History     Tobacco Use    Smoking status: Former     Types: Cigarettes     Passive exposure: Past    Smokeless tobacco: Never   Substance Use Topics    Alcohol use: Not Currently     Alcohol/week: 1.0 standard drink of alcohol     Types: 1 Cans of beer per week     Comment: rare    Drug use: Never     Family History    Problem Relation Name Age of Onset    Hypertension Mother      COPD Mother      Pulmonary embolism Father       Review of patient's allergies indicates:  No Known Allergies    Performance Status:The patient's activity level is regular exercise.      Review of Systems:  a review of eleven systems covering constitutional, Eye, HEENT, Psych, Respiratory, Cardiac, GI, , Musculoskeletal, Endocrine, Dermatologic was negative except for pertinent findings as listed ABOVE and below:  Weight fluctuates  Eats healthy, 5 small meals daily  Headache back of neck when BP elevated  Night wakenings several times per night  Sleepy, naps  nocturia    Exam:Comprehensive exam done. /74 (BP Location: Left arm, Patient Position: Sitting)   Pulse 63   Ht 6' (1.829 m)   Wt 85.3 kg (188 lb 0.8 oz)   SpO2 97%   BMI 25.50 kg/m²   Exam included Vitals as listed, and patient's appearance and affect and alertness and mood, oral exam for yeast and hygiene and pharynx lesions and Mallapatti (M) score, neck with inspection for jvd and masses and thyroid abnormalities and lymph nodes (supraclavicular and infraclavicular nodes and axillary also examined and noted if abn), chest exam included symmetry and effort and fremitus and percussion and auscultation, cardiac exam included rhythm and gallops and murmur and rubs and jvd and edema, abdominal exam for mass and hepatosplenomegaly and tenderness and hernias and bowel sounds, Musculoskeletal exam with muscle tone and posture and mobility/gait and  strength, and skin for rashes and cyanosis and pallor and turgor, extremity for clubbing.  Findings were normal except for pertinent findings listed below:  Oropharynx clear, M1  HR regular  Breath sounds clear bilat  No edema/clubbing        Radiographs (ct chest and cxr) reviewed: view by direct vision   CTA chest 1/12/24- bilat lower lobe bronchiectasis, bronchial wall thickening. No PE seen. Band like atelectasis bilat  bases    Outside CT (uploaded) chest 6/9/21- mild linear atelectasis bilat lower lobes, mild pleural based nodules which look like rounded atelectasis.  CXR 3/9/21- RLL nodule  CT abd/p 8/2019- rounded atelectasis bibasilar present at that time    Labs reviewed      Lab Results   Component Value Date    WBC 6.2 09/04/2024    RBC 5.05 09/04/2024    HGB 16.3 09/04/2024    HCT 49.0 09/04/2024    MCV 97.0 09/04/2024    MCH 32.3 09/04/2024    MCHC 33.3 09/04/2024    RDW 13.0 09/04/2024     09/04/2024    MPV 9.5 09/04/2024    GRAN 4.7 03/12/2024    GRAN 54.4 03/12/2024    LYMPH 1,773 09/04/2024    LYMPH 28.6 09/04/2024    MONO 843 09/04/2024    MONO 13.6 09/04/2024    EOS 0 (L) 09/04/2024    BASO 12 09/04/2024    EOSINOPHIL 0.0 09/04/2024    BASOPHIL 0.2 09/04/2024      Latest Reference Range & Units 03/12/24 14:13 09/04/24 00:00   Eos # 15 - 500 cells/uL 0.3 0 (L)      Latest Reference Range & Units 10/16/23 08:17 11/06/23 13:20 12/01/23 02:22 12/01/23 18:25   Eos # 0.0 - 0.5 K/uL 0.2 0.2 0.5 0.2     PFT reviewed- mild obstruction, increased lung vol. DLCO normal          Plan:  Clinical impression is apparently straight forward and impression with management as below.    Helder was seen today for copd.    Diagnoses and all orders for this visit:    COPD with asthma    Bronchiectasis without complication    Sleep apnea, unspecified type  -     Home Sleep Study; Future        Follow up in about 4 months (around 6/4/2025), or with Kayley.      Discussed with patient above for education the following:      Patient Instructions   Ordering overnight sleep study at home to evaluate for sleep apnea    If positive will order CPAP machine, notify clinic when machine is delivered to home to set up 31 days compliance appointment. You must use the machine for a least 4 hours every night.     Continue current Asthma/COPD medication regimen

## 2025-02-04 NOTE — PATIENT INSTRUCTIONS
Ordering overnight sleep study at home to evaluate for sleep apnea    If positive will order CPAP machine, notify clinic when machine is delivered to home to set up 31 days compliance appointment. You must use the machine for a least 4 hours every night.     Continue current Asthma/COPD medication regimen

## 2025-02-07 ENCOUNTER — PATIENT MESSAGE (OUTPATIENT)
Dept: ADMINISTRATIVE | Facility: OTHER | Age: 65
End: 2025-02-07
Payer: MEDICARE

## 2025-02-18 ENCOUNTER — TELEPHONE (OUTPATIENT)
Dept: UROLOGY | Facility: CLINIC | Age: 65
End: 2025-02-18
Payer: MEDICARE

## 2025-02-18 DIAGNOSIS — N40.0 BPH WITHOUT OBSTRUCTION/LOWER URINARY TRACT SYMPTOMS: Primary | ICD-10-CM

## 2025-02-18 NOTE — TELEPHONE ENCOUNTER
Chart review had TURP -2023  Per MD last note   his TURP to trend PSA very closely at this time. Will plan PSA free and total today to get a new baseline, and follow the free and total PSA to trend it every 6 months with annual follow-up.

## 2025-02-18 NOTE — TELEPHONE ENCOUNTER
----- Message from Maribel Washington sent at 2/18/2025  4:50 PM CST -----    ----- Message -----  From: Katy Gross  Sent: 2/18/2025   4:49 PM CST  To: Jayson TORRES Staff     Type:  Needs Medical AdviceWho Called:  PTWould the patient rather a call back or a response via uberlifener?  CallBest Call Back Number: [phone] Information: states need labs order before appt on 6/2/25 Please call back to advise. Thank you!

## 2025-02-19 ENCOUNTER — OFFICE VISIT (OUTPATIENT)
Dept: CARDIOLOGY | Facility: CLINIC | Age: 65
End: 2025-02-19
Payer: MEDICARE

## 2025-02-19 VITALS
BODY MASS INDEX: 25.77 KG/M2 | HEIGHT: 72 IN | SYSTOLIC BLOOD PRESSURE: 112 MMHG | WEIGHT: 190.25 LBS | HEART RATE: 67 BPM | DIASTOLIC BLOOD PRESSURE: 80 MMHG | OXYGEN SATURATION: 97 %

## 2025-02-19 DIAGNOSIS — I10 PRIMARY HYPERTENSION: ICD-10-CM

## 2025-02-19 DIAGNOSIS — E78.49 OTHER HYPERLIPIDEMIA: Primary | ICD-10-CM

## 2025-02-19 DIAGNOSIS — I71.21 ASCENDING AORTIC ANEURYSM, UNSPECIFIED WHETHER RUPTURED: ICD-10-CM

## 2025-02-19 DIAGNOSIS — Z15.89 MTHFR MUTATION: ICD-10-CM

## 2025-02-19 DIAGNOSIS — I71.40 ABDOMINAL AORTIC ANEURYSM (AAA) WITHOUT RUPTURE, UNSPECIFIED PART: ICD-10-CM

## 2025-02-19 DIAGNOSIS — R07.9 CHEST PAIN, UNSPECIFIED TYPE: ICD-10-CM

## 2025-02-19 NOTE — ASSESSMENT & PLAN NOTE
BP stable today in clinic at 112/80.   Continue on olmesartan 40 mg daily and labetalol 300 mg BID.  Encouraged low-sodium diet.    Continue with digital medicine.

## 2025-02-19 NOTE — ASSESSMENT & PLAN NOTE
Would benefit from a repeat nuclear stress test AFTER results of CTA.  Continue with olmesartan 40 every morning and labetalol 300 b.i.d. continue statin therapy and Eliquis 5 b.i.d.

## 2025-02-19 NOTE — ASSESSMENT & PLAN NOTE
Last lipid panel:     Latest Reference Range & Units 09/04/24 00:00   Cholesterol Total <200 mg/dL 178   HDL > OR = 40 mg/dL 60   HDL/Cholesterol Ratio <5.0 (calc) 3.0   Non HDL Chol. (LDL+VLDL) <130 mg/dL (calc) 118   Triglycerides <150 mg/dL 48   LDL Cholesterol mg/dL (calc) 104 (H)   (H): Data is abnormally high    Patient had stopped his rosuvastatin, encouraged to restart 5 mg daily.  Encouraged heart healthy low-fat low-cholesterol diet and exercise as tolerated.

## 2025-02-19 NOTE — PROGRESS NOTES
Subjective:    Patient ID:  Helder Brand is a 64 y.o. male who presents for follow-up.   Chief Complaint   Patient presents with    Follow-up    Chest Pain    Blood Pressure Check       HPI:  Mr. Brand is a 64-year-old male with past medical history of hypertension, hyperlipidemia, emphysema, former smoker, lower extremity arterial and venous thrombosis due to MTHFR mutation on Eliquis who comes into the clinic today for a follow-up visit.  Denies recent fever cough chills or congestion. Denies blood in the urine or blood in the stool.  Denies myalgias. Denies orthopnea or peripheral edema. Denies nausea vomiting or dyspepsia. No recent arm neck or jaw pain. No lightheadedness or dizziness.  He does admit to intermittent chest pains which he describes as numbness/aching and is concerned about his blood pressure today as he has been getting readings of 140s to 150s over 90s before he takes his medication.  He states whenever his blood pressure is high, it causes some neck pain.  He is enrolled in digital medicine and I have reviewed his last submitted 5 readings.  One teens to 130s over 80s to 90s.    Review of patient's allergies indicates:  No Known Allergies    Past Medical History:   Diagnosis Date    Blood clot in vein     left leg    Emphysema lung 2021    Enlarged prostate     Heterozygous MTHFR mutation [id number] 08/27/2023    Hyperlipidemia 2021    Hypertension 2001    Kidney stone 2001    x3    Kidney stones     x3    Personal history of colonic polyps 10/20/2023    Thyroid disease     benign growth, partial thyroidectomy     Past Surgical History:   Procedure Laterality Date    ANGIOGRAPHY OF LOWER EXTREMITY Left 08/05/2022    Procedure: Angiogram Extremity Unilateral;  Surgeon: Ali Khoobehi, MD;  Location: [facility] CATH/EP LAB;  Service: Peripheral Vascular;  Laterality: Left;    ANGIOGRAPHY OF LOWER EXTREMITY Left 08/06/2022    Procedure: Angioplasty-peripheral;  Surgeon: Ismael Esquivel MD;  Location:  [facility] CATH/EP LAB;  Service: General;  Laterality: Left;    ANGIOGRAPHY OF LOWER EXTREMITY Left 08/06/2022    Procedure: Angiogram Extremity Unilateral;  Surgeon: Ismael Esquivel MD;  Location: [facility] CATH/EP LAB;  Service: General;  Laterality: Left;    ANGIOGRAPHY OF LOWER EXTREMITY Left 08/07/2022    Procedure: Angiogram Extremity Unilateral;  Surgeon: Ismael Esquivel MD;  Location: [facility] CATH/EP LAB;  Service: General;  Laterality: Left;    ANGIOGRAPHY OF LOWER EXTREMITY Left 12/1/2023    Procedure: Angiogram Extremity Unilateral;  Surgeon: Ismael Esquivel MD;  Location: [facility] CATH/EP LAB;  Service: General;  Laterality: Left;    ANGIOGRAPHY OF LOWER EXTREMITY Left 12/2/2023    Procedure: Angiogram Extremity Unilateral;  Surgeon: Ismael Esquivel MD;  Location: [facility] CATH/EP LAB;  Service: General;  Laterality: Left;    ANGIOGRAPHY OF LOWER EXTREMITY Left 12/2/2023    Procedure: Angiogram Extremity Unilateral;  Surgeon: Ismael Esquivel MD;  Location: [facility] CATH/EP LAB;  Service: General;  Laterality: Left;    COLONOSCOPY N/A 10/20/2023    Procedure: COLONOSCOPY;  Surgeon: Jeff Call MD;  Location: [facility] ENDO;  Service: Endoscopy;  Laterality: N/A;    COLONOSCOPY  10/20/2023    5 YR RECALL    CYSTOSCOPY N/A 09/05/2023    Procedure: CYSTOSCOPY;  Surgeon: Adam Tyler MD;  Location: [facility] OR;  Service: Urology;  Laterality: N/A;    EMBOLECTOMY OR THROMBECTOMY, BLOOD VESSEL, LOWER EXTREMITY Left 12/1/2023    Procedure: EMBOLECTOMY OR THROMBECTOMY, BLOOD VESSEL, LOWER EXTREMITY;  Surgeon: Ismael Esquivel MD;  Location: [facility] CATH/EP LAB;  Service: General;  Laterality: Left;    INJECTION OF TISSUE PLASMINOGEN ACTIVATOR Left 08/06/2022    Procedure: INJECTION, TISSUE PLASMINOGEN ACTIVATOR;  Surgeon: Ismael Esquivel MD;  Location: [facility] CATH/EP LAB;  Service: General;  Laterality: Left;    INJECTION OF TISSUE PLASMINOGEN ACTIVATOR Left 08/06/2022    Procedure: INJECTION, TISSUE PLASMINOGEN  ACTIVATOR;  Surgeon: Ismael Esquivel MD;  Location: [facility] CATH/EP LAB;  Service: General;  Laterality: Left;    PROSTATE SURGERY  2018    REPAIR OF ANEURYSM Left 07/21/2021    Procedure: REPAIR POPLITEAL ARTERY ANEURYSM;  Surgeon: Ismael Esquivel MD;  Location: [facility] OR;  Service: Cardiovascular;  Laterality: Left;    THROMBECTOMY  08/06/2022    Procedure: THROMBECTOMY;  Surgeon: Ismael Esquivel MD;  Location: [facility] CATH/EP LAB;  Service: General;;    THROMBECTOMY Left 08/06/2022    Procedure: THROMBECTOMY;  Surgeon: Ismael Esquivel MD;  Location: [facility] CATH/EP LAB;  Service: General;  Laterality: Left;    THROMBECTOMY, LOWER ARTERIAL  12/2/2023    Procedure: Thrombectomy, Lower Arterial;  Surgeon: Ismael Esquivel MD;  Location: [facility] CATH/EP LAB;  Service: General;;    THYROIDECTOMY, PARTIAL  2011    TONSILLECTOMY      as a child    TRANSRECTAL BIOPSY OF PROSTATE WITH ULTRASOUND GUIDANCE N/A 09/05/2023    Procedure: BIOPSY, PROSTATE, RECTAL APPROACH, WITH US GUIDANCE;  Surgeon: Adam Tyler MD;  Location: [facility] OR;  Service: Urology;  Laterality: N/A;    TRANSURETHRAL RESECTION OF PROSTATE N/A 11/16/2023    Procedure: TURP (TRANSURETHRAL RESECTION OF PROSTATE);  Surgeon: Adam Tyler MD;  Location: [facility] OR;  Service: Urology;  Laterality: N/A;    urolift  04/2019    [facility]     Social History[1]  Family History   Problem Relation Name Age of Onset    Hypertension Mother      COPD Mother      Pulmonary embolism Father          Review of Systems:   Constitution: Negative for diaphoresis and fever.   HEENT: Negative for nosebleeds.    Cardiovascular: + occasional for chest pain, + intermittent hypertension per patient report       Occasional dyspnea on exertion       No leg swelling        No palpitations  Respiratory: Negative for shortness of breath and wheezing.    Hematologic/Lymphatic: Negative for bleeding problem. Does not bruise/bleed easily.   Skin: Negative for color change and rash.    Musculoskeletal: Negative for falls and myalgias.   Gastrointestinal: Negative for hematemesis and hematochezia.   Genitourinary: Negative for hematuria.   Neurological: Negative for dizziness and light-headedness.   Psychiatric/Behavioral: Negative for altered mental status and memory loss.          Objective:        Vitals:    02/19/25 0953   BP: 112/80   Pulse: 67       Lab Results   Component Value Date    WBC 6.2 09/04/2024    HGB 16.3 09/04/2024    HCT 49.0 09/04/2024     09/04/2024    CHOL 178 09/04/2024    TRIG 48 09/04/2024    HDL 60 09/04/2024    ALT 13 09/04/2024    AST 19 09/04/2024     09/04/2024    K 4.3 09/04/2024     09/04/2024    CREATININE 0.90 09/04/2024    BUN 11 09/04/2024    CO2 26 09/04/2024    TSH 0.57 07/07/2023    INR 1.3 (H) 12/02/2023    HGBA1C 5.2 03/09/2022    MICROALBUR 0.5 09/04/2024        ECHOCARDIOGRAM RESULTS  Results for orders placed during the hospital encounter of 09/06/23    Echo Saline Bubble? No    Interpretation Summary    Left Ventricle: The left ventricle is normal in size. Ventricular mass is normal. Normal wall thickness. Normal wall motion. There is normal systolic function with a visually estimated ejection fraction of 55 - 60%. There is normal diastolic function.    Right Ventricle: Normal right ventricular cavity size. Wall thickness is normal. Right ventricle wall motion  is normal. Systolic function is normal.    Mitral Valve: There is mild regurgitation.    Tricuspid Valve: There is no mass/vegetation present. There is mild regurgitation.    IVC/SVC: Normal venous pressure at 3 mmHg.        CURRENT/PREVIOUS VISIT EKG  Results for orders placed or performed in visit on 04/12/24   IN OFFICE EKG 12-LEAD (to [facility])    Collection Time: 04/12/24  8:40 AM   Result Value Ref Range    QRS Duration 98 ms    [facility] QTC Calculation 418 ms    Narrative    Test Reason : M54.2,    Vent. Rate : 068 BPM     Atrial Rate : 068 BPM     P-R Int : 204 ms          QRS  Dur : 098 ms      QT Int : 394 ms       P-R-T Axes : 051 028 052 degrees     QTc Int : 418 ms    Sinus rhythm with occasional Premature ventricular complexes  Otherwise normal ECG  When compared with ECG of 01-DEC-2023 02:15,  Premature ventricular complexes are now Present  Confirmed by Mariah DOUGLAS, Russell HERNANDEZ ([id number]) on 5/2/2024 8:32:52 PM    Referred By:             Confirmed By:Russell Lemus MD     No valid procedures specified.   Results for orders placed during the hospital encounter of 07/13/21    Nuclear Stress Test    Interpretation Summary    The EKG portion of this study is negative for ischemia.    The patient reported no chest pain during the stress test.    There were no arrhythmias during stress.    The nuclear portion of this study will be reported separately.      Physical Exam:  CONSTITUTIONAL: No fever, no chills  HEENT: Normocephalic, atraumatic,pupils reactive to light                 NECK:  No JVD no carotid bruit  CVS: S1S2+, RRR, no murmurs,   LUNGS: Clear  ABDOMEN: Soft, NT, BS+  EXTREMITIES: No cyanosis, edema  : No dunbar catheter  NEURO: AAO X 3  PSY: Normal affect      Medication List with Changes/Refills   Current Medications    ALBUTEROL (VENTOLIN HFA) 90 MCG/ACTUATION INHALER    Inhale 2 puffs into the lungs every 4 (four) hours as needed for Wheezing or Shortness of Breath. Rescue    ALBUTEROL-IPRATROPIUM (DUO-NEB) 2.5 MG-0.5 MG/3 ML NEBULIZER SOLUTION    Take 3 mLs by nebulization every 6 (six) hours as needed for Wheezing or Shortness of Breath. Rescue    APIXABAN (ELIQUIS) 5 MG TAB    Take 1 tablet (5 mg total) by mouth 2 (two) times daily.    BENRALIZUMAB (FASENRA PEN) 30 MG/ML ATIN    Inject 30 mg into the skin every 8 weeks.    BUDESONIDE-GLYCOPYR-FORMOTEROL (BREZTRI AEROSPHERE) 160-9-4.8 MCG/ACTUATION HFAA    Inhale 2 puffs into the lungs 2 (two) times a day.    CYANOCOBALAMIN/FOLIC AC/VIT B6 (FOLBIC ORAL)    Take 1 tablet by mouth once daily.    FLUTICASONE PROPIONATE  (FLONASE) 50 MCG/ACTUATION NASAL SPRAY    2 sprays by Each Nostril route daily as needed for Allergies.    LABETALOL (NORMODYNE) 300 MG TABLET    Take 1 tablet (300 mg total) by mouth 2 (two) times a day.    OLMESARTAN (BENICAR) 40 MG TABLET    Take 1 tablet (40 mg total) by mouth every morning.    ROSUVASTATIN (CRESTOR) 10 MG TABLET    Take 1 tablet (10 mg total) by mouth once daily.           Assessment:       1. Other hyperlipidemia    2. Abdominal aortic aneurysm (AAA) without rupture, unspecified part    3. Chest pain, unspecified type    4. Ascending aortic aneurysm, unspecified whether ruptured    5. Primary hypertension    6. MTHFR mutation         Plan:     Problem List Items Addressed This Visit          Cardiac/Vascular    Ascending aortic aneurysm    Current Assessment & Plan   Due for a repeat CTA asap.  Order placed.   Continue with blood pressure control.         HTN (hypertension)    Current Assessment & Plan   BP stable today in clinic at 112/80.   Continue on olmesartan 40 mg daily and labetalol 300 mg BID.  Encouraged low-sodium diet.    Continue with digital medicine.              Chest pain    Current Assessment & Plan   Would benefit from a repeat nuclear stress test AFTER results of CTA.  Continue with olmesartan 40 every morning and labetalol 300 b.i.d. continue statin therapy and Eliquis 5 b.i.d.         Relevant Orders    Nuclear Stress - Cardiology Interpreted    Other hyperlipidemia - Primary    Current Assessment & Plan   Last lipid panel:     Latest Reference Range & Units 09/04/24 00:00   Cholesterol Total <200 mg/dL 178   HDL > OR = 40 mg/dL 60   HDL/Cholesterol Ratio <5.0 (calc) 3.0   Non HDL Chol. (LDL+VLDL) <130 mg/dL (calc) 118   Triglycerides <150 mg/dL 48   LDL Cholesterol mg/dL (calc) 104 (H)   (H): Data is abnormally high    Patient had stopped his rosuvastatin, encouraged to restart 5 mg daily.  Encouraged heart healthy low-fat low-cholesterol diet and exercise as  tolerated.              Genetic    MTHFR mutation    Current Assessment & Plan   Known history.  Continue Eliquis 5 b.i.d. denies any bleeding tendencies.          Other Visit Diagnoses         Abdominal aortic aneurysm (AAA) without rupture, unspecified part        Relevant Orders    CTA Runoff ABD Pel Bilat Lower EXT    Creatinine, serum            Follow up in about 2 months (around 4/19/2025).         [1]   Social History  Tobacco Use    Smoking status: Former     Types: Cigarettes     Passive exposure: Past    Smokeless tobacco: Never   Substance Use Topics    Alcohol use: Not Currently     Alcohol/week: 1.0 standard drink of alcohol     Types: 1 Cans of beer per week     Comment: rare    Drug use: Never

## 2025-03-05 ENCOUNTER — TELEPHONE (OUTPATIENT)
Dept: RADIOLOGY | Facility: HOSPITAL | Age: 65
End: 2025-03-05

## 2025-03-06 ENCOUNTER — PROCEDURE VISIT (OUTPATIENT)
Dept: SLEEP MEDICINE | Facility: HOSPITAL | Age: 65
End: 2025-03-06
Attending: INTERNAL MEDICINE
Payer: MEDICARE

## 2025-03-06 ENCOUNTER — HOSPITAL ENCOUNTER (OUTPATIENT)
Dept: RADIOLOGY | Facility: HOSPITAL | Age: 65
Discharge: HOME | End: 2025-03-06
Payer: MEDICARE

## 2025-03-06 DIAGNOSIS — I71.40 ABDOMINAL AORTIC ANEURYSM (AAA) WITHOUT RUPTURE, UNSPECIFIED PART: ICD-10-CM

## 2025-03-06 DIAGNOSIS — G47.30 SLEEP APNEA, UNSPECIFIED TYPE: ICD-10-CM

## 2025-03-06 PROCEDURE — 75635 CT ANGIO ABDOMINAL ARTERIES: CPT | Mod: TC

## 2025-03-06 PROCEDURE — 25500020 PHARM REV CODE 255

## 2025-03-06 PROCEDURE — 95806 SLEEP STUDY UNATT&RESP EFFT: CPT

## 2025-03-06 PROCEDURE — 75635 CT ANGIO ABDOMINAL ARTERIES: CPT | Mod: 26,,, | Performed by: RADIOLOGY

## 2025-03-06 RX ADMIN — IOHEXOL 125 ML: 350 INJECTION, SOLUTION INTRAVENOUS at 08:03

## 2025-03-10 ENCOUNTER — TELEPHONE (OUTPATIENT)
Dept: CARDIOLOGY | Facility: HOSPITAL | Age: 65
End: 2025-03-10

## 2025-03-10 NOTE — TELEPHONE ENCOUNTER
Patient advised, [facility]  Will need to register on the first floor at the main entrance.  Patient advised that arrival time is 06:50.  Patient advised that they may be here about 3 hours, and may want to bring something to occupy their time, as there will be periods of waiting.    Patient advised, they may take their medications prior to testing if you need to.  Patient should HOLD Labetalol. Advised if food is needed to take medications, please keep it light, like toast and juice.    Patient advised to avoid all caffeine 12 hours prior to testing.  This includes sodas, chocolate, tea and decaf coffee.    Will provide peanut butter crackers for a snack after stress test.  If patient would prefer something else, please bring a snack from home.    Wear comfortable clothing.  No lotions, oils, or powders to the upper chest area. May wear deodorant.    No metal jewelry, buttons, or zippers to the upper chest area.  Patient verbalizes understanding of instructions.

## 2025-03-11 ENCOUNTER — HOSPITAL ENCOUNTER (OUTPATIENT)
Dept: RADIOLOGY | Facility: HOSPITAL | Age: 65
Discharge: HOME | End: 2025-03-11
Payer: MEDICARE

## 2025-03-11 ENCOUNTER — HOSPITAL ENCOUNTER (OUTPATIENT)
Dept: CARDIOLOGY | Facility: HOSPITAL | Age: 65
Discharge: HOME | End: 2025-03-11
Payer: MEDICARE

## 2025-03-11 DIAGNOSIS — R07.9 CHEST PAIN, UNSPECIFIED TYPE: ICD-10-CM

## 2025-03-11 LAB
CV STRESS BASE HR: 66 BPM
DIASTOLIC BLOOD PRESSURE: 86 MMHG
EJECTION FRACTION- HIGH: 65 %
END DIASTOLIC INDEX-HIGH: 153 ML/M2
END DIASTOLIC INDEX-LOW: 93 ML/M2
END SYSTOLIC INDEX-HIGH: 71 ML/M2
END SYSTOLIC INDEX-LOW: 31 ML/M2
NUC REST DIASTOLIC VOLUME INDEX: 101
NUC REST EJECTION FRACTION: 71
NUC REST SYSTOLIC VOLUME INDEX: 29
NUC STRESS DIASTOLIC VOLUME INDEX: 96
NUC STRESS EJECTION FRACTION: 64 %
NUC STRESS SYSTOLIC VOLUME INDEX: 35
OHS CV CPX 1 MINUTE RECOVERY HEART RATE: 113 BPM
OHS CV CPX 85 PERCENT MAX PREDICTED HEART RATE MALE: 131
OHS CV CPX ESTIMATED METS: 8
OHS CV CPX MAX PREDICTED HEART RATE: 154
OHS CV CPX PATIENT IS FEMALE: 0
OHS CV CPX PATIENT IS MALE: 1
OHS CV CPX PEAK DIASTOLIC BLOOD PRESSURE: 99 MMHG
OHS CV CPX PEAK HEAR RATE: 133 BPM
OHS CV CPX PEAK RATE PRESSURE PRODUCT: NORMAL
OHS CV CPX PEAK SYSTOLIC BLOOD PRESSURE: 201 MMHG
OHS CV CPX PERCENT MAX PREDICTED HEART RATE ACHIEVED: 86
OHS CV CPX RATE PRESSURE PRODUCT PRESENTING: 7656
OHS CV INITIAL DOSE: 12.3 MCG/KG/MIN
OHS CV PEAK DOSE: 27 MCG/KG/MIN
RETIRED EF AND QEF - SEE NOTES: 53 %
STRESS ECHO POST EXERCISE DUR MIN: 6 MINUTES
STRESS ECHO POST EXERCISE DUR SEC: 47 SECONDS
SYSTOLIC BLOOD PRESSURE: 116 MMHG

## 2025-03-11 PROCEDURE — 93016 CV STRESS TEST SUPVJ ONLY: CPT | Mod: ,,, | Performed by: INTERNAL MEDICINE

## 2025-03-11 PROCEDURE — 93018 CV STRESS TEST I&R ONLY: CPT | Mod: ,,, | Performed by: INTERNAL MEDICINE

## 2025-03-11 PROCEDURE — A9502 TC99M TETROFOSMIN: HCPCS

## 2025-03-11 PROCEDURE — 93017 CV STRESS TEST TRACING ONLY: CPT

## 2025-03-11 PROCEDURE — 78452 HT MUSCLE IMAGE SPECT MULT: CPT | Mod: 26,,, | Performed by: INTERNAL MEDICINE

## 2025-03-11 RX ADMIN — TETROFOSMIN 12.3 MILLICURIE: 1.38 INJECTION, POWDER, LYOPHILIZED, FOR SOLUTION INTRAVENOUS at 06:03

## 2025-03-11 RX ADMIN — TETROFOSMIN 27 MILLICURIE: 1.38 INJECTION, POWDER, LYOPHILIZED, FOR SOLUTION INTRAVENOUS at 08:03

## 2025-03-18 ENCOUNTER — OFFICE VISIT (OUTPATIENT)
Dept: FAMILY MEDICINE | Facility: CLINIC | Age: 65
End: 2025-03-18
Payer: MEDICARE

## 2025-03-18 VITALS
HEIGHT: 72 IN | WEIGHT: 187 LBS | SYSTOLIC BLOOD PRESSURE: 132 MMHG | HEART RATE: 60 BPM | RESPIRATION RATE: 18 BRPM | OXYGEN SATURATION: 98 % | DIASTOLIC BLOOD PRESSURE: 82 MMHG | BODY MASS INDEX: 25.33 KG/M2

## 2025-03-18 DIAGNOSIS — I10 HYPERTENSION, UNSPECIFIED TYPE: ICD-10-CM

## 2025-03-18 DIAGNOSIS — E78.5 HYPERLIPIDEMIA, UNSPECIFIED HYPERLIPIDEMIA TYPE: ICD-10-CM

## 2025-03-18 PROCEDURE — 1159F MED LIST DOCD IN RCRD: CPT | Mod: CPTII,S$GLB,, | Performed by: PHYSICIAN ASSISTANT

## 2025-03-18 PROCEDURE — 1126F AMNT PAIN NOTED NONE PRSNT: CPT | Mod: CPTII,S$GLB,, | Performed by: PHYSICIAN ASSISTANT

## 2025-03-18 PROCEDURE — 3008F BODY MASS INDEX DOCD: CPT | Mod: CPTII,S$GLB,, | Performed by: PHYSICIAN ASSISTANT

## 2025-03-18 PROCEDURE — 3075F SYST BP GE 130 - 139MM HG: CPT | Mod: CPTII,S$GLB,, | Performed by: PHYSICIAN ASSISTANT

## 2025-03-18 PROCEDURE — 99214 OFFICE O/P EST MOD 30 MIN: CPT | Mod: S$GLB,,, | Performed by: PHYSICIAN ASSISTANT

## 2025-03-18 PROCEDURE — 3079F DIAST BP 80-89 MM HG: CPT | Mod: CPTII,S$GLB,, | Performed by: PHYSICIAN ASSISTANT

## 2025-03-18 PROCEDURE — 3288F FALL RISK ASSESSMENT DOCD: CPT | Mod: CPTII,S$GLB,, | Performed by: PHYSICIAN ASSISTANT

## 2025-03-18 PROCEDURE — 1157F ADVNC CARE PLAN IN RCRD: CPT | Mod: CPTII,S$GLB,, | Performed by: PHYSICIAN ASSISTANT

## 2025-03-18 PROCEDURE — 1101F PT FALLS ASSESS-DOCD LE1/YR: CPT | Mod: CPTII,S$GLB,, | Performed by: PHYSICIAN ASSISTANT

## 2025-03-18 RX ORDER — LABETALOL 300 MG/1
300 TABLET, FILM COATED ORAL 2 TIMES DAILY
Qty: 180 TABLET | Refills: 1 | Status: SHIPPED | OUTPATIENT
Start: 2025-03-18

## 2025-03-18 RX ORDER — OLMESARTAN MEDOXOMIL 40 MG/1
40 TABLET ORAL EVERY MORNING
Qty: 90 TABLET | Refills: 1 | Status: SHIPPED | OUTPATIENT
Start: 2025-03-18

## 2025-03-18 RX ORDER — ROSUVASTATIN CALCIUM 10 MG/1
10 TABLET, COATED ORAL DAILY
Qty: 90 TABLET | Refills: 1 | Status: SHIPPED | OUTPATIENT
Start: 2025-03-18

## 2025-03-18 NOTE — PROGRESS NOTES
SUBJECTIVE:    Patient ID: Helder Brand is a 64 y.o. male.    Chief Complaint: Follow-up (No bottles// refills needed// pt states he's concerned about BP its been fluctuating says some days its normal and some days it can be high pt is registered with digital meds )    History of Present Illness    CHIEF COMPLAINT:  Helder presents today for follow up of blood pressure management.    HYPERTENSION:  He is currently taking Olmesartan 40mg and Labetalol 300mg twice daily, reporting lightheadedness with full dose of Labetalol. He recently enrolled in digital medicine program for blood pressure monitoring with Bluetooth-enabled blood pressure cuff for trend tracking.    CARDIOVASCULAR:  Stress test showed a fixed area in the inferior and infraseptal wall. EKG was negative for ischemia. He has a follow-up visit scheduled with cardiologist on the .    THROMBOSIS HISTORY:  He has a history of multiple blood clots, with the most recent episode occurring around age 64-65. He believes this condition has been present throughout his life. He currently takes Eliquis for prevention.    DIET AND EXERCISE:  He rides bike every other day for two miles and performs cardio exercises at home, maintaining an active lifestyle. He avoids junk food and sodas, limiting sodium intake to approximately 1200 mg daily.    SLEEP:  He is scheduled for a sleep apnea test after a previous attempt was unsuccessful due to equipment malfunction.    MEDICATIONS:  He takes Rosuvastatin for cholesterol management.    FAMILY HISTORY:  Father  from an arterial embolism.      ROS:  General: -fever, -chills, -fatigue, -weight gain, -weight loss  Eyes: -vision changes, -redness, -discharge  ENT: -ear pain, -nasal congestion, -sore throat  Cardiovascular: -chest pain, -palpitations, -lower extremity edema, +orthostatic symptoms  Respiratory: -cough, -shortness of breath  Gastrointestinal: -abdominal pain, -nausea, -vomiting, -diarrhea,  -constipation, -blood in stool  Genitourinary: -dysuria, -hematuria, -frequency  Musculoskeletal: -joint pain, -muscle pain  Skin: -rash, -lesion  Neurological: -headache, +dizziness, -numbness, -tingling, +lightheadedness  Psychiatric: -anxiety, -depression, -sleep difficulty         Hospital Outpatient Visit on 03/11/2025   Component Date Value Ref Range Status    85% Max Predicted HR 03/11/2025 131   Final    Max Predicted HR 03/11/2025 154   Final    [facility] CV CPX PATIENT IS MALE 03/11/2025 1.0   Final    [facility] CV CPX PATIENT IS FEMALE 03/11/2025 0.0   Final    dose 03/11/2025 12.3  mcg/kg/min Final    dose 03/11/2025 27.0  mcg/kg/min Final    EF + QEF 03/11/2025 53  % Final    Ejection Fraction- High Stress 03/11/2025 65  % Final    End diastolic index (mL/m2) 03/11/2025 93  mL/m2 Final    End diastolic index (mL/m2) 03/11/2025 153  mL/m2 Final    End systolic index (mL/m2) 03/11/2025 31  mL/m2 Final    End systolic index (mL/m2) 03/11/2025 71  mL/m2 Final    Nuc Rest EF 03/11/2025 71   Final    Nuc Rest Diastolic Volume Index 03/11/2025 101   Final    Nuc Rest Systolic Volume Index 03/11/2025 29   Final    Nuc Stress EF 03/11/2025 64  % Final    Nuc Rest Diastolic Volume Index 03/11/2025 96   Final    Nuc Rest Systolic Volume Index 03/11/2025 35   Final    HR at rest 03/11/2025 66  bpm Final    Systolic blood pressure 03/11/2025 116  mmHg Final    Diastolic blood pressure 03/11/2025 86  mmHg Final    RPP 03/11/2025 7,656   Final    Exercise duration (min) 03/11/2025 6  minutes Final    Exercise duration (sec) 03/11/2025 47  seconds Final    Peak HR 03/11/2025 133  bpm Final    Peak Systolic BP 03/11/2025 201  mmHg Final    Peak Diatolic BP 03/11/2025 99  mmHg Final    Peak RPP 03/11/2025 26,733   Final    Estimated METs 03/11/2025 8   Final    % Max HR Achieved 03/11/2025 86   Final    1 Minute Recovery HR 03/11/2025 113  bpm Final   Lab Visit on 03/06/2025   Component Date Value Ref Range Status    Creatinine  03/06/2025 0.9  0.5 - 1.4 mg/dL Final    eGFR 03/06/2025 >60  >60 mL/min/1.73 m^2 Final       Past Medical History:   Diagnosis Date    Blood clot in vein     left leg    Emphysema lung 2021    Enlarged prostate     Heterozygous MTHFR mutation [id number] 08/27/2023    Hyperlipidemia 2021    Hypertension 2001    Kidney stone 2001    x3    Kidney stones     x3    Personal history of colonic polyps 10/20/2023    Thyroid disease     benign growth, partial thyroidectomy     Past Surgical History:   Procedure Laterality Date    ANGIOGRAPHY OF LOWER EXTREMITY Left 08/05/2022    Procedure: Angiogram Extremity Unilateral;  Surgeon: Ali Khoobehi, MD;  Location: [facility] CATH/EP LAB;  Service: Peripheral Vascular;  Laterality: Left;    ANGIOGRAPHY OF LOWER EXTREMITY Left 08/06/2022    Procedure: Angioplasty-peripheral;  Surgeon: Ismael Esquivel MD;  Location: [facility] CATH/EP LAB;  Service: General;  Laterality: Left;    ANGIOGRAPHY OF LOWER EXTREMITY Left 08/06/2022    Procedure: Angiogram Extremity Unilateral;  Surgeon: Ismael Esquivel MD;  Location: [facility] CATH/EP LAB;  Service: General;  Laterality: Left;    ANGIOGRAPHY OF LOWER EXTREMITY Left 08/07/2022    Procedure: Angiogram Extremity Unilateral;  Surgeon: Ismael Esquivel MD;  Location: [facility] CATH/EP LAB;  Service: General;  Laterality: Left;    ANGIOGRAPHY OF LOWER EXTREMITY Left 12/1/2023    Procedure: Angiogram Extremity Unilateral;  Surgeon: Ismael Esquivel MD;  Location: [facility] CATH/EP LAB;  Service: General;  Laterality: Left;    ANGIOGRAPHY OF LOWER EXTREMITY Left 12/2/2023    Procedure: Angiogram Extremity Unilateral;  Surgeon: Ismael Esquivel MD;  Location: [facility] CATH/EP LAB;  Service: General;  Laterality: Left;    ANGIOGRAPHY OF LOWER EXTREMITY Left 12/2/2023    Procedure: Angiogram Extremity Unilateral;  Surgeon: Ismael Esquivel MD;  Location: [facility] CATH/EP LAB;  Service: General;  Laterality: Left;    COLONOSCOPY N/A 10/20/2023    Procedure: COLONOSCOPY;  Surgeon:  Jeff Call MD;  Location: [facility] ENDO;  Service: Endoscopy;  Laterality: N/A;    COLONOSCOPY  10/20/2023    5 YR RECALL    CYSTOSCOPY N/A 09/05/2023    Procedure: CYSTOSCOPY;  Surgeon: Adam Tyler MD;  Location: [facility] OR;  Service: Urology;  Laterality: N/A;    EMBOLECTOMY OR THROMBECTOMY, BLOOD VESSEL, LOWER EXTREMITY Left 12/1/2023    Procedure: EMBOLECTOMY OR THROMBECTOMY, BLOOD VESSEL, LOWER EXTREMITY;  Surgeon: Ismael Esquivel MD;  Location: [facility] CATH/EP LAB;  Service: General;  Laterality: Left;    INJECTION OF TISSUE PLASMINOGEN ACTIVATOR Left 08/06/2022    Procedure: INJECTION, TISSUE PLASMINOGEN ACTIVATOR;  Surgeon: Ismael Esquivel MD;  Location: [facility] CATH/EP LAB;  Service: General;  Laterality: Left;    INJECTION OF TISSUE PLASMINOGEN ACTIVATOR Left 08/06/2022    Procedure: INJECTION, TISSUE PLASMINOGEN ACTIVATOR;  Surgeon: Ismael Esquivel MD;  Location: [facility] CATH/EP LAB;  Service: General;  Laterality: Left;    PROSTATE SURGERY  2018    REPAIR OF ANEURYSM Left 07/21/2021    Procedure: REPAIR POPLITEAL ARTERY ANEURYSM;  Surgeon: Ismael Esquivel MD;  Location: [facility] OR;  Service: Cardiovascular;  Laterality: Left;    THROMBECTOMY  08/06/2022    Procedure: THROMBECTOMY;  Surgeon: Ismael Esquivel MD;  Location: [facility] CATH/EP LAB;  Service: General;;    THROMBECTOMY Left 08/06/2022    Procedure: THROMBECTOMY;  Surgeon: Ismael Esquivel MD;  Location: [facility] CATH/EP LAB;  Service: General;  Laterality: Left;    THROMBECTOMY, LOWER ARTERIAL  12/2/2023    Procedure: Thrombectomy, Lower Arterial;  Surgeon: Ismael Esquivel MD;  Location: [facility] CATH/EP LAB;  Service: General;;    THYROIDECTOMY, PARTIAL  2011    TONSILLECTOMY      as a child    TRANSRECTAL BIOPSY OF PROSTATE WITH ULTRASOUND GUIDANCE N/A 09/05/2023    Procedure: BIOPSY, PROSTATE, RECTAL APPROACH, WITH US GUIDANCE;  Surgeon: Adam Tyler MD;  Location: [facility] OR;  Service: Urology;  Laterality: N/A;    TRANSURETHRAL  RESECTION OF PROSTATE N/A 11/16/2023    Procedure: TURP (TRANSURETHRAL RESECTION OF PROSTATE);  Surgeon: Adam Tyler MD;  Location: [facility];  Service: Urology;  Laterality: N/A;    urolift  04/2019    [facility]     Family History   Problem Relation Name Age of Onset    Hypertension Mother      COPD Mother      Pulmonary embolism Father         Marital Status:   Alcohol History:  reports that he does not currently use alcohol after a past usage of about 1.0 standard drink of alcohol per week.  Tobacco History:  reports that he has quit smoking. His smoking use included cigarettes. He has been exposed to tobacco smoke. He has never used smokeless tobacco.  Drug History:  reports no history of drug use.    Review of patient's allergies indicates:  No Known Allergies  Current Medications[1]    Objective:      Vitals:    03/18/25 0806   BP: 132/82   Pulse: 60   Resp: 18   SpO2: 98%   Weight: 84.8 kg (187 lb)   Height: 6' (1.829 m)     Physical Exam    Vitals: Blood pressure: 132/82.  General: No acute distress. Well-developed. Well-nourished.  Eyes: EOMI. Sclerae anicteric.  HENT: Normocephalic. Atraumatic. Nares patent. Moist oral mucosa.  Ears: Bilateral TMs clear. Bilateral EACs clear.  Cardiovascular: Regular rate. Regular rhythm. No murmurs. No rubs. No gallops. Normal S1, S2.  Respiratory: Normal respiratory effort. Clear to auscultation bilaterally. No rales. No rhonchi. No wheezing.  Abdomen: Soft. Non-tender. Non-distended. Normoactive bowel sounds.  Musculoskeletal: No  obvious deformity.  Extremities: No lower extremity edema.  Neurological: Alert & oriented x3. No slurred speech. Normal gait.  Psychiatric: Normal mood. Normal affect. Good insight. Good judgment.  Skin: Warm. Dry. No rash.         Assessment:       Assessment & Plan    - Reviewed blood pressure management, noting recent fluctuations.  - Assessed current medication regimen of Olmesartan 40 mg and Labetalol 300 mg BID.  -  Evaluated recent stress test results, noting fixed area in inferior and infraseptal wall.  - Considered history of blood clots and current anticoagulation therapy with Eliquis.  - Maintained current blood pressure medication dosages, allowing digital medication program to manage any necessary adjustments.  - Noted recent increase in LDL cholesterol from 61 to 104, with naturally higher HDL providing some risk reduction.  - Deferred to cardiologist's upcoming evaluation of stress test results for further cardiac management.    ESSENTIAL HYPERTENSION:  - Continue the digital medication program for blood pressure monitoring.  - Continue Olmesartan 40 mg daily.  - Continue and refill Labetalol 300 mg twice daily.  - Monitor blood pressure fluctuations, with the most recent reading at 132/82.  - Note that the patient experienced lightheadedness with the initial medication dosage.  - Authorize the pharmacist associated with the digital medicine program to manage and adjust blood pressure medication if needed.  - Helder to maintain current diet and sodium restriction (approximately 1200 mg daily).    CHRONIC ISCHEMIC HEART DISEASE:  - Schedule follow-up visit with cardiologist Dr. Manrique on the 28th to discuss stress test results.  - Note that the recent EKG was negative for ischemia.  - Record that the stress test showed a fixed area in the inferior and infraseptal wall, indicating a perfusion abnormality.    HYPERLIPIDEMIA:  - Continue and refill Rosuvastatin for cholesterol management.  - Monitor total cholesterol, which was approximately 190 in the last test.  - Note that LDL cholesterol increased to 104 from a previous value of 61.  - Observe that HDL cholesterol is naturally higher, which reduces risk.  - Aim for LDL cholesterol below 104 in the next test.    OBSTRUCTIVE SLEEP APNEA:  - Schedule the patient for a sleep apnea test soon.  - Note that the previous monitoring attempt was unsuccessful.    HISTORY OF  VENOUS THROMBOSIS AND EMBOLISM:  - Monitor the patient's history of multiple blood clot occurrences.  - Note the patient's belief that blood clots may be related to COVID vaccination, although this is not confirmed.  - Continue Eliquis to prevent blood clots.  - Continue Eliquis as the current anticoagulant therapy.    FAMILY HISTORY OF HEART DISEASE:  - Document that father  of an embolism and heart attack at age 85.    GENERAL RECOMMENDATIONS:  - Helder to continue current exercise routine, including bike riding and cardio activities.  - Follow up in approximately 6 months.  - Contact the office if any urgent issues arise or if not feeling well.       Plan:       Hypertension, unspecified type  Comments:  BP appears to be well managed. He has cut back on the daily dose as well. will keep meds as is. labetalon 300mg and 150mg morning and evening.  Orders:  -     labetaloL (NORMODYNE) 300 MG tablet; Take 1 tablet (300 mg total) by mouth 2 (two) times a day.  Dispense: 180 tablet; Refill: 1  -     olmesartan (BENICAR) 40 MG tablet; Take 1 tablet (40 mg total) by mouth every morning.  Dispense: 90 tablet; Refill: 1    Hyperlipidemia, unspecified hyperlipidemia type  -     rosuvastatin (CRESTOR) 10 MG tablet; Take 1 tablet (10 mg total) by mouth once daily.  Dispense: 90 tablet; Refill: 1      Follow up in about 6 months (around 2025).    This note was generated with the assistance of ambient listening technology. Verbal consent was obtained by the patient and accompanying visitor(s) for the recording of patient appointment to facilitate this note. I attest to having reviewed and edited the generated note for accuracy, though some syntax or spelling errors may persist. Please contact the author of this note for any clarification.          3/18/2025 Riley Atkinson PA-C      Answers submitted by the patient for this visit:  High Blood Pressure Questionnaire (Submitted on 3/11/2025)  Chief Complaint:  Hypertension  Chronicity: recurrent  Onset: more than 1 year ago  Progression since onset: gradually worsening  Condition status: resistant  anxiety: Yes  blurred vision: Yes  chest pain: No  headaches: No  malaise/fatigue: No  neck pain: Yes  orthopnea: No  palpitations: No  peripheral edema: No  PND: No  shortness of breath: No  sweats: No  Agents associated with hypertension: no associated agents  CAD risks: no known risk factors, dyslipidemia  Compliance problems: no compliance problems  Past treatments: nothing  Improvement on treatment: no improvement         [1]   Current Outpatient Medications:     albuterol (VENTOLIN HFA) 90 mcg/actuation inhaler, Inhale 2 puffs into the lungs every 4 (four) hours as needed for Wheezing or Shortness of Breath. Rescue, Disp: 18 g, Rfl: 11    albuterol-ipratropium (DUO-NEB) 2.5 mg-0.5 mg/3 mL nebulizer solution, Take 3 mLs by nebulization every 6 (six) hours as needed for Wheezing or Shortness of Breath. Rescue, Disp: 240 mL, Rfl: 5    apixaban (ELIQUIS) 5 mg Tab, Take 1 tablet (5 mg total) by mouth 2 (two) times daily., Disp: 60 tablet, Rfl: 7    benralizumab (FASENRA PEN) 30 mg/mL AtIn, Inject 30 mg into the skin every 8 weeks., Disp: 1 mL, Rfl: 5    budesonide-glycopyr-formoterol (BREZTRI AEROSPHERE) 160-9-4.8 mcg/actuation HFAA, Inhale 2 puffs into the lungs 2 (two) times a day., Disp: 32.1 g, Rfl: 3    cyanocobalamin/folic ac/vit B6 (FOLBIC ORAL), Take 1 tablet by mouth once daily., Disp: , Rfl:     fluticasone propionate (FLONASE) 50 mcg/actuation nasal spray, 2 sprays by Each Nostril route daily as needed for Allergies., Disp: , Rfl:     labetaloL (NORMODYNE) 300 MG tablet, Take 1 tablet (300 mg total) by mouth 2 (two) times a day., Disp: 180 tablet, Rfl: 1    olmesartan (BENICAR) 40 MG tablet, Take 1 tablet (40 mg total) by mouth every morning., Disp: 90 tablet, Rfl: 1    rosuvastatin (CRESTOR) 10 MG tablet, Take 1 tablet (10 mg total) by mouth once daily., Disp: 90  tablet, Rfl: 1  No current facility-administered medications for this visit.    Facility-Administered Medications Ordered in Other Visits:     lactated ringers infusion, , Intravenous, Continuous, Jasbir Aragon MD, Last Rate: 75 mL/hr at 11/07/19 1333, 1,000 mL at 11/07/19 1333

## 2025-03-28 ENCOUNTER — OFFICE VISIT (OUTPATIENT)
Dept: CARDIOLOGY | Facility: CLINIC | Age: 65
End: 2025-03-28
Payer: MEDICARE

## 2025-03-28 VITALS
DIASTOLIC BLOOD PRESSURE: 80 MMHG | SYSTOLIC BLOOD PRESSURE: 122 MMHG | HEART RATE: 67 BPM | WEIGHT: 187.5 LBS | OXYGEN SATURATION: 97 % | HEIGHT: 72 IN | BODY MASS INDEX: 25.4 KG/M2

## 2025-03-28 DIAGNOSIS — I10 PRIMARY HYPERTENSION: Primary | ICD-10-CM

## 2025-03-28 DIAGNOSIS — E78.2 MIXED HYPERLIPIDEMIA: ICD-10-CM

## 2025-03-28 DIAGNOSIS — I25.10 CORONARY ARTERY CALCIFICATION: ICD-10-CM

## 2025-03-28 DIAGNOSIS — I34.0 MILD MITRAL REGURGITATION: ICD-10-CM

## 2025-03-28 PROCEDURE — 99999 PR PBB SHADOW E&M-EST. PATIENT-LVL III: CPT | Mod: PBBFAC,,, | Performed by: STUDENT IN AN ORGANIZED HEALTH CARE EDUCATION/TRAINING PROGRAM

## 2025-03-28 NOTE — PROGRESS NOTES
Part of this note has been created using [employer] dictation system. Errors in transcription may not be completely avoided. Please do not hesitate to contact me.    Patient ID:  Helder Brand  64 y.o.  male      Assessment:       1. Primary hypertension    2. Mixed hyperlipidemia    3. Coronary artery calcification    4. Mild mitral regurgitation          Plan:     Asymptomatic from cardiac standpoint.  Angina or anginal equivalent symptoms at rest or with exertion.    Blood pressure is controlled.  Continue Crestor 10 mg daily.  Goal LDL<70.  His LDL had gone up on last lipid panel.  He had stopped taking the Crestor at that time on his own.  He restarted it a couple of months ago.  Continue olmesartan 40 mg daily and labetalol 300 mg b.i.d..  Routine surveillance echo periodically as per guidelines for mild valvular disease.    Continue Eliquis 5 mg b.i.d. for MTHFR mutation and history of arterial thromboembolism.   Follow up with PCP, heme/Onc, vascular surgery and thoracic surgery for surveillance and continued management of his comorbidities (ascending aortic aneurysm, PAD and MTHFR mutation).      Subjective:     Chief Complaint   Patient presents with    Hypertension    Hyperlipidemia    Results     stress       HPI:  Helder Brand is a 64 y.o. male who presents today for follow-up.  He has been feeling well and reports no symptoms.  He had some atypical symptoms prior to last visit which has resolved without any recurrence for a while.  He is very active.  He bikes for 30 minutes and does [employer] regularly. Reports no chest pain, dyspnea, orthopnea, PND, swelling of extremities, palpitations, syncope or near syncope.  Denies any claudication symptoms.  Discuss stress MPI results.  Did not show any reversible defects suggestive to suggest ischemia.  There was a small-sized defect secondary to diaphragmatic artifact.  Blood pressure has been better controlled.    He has history of hypertension,  hyperlipidemia, emphysema, former smoker, lower extremity arterial and venous thrombosis due to MTHFR mutation on Eliquis. He had prostate surgery in 2024, and after the surgery he had ischemic left foot due to thrombosis which was treated with thrombectomy. 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  Intact bypass graft from the distal superficial femoral artery to the distal popliteal artery and intact flow to the proximal trifurcation vessels of the left calf. No abdominal aortic aneurysm is identified.  There is normal flow into mesenteric and renal vessels and into the pelvic vessels.       PREVIOUS CARDIAC TESTING/PROCEDURES HISTORY:  Most Recent Echocardiogram Results  Results for orders placed during the hospital encounter of 09/06/23    Echo Saline Bubble? No    Interpretation Summary    Left Ventricle: The left ventricle is normal in size. Ventricular mass is normal. Normal wall thickness. Normal wall motion. There is normal systolic function with a visually estimated ejection fraction of 55 - 60%. There is normal diastolic function.    Right Ventricle: Normal right ventricular cavity size. Wall thickness is normal. Right ventricle wall motion  is normal. Systolic function is normal.    Mitral Valve: There is mild regurgitation.    Tricuspid Valve: There is no mass/vegetation present. There is mild regurgitation.    IVC/SVC: Normal venous pressure at 3 mmHg.      Most Recent Stress Test Results  Results for orders placed during the hospital encounter of 03/11/25    Nuclear Stress - Cardiology Interpreted    Interpretation Summary    Abnormal myocardial perfusion scan.    There is a mild to moderate intensity, small sized, equivocal perfusion abnormality that is fixed in the inferior and inferoseptal wall(s). This finding is equivocal due to diaphragm shadow.    There are no other significant perfusion abnormalities.    The gated perfusion images showed an  ejection fraction of 71% at rest. The gated perfusion images showed an ejection fraction of 64% post stress. Normal ejection fraction is greater than 53%.    There is normal wall motion at rest and post-stress.    The ECG portion of the study is negative for ischemia.    The patient reported no chest pain during the stress test.    During stress, rare PVCs are noted.    The patient exercised for 6 minutes 47 seconds on a Alfa protocol (8 METs), achieving a peak heart rate of 133 bpm, which is 86% of the age predicted maximum heart rate.      Most Recent Cardiac PET Stress Test Results  No results found for this or any previous visit.      Most Recent Cardiovascular Angiogram results  Results for orders placed during the hospital encounter of 12/01/23    Cardiac catheterization    Narrative  Procedure performed in the Invasive Lab  - See Procedure Log link below for nursing documentation  - See OpNote on Surgeries Tab for physician findings  - See Imaging Tab for radiologist dictation      Other Most Recent Cardiology Results  Results for orders placed during the hospital encounter of 12/01/23    CARDIAC MONITORING STRIPS      CARDIAC SURGERY:    Most Recent EKG Results  Results for orders placed or performed in visit on 04/12/24   IN OFFICE EKG 12-LEAD (to [place])    Collection Time: 04/12/24  8:40 AM   Result Value Ref Range    QRS Duration 98 ms    [facility] QTC Calculation 418 ms    Narrative    Test Reason : M54.2,    Vent. Rate : 068 BPM     Atrial Rate : 068 BPM     P-R Int : 204 ms          QRS Dur : 098 ms      QT Int : 394 ms       P-R-T Axes : 051 028 052 degrees     QTc Int : 418 ms    Sinus rhythm with occasional Premature ventricular complexes  Otherwise normal ECG  When compared with ECG of 01-DEC-2023 02:15,  Premature ventricular complexes are now Present  Confirmed by Mariah DOUGLAS, Russell HERNANDEZ ([id number]) on 5/2/2024 8:32:52 PM    Referred By:             Confirmed By:Russell Lemus MD         Review of patient's  allergies indicates:  No Known Allergies    Past Medical History:   Diagnosis Date    Blood clot in vein     left leg    Emphysema lung 2021    Enlarged prostate     Heterozygous MTHFR mutation [id number] 08/27/2023    Hyperlipidemia 2021    Hypertension 2001    Kidney stone 2001    x3    Kidney stones     x3    Personal history of colonic polyps 10/20/2023    Thyroid disease     benign growth, partial thyroidectomy     Past Surgical History:   Procedure Laterality Date    ANGIOGRAPHY OF LOWER EXTREMITY Left 08/05/2022    Procedure: Angiogram Extremity Unilateral;  Surgeon: Ali Khoobehi, MD;  Location: [facility] CATH/EP LAB;  Service: Peripheral Vascular;  Laterality: Left;    ANGIOGRAPHY OF LOWER EXTREMITY Left 08/06/2022    Procedure: Angioplasty-peripheral;  Surgeon: Ismael Esquivel MD;  Location: [facility] CATH/EP LAB;  Service: General;  Laterality: Left;    ANGIOGRAPHY OF LOWER EXTREMITY Left 08/06/2022    Procedure: Angiogram Extremity Unilateral;  Surgeon: Ismael Esquivel MD;  Location: [facility] CATH/EP LAB;  Service: General;  Laterality: Left;    ANGIOGRAPHY OF LOWER EXTREMITY Left 08/07/2022    Procedure: Angiogram Extremity Unilateral;  Surgeon: Ismael Esquivel MD;  Location: [facility] CATH/EP LAB;  Service: General;  Laterality: Left;    ANGIOGRAPHY OF LOWER EXTREMITY Left 12/1/2023    Procedure: Angiogram Extremity Unilateral;  Surgeon: Ismael Esquivel MD;  Location: [facility] CATH/EP LAB;  Service: General;  Laterality: Left;    ANGIOGRAPHY OF LOWER EXTREMITY Left 12/2/2023    Procedure: Angiogram Extremity Unilateral;  Surgeon: Ismael Esquivel MD;  Location: [facility] CATH/EP LAB;  Service: General;  Laterality: Left;    ANGIOGRAPHY OF LOWER EXTREMITY Left 12/2/2023    Procedure: Angiogram Extremity Unilateral;  Surgeon: Ismael Esquivel MD;  Location: [facility] CATH/EP LAB;  Service: General;  Laterality: Left;    COLONOSCOPY N/A 10/20/2023    Procedure: COLONOSCOPY;  Surgeon: Jeff Call MD;  Location: [facility] ENDO;   Service: Endoscopy;  Laterality: N/A;    COLONOSCOPY  10/20/2023    5 YR RECALL    CYSTOSCOPY N/A 09/05/2023    Procedure: CYSTOSCOPY;  Surgeon: Adam Tyler MD;  Location: [facility] OR;  Service: Urology;  Laterality: N/A;    EMBOLECTOMY OR THROMBECTOMY, BLOOD VESSEL, LOWER EXTREMITY Left 12/1/2023    Procedure: EMBOLECTOMY OR THROMBECTOMY, BLOOD VESSEL, LOWER EXTREMITY;  Surgeon: Ismael Esquivel MD;  Location: [facility] CATH/EP LAB;  Service: General;  Laterality: Left;    INJECTION OF TISSUE PLASMINOGEN ACTIVATOR Left 08/06/2022    Procedure: INJECTION, TISSUE PLASMINOGEN ACTIVATOR;  Surgeon: Ismael Esquivel MD;  Location: [facility] CATH/EP LAB;  Service: General;  Laterality: Left;    INJECTION OF TISSUE PLASMINOGEN ACTIVATOR Left 08/06/2022    Procedure: INJECTION, TISSUE PLASMINOGEN ACTIVATOR;  Surgeon: Ismael Esquivel MD;  Location: [facility] CATH/EP LAB;  Service: General;  Laterality: Left;    PROSTATE SURGERY  2018    REPAIR OF ANEURYSM Left 07/21/2021    Procedure: REPAIR POPLITEAL ARTERY ANEURYSM;  Surgeon: Ismael Esquivel MD;  Location: [facility] OR;  Service: Cardiovascular;  Laterality: Left;    THROMBECTOMY  08/06/2022    Procedure: THROMBECTOMY;  Surgeon: Ismael Esquivel MD;  Location: [facility] CATH/EP LAB;  Service: General;;    THROMBECTOMY Left 08/06/2022    Procedure: THROMBECTOMY;  Surgeon: Ismael Esquivel MD;  Location: [facility] CATH/EP LAB;  Service: General;  Laterality: Left;    THROMBECTOMY, LOWER ARTERIAL  12/2/2023    Procedure: Thrombectomy, Lower Arterial;  Surgeon: Ismael Esquivel MD;  Location: [facility] CATH/EP LAB;  Service: General;;    THYROIDECTOMY, PARTIAL  2011    TONSILLECTOMY      as a child    TRANSRECTAL BIOPSY OF PROSTATE WITH ULTRASOUND GUIDANCE N/A 09/05/2023    Procedure: BIOPSY, PROSTATE, RECTAL APPROACH, WITH US GUIDANCE;  Surgeon: Adam Tyler MD;  Location: [facility] OR;  Service: Urology;  Laterality: N/A;    TRANSURETHRAL RESECTION OF PROSTATE N/A 11/16/2023    Procedure:  TURP (TRANSURETHRAL RESECTION OF PROSTATE);  Surgeon: Adam Tyler MD;  Location: [facility];  Service: Urology;  Laterality: N/A;    urolift  04/2019    [facility]     Social History[1]       REVIEW OF SYSTEMS  CONSTITUTIONAL: No chills, no fatigue, no fever.   EYES: No double vision, no blurred vision  NEURO: No headaches, no dizziness  RESPIRATORY: No cough, no wheezing.    CARDIOVASCULAR: See HPI   GI: No abdominal pain, no melena, no diarrhea, no nausea or vomiting.   : No  dysuria and frequency, no hematuria  SKIN: no bruising, no discoloration  ENDOCRINE: no polyphagia, no heat intolerance, no cold intolerance  PSYCHIATRIC: no depression, no anxiety, no memory loss  MUSCULOSKELETAL: no  neck pain, no muscle weakness,no back pain          Objective:        Vitals:    03/28/25 1433   BP: 122/80   Pulse: 67         PHYSICAL EXAM  CONSTITUTIONAL: In no apparent distress  HEENT: Normocephalic. Pupils normal and conjunctivae normal. No pallor  NECK: No JVD  LUNGS: B/L air entry to the lungs, clear to auscultation. No rales, wheezing or rhonchi.  HEART: Normal rate and regular rhythm. Normal S1 and S2.  No murmur   ABDOMEN: soft, non-tender; bowel sounds normal  EXTREMITIES: No edema. Good palpable distal pulses.  NEURO: AAO X 3, no gross sensory or motor deficits  SKIN:  No rash  Psych:  Normal affect    Lab Results   Component Value Date    WBC 6.2 09/04/2024    HGB 16.3 09/04/2024    HCT 49.0 09/04/2024     09/04/2024    CHOL 178 09/04/2024    TRIG 48 09/04/2024    HDL 60 09/04/2024    ALT 13 09/04/2024    AST 19 09/04/2024     09/04/2024    K 4.3 09/04/2024     09/04/2024    CREATININE 0.9 03/06/2025    BUN 11 09/04/2024    CO2 26 09/04/2024    TSH 0.57 07/07/2023    INR 1.3 (H) 12/02/2023    HGBA1C 5.2 03/09/2022    MICROALBUR 0.5 09/04/2024        @  Lab Results   Component Value Date    CHOL 178 09/04/2024    CHOL 138 07/07/2023    CHOL 197 03/09/2022     Lab Results   Component  "Value Date    HDL 60 09/04/2024    HDL 66 07/07/2023    HDL 57 03/09/2022     Lab Results   Component Value Date    LDLCALC 104 (H) 09/04/2024    LDLCALC 61 07/07/2023    LDLCALC 130.2 03/09/2022     No results found for: "DLDL"  Lab Results   Component Value Date    TRIG 48 09/04/2024    TRIG 37 07/07/2023    TRIG 49 03/09/2022           Current Outpatient Medications   Medication Instructions    albuterol (VENTOLIN HFA) 90 mcg/actuation inhaler 2 puffs, Inhalation, Every 4 hours PRN, Rescue    albuterol-ipratropium (DUO-NEB) 2.5 mg-0.5 mg/3 mL nebulizer solution 3 mLs, Nebulization, Every 6 hours PRN, Rescue    apixaban (ELIQUIS) 5 mg, Oral, 2 times daily    budesonide-glycopyr-formoterol (BREZTRI AEROSPHERE) 160-9-4.8 mcg/actuation HFAA 2 puffs, Inhalation, 2 times daily    cyanocobalamin/folic ac/vit B6 (FOLBIC ORAL) 1 tablet, Daily    FASENRA PEN 30 mg, Subcutaneous, Every 8 weeks    fluticasone propionate (FLONASE) 50 mcg/actuation nasal spray 2 sprays, Daily PRN    labetaloL (NORMODYNE) 300 mg, Oral, 2 times daily    olmesartan (BENICAR) 40 mg, Oral, Every morning    rosuvastatin (CRESTOR) 10 mg, Oral, Daily       Medication List with Changes/Refills   Current Medications    ALBUTEROL (VENTOLIN HFA) 90 MCG/ACTUATION INHALER    Inhale 2 puffs into the lungs every 4 (four) hours as needed for Wheezing or Shortness of Breath. Rescue    ALBUTEROL-IPRATROPIUM (DUO-NEB) 2.5 MG-0.5 MG/3 ML NEBULIZER SOLUTION    Take 3 mLs by nebulization every 6 (six) hours as needed for Wheezing or Shortness of Breath. Rescue    APIXABAN (ELIQUIS) 5 MG TAB    Take 1 tablet (5 mg total) by mouth 2 (two) times daily.    BENRALIZUMAB (FASENRA PEN) 30 MG/ML ATIN    Inject 30 mg into the skin every 8 weeks.    BUDESONIDE-GLYCOPYR-FORMOTEROL (BREZTRI AEROSPHERE) 160-9-4.8 MCG/ACTUATION HFAA    Inhale 2 puffs into the lungs 2 (two) times a day.    CYANOCOBALAMIN/FOLIC AC/VIT B6 (FOLBIC ORAL)    Take 1 tablet by mouth once daily.    " FLUTICASONE PROPIONATE (FLONASE) 50 MCG/ACTUATION NASAL SPRAY    2 sprays by Each Nostril route daily as needed for Allergies.    LABETALOL (NORMODYNE) 300 MG TABLET    Take 1 tablet (300 mg total) by mouth 2 (two) times a day.    OLMESARTAN (BENICAR) 40 MG TABLET    Take 1 tablet (40 mg total) by mouth every morning.    ROSUVASTATIN (CRESTOR) 10 MG TABLET    Take 1 tablet (10 mg total) by mouth once daily.               All pertinent labs, imaging, and EKGs reviewed.  Patient's most recent EKG tracing was personally interpreted by this provider.    Problem List Items Addressed This Visit          Cardiac/Vascular    HTN (hypertension) - Primary     Other Visit Diagnoses         Mixed hyperlipidemia        Relevant Orders    Lipid Panel      Coronary artery calcification          Mild mitral regurgitation                Follow up in about 6 months (around 9/28/2025).          [1]   Social History  Tobacco Use    Smoking status: Former     Types: Cigarettes     Passive exposure: Past    Smokeless tobacco: Never   Substance Use Topics    Alcohol use: Not Currently     Alcohol/week: 1.0 standard drink of alcohol     Types: 1 Cans of beer per week     Comment: rare    Drug use: Never

## 2025-04-04 ENCOUNTER — TELEPHONE (OUTPATIENT)
Dept: UROLOGY | Facility: CLINIC | Age: 65
End: 2025-04-04
Payer: MEDICARE

## 2025-04-04 DIAGNOSIS — J45.50 SEVERE PERSISTENT ASTHMA WITHOUT COMPLICATION: ICD-10-CM

## 2025-04-09 RX ORDER — BENRALIZUMAB 30 MG/ML
30 INJECTION, SOLUTION SUBCUTANEOUS
Qty: 1 ML | Refills: 5 | Status: ACTIVE | OUTPATIENT
Start: 2025-04-09

## 2025-04-16 ENCOUNTER — TELEPHONE (OUTPATIENT)
Dept: PULMONOLOGY | Facility: CLINIC | Age: 65
End: 2025-04-16
Payer: MEDICARE

## 2025-05-30 ENCOUNTER — LAB VISIT (OUTPATIENT)
Dept: LAB | Facility: HOSPITAL | Age: 65
End: 2025-05-30
Attending: UROLOGY
Payer: MEDICARE

## 2025-05-30 DIAGNOSIS — N40.0 BPH WITHOUT OBSTRUCTION/LOWER URINARY TRACT SYMPTOMS: ICD-10-CM

## 2025-05-30 LAB
PSA FREE MFR SERPL: 17.73 %
PSA FREE SERPL-MCNC: 0.72 NG/ML
PSA SERPL-MCNC: 4.06 NG/ML (ref ?–4)

## 2025-05-30 PROCEDURE — 84153 ASSAY OF PSA TOTAL: CPT

## 2025-05-30 PROCEDURE — 36415 COLL VENOUS BLD VENIPUNCTURE: CPT

## 2025-06-05 ENCOUNTER — OFFICE VISIT (OUTPATIENT)
Dept: UROLOGY | Facility: CLINIC | Age: 65
End: 2025-06-05
Payer: MEDICARE

## 2025-06-05 VITALS — WEIGHT: 187.63 LBS | HEIGHT: 72 IN | BODY MASS INDEX: 25.41 KG/M2

## 2025-06-05 DIAGNOSIS — N40.0 BPH WITHOUT OBSTRUCTION/LOWER URINARY TRACT SYMPTOMS: Primary | ICD-10-CM

## 2025-06-05 DIAGNOSIS — R97.20 ELEVATED PSA: ICD-10-CM

## 2025-06-05 LAB
BACTERIA #/AREA URNS AUTO: NORMAL /HPF
BILIRUBIN, UA POC OHS: ABNORMAL
BLOOD, UA POC OHS: ABNORMAL
CLARITY, UA POC OHS: CLEAR
COLOR, UA POC OHS: YELLOW
GLUCOSE, UA POC OHS: NEGATIVE
KETONES, UA POC OHS: ABNORMAL
LEUKOCYTES, UA POC OHS: NEGATIVE
MICROSCOPIC COMMENT: NORMAL
NITRITE, UA POC OHS: NEGATIVE
PH, UA POC OHS: 6
POC RESIDUAL URINE VOLUME: 9 ML (ref 0–100)
PROTEIN, UA POC OHS: NEGATIVE
RBC #/AREA URNS AUTO: 3 /HPF (ref 0–4)
SPECIFIC GRAVITY, UA POC OHS: 1.01
UROBILINOGEN, UA POC OHS: 0.2
WBC #/AREA URNS AUTO: 2 /HPF (ref 0–5)

## 2025-06-05 PROCEDURE — 99999 PR PBB SHADOW E&M-EST. PATIENT-LVL III: CPT | Mod: PBBFAC,,, | Performed by: NURSE PRACTITIONER

## 2025-06-05 PROCEDURE — 81001 URINALYSIS AUTO W/SCOPE: CPT | Performed by: NURSE PRACTITIONER

## 2025-06-05 PROCEDURE — 87086 URINE CULTURE/COLONY COUNT: CPT | Performed by: NURSE PRACTITIONER

## 2025-06-06 LAB — BACTERIA UR CULT: NO GROWTH

## 2025-06-09 ENCOUNTER — PATIENT MESSAGE (OUTPATIENT)
Dept: ADMINISTRATIVE | Facility: OTHER | Age: 65
End: 2025-06-09
Payer: MEDICARE

## 2025-06-10 ENCOUNTER — TELEPHONE (OUTPATIENT)
Dept: UROLOGY | Facility: CLINIC | Age: 65
End: 2025-06-10
Payer: MEDICARE

## 2025-06-10 ENCOUNTER — RESULTS FOLLOW-UP (OUTPATIENT)
Dept: UROLOGY | Facility: CLINIC | Age: 65
End: 2025-06-10

## 2025-06-10 DIAGNOSIS — R31.29 MICROSCOPIC HEMATURIA: Primary | ICD-10-CM

## 2025-06-10 DIAGNOSIS — R97.20 ELEVATED PSA: Primary | ICD-10-CM

## 2025-06-10 NOTE — TELEPHONE ENCOUNTER
Spoke with pt regarding micro UA/culture results  Micro UA RBC 3  Culture no growth  Pt agrees to proceed with CTU with creatinine prior  Staff to call and schedule  Pt verbalized understanding

## 2025-06-12 ENCOUNTER — HOSPITAL ENCOUNTER (OUTPATIENT)
Dept: RADIOLOGY | Facility: HOSPITAL | Age: 65
Discharge: HOME | End: 2025-06-12
Attending: NURSE PRACTITIONER
Payer: MEDICARE

## 2025-06-12 DIAGNOSIS — R31.29 MICROSCOPIC HEMATURIA: ICD-10-CM

## 2025-06-12 LAB
CREAT SERPL-MCNC: 1.1 MG/DL (ref 0.5–1.4)
SAMPLE: NORMAL

## 2025-06-12 PROCEDURE — 82565 ASSAY OF CREATININE: CPT | Mod: PO

## 2025-06-12 PROCEDURE — 25500020 PHARM REV CODE 255: Mod: PO | Performed by: NURSE PRACTITIONER

## 2025-06-12 PROCEDURE — 74178 CT ABD&PLV WO CNTR FLWD CNTR: CPT | Mod: 26,,, | Performed by: RADIOLOGY

## 2025-06-12 RX ADMIN — IOHEXOL 100 ML: 350 INJECTION, SOLUTION INTRAVENOUS at 03:06

## 2025-06-13 ENCOUNTER — TELEPHONE (OUTPATIENT)
Dept: UROLOGY | Facility: CLINIC | Age: 65
End: 2025-06-13
Payer: MEDICARE

## 2025-06-13 ENCOUNTER — RESULTS FOLLOW-UP (OUTPATIENT)
Dept: UROLOGY | Facility: CLINIC | Age: 65
End: 2025-06-13

## 2025-06-13 NOTE — TELEPHONE ENCOUNTER
----- Message from Arjun Gross MD sent at 6/13/2025 12:11 PM CDT -----  I'd maybe just repeat UA micro in 6 months to ensure no increase in microhematuria.     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   I will move his PSA lab to prior to cysto as well once date decided    ----- Message -----  From: Interface, Rad Results In  Sent: 6/12/2025   4:48 PM CDT  To: Ina Bonilla NP

## 2025-06-13 NOTE — TELEPHONE ENCOUNTER
----- Message from Lore Atwood MD sent at 6/13/2025  9:52 AM CDT -----  I dont really feel strongly about a cysto here. Maybe others feel differently.   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   I will move his PSA lab to prior to cysto as well once date decided    ----- Message -----  From: Interface, Rad Results In  Sent: 6/12/2025   4:48 PM CDT  To: Ina Bonilla NP

## 2025-06-17 ENCOUNTER — TELEPHONE (OUTPATIENT)
Dept: UROLOGY | Facility: CLINIC | Age: 65
End: 2025-06-17
Payer: MEDICARE

## 2025-06-17 NOTE — TELEPHONE ENCOUNTER
Copied from CRM #[id number]. Topic: General Inquiry - Return Call  >> Jun 17, 2025 11:50 AM Maribel Smith wrote:  Type:  Patient Returning Call    Who Called:pt    Who Left Message for Patient:Candi PIÑA  Does the patient know what this is regarding?:yes    Would the patient rather a call back or a response via [facility]? Call back     Best Call Back Number:[phone]    Additional Information: Pt returning call.

## 2025-07-17 ENCOUNTER — LAB VISIT (OUTPATIENT)
Dept: LAB | Facility: HOSPITAL | Age: 65
End: 2025-07-17
Attending: NURSE PRACTITIONER
Payer: MEDICARE

## 2025-07-17 DIAGNOSIS — R97.20 ELEVATED PSA: ICD-10-CM

## 2025-07-17 LAB
PSA FREE MFR SERPL: 17.53 %
PSA FREE SERPL-MCNC: 0.71 NG/ML
PSA SERPL-MCNC: 4.05 NG/ML (ref ?–4)

## 2025-07-17 PROCEDURE — 36415 COLL VENOUS BLD VENIPUNCTURE: CPT

## 2025-07-17 PROCEDURE — 84153 ASSAY OF PSA TOTAL: CPT

## 2025-07-22 ENCOUNTER — OFFICE VISIT (OUTPATIENT)
Dept: PULMONOLOGY | Facility: CLINIC | Age: 65
End: 2025-07-22
Payer: MEDICARE

## 2025-07-22 VITALS
OXYGEN SATURATION: 98 % | SYSTOLIC BLOOD PRESSURE: 126 MMHG | BODY MASS INDEX: 25.43 KG/M2 | HEIGHT: 72 IN | DIASTOLIC BLOOD PRESSURE: 84 MMHG | HEART RATE: 55 BPM | WEIGHT: 187.75 LBS

## 2025-07-22 DIAGNOSIS — J47.9 BRONCHIECTASIS WITHOUT COMPLICATION: Primary | ICD-10-CM

## 2025-07-22 DIAGNOSIS — J44.89 ASTHMA-COPD OVERLAP SYNDROME: ICD-10-CM

## 2025-07-22 DIAGNOSIS — J44.89 COPD WITH ASTHMA: ICD-10-CM

## 2025-07-22 DIAGNOSIS — R91.1 LUNG NODULE: ICD-10-CM

## 2025-07-22 PROCEDURE — 99213 OFFICE O/P EST LOW 20 MIN: CPT | Mod: S$GLB,,, | Performed by: NURSE PRACTITIONER

## 2025-07-22 PROCEDURE — 1159F MED LIST DOCD IN RCRD: CPT | Mod: CPTII,S$GLB,, | Performed by: NURSE PRACTITIONER

## 2025-07-22 PROCEDURE — 1126F AMNT PAIN NOTED NONE PRSNT: CPT | Mod: CPTII,S$GLB,, | Performed by: NURSE PRACTITIONER

## 2025-07-22 PROCEDURE — 3008F BODY MASS INDEX DOCD: CPT | Mod: CPTII,S$GLB,, | Performed by: NURSE PRACTITIONER

## 2025-07-22 PROCEDURE — 1157F ADVNC CARE PLAN IN RCRD: CPT | Mod: CPTII,S$GLB,, | Performed by: NURSE PRACTITIONER

## 2025-07-22 PROCEDURE — 1101F PT FALLS ASSESS-DOCD LE1/YR: CPT | Mod: CPTII,S$GLB,, | Performed by: NURSE PRACTITIONER

## 2025-07-22 PROCEDURE — 3079F DIAST BP 80-89 MM HG: CPT | Mod: CPTII,S$GLB,, | Performed by: NURSE PRACTITIONER

## 2025-07-22 PROCEDURE — 4010F ACE/ARB THERAPY RXD/TAKEN: CPT | Mod: CPTII,S$GLB,, | Performed by: NURSE PRACTITIONER

## 2025-07-22 PROCEDURE — 3074F SYST BP LT 130 MM HG: CPT | Mod: CPTII,S$GLB,, | Performed by: NURSE PRACTITIONER

## 2025-07-22 PROCEDURE — 3288F FALL RISK ASSESSMENT DOCD: CPT | Mod: CPTII,S$GLB,, | Performed by: NURSE PRACTITIONER

## 2025-07-22 PROCEDURE — 99999 PR PBB SHADOW E&M-EST. PATIENT-LVL III: CPT | Mod: PBBFAC,,, | Performed by: NURSE PRACTITIONER

## 2025-07-22 NOTE — PROGRESS NOTES
7/22/2025    Helder rBand  Follow up    Chief Complaint   Patient presents with    4m f/u       HPI:   7/22/2025- states he is good, currently on Fasenra, states it is a miracle drug. Has noticed the hot weather has made him feel congested. Improved with albuterol nebulizer treatment.   Overall no complaints of cough, wheeze, or chest tightness. No exacerbations since starting biologic therapy. Exercising 3 times weekly with no issues.   On eliquis for DVTs.     6/13/2024- on Fasenra for 56 days, has noticed dramatic improvement in daily activities, has not required systemic steroids in past 3 months. States he is finally able to start exercising again, No longer has to take albuterol before exercising. Able to ride bike with out stopping to rest for several miles.   On Breztri daily, no complaints of cough, wheeze, or chest tightness. Has noticed decrease in amount of mucous in chest, no longer coughing to clear mucous.     3/12/2024- complaint of hoarse voice changes onset 1 month. Treated with prednisone and azithromycin with benefit. Symptoms returned after completing steroid therapy. Complaint of thick productive cough, associated with wheeze when laying down.   Using nebulizer and aerobika device  daily. Requires systemic steroids every other month for past year.     On Eliquis for DVTs. No issues with excessive bleeding.       12/28/2023- stopped blood thinners for surgery, had left foot and left lower leg pain.dx DVT left leg admitted to [facility]  12/03/2023 currently on Eliquis and will continue lifetime therapy. States SOB is persistent complaint, on Breztri most days with benefit.   Had exacerbation 2 weeks prior improved with steroid therapy.     7/6/2023- complaint of COPD exacerbation onset 3 weeks treated with antibiotics and oral steroids took two doses of therapy.   Complaint of productive cough. Green mucous. Treated with z-pack and doxycycline with no benefit.   Associated with chest tightness and  wheeze.       5/10/23- complaint of cough, onset 2 weeks, improved with nebulizer therapy.   Difficult to clear chest of mucous. Clear mucous. Worse in late evenings.   Associated with wheeze and chest tightness.   Currently on Eliquis for history DVT    10/20/22- had COVID 19 July 2022, states no current complaint of shortness of breath, cough, chest tightness, or wheeze. not currently using advair or nebulizer machine.   Left DVT in August currently on Eliquis,     6/1/2022- states breathing is improved after started nebulizer as needed. PCP treated with antibiotics for productive green mucous in April, resolved with therapy.   SOB- recurrent complaint, had trouble breathing while laying down improved with albuterol inhaler. Associated with chest tightness and wheeze in late evenings, most nights. Nocturnal arousals 1x weekly. States doing better while on antibiotics.   On advair most day, sometimes forgets. Started on Trelegy but insurance coverage is not affordable.   Able to work in garden but still having to take breaks. Worse in high heat.   Declined sleep apnea therapy.     3/9/2022-Cough- worsened in past 2 weeks, has to sleep in recliner, coughing fits at night. Productive thick mucous green in color. Using nebulizer 3x daily with benefit for few hours.   SOB- severe, worse with exertion, associated with wheeze and chest tightness.     States was previously doing well, able to do gardening for 10 minutes then having to rest. Not using stiolto due to difficultly with device.       11/19/2021- Complaint of worsening cough- onset 2 weeks, tx by PCP with azithromycin and Levaquin with minimal benefit. Coughing through out night. Productive thick green mucous that has turned yellow in color.   Associated with chest tightness and wheeze.        10/06/2021- since last visit pt had blood clot to left popliteal artery and required vascular surgery. He has a L leg wound vacc and getting wound care now.   He hasn't  smoked for 40 yrs. Feels some mild throat clearing w/ cough.   Used to swim a lot when young. Worked [employer] 8-10 yrs and got lots of exercise.  Mother smoked a little and got bad copd.    6/17/21-  Need disc of images from [facility]- please bring disc from home and drop off to our office. Once I review the images I can give more advice- possible biopsy?  Get pulmonary function tests  Follow up with cardiologist  Pt is a 62 yo male with HTN, thyroid disease presenting for new evaluation.  Pt reports he had abnormal chest x-ray which was found after he had episode of chest tightness.  Has been getting these episodes of pressure like anterior chest discomfort, off and on for several months, usually while at rest and lasts few minutes. He rides bike 3-6 miles a day and denies LAW or chest tightness w/ exertion. Sometimes has slight wheeze when laying down at night but no cough/mucous. No inhaler use. No childhood asthma or breathing trouble. Denies frequent bronchitis.  Secondhand smoke from grandparents and parents- teenage cigarette smoke but quit at age 17. Mother smoked and now on hospice with COPD.  Pt had CT chest after abnormality seen on CXR 3/2021 with RLL nodule- forgot disc at home. (done at [facility])  He is going to have an echo at 3pm today.  In past had growth on thyroid- benign, had partial thyroidectomy.  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 08/27/2023    Hyperlipidemia 2021    Hypertension 2001    Kidney stone 2001    x3    Kidney stones     x3    Personal history of colonic polyps 10/20/2023    Thyroid disease     benign growth, partial thyroidectomy         Past Surgical History:   Procedure Laterality Date    ANGIOGRAPHY OF LOWER EXTREMITY Left 08/05/2022     Procedure: Angiogram Extremity Unilateral;  Surgeon: Ali Khoobehi, MD;  Location: [facility] CATH/EP LAB;  Service: Peripheral Vascular;  Laterality: Left;    ANGIOGRAPHY OF LOWER EXTREMITY Left 08/06/2022    Procedure: Angioplasty-peripheral;  Surgeon: Ismael Esquivel MD;  Location: [facility] CATH/EP LAB;  Service: General;  Laterality: Left;    ANGIOGRAPHY OF LOWER EXTREMITY Left 08/06/2022    Procedure: Angiogram Extremity Unilateral;  Surgeon: Ismael Esquivel MD;  Location: [facility] CATH/EP LAB;  Service: General;  Laterality: Left;    ANGIOGRAPHY OF LOWER EXTREMITY Left 08/07/2022    Procedure: Angiogram Extremity Unilateral;  Surgeon: Ismael Esquivel MD;  Location: [facility] CATH/EP LAB;  Service: General;  Laterality: Left;    ANGIOGRAPHY OF LOWER EXTREMITY Left 12/1/2023    Procedure: Angiogram Extremity Unilateral;  Surgeon: Ismael Esquivel MD;  Location: [facility] CATH/EP LAB;  Service: General;  Laterality: Left;    ANGIOGRAPHY OF LOWER EXTREMITY Left 12/2/2023    Procedure: Angiogram Extremity Unilateral;  Surgeon: Ismael Esquivel MD;  Location: [facility] CATH/EP LAB;  Service: General;  Laterality: Left;    ANGIOGRAPHY OF LOWER EXTREMITY Left 12/2/2023    Procedure: Angiogram Extremity Unilateral;  Surgeon: Ismael Esquivel MD;  Location: [facility] CATH/EP LAB;  Service: General;  Laterality: Left;    COLONOSCOPY N/A 10/20/2023    Procedure: COLONOSCOPY;  Surgeon: Jeff Call MD;  Location: [facility] ENDO;  Service: Endoscopy;  Laterality: N/A;    COLONOSCOPY  10/20/2023    5 YR RECALL    CYSTOSCOPY N/A 09/05/2023    Procedure: CYSTOSCOPY;  Surgeon: Adam Tyler MD;  Location: [facility] OR;  Service: Urology;  Laterality: N/A;    EMBOLECTOMY OR THROMBECTOMY, BLOOD VESSEL, LOWER EXTREMITY Left 12/1/2023    Procedure: EMBOLECTOMY OR THROMBECTOMY, BLOOD VESSEL, LOWER EXTREMITY;  Surgeon: Ismael Esquivel MD;  Location: [facility] CATH/EP LAB;  Service: General;  Laterality: Left;    INJECTION OF TISSUE PLASMINOGEN ACTIVATOR  Left 08/06/2022    Procedure: INJECTION, TISSUE PLASMINOGEN ACTIVATOR;  Surgeon: Ismael Esquivel MD;  Location: [facility] CATH/EP LAB;  Service: General;  Laterality: Left;    INJECTION OF TISSUE PLASMINOGEN ACTIVATOR Left 08/06/2022    Procedure: INJECTION, TISSUE PLASMINOGEN ACTIVATOR;  Surgeon: Ismael Esquivel MD;  Location: [facility] CATH/EP LAB;  Service: General;  Laterality: Left;    PROSTATE SURGERY  2018    REPAIR OF ANEURYSM Left 07/21/2021    Procedure: REPAIR POPLITEAL ARTERY ANEURYSM;  Surgeon: Ismael Esquivle MD;  Location: [facility] OR;  Service: Cardiovascular;  Laterality: Left;    THROMBECTOMY  08/06/2022    Procedure: THROMBECTOMY;  Surgeon: Ismael Esquivel MD;  Location: [facility] CATH/EP LAB;  Service: General;;    THROMBECTOMY Left 08/06/2022    Procedure: THROMBECTOMY;  Surgeon: Ismael Esquivel MD;  Location: [facility] CATH/EP LAB;  Service: General;  Laterality: Left;    THROMBECTOMY, LOWER ARTERIAL  12/2/2023    Procedure: Thrombectomy, Lower Arterial;  Surgeon: Ismael Esquivel MD;  Location: [facility] CATH/EP LAB;  Service: General;;    THYROIDECTOMY, PARTIAL  2011    TONSILLECTOMY      as a child    TRANSRECTAL BIOPSY OF PROSTATE WITH ULTRASOUND GUIDANCE N/A 09/05/2023    Procedure: BIOPSY, PROSTATE, RECTAL APPROACH, WITH US GUIDANCE;  Surgeon: Adam Tyler MD;  Location: [facility] OR;  Service: Urology;  Laterality: N/A;    TRANSURETHRAL RESECTION OF PROSTATE N/A 11/16/2023    Procedure: TURP (TRANSURETHRAL RESECTION OF PROSTATE);  Surgeon: Adam Tyler MD;  Location: [facility] OR;  Service: Urology;  Laterality: N/A;    urolift  04/2019    [facility]     Social History     Tobacco Use    Smoking status: Former     Types: Cigarettes     Passive exposure: Past    Smokeless tobacco: Never   Substance Use Topics    Alcohol use: Not Currently     Alcohol/week: 1.0 standard drink of alcohol     Types: 1 Cans of beer per week     Comment: rare    Drug use: Never     Family History   Problem Relation Name Age  of Onset    Hypertension Mother      COPD Mother      Pulmonary embolism Father       Review of patient's allergies indicates:  No Known Allergies    Performance Status:The patient's activity level is regular exercise.      Review of Systems:  a review of eleven systems covering constitutional, Eye, HEENT, Psych, Respiratory, Cardiac, GI, , Musculoskeletal, Endocrine, Dermatologic was negative except for pertinent findings as listed ABOVE and below:  All negative with pertinent positives as above       Exam:Comprehensive exam done. /84 (BP Location: Right arm, Patient Position: Sitting)   Pulse (!) 55   Ht 6' (1.829 m)   Wt 85.2 kg (187 lb 11.6 oz)   SpO2 98% Comment: on room air at rest  BMI 25.46 kg/m²   Exam included Vitals as listed, and patient's appearance and affect and alertness and mood, oral exam for yeast and hygiene and pharynx lesions and Mallapatti (M) score, neck with inspection for jvd and masses and thyroid abnormalities and lymph nodes (supraclavicular and infraclavicular nodes and axillary also examined and noted if abn), chest exam included symmetry and effort and fremitus and percussion and auscultation, cardiac exam included rhythm and gallops and murmur and rubs and jvd and edema, abdominal exam for mass and hepatosplenomegaly and tenderness and hernias and bowel sounds, Musculoskeletal exam with muscle tone and posture and mobility/gait and  strength, and skin for rashes and cyanosis and pallor and turgor, extremity for clubbing.  Findings were normal except for pertinent findings listed below:  BS clear  Thin, athletic build    Radiographs (ct chest and cxr) reviewed: view by direct vision   CT Urogram Abd Pelvis W WO 06/12/2025 3 mm noncalcified pulmonary nodule in the right lower, bronchiectasis and atelectasis    CTA Chest Non-Coronary 01/12/2024 Band like and platelike posterior bibasilar opacification, peribronchial thickening, mild bronchiectasis not significantly  changed in appearance    CTA Chest Non-Coronary 3/22/2023 peribronchial thickening, mild bronchiectasis. Bilateral lower lobe linear atelectasis    X-Ray Chest PA And Lateral 06/22/2023 lungs clear.    CTA Chest Non-Coronary 03/22/23 dependent atelectasis, no lung nodules    CTA Chest Non-Coronary 08/13/22 no PE seen; right lower lung nodule decreased in size, left is stable    [facility] Chest x-ray: 4/21/22 findings appear consitent wtih chronic small airwasy disease. no consolidation or other adute process in evident, no significnat or detrimental interval changes in comparison to CXR 11/15/2021      X-Ray Chest AP Portable 01/02/22    Minimal basilar parenchymal opacities or atelectasis.  Small nodular opacity observed in the right lung base laterally.      CTA Chest Non-Coronary (PE Study) 01/03/22 Bibasilar atelectasis/infiltrate. Pneumonia must be considered     Outside CT (uploaded) chest 6/9/21- mild linear atelectasis bilat lower lobes, mild pleural based nodules which look like rounded atelectasis.  CXR 3/9/21- RLL nodule  CT abd/p 8/2019- rounded atelectasis bibasilar present at that time    Labs reviewed      Lab Results   Component Value Date    WBC 6.2 09/04/2024    RBC 5.05 09/04/2024    HGB 16.3 09/04/2024    HCT 49.0 09/04/2024    MCV 97.0 09/04/2024    MCH 32.3 09/04/2024    MCHC 33.3 09/04/2024    RDW 13.0 09/04/2024     09/04/2024    MPV 9.5 09/04/2024    GRAN 4.7 03/12/2024    GRAN 54.4 03/12/2024    LYMPH 1,773 09/04/2024    LYMPH 28.6 09/04/2024    MONO 843 09/04/2024    MONO 13.6 09/04/2024    EOS 0 (L) 09/04/2024    BASO 12 09/04/2024    EOSINOPHIL 0.0 09/04/2024    BASOPHIL 0.2 09/04/2024      Latest Reference Range & Units 10/16/23 08:17 11/06/23 13:20 12/01/23 02:22 12/01/23 18:25   Eos # 0.0 - 0.5 K/uL 0.2 0.2 0.5 0.2       Culture, Respiratory with Gram Stain 03/10/22   Normal respiratory whitney       PFT reviewed- mild obstruction, increased lung vol. DLCO  normal          Plan:  Clinical impression is apparently straight forward and impression with management as below.    Helder was seen today for 4m f/u.    Diagnoses and all orders for this visit:    Bronchiectasis without complication  Comments:  - continue current prescription medication regiment    COPD with asthma  Comments:  - continue current prescription medication regiment    Lung nodule  Comments:  - CT abdomen reviewed, stable    Asthma-COPD overlap syndrome  Comments:  - continue current prescription medication regiment        Follow up in about 6 months (around 1/22/2026), or if symptoms worsen or fail to improve.      Discussed with patient above for education the following:      There are no Patient Instructions on file for this visit.

## 2025-07-24 ENCOUNTER — PATIENT MESSAGE (OUTPATIENT)
Dept: PULMONOLOGY | Facility: CLINIC | Age: 65
End: 2025-07-24
Payer: MEDICARE

## 2025-07-25 DIAGNOSIS — R06.02 SHORTNESS OF BREATH: ICD-10-CM

## 2025-07-25 DIAGNOSIS — J44.9 COPD, MILD: ICD-10-CM

## 2025-07-25 DIAGNOSIS — J44.89 ASTHMA-COPD OVERLAP SYNDROME: ICD-10-CM

## 2025-07-25 RX ORDER — ALBUTEROL SULFATE 90 UG/1
2 INHALANT RESPIRATORY (INHALATION) EVERY 4 HOURS PRN
Qty: 18 G | Refills: 11 | Status: SHIPPED | OUTPATIENT
Start: 2025-07-25

## 2025-07-25 RX ORDER — BUDESONIDE, GLYCOPYRROLATE, AND FORMOTEROL FUMARATE 160; 9; 4.8 UG/1; UG/1; UG/1
2 AEROSOL, METERED RESPIRATORY (INHALATION) 2 TIMES DAILY
Qty: 32.1 G | Refills: 3 | Status: SHIPPED | OUTPATIENT
Start: 2025-07-25

## 2025-07-29 ENCOUNTER — OFFICE VISIT (OUTPATIENT)
Dept: FAMILY MEDICINE | Facility: CLINIC | Age: 65
End: 2025-07-29
Payer: MEDICARE

## 2025-07-29 ENCOUNTER — TELEPHONE (OUTPATIENT)
Dept: DERMATOLOGY | Facility: CLINIC | Age: 65
End: 2025-07-29
Payer: MEDICARE

## 2025-07-29 ENCOUNTER — HOSPITAL ENCOUNTER (OUTPATIENT)
Dept: RADIOLOGY | Facility: HOSPITAL | Age: 65
Discharge: HOME | End: 2025-07-29
Attending: NURSE PRACTITIONER
Payer: MEDICARE

## 2025-07-29 VITALS
RESPIRATION RATE: 18 BRPM | SYSTOLIC BLOOD PRESSURE: 110 MMHG | OXYGEN SATURATION: 96 % | HEIGHT: 72 IN | HEART RATE: 63 BPM | DIASTOLIC BLOOD PRESSURE: 68 MMHG | BODY MASS INDEX: 25.19 KG/M2 | WEIGHT: 186 LBS

## 2025-07-29 DIAGNOSIS — I10 HYPERTENSION, UNSPECIFIED TYPE: Primary | ICD-10-CM

## 2025-07-29 DIAGNOSIS — J44.9 CHRONIC OBSTRUCTIVE PULMONARY DISEASE, UNSPECIFIED COPD TYPE: ICD-10-CM

## 2025-07-29 DIAGNOSIS — N13.8 BPH WITH URINARY OBSTRUCTION: ICD-10-CM

## 2025-07-29 DIAGNOSIS — Z12.83 SKIN EXAM, SCREENING FOR CANCER: ICD-10-CM

## 2025-07-29 DIAGNOSIS — N40.1 BPH WITH URINARY OBSTRUCTION: ICD-10-CM

## 2025-07-29 DIAGNOSIS — R97.20 ELEVATED PSA: ICD-10-CM

## 2025-07-29 DIAGNOSIS — Z15.89 HETEROZYGOUS MTHFR MUTATION C677T: ICD-10-CM

## 2025-07-29 DIAGNOSIS — E78.5 HYPERLIPIDEMIA, UNSPECIFIED HYPERLIPIDEMIA TYPE: ICD-10-CM

## 2025-07-29 PROCEDURE — 1159F MED LIST DOCD IN RCRD: CPT | Mod: CPTII,S$GLB,, | Performed by: PHYSICIAN ASSISTANT

## 2025-07-29 PROCEDURE — 25500020 PHARM REV CODE 255

## 2025-07-29 PROCEDURE — 1126F AMNT PAIN NOTED NONE PRSNT: CPT | Mod: CPTII,S$GLB,, | Performed by: PHYSICIAN ASSISTANT

## 2025-07-29 PROCEDURE — A9585 GADOBUTROL INJECTION: HCPCS

## 2025-07-29 PROCEDURE — 72197 MRI PELVIS W/O & W/DYE: CPT | Mod: 26,,, | Performed by: RADIOLOGY

## 2025-07-29 PROCEDURE — 3288F FALL RISK ASSESSMENT DOCD: CPT | Mod: CPTII,S$GLB,, | Performed by: PHYSICIAN ASSISTANT

## 2025-07-29 PROCEDURE — 1101F PT FALLS ASSESS-DOCD LE1/YR: CPT | Mod: CPTII,S$GLB,, | Performed by: PHYSICIAN ASSISTANT

## 2025-07-29 PROCEDURE — 3074F SYST BP LT 130 MM HG: CPT | Mod: CPTII,S$GLB,, | Performed by: PHYSICIAN ASSISTANT

## 2025-07-29 PROCEDURE — 1157F ADVNC CARE PLAN IN RCRD: CPT | Mod: CPTII,S$GLB,, | Performed by: PHYSICIAN ASSISTANT

## 2025-07-29 PROCEDURE — 72197 MRI PELVIS W/O & W/DYE: CPT | Mod: TC

## 2025-07-29 PROCEDURE — 3078F DIAST BP <80 MM HG: CPT | Mod: CPTII,S$GLB,, | Performed by: PHYSICIAN ASSISTANT

## 2025-07-29 PROCEDURE — 4010F ACE/ARB THERAPY RXD/TAKEN: CPT | Mod: CPTII,S$GLB,, | Performed by: PHYSICIAN ASSISTANT

## 2025-07-29 PROCEDURE — 3008F BODY MASS INDEX DOCD: CPT | Mod: CPTII,S$GLB,, | Performed by: PHYSICIAN ASSISTANT

## 2025-07-29 PROCEDURE — 99214 OFFICE O/P EST MOD 30 MIN: CPT | Mod: S$GLB,,, | Performed by: PHYSICIAN ASSISTANT

## 2025-07-29 RX ORDER — GADOBUTROL 604.72 MG/ML
INJECTION INTRAVENOUS
Status: COMPLETED
Start: 2025-07-29 | End: 2025-07-29

## 2025-07-29 RX ADMIN — GADOBUTROL 8 ML: 604.72 INJECTION INTRAVENOUS at 04:07

## 2025-07-29 NOTE — PROGRESS NOTES
"  SUBJECTIVE:    Patient ID: Helder Brand is a 65 y.o. male.    Chief Complaint: Follow-up (No bottles// no refills needed// pt states he would like to go over CT scan that was done 06/12)    History of Present Illness    CHIEF COMPLAINT:  Helder presents today for follow up.    UROLOGIC CONCERNS:  He reports progressively increasing PSA levels from 2.0 one year ago to 4.0 two months prior, with most recent value of 4.5-4.6. He has a scheduled prostate MRI today at 4:00 PM for further evaluation. He has a history of microhematuria and chronic bladder outlet obstruction. CT urogram showed a nonobstructing right renal stone without hydronephrosis or enhancing renal mass. He previously underwent TURP with Dr. Tyler and was advised to repeat urinalysis in 6 months post-procedure.    RESPIRATORY:  He reports significant improvement in breathing since starting Fasenra, describing it as a "miracle drug". He experiences occasional mild breathing difficulty, particularly when cutting grass. His pulmonologist identified an esophageal component to his respiratory symptoms, with occasional sensation of something caught in his throat. He has notably large lungs requiring two X-ray images for complete visualization. He denies recent respiratory exacerbations.    OPHTHALMOLOGIC:  He reports progressive cataract development with worsening vision, particularly noting increased glare and blurriness at night that impacts driving ability. He last saw ophthalmologist Dr. LE in January and plans follow-up for evaluation of potential surgical intervention.    DERMATOLOGIC:  He has history of basal cell carcinoma removal from right shoulder area and reports occasional tingling and heat sensation at the surgical site. He has not had a dermatology follow up in two years and desires routine skin check. He denies any new concerning skin lesions.    MEDICATIONS:  He reports adherence to blood pressure medication.    EXERCISE:  He " maintains regular exercise 3 times per week. He has temporarily suspended bicycle riding due to excessive heat and plans to resume when temperatures decrease.      ROS:  General: -fever, -chills, -fatigue, -weight gain, -weight loss  Eyes: -vision changes, -redness, -discharge, +blurry vision  ENT: -ear pain, -nasal congestion, -sore throat, +hoarseness, +sense of lump/mass in throat when swallowing  Cardiovascular: -chest pain, -palpitations, -lower extremity edema  Respiratory: -cough, +shortness of breath  Gastrointestinal: -abdominal pain, -nausea, -vomiting, -diarrhea, -constipation, -blood in stool  Genitourinary: -dysuria, -hematuria, -frequency  Musculoskeletal: -joint pain, -muscle pain  Skin: -rash, -lesion, +burning sensation  Neurological: -headache, -dizziness, -numbness, +tingling  Psychiatric: -anxiety, -depression, -sleep difficulty         Lab Visit on 07/17/2025   Component Date Value Ref Range Status    Prostate Specific Antigen 07/17/2025 4.05 (H)  Not established ng/mL Final    Prostate Specific Antigen Free 07/17/2025 0.71  <=1.50 ng/mL Final    PSA % Free 07/17/2025 17.53  Not established % Final   Hospital Outpatient Visit on 06/12/2025   Component Date Value Ref Range Status    POC Creatinine 06/12/2025 1.1  0.5 - 1.4 mg/dL Final    Sample 06/12/2025 VENOUS   Final   Office Visit on 06/05/2025   Component Date Value Ref Range Status    POC Residual Urine Volume 06/05/2025 9  0 - 100 mL Final    Color, POC UA 06/05/2025 Yellow  Yellow, Straw, Colorless Final    Clarity, POC UA 06/05/2025 Clear  Clear Final    Glucose, POC UA 06/05/2025 Negative  Negative Final    Bilirubin, POC UA 06/05/2025 Small (A)  Negative Final    Ketones, POC UA 06/05/2025 Trace (A)  Negative Final    Spec Grav POC UA 06/05/2025 1.010  1.005 - 1.030 Final    Blood, POC UA 06/05/2025 Moderate (A)  Negative Final    pH, POC UA 06/05/2025 6.0  5.0 - 8.0 Final    Protein, POC UA 06/05/2025 Negative  Negative Final     Urobilinogen, POC UA 06/05/2025 0.2  <=1.0 Final    Nitrite, POC UA 06/05/2025 Negative  Negative Final    WBC, POC UA 06/05/2025 Negative  Negative Final    RBC, UA 06/05/2025 3  0 - 4 /HPF Final    WBC, UA 06/05/2025 2  0 - 5 /HPF Final    Bacteria, UA 06/05/2025 Occasional  None, Rare, Occasional /HPF Final    Microscopic Comment 06/05/2025    Final    Urine Culture 06/05/2025 No Growth   Final   Lab Visit on 05/30/2025   Component Date Value Ref Range Status    Prostate Specific Antigen 05/30/2025 4.06 (H)  Not established ng/mL Final    Prostate Specific Antigen Free 05/30/2025 0.72  <=1.50 ng/mL Final    PSA % Free 05/30/2025 17.73  Not established % Final   Hospital Outpatient Visit on 03/11/2025   Component Date Value Ref Range Status    85% Max Predicted HR 03/11/2025 131   Final    Max Predicted HR 03/11/2025 154   Final    [facility] CV CPX PATIENT IS MALE 03/11/2025 1.0   Final    [facility] CV CPX PATIENT IS FEMALE 03/11/2025 0.0   Final    dose 03/11/2025 12.3  mcg/kg/min Final    dose 03/11/2025 27.0  mcg/kg/min Final    EF + QEF 03/11/2025 53  % Final    Ejection Fraction- High Stress 03/11/2025 65  % Final    End diastolic index (mL/m2) 03/11/2025 93  mL/m2 Final    End diastolic index (mL/m2) 03/11/2025 153  mL/m2 Final    End systolic index (mL/m2) 03/11/2025 31  mL/m2 Final    End systolic index (mL/m2) 03/11/2025 71  mL/m2 Final    Nuc Rest EF 03/11/2025 71   Final    Nuc Rest Diastolic Volume Index 03/11/2025 101   Final    Nuc Rest Systolic Volume Index 03/11/2025 29   Final    Nuc Stress EF 03/11/2025 64  % Final    Nuc Rest Diastolic Volume Index 03/11/2025 96   Final    Nuc Rest Systolic Volume Index 03/11/2025 35   Final    HR at rest 03/11/2025 66  bpm Final    Systolic blood pressure 03/11/2025 116  mmHg Final    Diastolic blood pressure 03/11/2025 86  mmHg Final    RPP 03/11/2025 7,656   Final    Exercise duration (min) 03/11/2025 6  minutes Final    Exercise duration (sec) 03/11/2025 47  seconds  Final    Peak HR 03/11/2025 133  bpm Final    Peak Systolic BP 03/11/2025 201  mmHg Final    Peak Diatolic BP 03/11/2025 99  mmHg Final    Peak RPP 03/11/2025 26,733   Final    Estimated METs 03/11/2025 8   Final    % Max HR Achieved 03/11/2025 86   Final    1 Minute Recovery HR 03/11/2025 113  bpm Final   Lab Visit on 03/06/2025   Component Date Value Ref Range Status    Creatinine 03/06/2025 0.9  0.5 - 1.4 mg/dL Final    eGFR 03/06/2025 >60  >60 mL/min/1.73 m^2 Final       Past Medical History:   Diagnosis Date    Blood clot in vein     left leg    Emphysema lung 2021    Enlarged prostate     Heterozygous MTHFR mutation C677T 08/27/2023    Hyperlipidemia 2021    Hypertension 2001    Kidney stone 2001    x3    Kidney stones     x3    Personal history of colonic polyps 10/20/2023    Thyroid disease     benign growth, partial thyroidectomy     Past Surgical History:   Procedure Laterality Date    ANGIOGRAPHY OF LOWER EXTREMITY Left 08/05/2022    Procedure: Angiogram Extremity Unilateral;  Surgeon: Ali Khoobehi, MD;  Location: [facility] CATH/EP LAB;  Service: Peripheral Vascular;  Laterality: Left;    ANGIOGRAPHY OF LOWER EXTREMITY Left 08/06/2022    Procedure: Angioplasty-peripheral;  Surgeon: Ismael Esquivel MD;  Location: [facility] CATH/EP LAB;  Service: General;  Laterality: Left;    ANGIOGRAPHY OF LOWER EXTREMITY Left 08/06/2022    Procedure: Angiogram Extremity Unilateral;  Surgeon: Ismael Esquivel MD;  Location: [facility] CATH/EP LAB;  Service: General;  Laterality: Left;    ANGIOGRAPHY OF LOWER EXTREMITY Left 08/07/2022    Procedure: Angiogram Extremity Unilateral;  Surgeon: Ismael Esquivel MD;  Location: [facility] CATH/EP LAB;  Service: General;  Laterality: Left;    ANGIOGRAPHY OF LOWER EXTREMITY Left 12/1/2023    Procedure: Angiogram Extremity Unilateral;  Surgeon: Ismael Esquivel MD;  Location: [facility] CATH/EP LAB;  Service: General;  Laterality: Left;    ANGIOGRAPHY OF LOWER EXTREMITY Left 12/2/2023    Procedure: Angiogram  Extremity Unilateral;  Surgeon: Ismael Esquivel MD;  Location: [facility] CATH/EP LAB;  Service: General;  Laterality: Left;    ANGIOGRAPHY OF LOWER EXTREMITY Left 12/2/2023    Procedure: Angiogram Extremity Unilateral;  Surgeon: Ismael Esquivel MD;  Location: [facility] CATH/EP LAB;  Service: General;  Laterality: Left;    COLONOSCOPY N/A 10/20/2023    Procedure: COLONOSCOPY;  Surgeon: Jeff Call MD;  Location: [facility] ENDO;  Service: Endoscopy;  Laterality: N/A;    COLONOSCOPY  10/20/2023    5 YR RECALL    CYSTOSCOPY N/A 09/05/2023    Procedure: CYSTOSCOPY;  Surgeon: Adam Tyler MD;  Location: [facility] AS OR;  Service: Urology;  Laterality: N/A;    EMBOLECTOMY OR THROMBECTOMY, BLOOD VESSEL, LOWER EXTREMITY Left 12/1/2023    Procedure: EMBOLECTOMY OR THROMBECTOMY, BLOOD VESSEL, LOWER EXTREMITY;  Surgeon: Ismael Esquivel MD;  Location: [facility] CATH/EP LAB;  Service: General;  Laterality: Left;    INJECTION OF TISSUE PLASMINOGEN ACTIVATOR Left 08/06/2022    Procedure: INJECTION, TISSUE PLASMINOGEN ACTIVATOR;  Surgeon: Ismael Esquivel MD;  Location: [facility] CATH/EP LAB;  Service: General;  Laterality: Left;    INJECTION OF TISSUE PLASMINOGEN ACTIVATOR Left 08/06/2022    Procedure: INJECTION, TISSUE PLASMINOGEN ACTIVATOR;  Surgeon: Ismael Esquivel MD;  Location: [facility] CATH/EP LAB;  Service: General;  Laterality: Left;    PROSTATE SURGERY  2018    REPAIR OF ANEURYSM Left 07/21/2021    Procedure: REPAIR POPLITEAL ARTERY ANEURYSM;  Surgeon: Ismael Esquivel MD;  Location: [facility] OR;  Service: Cardiovascular;  Laterality: Left;    THROMBECTOMY  08/06/2022    Procedure: THROMBECTOMY;  Surgeon: Ismael Esquivel MD;  Location: [facility] CATH/EP LAB;  Service: General;;    THROMBECTOMY Left 08/06/2022    Procedure: THROMBECTOMY;  Surgeon: Ismael Esquivel MD;  Location: [facility] CATH/EP LAB;  Service: General;  Laterality: Left;    THROMBECTOMY, LOWER ARTERIAL  12/2/2023    Procedure: Thrombectomy, Lower Arterial;  Surgeon: Sierra  Ismael VIDAL MD;  Location: [facility] CATH/EP LAB;  Service: General;;    THYROIDECTOMY, PARTIAL  2011    TONSILLECTOMY      as a child    TRANSRECTAL BIOPSY OF PROSTATE WITH ULTRASOUND GUIDANCE N/A 09/05/2023    Procedure: BIOPSY, PROSTATE, RECTAL APPROACH, WITH US GUIDANCE;  Surgeon: Adam Tyler MD;  Location: [facility] ASU OR;  Service: Urology;  Laterality: N/A;    TRANSURETHRAL RESECTION OF PROSTATE N/A 11/16/2023    Procedure: TURP (TRANSURETHRAL RESECTION OF PROSTATE);  Surgeon: Adam Tyler MD;  Location: [facility] OR;  Service: Urology;  Laterality: N/A;    urolift  04/2019    [facility]     Family History   Problem Relation Name Age of Onset    Hypertension Mother      COPD Mother      Pulmonary embolism Father         Marital Status:   Alcohol History:  reports that he does not currently use alcohol after a past usage of about 1.0 standard drink of alcohol per week.  Tobacco History:  reports that he has quit smoking. His smoking use included cigarettes. He has been exposed to tobacco smoke. He has never used smokeless tobacco.  Drug History:  reports no history of drug use.    Review of patient's allergies indicates:  No Known Allergies  Current Medications[1]    Objective:      Vitals:    07/29/25 0708   BP: 110/68   Pulse: 63   Resp: 18   SpO2: 96%   Weight: 84.4 kg (186 lb)   Height: 6' (1.829 m)     Physical Exam    Vitals: Blood pressure: 110/68.  General: No acute distress. Well-developed. Well-nourished.  Eyes: EOMI. Sclerae anicteric.  HENT: Normocephalic. Atraumatic. Nares patent. Moist oral mucosa.  Ears: Bilateral TMs clear. Bilateral EACs clear.  Cardiovascular: Regular rate. Regular rhythm. No murmurs. No rubs. No gallops. Normal S1, S2.  Respiratory: Normal respiratory effort. Clear to auscultation bilaterally. No rales. No rhonchi. No wheezing.  Abdomen: Soft. Non-tender. Non-distended. Normoactive bowel sounds.  Musculoskeletal: No  obvious deformity.  Extremities: No lower extremity  edema.  Neurological: Alert & oriented x3. No slurred speech. Normal gait.  Psychiatric: Normal mood. Normal affect. Good insight. Good judgment.  Skin: Warm. Dry. No rash.         Assessment:       Assessment & Plan    - Reviewed urology history, including recent PSA elevation.  - Assessed CT urogram results showing nonobstructing right renal stone, no hydronephrosis, no enhancing renal mass.  - Noted chronic bladder outlet obstruction likely due to prostate.  - Evaluated cardiac status, continued medical management for BP and surveillance for mild valve disease.  - Considered Fasenra's effectiveness in managing respiratory condition.  - Assessed skin for concerning lesions, found only benign sebaceous keratoses.  - Reviewed history of basal cell carcinoma removal.  - Evaluated overall health status, including weight management and exercise routine.    ELEVATED PROSTATE SPECIFIC ANTIGEN (PSA):  - Monitored PSA levels showing significant increase: from baseline of 2 to 4 two months ago, and now 4.5-4.6 twelve days ago, indicating a doubling in approximately one year.  - This trend is potentially concerning for prostate pathology.  - Scheduled MRI prostate for today at 4:00 PM to further evaluate.  - Arranged follow-up with urology next week to discuss MRI results and recommendations.  - Discussed prostate health and recent procedures, including TURP.    BLADDER-NECK OBSTRUCTION:  - Monitored chronic bladder outlet obstruction likely due to prostatic enlargement.  - CT confirmed this condition with no concerning additional findings.    MICROSCOPIC HEMATURIA:  - Monitored microhematuria with ongoing surveillance of blood in urine.  - CT urogram revealed nonobstructing right renal stone without hydronephrosis or enhancing renal mass that would account for hematuria.  - Plan to repeat urinalysis in 6 months to continue monitoring.    KIDNEY CALCULUS:  - Monitored nonobstructing right renal stone identified on CT urogram  without hydronephrosis or enhancing renal mass.    CATARACT:  - Monitored worsening eyesight and nocturnal glare, indicating potential cataract progression.  - Evaluated visual symptoms including increased glare at night and deteriorating visual acuity.  - Plan to schedule appointment with ophthalmologist Dr. Rivero to discuss cataract surgery options.    RESPIRATORY DISORDER:  - Monitored respiratory status with no exacerbations since initiating Fasenra therapy.  - Helder still experiences dyspnea with exertion when cutting grass, but overall respiratory function has improved.  - Will continue Fasenra therapy which has been effective for management of lung issues.    DYSPHAGIA:  - Monitored sensation of foreign body in esophageal region affecting phonation.  - Pulmonologist's evaluation indicated the issue is primarily esophageal rather than pulmonary in origin, with impact on vocal quality.    ESSENTIAL HYPERTENSION:  - Monitored blood pressure which is well-controlled at 110/68 mmHg on current medication regimen.  - Evaluated hypertension control, noting excellent response to therapy.  - Continued current antihypertensive medication.  - Ordered complete labsup including lipid panel prior to next visit.    BASAL CELL CARCINOMA AND HISTORY OF SKIN CANCER:  - Monitored history of basal cell carcinoma on the shoulder which was previously excised.  - Evaluated current skin status with no concerning lesions identified on exam.  - Referred to Dr. Brooke Manley for routine dermatology skin check to monitor for recurrence.    GENERAL HEALTH RECOMMENDATIONS:  - Helder to avoid outdoor activities during the hottest parts of the day to prevent heat exhaustion.  - Helder to continue current exercise regimen, resuming bike riding when weather cools down.    FOLLOW-UP:  - Follow up in 6 months (January).       Plan:       Hypertension, unspecified type    Skin exam, screening for cancer  -     Ambulatory referral/consult  to Dermatology; Future; Expected date: 07/29/2025    Heterozygous MTHFR mutation C677T    Hyperlipidemia, unspecified hyperlipidemia type    Chronic obstructive pulmonary disease, unspecified COPD type    BPH with urinary obstruction      Follow up in about 6 months (around 1/29/2026).    This note was generated with the assistance of ambient listening technology. Verbal consent was obtained by the patient and accompanying visitor(s) for the recording of patient appointment to facilitate this note. I attest to having reviewed and edited the generated note for accuracy, though some syntax or spelling errors may persist. Please contact the author of this note for any clarification.          7/29/2025 Riley Atkinson PA-C      Answers submitted by the patient for this visit:  Review of Systems Questionnaire (Submitted on 7/28/2025)  activity change: No  unexpected weight change: No  neck pain: No  hearing loss: No  rhinorrhea: No  trouble swallowing: No  eye discharge: No  visual disturbance: Yes  chest tightness: No  wheezing: No  chest pain: No  palpitations: No  blood in stool: No  constipation: No  vomiting: No  diarrhea: No  polydipsia: No  polyuria: No  difficulty urinating: No  urgency: Yes  hematuria: No  joint swelling: No  arthralgias: No  headaches: No  weakness: No  confusion: No  dysphoric mood: No         [1]   Current Outpatient Medications:     albuterol (VENTOLIN HFA) 90 mcg/actuation inhaler, Inhale 2 puffs into the lungs every 4 (four) hours as needed for Wheezing or Shortness of Breath. Rescue, Disp: 18 g, Rfl: 11    albuterol-ipratropium (DUO-NEB) 2.5 mg-0.5 mg/3 mL nebulizer solution, Take 3 mLs by nebulization every 6 (six) hours as needed for Wheezing or Shortness of Breath. Rescue, Disp: 240 mL, Rfl: 5    apixaban (ELIQUIS) 5 mg Tab, Take 1 tablet (5 mg total) by mouth 2 (two) times daily., Disp: 60 tablet, Rfl: 7    benralizumab (FASENRA PEN) 30 mg/mL AtIn, Inject 30 mg into the skin every 8  weeks., Disp: 1 mL, Rfl: 5    budesonide-glycopyr-formoterol (BREZTRI AEROSPHERE) 160-9-4.8 mcg/actuation HFAA, Inhale 2 puffs into the lungs 2 (two) times a day., Disp: 32.1 g, Rfl: 3    cyanocobalamin/folic ac/vit B6 (FOLBIC ORAL), Take 1 tablet by mouth once daily., Disp: , Rfl:     labetaloL (NORMODYNE) 300 MG tablet, Take 1 tablet (300 mg total) by mouth 2 (two) times a day., Disp: 180 tablet, Rfl: 1    olmesartan (BENICAR) 40 MG tablet, Take 1 tablet (40 mg total) by mouth every morning., Disp: 90 tablet, Rfl: 1    rosuvastatin (CRESTOR) 10 MG tablet, Take 1 tablet (10 mg total) by mouth once daily., Disp: 90 tablet, Rfl: 1  No current facility-administered medications for this visit.    Facility-Administered Medications Ordered in Other Visits:     lactated ringers infusion, , Intravenous, Continuous, Jasbir Aragon MD, Last Rate: 75 mL/hr at 11/07/19 1333, 1,000 mL at 11/07/19 1333

## 2025-08-08 ENCOUNTER — TELEPHONE (OUTPATIENT)
Dept: OPHTHALMOLOGY | Facility: CLINIC | Age: 65
End: 2025-08-08
Payer: MEDICARE

## 2025-08-08 NOTE — TELEPHONE ENCOUNTER
Copied from CRM #[id number]. Topic: General Inquiry - Patient Advice  >> Aug 8, 2025  2:05 PM Sandra wrote:  Type: Needs Medical Advice  Who Called:  Pt     Best Call Back Number: [phone]    Additional Information: Pt asking for a call back to discuss scheduling a procedure. Pls call back and advise

## 2025-08-10 ENCOUNTER — PATIENT MESSAGE (OUTPATIENT)
Dept: ADMINISTRATIVE | Facility: OTHER | Age: 65
End: 2025-08-10
Payer: MEDICARE

## 2025-08-21 ENCOUNTER — OFFICE VISIT (OUTPATIENT)
Dept: UROLOGY | Facility: CLINIC | Age: 65
End: 2025-08-21
Payer: MEDICARE

## 2025-08-21 DIAGNOSIS — R97.20 ELEVATED PSA: Primary | ICD-10-CM

## (undated) DEVICE — DRAPE CYSTOSCOPY LARGE

## (undated) DEVICE — Device

## (undated) DEVICE — GUIDEWIRE STIFF ANGLED 035X260 LAUREATE

## (undated) DEVICE — GLOVE SENSICARE PI ALOE 6.5

## (undated) DEVICE — CLIP APPLIER MSM20 STERILMED  ETHMSM20

## (undated) DEVICE — TUBING SUC UNIV W/CONN 12FT

## (undated) DEVICE — DRAPE C-ARM (FITS NEW C-ARM) 07-CA108

## (undated) DEVICE — GUIDE BIOPSY BIPLANAR 18G

## (undated) DEVICE — SOLUTION SCRUB IODINE 4OZ

## (undated) DEVICE — CONTAINER SPECIMEN OR STER 4OZ

## (undated) DEVICE — SPONGE BULKEE II ABSRB 6X6.75

## (undated) DEVICE — CATHETER TRAILBLAZER 0.014/150

## (undated) DEVICE — BAG DRAIN ANTI REFLUX 2000ML

## (undated) DEVICE — SYR 50ML CATH TIP

## (undated) DEVICE — SOLUTION DURAPREP 8630

## (undated) DEVICE — NDL BLUNT W/O FILTER 18GX1.5IN

## (undated) DEVICE — SET SOLENT OMNI 120CM 6FR

## (undated) DEVICE — GLOVE SURG ULTRA TOUCH 7

## (undated) DEVICE — GUIDEWIRE REGALIA XS 0.014X300CM

## (undated) DEVICE — ELECTRODE RESECTION BUTTON LRG

## (undated) DEVICE — BAG LINGEMAN DRAIN UROLOGY

## (undated) DEVICE — TAPE UMBILICAL COTTON XR RADIOPAQU 1/8 X 36 STERILE

## (undated) DEVICE — SHEATH PINNACLE 5FRX10CM W/GUIDEWIRE

## (undated) DEVICE — SCRUB 10% POVIDONE IODINE 4OZ

## (undated) DEVICE — SOL NACL IRR 1000ML BTL

## (undated) DEVICE — ELECTRODE RESECTION LOOP LRGE

## (undated) DEVICE — TRAY SKIN PREP DRY

## (undated) DEVICE — SPONGE LAP 18X18

## (undated) DEVICE — INTRODUCER KIT STICK MINI MAX 5F X 10

## (undated) DEVICE — DRAPE IOBAN 23X33 6651EZ

## (undated) DEVICE — GLOVE SENSICARE PI ALOE 7

## (undated) DEVICE — SUTURE VICRYL 3-0 SH 27 VCP416H

## (undated) DEVICE — GUN BIOPSY 18GA MONOPLY

## (undated) DEVICE — WINGSET SLBCS 23X.75 12LUER

## (undated) DEVICE — SYR LUER LOCK STERILE 10ML

## (undated) DEVICE — SCRUB DYNA-HEX LIQ 4% CHG 4OZ

## (undated) DEVICE — PAD BOVIE ADULT

## (undated) DEVICE — TUBING ARTHRO IRR 4-LEAD

## (undated) DEVICE — COVER TRANSDUCER LATEX N/STERI

## (undated) DEVICE — COVER PROBE 3D/4D

## (undated) DEVICE — SHEATH INTRODUCER DESTINATION 6FX45

## (undated) DEVICE — GOWN SMART LRG 044673

## (undated) DEVICE — HEMOSTAT SURGICEL 4X8

## (undated) DEVICE — SUTURE MONOCRYL 4-0 PS-2 27 MCP426H

## (undated) DEVICE — DRESSING POST OP MEPILEX AG  4X14

## (undated) DEVICE — SOLUTION IRRI NS BOTTLE 1000ML R5200-01

## (undated) DEVICE — NDL SPINAL 22GX7 SPINOCAN

## (undated) DEVICE — THROMBECTOMY DEVICE PRONTO V4 5.5F

## (undated) DEVICE — DRAPE 3/4

## (undated) DEVICE — SUTURE PROLENE 7-0 BV175-6 24 8735H

## (undated) DEVICE — SHEATH PINNACLE 6FRX10CM W/GUIDEWIRE

## (undated) DEVICE — GOWN POLY REINF BRTH SLV LG

## (undated) DEVICE — GUIDEWIRE REG. ANGLED .035X260 LAUREATE

## (undated) DEVICE — GLOVE BIOGEL PI ULTRA TOUCH GRAY SZ7.5

## (undated) DEVICE — BOOT SUTURE YELLOW 30-706

## (undated) DEVICE — CLIP APPLIER MCS20 STERILMED ETHMCS20

## (undated) DEVICE — LOOP MAXI RED 30-722

## (undated) DEVICE — SOL NACL IRR 3000ML

## (undated) DEVICE — DRAPE CVS SPLIT SHEET 29154

## (undated) DEVICE — SUTURE SILK 2-0 18 A185H

## (undated) DEVICE — TUBING SUCTION SET ENDOMAT

## (undated) DEVICE — BAG DRAIN ANTI REFLUX 4000ML

## (undated) DEVICE — LEGGING CLEAR POLY 2/PACK

## (undated) DEVICE — KIT INFLATION MERIT (IN8152, HP9200E)

## (undated) DEVICE — SLEEVE SCD EXPRESS KNEE MEDIUM

## (undated) DEVICE — SPONGE XRAY DETECTABLE 4X4

## (undated) DEVICE — TRAY VASCULAR SLIDELL MEMORIAL

## (undated) DEVICE — SET CYSTO IRR DRP CHMBR 84IN

## (undated) DEVICE — CATHETER TRAILBLAZER 35X135

## (undated) DEVICE — GUIDEWIRE BMW 0.014/300

## (undated) DEVICE — SUTURE PROLENE 7-0 BV-1 24 8702H

## (undated) DEVICE — CATH. COUDE 14FR 5CC BALLOON ECKHOLDT

## (undated) DEVICE — SUTURE VICRYL 3-0 18 SH VCP864D

## (undated) DEVICE — SPONGE GAUZE 16PLY 4X4

## (undated) DEVICE — CATHETER RIM

## (undated) DEVICE — CATHETER ANGIO OMNI FLUSH 10732201

## (undated) DEVICE — JELLY SURGILUBE LUBE TUBE 2OZ

## (undated) DEVICE — SUTURE PROLENE 6-0 BV-1 30 8709H

## (undated) DEVICE — COVER LIGHT HANDLE LB53

## (undated) DEVICE — PATCH CELOX VASCULAR HEMOSTATIC

## (undated) DEVICE — COVER TABLE 44X90 STERILE

## (undated) DEVICE — DRESSING POST OP MEPILEX AG 4X8

## (undated) DEVICE — GOWN POLY REINF BRTH SLV XL

## (undated) DEVICE — GLOVE SENSICARE PI ALOE 6

## (undated) DEVICE — STAPLER SKIN NON ROTATE PXW35

## (undated) DEVICE — GOWN SURGICAL BASICS XL

## (undated) DEVICE — GLIDECATH ANGLED TAPER 5FR 100CM CG508

## (undated) DEVICE — SUTURE SILK 2-0 SH 18 CR C012D